# Patient Record
Sex: FEMALE | Race: BLACK OR AFRICAN AMERICAN | NOT HISPANIC OR LATINO | Employment: FULL TIME | URBAN - METROPOLITAN AREA
[De-identification: names, ages, dates, MRNs, and addresses within clinical notes are randomized per-mention and may not be internally consistent; named-entity substitution may affect disease eponyms.]

---

## 2018-01-01 ENCOUNTER — TRANSFERRED RECORDS (OUTPATIENT)
Dept: HEALTH INFORMATION MANAGEMENT | Facility: CLINIC | Age: 28
End: 2018-01-01

## 2018-01-01 LAB — PAP SMEAR - HIM PATIENT REPORTED: NEGATIVE

## 2018-06-12 ENCOUNTER — OFFICE VISIT (OUTPATIENT)
Dept: FAMILY MEDICINE | Facility: CLINIC | Age: 28
End: 2018-06-12
Payer: COMMERCIAL

## 2018-06-12 VITALS
SYSTOLIC BLOOD PRESSURE: 120 MMHG | HEIGHT: 61 IN | WEIGHT: 146.7 LBS | TEMPERATURE: 98.8 F | OXYGEN SATURATION: 99 % | BODY MASS INDEX: 27.7 KG/M2 | DIASTOLIC BLOOD PRESSURE: 80 MMHG | RESPIRATION RATE: 14 BRPM | HEART RATE: 70 BPM

## 2018-06-12 DIAGNOSIS — N92.1 MENORRHAGIA WITH IRREGULAR CYCLE: ICD-10-CM

## 2018-06-12 DIAGNOSIS — N76.0 BV (BACTERIAL VAGINOSIS): ICD-10-CM

## 2018-06-12 DIAGNOSIS — B96.89 BV (BACTERIAL VAGINOSIS): ICD-10-CM

## 2018-06-12 DIAGNOSIS — R10.32 ABDOMINAL PAIN, LEFT LOWER QUADRANT: Primary | ICD-10-CM

## 2018-06-12 DIAGNOSIS — N94.89 ADNEXAL MASS: ICD-10-CM

## 2018-06-12 DIAGNOSIS — Z11.3 SCREENING EXAMINATION FOR VENEREAL DISEASE: ICD-10-CM

## 2018-06-12 LAB
BETA HCG QUAL IFA URINE: NEGATIVE
SPECIMEN SOURCE: ABNORMAL
WET PREP SPEC: ABNORMAL

## 2018-06-12 PROCEDURE — 87491 CHLMYD TRACH DNA AMP PROBE: CPT | Performed by: FAMILY MEDICINE

## 2018-06-12 PROCEDURE — 87177 OVA AND PARASITES SMEARS: CPT | Mod: 59 | Performed by: FAMILY MEDICINE

## 2018-06-12 PROCEDURE — 87209 SMEAR COMPLEX STAIN: CPT | Performed by: FAMILY MEDICINE

## 2018-06-12 PROCEDURE — 99203 OFFICE O/P NEW LOW 30 MIN: CPT | Performed by: FAMILY MEDICINE

## 2018-06-12 PROCEDURE — 87591 N.GONORRHOEAE DNA AMP PROB: CPT | Performed by: FAMILY MEDICINE

## 2018-06-12 PROCEDURE — 84703 CHORIONIC GONADOTROPIN ASSAY: CPT | Performed by: FAMILY MEDICINE

## 2018-06-12 PROCEDURE — 87210 SMEAR WET MOUNT SALINE/INK: CPT | Performed by: FAMILY MEDICINE

## 2018-06-12 PROCEDURE — 36415 COLL VENOUS BLD VENIPUNCTURE: CPT | Performed by: FAMILY MEDICINE

## 2018-06-12 PROCEDURE — 85025 COMPLETE CBC W/AUTO DIFF WBC: CPT | Performed by: FAMILY MEDICINE

## 2018-06-12 RX ORDER — METRONIDAZOLE 500 MG/1
500 TABLET ORAL 2 TIMES DAILY
Qty: 14 TABLET | Refills: 0 | Status: SHIPPED | OUTPATIENT
Start: 2018-06-12 | End: 2018-06-25

## 2018-06-12 NOTE — PATIENT INSTRUCTIONS
Douleur abdominale (abdominal pain) de cause inconnue (chez les femmes)   La cause exacte de votre douleur abdominale (ventre) n'est pas connue de manière certaine. Amry ne signifie en aucun bart que vous devriez vous inquiéter, ou que les tests adéquats n'ont pas été effectués. Tout le monde préfère connaître la cause exacte de son problème, mais, dans le bart de la douleur abdominale, il arrive parfois qu'il n'existe aucune cause clairement déterminée, ce sandhya peut être une bonne chose. La bonne nouvelle est que carly symptômes peuvent être traités, et que vous allez vous sentir mieux.   Votre état ne semble pas sérieux maintenant; toutefois, il peut arriver que les signes d'un problème sérieux prennent plus de temps à se manifester. Pour cette raison, il est important que vous surveilliez l'apparition de nouveaux symptômes ou problèmes, ou de toute aggravation de votre état.  Au cours mary prochains jours, la douleur abdominale peut être intermittente, ou sonja continue. D'autres symptômes répandus peuvent comprendre la nausée (nausea) et le vomissement (vomiting). Il se peut qu'il vous soit difficile de dire si vous avez de la nausée; il se peut simplement que vous ne vous sentiez pas sonja et que vous n'associiez pas amberly à de la nausée. La constipation (constipation), la diarrhée (diarrhea) et la fièvre (fever) peuvent toutes accompagner cette douleur.  La douleur peut perdurer au cours mary prochains jours, même si edmundo est traitée de manière adéquate. Suivant la tournure de choses, il arrive que la cause devienne plus évidente, et edmundo peut dès lors nécessiter plus de traitement, ou un traitement différent. D'autres analyses, médicaments ou tests peuvent être nécessaires.  Soins à domicile  Votre fournisseur de soins de santé peut vous prescrire mary médicaments afin de couvrir la douleur, les symptômes ou une infection (infection). Suivez ses instructions pour prendre yuri médicaments.  Soins  généraux    Reposez-vous jusqu'à votre prochain examen. N'entreprenez aucune activité fatigante.    Essayez de trouver mary positions sandhya calment votre inconfort (discomfort). Un petit oreiller placé en l'abdomen peut aider à soulager la douleur.    Quelque chose de chaud en votre abdomen (par exemple un coussin chauffant (heating pad)) peut s'avérer utile, mais faites attention à ne pas vous brûler.  Régime alimentaire    Ne vous forcez pas à manger, en particulier si vous avez mary crampes (cramps), mary vomissements ou de la diarrhée.    L'eau est importante pour éviter la déshydratation (dehydrated). La soupe peut également être bonne pour vous. Les boissons énergétiques peuvent également s'avérer bénéfiques, en particulier si elles ne sont pas trop acides. Assurez-vous de ne pas boire de boissons sucrées, car celles-ci peuvent aggraver votre état. Buvez mary liquides en petites quantités. Buvez-les très lentement.    La caféine aggrave parfois la douleur et les crampes.    Évitez les produits laitiers si vous vomissez ou avez de la diarrhée.    Ne mangez pas de grandes quantités de nourriture en une seule fois. Attendez quelques minutes entre les bouchées.    Adoptez un régime à faible teneur en fibres (dit également   régime sans résidu   (low-residue diet)). Les aliments permis sont les pains de grains raffinés, le lester elsi, les jus de fruits et légumes sans la pulpe et les viandes tendres. Clay aliments passeront plus facilement par l'intestin.    Évitez les aliments en grains entiers, les fruits et légumes entiers, les viandes, les graines et les noix, les nourritures frites et grasses, les produits laitiers, l'alcool et la nourriture épicée jusqu'à disparition mary symptômes.  Suivi  Réalisez le suivi avec votre fournisseur de soins de santé comme indiqué, ou si votre douleur n'a pas commencé à diminuer sous les prochaines 24 heures.  Quand devez-vous obtenir mary soins médicaux  Cherchez à obtenir mary soins  médicaux dans les plus brefs délais dans tous les bart suivants :    La douleur s'aggrave ou se déplace vers la partie droite du bas-ventre.    Nouveau vomissement ou nouvelle diarrhée, ou aggravation de ceux-ci    Gonflement (swelling) de l'abdomen    Incapacité d'aller à la selle pendant plus de trois jours    Fièvre de 100,4  F (38  C) ou plus, ou autre température indiquée par votre fournisseur de soins de santé.    Sang dans le vomi ou les selles (couleur rouge foncé ou noire)    Jaunisse (jaundice) (yeux et peau de couleur jaune)    Faiblesse (weakness), étourdissement (dizziness)    Douleur dans le thorax, le dos, le cou ou la mâchoire    Saignement vaginal (vaginal bleeding) imprévu ou absence de règle (missed period)  Appelez le 911  Appelez les services d'urgences dans tous les bart suivants :    Difficulté à respirer (trouble breathing)    Confusion (confusion)    Syncope (fainting) ou perte de connaissance (loss of consciousness)    Rythme cardiaque rapide (rapid heat rate)    Crise épileptique (seizure)  Date Last Reviewed: 12/30/2015 2000-2017 [employer]. [address] [place] [address], [place] [address]. All rights reserved. This information is not intended as a substitute for professional medical care. Always follow your healthcare professional's instructions.

## 2018-06-12 NOTE — MR AVS SNAPSHOT
After Visit Summary   6/12/2018    Hilaria Jansen    MRN: [id number]           Patient Information     Date Of Birth          1990        Visit Information        Provider Department      6/12/2018 10:45 AM Taj Sun MD; [facility]        Today's Diagnoses     Menorrhagia with irregular cycle    -  1    Abdominal pain, left lower quadrant          Care Instructions      Douleur abdominale (abdominal pain) de cause inconnue (chez les femmes)   La cause exacte de votre douleur abdominale (ventre) n'est pas connue de manière certaine. Mary ne signifie en aucun bart que vous devriez vous inquiéter, ou que les tests adéquats n'ont pas été effectués. Tout le monde préfère connaître la cause exacte de son problème, mais, dans le bart de la douleur abdominale, il arrive parfois qu'il n'existe aucune cause clairement déterminée, ce sandhya peut être une bonne chose. La bonne nouvelle est que carly symptômes peuvent être traités, et que vous allez vous sentir mieux.   Votre état ne semble pas sérieux maintenant; toutefois, il peut arriver que les signes d'un problème sérieux prennent plus de temps à se manifester. Pour cette raison, il est important que vous surveilliez l'apparition de nouveaux symptômes ou problèmes, ou de toute aggravation de votre état.  Au cours mary prochains jours, la douleur abdominale peut être intermittente, ou sonja continue. D'autres symptômes répandus peuvent comprendre la nausée (nausea) et le vomissement (vomiting). Il se peut qu'il vous soit difficile de dire si vous avez de la nausée; il se peut simplement que vous ne vous sentiez pas sonja et que vous n'associiez pas amberly à de la nausée. La constipation (constipation), la diarrhée (diarrhea) et la fièvre (fever) peuvent toutes accompagner cette douleur.  La douleur peut perdurer au cours mary prochains jours, même si edmundo est traitée de manière adéquate. Suivant la tournure de choses, il arrive que  la cause devienne plus évidente, et edmundo peut dès lors nécessiter plus de traitement, ou un traitement différent. D'autres analyses, médicaments ou tests peuvent être nécessaires.  Soins à domicile  Votre fournisseur de soins de santé peut vous prescrire mary médicaments afin de couvrir la douleur, les symptômes ou une infection (infection). Suivez ses instructions pour prendre yuri médicaments.  Soins généraux    Reposez-vous jusqu'à votre prochain examen. N'entreprenez aucune activité fatigante.    Essayez de trouver mary positions sandhya calment votre inconfort (discomfort). Un petit oreiller placé en l'abdomen peut aider à soulager la douleur.    Quelque chose de chaud en votre abdomen (par exemple un coussin chauffant (heating pad)) peut s'avérer utile, mais faites attention à ne pas vous brûler.  Régime alimentaire    Ne vous forcez pas à manger, en particulier si vous avez mary crampes (cramps), mary vomissements ou de la diarrhée.    L'eau est importante pour éviter la déshydratation (dehydrated). La soupe peut également être bonne pour vous. Les boissons énergétiques peuvent également s'avérer bénéfiques, en particulier si elles ne sont pas trop acides. Assurez-vous de ne pas boire de boissons sucrées, car celles-ci peuvent aggraver votre état. Buvez mary liquides en petites quantités. Buvez-les très lentement.    La caféine aggrave parfois la douleur et les crampes.    Évitez les produits laitiers si vous vomissez ou avez de la diarrhée.    Ne mangez pas de grandes quantités de nourriture en une seule fois. Attendez quelques minutes entre les bouchées.    Adoptez un régime à faible teneur en fibres (dit également   régime sans résidu   (low-residue diet)). Les aliments permis sont les pains de grains raffinés, le lester elsi, les jus de fruits et légumes sans la pulpe et les viandes tendres. Yuri aliments passeront plus facilement par l'intestin.    Évitez les aliments en grains entiers, les fruits et légumes  entiers, les viandes, les graines et les noix, les nourritures frites et grasses, les produits laitiers, l'alcool et la nourriture épicée jusqu'à disparition mary symptômes.  Suivi  Réalisez le suivi avec votre fournisseur de soins de santé comme indiqué, ou si votre douleur n'a pas commencé à diminuer sous les prochaines 24 heures.  Quand devez-vous obtenir mary soins médicaux  Cherchez à obtenir mary soins médicaux dans les plus brefs délais dans tous les bart suivants :    La douleur s'aggrave ou se déplace vers la partie droite du bas-ventre.    Nouveau vomissement ou nouvelle diarrhée, ou aggravation de ceux-ci    Gonflement (swelling) de l'abdomen    Incapacité d'aller à la selle pendant plus de trois jours    Fièvre de 100,4  F (38  C) ou plus, ou autre température indiquée par votre fournisseur de soins de santé.    Sang dans le vomi ou les selles (couleur rouge foncé ou noire)    Jaunisse (jaundice) (yeux et peau de couleur jaune)    Faiblesse (weakness), étourdissement (dizziness)    Douleur dans le thorax, le dos, le cou ou la mâchoire    Saignement vaginal (vaginal bleeding) imprévu ou absence de règle (missed period)  Appelez le 911  Appelez les services d'urgences dans tous les bart suivants :    Difficulté à respirer (trouble breathing)    Confusion (confusion)    Syncope (fainting) ou perte de connaissance (loss of consciousness)    Rythme cardiaque rapide (rapid heat rate)    Crise épileptique (seizure)  Date Last Reviewed: 12/30/2015 2000-2017 [employer]. [address] [place] [address], [place] [address]. All rights reserved. This information is not intended as a substitute for professional medical care. Always follow your healthcare professional's instructions.                Follow-ups after your visit        Future tests that were ordered for you today     Open Future Orders        Priority Expected Expires Ordered    Ova and Parasite Exam Routine Routine  6/12/2019 6/12/2018            Who  "to contact     If you have questions or need follow up information about today's clinic visit or your schedule please contact [facility] directly at [phone].  Normal or non-critical lab and imaging results will be communicated to you by MyChart, letter or phone within 4 business days after the clinic has received the results. If you do not hear from us within 7 days, please contact the clinic through MyChart or phone. If you have a critical or abnormal lab result, we will notify you by phone as soon as possible.  Submit refill requests through [employer] or call your pharmacy and they will forward the refill request to us. Please allow 3 business days for your refill to be completed.          Additional Information About Your Visit        [facility]Charlotte [facility] HospitalYnvisible Information     [employer] lets you send messages to your doctor, view your test results, renew your prescriptions, schedule appointments and more. To sign up, go to [email or web address] . Click on \"Log in\" on the left side of the screen, which will take you to the Welcome page. Then click on \"Sign up Now\" on the right side of the page.     You will be asked to enter the access code listed below, as well as some personal information. Please follow the directions to create your username and password.     Your access code is: [email or web address]  Expires: 9/10/2018 11:36 AM     Your access code will  in 90 days. If you need help or a new code, please call your [place] clinic or [phone].        Care EveryWhere ID     This is your Care EveryWhere ID. This could be used by other organizations to access your [place] medical records  [id number]        Your Vitals Were     Pulse Temperature Respirations Height Last Period Pulse Oximetry    70 98.8  F (37.1  C) (Oral) 14 5' 1\" (1.549 m) (LMP Unknown) 99%    Breastfeeding? BMI (Body Mass Index)                No 27.72 kg/m2           Blood Pressure from Last 3 Encounters:   18 120/80    Weight from Last 3 " Encounters:   06/12/18 146 lb 11.2 oz (66.5 kg)              We Performed the Following     Beta HCG Qual, Urine - [facility] ([id number])     CBC with platelets differential     Eosinophil smear     Wet prep        Primary Care Provider Office Phone # Fax #    [facility] [phone] [address]  [facility] [address]        Equal Access to Services     SHERMAN REYES : Hadii aad ku hadasho Soomaali, waaxda luqadaha, qaybta kaalmada adeegyada, waxay idiin hayaan adeeg khararomeo lakerline . So [facility] [phone].    ATENCIÓN: Si habla español, tiene a coronado disposición servicios gratuitos de asistencia lingüística. Llame al [phone].    We comply with applicable federal civil rights laws and [place] laws. We do not discriminate on the basis of race, color, national origin, age, disability, sex, sexual orientation, or gender identity.            Thank you!     Thank you for choosing [facility]  for your care. Our goal is always to provide you with excellent care. Hearing back from our patients is one way we can continue to improve our services. Please take a few minutes to complete the written survey that you may receive in the mail after your visit with us. Thank you!             Your Updated Medication List - Protect others around you: Learn how to safely use, store and throw away your medicines at www.disposemymeds.org.      Notice  As of 6/12/2018 11:36 AM    You have not been prescribed any medications.

## 2018-06-12 NOTE — PROGRESS NOTES
SUBJECTIVE:   Hilaria Jansen is a 28 year old female who presents to clinic today for the following health issues:    Chief Complaint   Patient presents with     Pelvic Pain     > 1 month-missed period last month-feels like somthing moving inside-painful urination and constipation-left flank pain-   28-year-old who presents to clinic to establish care.  Past medical history significant for a left adnexal mass that was being evaluated by OB/GYN.  Today she is concerned about multiple symptoms.  She noticed progressive intermittent abdominal pain worse in the left lower quadrant.  She also describes that she feels that there is something moving.  (The patient specifically says it is not gas or bowel movements).   She is concerned about a parasitic infection.  She recently traveled to [place] and stayed for a month.  She denies any hiking or wilderness trips.  No other family members experienced the same symptoms.  She denies any episodes of constipation or diarrhea.  Denies any changes to her stool.  Pain is still located in the left lower quadrant.  Occasionally increases to 7 out of 10.  Currently relieved by Advil.    Also, she is concerned about a vaginal discharge with some odor that has been present for the past 1-2 weeks.  Has not tried any medications for it.    Also, she missed a menstrual cycle last month but had another episode of bleeding this month.  Menstrual cycle lasted from Trinidad 3, 2018 to June 7, 2018.  Abdominal pain and feeling that something is moving.     Feels that there is something moving.  Last month. And this month and now it is constant.   Abdominal pain LLQ. 7/10. Intermittent Advil.     Problem list and histories reviewed & adjusted, as indicated.  Additional history: as documented    There is no problem list on file for this patient.    History reviewed. No pertinent surgical history.    Social History   Substance Use Topics     Smoking status: Never Smoker     Smokeless tobacco: Never  "Used     Alcohol use No     Family History   Problem Relation Age of Onset     Breast Cancer Paternal Aunt      Other - See Comments Maternal Aunt            Reviewed and updated as needed this visit by clinical staff         ROS:  Constitutional, HEENT, cardiovascular, pulmonary, gi and gu systems are negative, except as otherwise noted.    OBJECTIVE:     /80  Pulse 70  Temp 98.8  F (37.1  C) (Oral)  Resp 14  Ht 5' 1\" (1.549 m)  Wt 146 lb 11.2 oz (66.5 kg)  LMP  (LMP Unknown)  SpO2 99%  Breastfeeding? No  BMI 27.72 kg/m2  Body mass index is 27.72 kg/(m^2).  GENERAL: healthy, alert and no distress  NECK: no adenopathy, no asymmetry, masses, or scars and thyroid normal to palpation  RESP: lungs clear to auscultation - no rales, rhonchi or wheezes  CV: regular rate and rhythm, normal S1 S2, no S3 or S4, no murmur, click or rub, no peripheral edema and peripheral pulses strong  ABDOMEN: soft, tenderness to palpation the left lower quadrant, no hepatosplenomegaly, no masses and bowel sounds normal   (female): normal female external genitalia, normal urethral meatus, vaginal mucosa, moderate vaginal discharge with a strong odor.    Diagnostic Test Results:  Results for orders placed or performed in visit on 06/12/18 (from the past 24 hour(s))   Wet prep   Result Value Ref Range    Specimen Description Vagina     Wet Prep No Trichomonas seen     Wet Prep No yeast seen     Wet Prep Clue cells seen (A)    CBC with platelets differential   Result Value Ref Range    WBC 6.7 4.0 - 11.0 10e9/L    RBC Count 4.37 3.8 - 5.2 10e12/L    Hemoglobin 12.4 11.7 - 15.7 g/dL    Hematocrit 37.0 35.0 - 47.0 %    MCV 85 78 - 100 fl    MCH 28.4 26.5 - 33.0 pg    MCHC 33.5 31.5 - 36.5 g/dL    RDW 12.7 10.0 - 15.0 %    Platelet Count 229 150 - 450 10e9/L    Diff Method Automated Method     % Neutrophils 48.2 %    % Lymphocytes 43.8 %    % Monocytes 6.3 %    % Eosinophils 1.4 %    % Basophils 0.3 %    Absolute Neutrophil 3.2 1.6 " - 8.3 10e9/L    Absolute Lymphocytes 2.9 0.8 - 5.3 10e9/L    Absolute Monocytes 0.4 0.0 - 1.3 10e9/L    Absolute Eosinophils 0.1 0.0 - 0.7 10e9/L    Absolute Basophils 0.0 0.0 - 0.2 10e9/L   Beta HCG Qual, Urine - [facility] ([id number])   Result Value Ref Range    Beta HCG Qual IFA Urine Negative NEG^Negative        04/06/2018  1. Uterus is normal in size, shape and orientation. Two intramural myomas     noted as above.     2. Comparison made to prior US dated 1/25/18. The complex cystic/solid     mass previously seen within the right ovary has diminished in size.     Today's scan demonstrates the presence of two simple, benign appearing     follicles as well as an adjacent complex cystic mass now measuring 2.1 x     1.4 x 1.2 cm. The complex cystic mass likely represents the previous     finding and appears to be resolving. Consider another short-term follow-up     study in 6-12 weeks to confirm complete resolution.       3. Left ovary is normal in appearance.         ASSESSMENT/PLAN:   1. Abdominal pain, left lower quadrant  Assessment: The patient specifically states that she feels something moving.  Differential diagnosis includes intrauterine pregnancy, parasitic infection or gas.  Will start by ordering a urine pregnancy test to evaluate if the patient is pregnant.  We also ordered CBC with eosinophil count to evaluate for any parasitic infections.  Plan:  - Ova and Parasite Exam Routine; Future  - Eosinophil smear  - CBC with platelets differential  - Beta HCG Qual, Urine - [facility] ([id number])    2. Adnexal mass  Assessment:The patient was noted to have a complex cystic mass noted by ultrasound on April 6, 2018.  It was recommended by the radiographer to obtain a repeat ultrasound after 6-12 weeks but the patient was in vacation in [place].  During the visit today she does have tenderness to palpation in the left lower quadrant which is consistent with the location of the cystic mass noted  previously.  Plan:  -  US Pelvic Complete w Transvaginal; Future    3. BV (bacterial vaginosis)  - Wet prep  - metroNIDAZOLE (FLAGYL) 500 MG tablet; Take 1 tablet (500 mg) by mouth 2 times daily  Dispense: 14 tablet; Refill: 0    4. Menorrhagia with irregular cycle  - Beta HCG Qual, Urine - [facility] ([id number])    5. Screening examination for venereal disease  - Chlamydia trachomatis PCR  - Neisseria gonorrhoeae PCR    FOLLOW-UP: The patient was advised to return to clinic for a follow-up visit after 1-2 weeks.   Return to clinic earlier if symptoms fails to improve. if symptoms persisted or fails to improve. Obtain pelvic US and stool studies.     Taj Sun MD  [facility]  PAGER: [phone] or (W): [phone]

## 2018-06-12 NOTE — NURSING NOTE
"Chief Complaint   Patient presents with     Pelvic Pain     > 1 month-missed period last month-feels like somthing moving inside-painful urination and constipation-left flank pain-       Initial /80  Pulse 70  Temp 98.8  F (37.1  C) (Oral)  Resp 14  Ht 5' 1\" (1.549 m)  Wt 146 lb 11.2 oz (66.5 kg)  LMP  (LMP Unknown)  SpO2 99%  Breastfeeding? No  BMI 27.72 kg/m2 Estimated body mass index is 27.72 kg/(m^2) as calculated from the following:    Height as of this encounter: 5' 1\" (1.549 m).    Weight as of this encounter: 146 lb 11.2 oz (66.5 kg).  BP completed using cuff size: regular    Health Maintenance that is potentially due pending provider review:  Health Maintenance Due   Topic Date Due     TETANUS IMMUNIZATION (SYSTEM ASSIGNED)  04/28/2008     HIV SCREEN (SYSTEM ASSIGNED)  04/28/2008     PAP SCREENING Q3 YR (SYSTEM ASSIGNED)  04/28/2011         Pap-done- 3/2013-Dr. Annalisa Nam[place], [place]-[facility]  "

## 2018-06-13 LAB
BASOPHILS # BLD AUTO: 0 10E9/L (ref 0–0.2)
BASOPHILS NFR BLD AUTO: 0.3 %
C TRACH DNA SPEC QL NAA+PROBE: NEGATIVE
DIFFERENTIAL METHOD BLD: NORMAL
EOSINOPHIL # BLD AUTO: 0.1 10E9/L (ref 0–0.7)
EOSINOPHIL NFR BLD AUTO: 1.4 %
EOSINOPHIL SPEC QL WRIGHT STN: NORMAL
ERYTHROCYTE [DISTWIDTH] IN BLOOD BY AUTOMATED COUNT: 12.7 % (ref 10–15)
HCT VFR BLD AUTO: 37 % (ref 35–47)
HGB BLD-MCNC: 12.4 G/DL (ref 11.7–15.7)
LYMPHOCYTES # BLD AUTO: 2.9 10E9/L (ref 0.8–5.3)
LYMPHOCYTES NFR BLD AUTO: 43.8 %
Lab: NORMAL
MCH RBC QN AUTO: 28.4 PG (ref 26.5–33)
MCHC RBC AUTO-ENTMCNC: 33.5 G/DL (ref 31.5–36.5)
MCV RBC AUTO: 85 FL (ref 78–100)
MONOCYTES # BLD AUTO: 0.4 10E9/L (ref 0–1.3)
MONOCYTES NFR BLD AUTO: 6.3 %
N GONORRHOEA DNA SPEC QL NAA+PROBE: NEGATIVE
NEUTROPHILS # BLD AUTO: 3.2 10E9/L (ref 1.6–8.3)
NEUTROPHILS NFR BLD AUTO: 48.2 %
O+P STL MICRO: NORMAL
O+P STL MICRO: NORMAL
PLATELET # BLD AUTO: 229 10E9/L (ref 150–450)
RBC # BLD AUTO: 4.37 10E12/L (ref 3.8–5.2)
SPECIMEN SOURCE: NORMAL
WBC # BLD AUTO: 6.7 10E9/L (ref 4–11)

## 2018-06-14 ENCOUNTER — OFFICE VISIT (OUTPATIENT)
Dept: OBGYN | Facility: CLINIC | Age: 28
End: 2018-06-14
Attending: FAMILY MEDICINE
Payer: COMMERCIAL

## 2018-06-14 ENCOUNTER — RADIANT APPOINTMENT (OUTPATIENT)
Dept: ULTRASOUND IMAGING | Facility: CLINIC | Age: 28
End: 2018-06-14
Attending: FAMILY MEDICINE
Payer: COMMERCIAL

## 2018-06-14 VITALS
WEIGHT: 146 LBS | BODY MASS INDEX: 27.59 KG/M2 | DIASTOLIC BLOOD PRESSURE: 73 MMHG | HEART RATE: 70 BPM | SYSTOLIC BLOOD PRESSURE: 109 MMHG | TEMPERATURE: 97.6 F

## 2018-06-14 DIAGNOSIS — N94.89 ADNEXAL MASS: ICD-10-CM

## 2018-06-14 DIAGNOSIS — N92.6 IRREGULAR MENSES: Primary | ICD-10-CM

## 2018-06-14 PROCEDURE — 76830 TRANSVAGINAL US NON-OB: CPT | Performed by: OBSTETRICS & GYNECOLOGY

## 2018-06-14 PROCEDURE — 99203 OFFICE O/P NEW LOW 30 MIN: CPT | Performed by: OBSTETRICS & GYNECOLOGY

## 2018-06-14 NOTE — PROGRESS NOTES
"SUBJECTIVE:  Hilaria Jansen is a 28 year old female who presents to discuss abdominal pain, recent ultrasound.  She was evaluated by Dr. Sun 6/12/18.  She describes a sensation of \"something moving\" in her lower abdomen.  She describes pain in her left lower abdomen.  She had no period in May, most recent period started Trinidad 3.  She would like to become pregnant.  Pap 2013 normal ([facility]).  She states she usually has daily bowel movements, but sometimes every two days, and the stools can be hard.      An ultrasound on 4/6/18 showed two intramural fibroids.  The left ovary was normal.  The right ovary had two simple, benign appearing follicles as well as an adjacent complex cystic mass measuring 2.1 x 1.4 x 1.2 cm.  (An ultrasound 1/25/18 had showed a complex cystic/solid mass in the right ovary, larger than that seen on the 4/6/18 ultrasound).      An ultrasound in our clinic today shows the left ovary measures 5.0 x 4.6 x 3.7 cm, and contains 3 simple cysts.  The right ovary measures 4.1 x 2.6 x 2.4 cm, and contains a single simple cyst 2.6 x 3.0 x 2.1 cm.  Trace free fluid is seen in the cul de sac, and both adnexae.      OBJECTIVE: /73  Pulse 70  Temp 97.6  F (36.4  C) (Oral)  Wt 146 lb (66.2 kg)  LMP  (LMP Unknown)  BMI 27.59 kg/m2 Patient was not otherwise examined.      ASSESSMENT: Abdominal pain.  Bilateral ovarian cysts, likely physiologic given prior ultrasound findings.  Recent irregular menses.  Desire for pregnancy.      PLAN: Discussed the ultrasound findings in detail.  Discussed physiologic ovarian cysts, formation and resolution.  Discussed ovulation, conception.  Discussed possibility that her abdominal pain has a GI source rather than a pelvic source.      After discussion, she will plan to have a lab visit for progesterone later in her cycle, about the first week in July, to try to assess ovulationm, call for results.  If progesterone is elevated, ovulation is confirmed.  " If low, she will plan a repeat progesterone about 2 weeks later, and we will re-evaluate.  In view of her desire for pregnancy, she will plan a future appointment with Dr. Capps later this summer.      Please note greater than 50% of this 30 minute appointment were spent in counseling with the patient of the issues described above in the history of present illness and in the plan, including evaluation and managment of irregular menses, abdominal pain.    Due to language barrier, an  was present during the history-taking and subsequent discussion with this patient.

## 2018-06-14 NOTE — MR AVS SNAPSHOT
"              After Visit Summary   2018    Hilaria Jansen    MRN: [id number]           Patient Information     Date Of Birth          1990        Visit Information        Provider Department      2018 3:15 PM Nora Vera MD; [facility] [place] [facility]        Today's Diagnoses     Irregular menses    -  1       Follow-ups after your visit        Who to contact     If you have questions or need follow up information about today's clinic visit or your schedule please contact [facility] directly at [phone].  Normal or non-critical lab and imaging results will be communicated to you by Lat[employer] Education[employer], letter or phone within 4 business days after the clinic has received the results. If you do not hear from us within 7 days, please contact the clinic through [employer] or phone. If you have a critical or abnormal lab result, we will notify you by phone as soon as possible.  Submit refill requests through [a]list games or call your pharmacy and they will forward the refill request to us. Please allow 3 business days for your refill to be completed.          Additional Information About Your Visit        MyChart Information     [a]list games lets you send messages to your doctor, view your test results, renew your prescriptions, schedule appointments and more. To sign up, go to [email or web address] . Click on \"Log in\" on the left side of the screen, which will take you to the Welcome page. Then click on \"Sign up Now\" on the right side of the page.     You will be asked to enter the access code listed below, as well as some personal information. Please follow the directions to create your username and password.     Your access code is: [email or web address]  Expires: 9/10/2018 11:36 AM     Your access code will  in 90 days. If you need help or a new code, please call your [facility] or [phone].        Care EveryWhere ID     This is your Care EveryWhere ID. This could be used by other " organizations to access your [facility] medical records  [id number]        Your Vitals Were     Pulse Temperature Last Period BMI (Body Mass Index)          70 97.6  F (36.4  C) (Oral) (LMP Unknown) 27.59 kg/m2         Blood Pressure from Last 3 Encounters:   06/14/18 109/73   06/12/18 120/80    Weight from Last 3 Encounters:   06/14/18 146 lb (66.2 kg)   06/12/18 146 lb 11.2 oz (66.5 kg)               Primary Care Provider Office Phone # Fax #    [facility] [phone]       [address]  [facility] [address]        Equal Access to Services     SHERMAN REYES : Hadii luc velizo Sopee, waaxda luqadaha, qaybta kaalmada adeegyada, katja britt . So [facility] [phone].    ATENCIÓN: Si habla español, tiene a coronado disposición servicios gratuitos de asistencia lingüística. Llame al [phone].    We comply with applicable federal civil rights laws and [place] laws. We do not discriminate on the basis of race, color, national origin, age, disability, sex, sexual orientation, or gender identity.            Thank you!     Thank you for choosing [facility]  for your care. Our goal is always to provide you with excellent care. Hearing back from our patients is one way we can continue to improve our services. Please take a few minutes to complete the written survey that you may receive in the mail after your visit with us. Thank you!             Your Updated Medication List - Protect others around you: Learn how to safely use, store and throw away your medicines at www.disposemymeds.org.          This list is accurate as of 6/14/18 11:59 PM.  Always use your most recent med list.                   Brand Name Dispense Instructions for use Diagnosis    metroNIDAZOLE 500 MG tablet    FLAGYL    14 tablet    Take 1 tablet (500 mg) by mouth 2 times daily    BV (bacterial vaginosis)

## 2018-06-14 NOTE — PROGRESS NOTES
Please call the patient with the results.  Pelvic ultrasound shows a medium size cyst in the right ovary and multiple small cysts in the left ovary The pain in your pelvic area can be attributed to the cysts. I would recommend a follow-up here in clinic if your symptoms continues.     Take care   Taj Sun MD

## 2018-06-14 NOTE — NURSING NOTE
"Chief Complaint   Patient presents with     RECHECK       Initial /73  Pulse 70  Temp 97.6  F (36.4  C) (Oral)  Wt 146 lb (66.2 kg)  LMP  (LMP Unknown)  BMI 27.59 kg/m2 Estimated body mass index is 27.59 kg/(m^2) as calculated from the following:    Height as of 6/12/18: 5' 1\" (1.549 m).    Weight as of this encounter: 146 lb (66.2 kg).  BP completed using cuff size: regular    No obstetric history on file.    The following HM Due: NONE      The following patient reported/Care Every where data was sent to:  P ABSTRACT QUALITY INITIATIVES [[id number]]  na      n/a              "

## 2018-06-15 ENCOUNTER — TELEPHONE (OUTPATIENT)
Dept: FAMILY MEDICINE | Facility: CLINIC | Age: 28
End: 2018-06-15

## 2018-06-15 NOTE — TELEPHONE ENCOUNTER
Result note 6/12/18:      US Pelvic Complete w Transvaginal   Status:  Final result   Visible to patient:  No (Not Released) Dx:  Adnexal mass Order: [id number]       Notes Recorded by Iesha Gilliland, ADAN on 6/15/2018 at 1:42 PM  LM on home number through [employer]  for pt to call back.  Iesha Gilliland RN    ------    Notes Recorded by Taj Sun MD on 6/14/2018 at 5:08 PM  Please call the patient with the results.  Pelvic ultrasound shows a medium size cyst in the right ovary and multiple small cysts in the left ovary The pain in your pelvic area can be attributed to the cysts. I would recommend a follow-up here in clinic if your symptoms continues.     Take care   Taj Sun MD

## 2018-06-18 NOTE — TELEPHONE ENCOUNTER
Spoke to pt via [facility] interpeter.  Informed and scheduled pt in apt tomorrow per pt request.  Iesha Gilliland RN

## 2018-06-19 ENCOUNTER — HEALTH MAINTENANCE LETTER (OUTPATIENT)
Age: 28
End: 2018-06-19

## 2018-06-25 ENCOUNTER — OFFICE VISIT (OUTPATIENT)
Dept: FAMILY MEDICINE | Facility: CLINIC | Age: 28
End: 2018-06-25
Payer: COMMERCIAL

## 2018-06-25 VITALS
OXYGEN SATURATION: 100 % | TEMPERATURE: 99 F | HEART RATE: 104 BPM | SYSTOLIC BLOOD PRESSURE: 118 MMHG | HEIGHT: 61 IN | WEIGHT: 147.2 LBS | DIASTOLIC BLOOD PRESSURE: 74 MMHG | BODY MASS INDEX: 27.79 KG/M2

## 2018-06-25 DIAGNOSIS — N83.209 CYST OF OVARY, UNSPECIFIED LATERALITY: ICD-10-CM

## 2018-06-25 DIAGNOSIS — R10.2 PELVIC PAIN IN FEMALE: Primary | ICD-10-CM

## 2018-06-25 PROCEDURE — 99213 OFFICE O/P EST LOW 20 MIN: CPT | Performed by: FAMILY MEDICINE

## 2018-06-25 NOTE — MR AVS SNAPSHOT
"              After Visit Summary   6/25/2018    Hilaria Jansen    MRN: [id number]           Patient Information     Date Of Birth          1990        Visit Information        Provider Department      6/25/2018 3:45 PM Taj Sun MD; [employer] [facility] [facility]         Follow-ups after your visit        Your next 10 appointments already scheduled     Jul 26, 2018  5:00 PM CDT   Office Visit with Brooke Capps MD   [facility] ([facility]    [address] [place] [id number] [phone]           Bring a current list of meds and any records pertaining to this visit. For Physicals, please bring immunization records and any forms needing to be filled out. Please arrive 10 minutes early to complete paperwork.              Who to contact     If you have questions or need follow up information about today's clinic visit or your schedule please contact [facility] directly at [phone].  Normal or non-critical lab and imaging results will be communicated to you by makeristhart, letter or phone within 4 business days after the clinic has received the results. If you do not hear from us within 7 days, please contact the clinic through Ulaboxt or phone. If you have a critical or abnormal lab result, we will notify you by phone as soon as possible.  Submit refill requests through [employer] (formerly [employer]) or call your pharmacy and they will forward the refill request to us. Please allow 3 business days for your refill to be completed.          Additional Information About Your Visit        makeristharMakstr Information     [employer] (formerly [employer]) lets you send messages to your doctor, view your test results, renew your prescriptions, schedule appointments and more. To sign up, go to [email or web address] . Click on \"Log in\" on the left side of the screen, which will take you to the Welcome page. Then click on \"Sign up Now\" on the right side of the page. " "    You will be asked to enter the access code listed below, as well as some personal information. Please follow the directions to create your username and password.     Your access code is: [email or web address]  Expires: 9/10/2018 11:36 AM     Your access code will  in 90 days. If you need help or a new code, please call your [facility] or [phone].        Care EveryWhere ID     This is your Care EveryWhere ID. This could be used by other organizations to access your [place] medical records  [id number]        Your Vitals Were     Pulse Temperature Height Last Period Pulse Oximetry Breastfeeding?    104 99  F (37.2  C) (Oral) 5' 1\" (1.549 m) 2018 (Exact Date) 100% No    BMI (Body Mass Index)                   27.81 kg/m2            Blood Pressure from Last 3 Encounters:   18 118/74   18 109/73   18 120/80    Weight from Last 3 Encounters:   18 147 lb 3.2 oz (66.8 kg)   18 146 lb (66.2 kg)   18 146 lb 11.2 oz (66.5 kg)              Today, you had the following     No orders found for display       Primary Care Provider Office Phone # Fax #    Taj Beka Sun [phone] [address]  [place] [address]        Equal Access to Services     [facility]: Hadii aad ku hadasho Sotaqueriaali, waaxda luqadaha, qaybta kaalmada adegerriyada, katja britt . So [facility] [phone].    ATENCIÓN: Si habla español, tiene a coronado disposición servicios gratuitos de asistencia lingüística. Llame al [phone].    We comply with applicable federal civil rights laws and [place] laws. We do not discriminate on the basis of race, color, national origin, age, disability, sex, sexual orientation, or gender identity.            Thank you!     Thank you for choosing [facility]  for your care. Our goal is always to provide you with excellent care. Hearing back from our patients is one way we can continue to improve our " services. Please take a few minutes to complete the written survey that you may receive in the mail after your visit with us. Thank you!             Your Updated Medication List - Protect others around you: Learn how to safely use, store and throw away your medicines at www.disposemymeds.org.      Notice  As of 6/25/2018  4:31 PM    You have not been prescribed any medications.

## 2018-06-25 NOTE — PROGRESS NOTES
"  SUBJECTIVE:   Hilaria Jansen is a 28 year old female who presents to clinic today for the following health issues:    Follow up U/S results.  The patient presents to clinic for a follow-up visit.  She reports that left lower quadrant pain has resolved.  She denies any nausea vomiting or diarrhea.  She continues to be concerned about something moving inside her abdomen.  She denies any abdominal pain, constipation or diarrhea.  Desires to know the results of her last ultrasound.    Problem list and histories reviewed & adjusted, as indicated.  Additional history: as documented    Patient Active Problem List   Diagnosis     Adnexal mass     No past surgical history on file.    Social History   Substance Use Topics     Smoking status: Never Smoker     Smokeless tobacco: Never Used     Alcohol use No     Family History   Problem Relation Age of Onset     Breast Cancer Paternal Aunt      Other - See Comments Maternal Aunt            Reviewed and updated as needed this visit by clinical staff  Tobacco  Allergies       Reviewed and updated as needed this visit by Provider         ROS:  Constitutional, HEENT, cardiovascular, pulmonary, gi and gu systems are negative, except as otherwise noted.    OBJECTIVE:     /74  Pulse 104  Temp 99  F (37.2  C) (Oral)  Ht 5' 1\" (1.549 m)  Wt 147 lb 3.2 oz (66.8 kg)  LMP 06/03/2018 (Exact Date)  SpO2 100%  Breastfeeding? No  BMI 27.81 kg/m2  Body mass index is 27.81 kg/(m^2).  GENERAL: healthy, alert and no distress  NECK: no adenopathy, no asymmetry, masses, or scars and thyroid normal to palpation  RESP: lungs clear to auscultation - no rales, rhonchi or wheezes  CV: regular rate and rhythm, normal S1 S2, no S3 or S4, no murmur, click or rub, no peripheral edema and peripheral pulses strong  ABDOMEN: soft, nontender, no hepatosplenomegaly, no masses and bowel sounds normal  MS: no gross musculoskeletal defects noted, no edema    Diagnostic Test Results:    Impression:  " The uterus is normal.  The endometrium measures 2.6 mm.  The right ovary contains a simple cyst as noted above.  The left ovary contains at least three simple cysts as noted above.      ASSESSMENT/PLAN:       ICD-10-CM    1. Pelvic pain in female R10.2    2. Cyst of ovary, unspecified laterality N83.209      The patient was noted to have a complex cystic mass noted by ultrasound on April 6, 2018.  It was recommended by the radiographer to obtain a repeat ultrasound after 6-12 weeks but the patient was in vacation in [place].  Ultrasound was ordered in the previous visit.  Results were reviewed with the patient today.  Cystic mass described in the previous ultrasound was not noted.  She was noted to have 3 small cysts in the left ovary.  Pelvic pain is likely secondary to ovarian cyst.  The patient does not have any gastrointestinal symptoms at this time.  She was advised to return to clinic for evaluation if pain continues.   Results of labs from previous visit including ova and parasite, enteric panel were all unremarkable.    Taj Sun MD  [facility]

## 2018-06-25 NOTE — NURSING NOTE
"Chief Complaint   Patient presents with     Follow Up For     /74  Pulse 104  Temp 99  F (37.2  C) (Oral)  Ht 5' 1\" (1.549 m)  Wt 147 lb 3.2 oz (66.8 kg)  LMP 06/03/2018 (Exact Date)  SpO2 100%  Breastfeeding? No  BMI 27.81 kg/m2 Estimated body mass index is 27.81 kg/(m^2) as calculated from the following:    Height as of this encounter: 5' 1\" (1.549 m).    Weight as of this encounter: 147 lb 3.2 oz (66.8 kg).  BP completed using cuff size: regular       Health Maintenance due pending provider review:  Pap Smear    Pt had PAP SMEAR done this year with Dr. Annalisa Nam-Cedar - results were normal.    Farrah Blackmon MA  "

## 2018-07-10 DIAGNOSIS — N92.6 IRREGULAR MENSES: Primary | ICD-10-CM

## 2018-07-10 DIAGNOSIS — N92.6 IRREGULAR MENSES: ICD-10-CM

## 2018-07-10 LAB — PROGEST SERPL-MCNC: <0.2 NG/ML

## 2018-07-10 PROCEDURE — 84144 ASSAY OF PROGESTERONE: CPT | Performed by: OBSTETRICS & GYNECOLOGY

## 2018-07-10 PROCEDURE — 36415 COLL VENOUS BLD VENIPUNCTURE: CPT | Performed by: OBSTETRICS & GYNECOLOGY

## 2018-07-10 NOTE — PROGRESS NOTES
Pt had progesterone level drawn and it was low. Per AM, pt to repeat in 2 wks. Order placed.   Leslee Miller, RN-BSN

## 2018-07-24 DIAGNOSIS — N92.6 IRREGULAR MENSES: ICD-10-CM

## 2018-07-24 LAB — PROGEST SERPL-MCNC: 22.5 NG/ML

## 2018-07-24 PROCEDURE — 36415 COLL VENOUS BLD VENIPUNCTURE: CPT | Performed by: OBSTETRICS & GYNECOLOGY

## 2018-07-24 PROCEDURE — 84144 ASSAY OF PROGESTERONE: CPT | Performed by: OBSTETRICS & GYNECOLOGY

## 2018-07-26 ENCOUNTER — OFFICE VISIT (OUTPATIENT)
Dept: OBGYN | Facility: CLINIC | Age: 28
End: 2018-07-26
Payer: COMMERCIAL

## 2018-07-26 VITALS
HEART RATE: 101 BPM | BODY MASS INDEX: 29.19 KG/M2 | OXYGEN SATURATION: 100 % | DIASTOLIC BLOOD PRESSURE: 83 MMHG | HEIGHT: 60 IN | SYSTOLIC BLOOD PRESSURE: 125 MMHG | WEIGHT: 148.7 LBS

## 2018-07-26 DIAGNOSIS — Z31.69 PRE-CONCEPTION COUNSELING: Primary | ICD-10-CM

## 2018-07-26 PROCEDURE — 99214 OFFICE O/P EST MOD 30 MIN: CPT | Performed by: OBSTETRICS & GYNECOLOGY

## 2018-07-26 RX ORDER — PRENATAL VIT/IRON FUM/FOLIC AC 27MG-0.8MG
1 TABLET ORAL DAILY
Qty: 100 TABLET | Refills: 3 | Status: SHIPPED | OUTPATIENT
Start: 2018-07-26 | End: 2019-02-04

## 2018-07-26 NOTE — MR AVS SNAPSHOT
After Visit Summary   7/26/2018    Hilaria Jansen    MRN: [id number]           Patient Information     Date Of Birth          1990        Visit Information        Provider Department      7/26/2018 4:45 PM Brooke Capps MD; [employer] [facility] [facility]        Today's Diagnoses     Pre-conception counseling    -  1      Care Instructions    Do a pregnancy test on 8/1 and if positive, let us know and we would check blood work Wednesday and Friday.  Plan for ultrasound about 8 weeks    If negative, try ovulation kits and let me know if no positive is seen.     Cycle instructions:    The first day of bleeding/period is called Day 1.    Start testing for ovulation using the store bought ovulation predictor kits on Day 11 of cycle.  When you get a positive surge, you will most likely be ovulating within the next 24 hours.       Test the urine about the same time each day.  (should try to test between 2-4 pm, empty bladder about noon)         The test is positive when you see 2 dark solid lines or smiley face.  (one faint line and one dark line is NOT positive)       Once the test is positive, you can stop testing.  Have sex/intercourse the day the test is positive.  The next day, have sex again.    If you don't have a period by 15-16 days after LH kit is positive (surge) then do urine pregnancy test and call clinic if positive.      Basic info:  The ovulation predictor kits are found in the condom/tampon section of the store.  Clear blue easy brand is simple to read.  Most women will get a positive LH surge before day 20 of the cycle.    Do not use lubercants with intercourse when trying to get pregnant.            Follow-ups after your visit        Who to contact     If you have questions or need follow up information about today's clinic visit or your schedule please contact [facility] directly at [phone].  Normal or non-critical lab and imaging  "results will be communicated to you by MyChart, letter or phone within 4 business days after the clinic has received the results. If you do not hear from us within 7 days, please contact the clinic through Quettrat or phone. If you have a critical or abnormal lab result, we will notify you by phone as soon as possible.  Submit refill requests through [employer] or call your pharmacy and they will forward the refill request to us. Please allow 3 business days for your refill to be completed.          Additional Information About Your Visit        [employer] Information     [employer] lets you send messages to your doctor, view your test results, renew your prescriptions, schedule appointments and more. To sign up, go to [email or web address] . Click on \"Log in\" on the left side of the screen, which will take you to the Welcome page. Then click on \"Sign up Now\" on the right side of the page.     You will be asked to enter the access code listed below, as well as some personal information. Please follow the directions to create your username and password.     Your access code is: [email or web address]  Expires: 10/24/2018  4:44 PM     Your access code will  in 90 days. If you need help or a new code, please call your [place] clinic or [phone].        Care EveryWhere ID     This is your Care EveryWhere ID. This could be used by other organizations to access your [place] medical records  [id number]        Your Vitals Were     Pulse Height Last Period Pulse Oximetry BMI (Body Mass Index)       101 5' (1.524 m) 2018 100% 29.04 kg/m2        Blood Pressure from Last 3 Encounters:   18 125/83   18 118/74   18 109/73    Weight from Last 3 Encounters:   18 148 lb 11.2 oz (67.4 kg)   18 147 lb 3.2 oz (66.8 kg)   18 146 lb (66.2 kg)              Today, you had the following     No orders found for display         Today's Medication Changes          These changes are accurate as of 18  " 5:23 PM.  If you have any questions, ask your nurse or doctor.               Start taking these medicines.        Dose/Directions    prenatal multivitamin plus iron 27-0.8 MG Tabs per tablet   Used for:  Pre-conception counseling   Started by:  Brooke Capps MD        Dose:  1 tablet   Take 1 tablet by mouth daily   Quantity:  100 tablet   Refills:  3            Where to get your medicines      These medications were sent to [facility] [address] [place] [address] AT [address] [address]    Hours:  24-hours Phone:  [phone]     prenatal multivitamin plus iron 27-0.8 MG Tabs per tablet                Primary Care Provider Office Phone # Fax #    Taj Beka Sun [phone] [address] [facility] [address] [address]        Equal Access to Services     [facility]: Hadii luc jackson hadasho Soomaali, waaxda luqadaha, qaybta kaalmada adeegyada, waxay angelin hayeli britt . So [facility] [phone].    ATENCIÓN: Si habla español, tiene a coronado disposición servicios gratuitos de asistencia lingüística. Alem al [phone].    We comply with applicable federal civil rights laws and [place] laws. We do not discriminate on the basis of race, color, national origin, age, disability, sex, sexual orientation, or gender identity.            Thank you!     Thank you for choosing [facility]  for your care. Our goal is always to provide you with excellent care. Hearing back from our patients is one way we can continue to improve our services. Please take a few minutes to complete the written survey that you may receive in the mail after your visit with us. Thank you!             Your Updated Medication List - Protect others around you: Learn how to safely use, store and throw away your medicines at www.disposemymeds.org.          This list is accurate as of 7/26/18  5:23 PM.  Always use your most recent med  list.                   Brand Name Dispense Instructions for use Diagnosis    prenatal multivitamin plus iron 27-0.8 MG Tabs per tablet     100 tablet    Take 1 tablet by mouth daily    Pre-conception counseling

## 2018-07-26 NOTE — PATIENT INSTRUCTIONS
Do a pregnancy test on 8/1 and if positive, let us know and we would check blood work Wednesday and Friday.  Plan for ultrasound about 8 weeks    If negative, try ovulation kits and let me know if no positive is seen.     Cycle instructions:    The first day of bleeding/period is called Day 1.    Start testing for ovulation using the store bought ovulation predictor kits on Day 11 of cycle.  When you get a positive surge, you will most likely be ovulating within the next 24 hours.       Test the urine about the same time each day.  (should try to test between 2-4 pm, empty bladder about noon)         The test is positive when you see 2 dark solid lines or smiley face.  (one faint line and one dark line is NOT positive)       Once the test is positive, you can stop testing.  Have sex/intercourse the day the test is positive.  The next day, have sex again.    If you don't have a period by 15-16 days after LH kit is positive (surge) then do urine pregnancy test and call clinic if positive.      Basic info:  The ovulation predictor kits are found in the condom/tampon section of the store.  Clear blue easy brand is simple to read.  Most women will get a positive LH surge before day 20 of the cycle.    Do not use lubercants with intercourse when trying to get pregnant.

## 2018-07-26 NOTE — PROGRESS NOTES
CC:  Trying to get pregnant  HPI:  Hilaria Jansen is a 28 year old female Patient's last menstrual period was 07/04/2018.  Menses are regular q 28-30 days, lasting 3-4 days.  Here with 's who speaks Bangladeshi trying to get pregnant.    Prior pregnancy history with one early chemical pregnancy and one termination where she took a pill.  Same partner for those pregnancies.    Had a Depo-Provera shot March 2017 and then no period until November 2017.  Had a progesterone level checked July 10 that was low/undetectable.  Repeat progesterone level July 24=22.5    Allergies: Flagyl [metronidazole]    The patient's past medical history, social history and family history is reviewed at our visit and updated in EPIC.    EXAM:  Blood pressure 125/83, pulse 101, height 5' (1.524 m), weight 148 lb 11.2 oz (67.4 kg), last menstrual period 07/04/2018, SpO2 100 %, not currently breastfeeding.  BMI= Body mass index is 29.04 kg/(m^2).  Patient's last menstrual period was 07/04/2018.  General - pleasant female in no acute distress.  Neurological - alert and oriented X 3  Psychiatric - normal mood and affect    No other physical examination was performed today as we spent over 50% of today's 30 minute visit in face-to-face discussion and counseling about next steps for pregnancy.    ASSESSMENT/PLAN:  (Z31.69) Pre-conception counseling  (primary encounter diagnosis)  Comment: elevated progesterone a few days ago  Plan: Prenatal Vit-Fe Fumarate-FA (PRENATAL         MULTIVITAMIN PLUS IRON) 27-0.8 MG TABS per         tablet        Recommend she start taking a prenatal vitamin daily and check a pregnancy test on August 1.  If pregnancy test is positive we will have her come in for a quantitative hCG on August 1 and August 3 to make sure it is rising.  Discussed progesterone level with 1 of those lab draws to make sure staying in the normal range.  If it is low, can supplement with Prometrium.  Plan ultrasound approximately 8 weeks  gestation.    If pregnancy test is negative, she was given cycle instructions and we reviewed how to do ovulation predictor kits with the following cycle.  She will let me know if not having a positive OPK.  Otherwise discussed normal to take up to 1 year to get pregnant and since her periods did not start until November 2017, that would be November 2018.  She was reassured.  Plan Pap smear with first new OB visit.      Brooke Capps MD

## 2018-08-14 DIAGNOSIS — N91.2 ABSENCE OF MENSTRUATION: ICD-10-CM

## 2018-08-14 LAB
B-HCG SERPL-ACNC: <1 IU/L (ref 0–5)
PROGEST SERPL-MCNC: 1.9 NG/ML

## 2018-08-14 PROCEDURE — 84702 CHORIONIC GONADOTROPIN TEST: CPT | Performed by: OBSTETRICS & GYNECOLOGY

## 2018-08-14 PROCEDURE — 36415 COLL VENOUS BLD VENIPUNCTURE: CPT | Performed by: OBSTETRICS & GYNECOLOGY

## 2018-08-14 PROCEDURE — 84144 ASSAY OF PROGESTERONE: CPT | Performed by: OBSTETRICS & GYNECOLOGY

## 2018-09-24 DIAGNOSIS — N92.6 IRREGULAR MENSES: ICD-10-CM

## 2018-09-24 LAB — PROGEST SERPL-MCNC: 2.5 NG/ML

## 2018-09-24 PROCEDURE — 84144 ASSAY OF PROGESTERONE: CPT | Performed by: OBSTETRICS & GYNECOLOGY

## 2018-09-24 PROCEDURE — 36415 COLL VENOUS BLD VENIPUNCTURE: CPT | Performed by: OBSTETRICS & GYNECOLOGY

## 2018-10-04 ENCOUNTER — OFFICE VISIT (OUTPATIENT)
Dept: OBGYN | Facility: CLINIC | Age: 28
End: 2018-10-04
Attending: OBSTETRICS & GYNECOLOGY
Payer: COMMERCIAL

## 2018-10-04 ENCOUNTER — RADIANT APPOINTMENT (OUTPATIENT)
Dept: ULTRASOUND IMAGING | Facility: CLINIC | Age: 28
End: 2018-10-04
Attending: OBSTETRICS & GYNECOLOGY
Payer: COMMERCIAL

## 2018-10-04 VITALS
WEIGHT: 149 LBS | DIASTOLIC BLOOD PRESSURE: 75 MMHG | HEART RATE: 82 BPM | BODY MASS INDEX: 29.1 KG/M2 | TEMPERATURE: 98.4 F | SYSTOLIC BLOOD PRESSURE: 120 MMHG

## 2018-10-04 DIAGNOSIS — N92.6 IRREGULAR MENSES: ICD-10-CM

## 2018-10-04 DIAGNOSIS — N92.6 IRREGULAR MENSES: Primary | ICD-10-CM

## 2018-10-04 PROCEDURE — 76830 TRANSVAGINAL US NON-OB: CPT | Performed by: OBSTETRICS & GYNECOLOGY

## 2018-10-04 PROCEDURE — 99214 OFFICE O/P EST MOD 30 MIN: CPT | Performed by: OBSTETRICS & GYNECOLOGY

## 2018-10-04 NOTE — NURSING NOTE
Chief Complaint   Patient presents with     Ultrasound       Initial /75  Pulse 82  Temp 98.4  F (36.9  C) (Oral)  Wt 149 lb (67.6 kg)  BMI 29.1 kg/m2 Estimated body mass index is 29.1 kg/(m^2) as calculated from the following:    Height as of 18: 5' (1.524 m).    Weight as of this encounter: 149 lb (67.6 kg).  BP completed using cuff size: regular        The following HM Due: NONE      The following patient reported/Care Every where data was sent to:  P ABSTRACT QUALITY INITIATIVES [[id number]]  na      n/a

## 2018-10-04 NOTE — MR AVS SNAPSHOT
After Visit Summary   10/4/2018    Hilaria Jansen    MRN: [id number]           Patient Information     Date Of Birth          1990        Visit Information        Provider Department      10/4/2018 3:45 PM Brooke Capps MD; LANGUAGE [facility]        Today's Diagnoses     Irregular menses    -  1       Follow-ups after your visit        Who to contact     If you have questions or need follow up information about today's clinic visit or your schedule please contact [facility] directly at [phone].  Normal or non-critical lab and imaging results will be communicated to you by MyChart, letter or phone within 4 business days after the clinic has received the results. If you do not hear from us within 7 days, please contact the clinic through PetCoachhart or phone. If you have a critical or abnormal lab result, we will notify you by phone as soon as possible.  Submit refill requests through Planet DDS or call your pharmacy and they will forward the refill request to us. Please allow 3 business days for your refill to be completed.          Additional Information About Your Visit        MyChart Information     Planet DDS gives you secure access to your electronic health record. If you see a primary care provider, you can also send messages to your care team and make appointments. If you have questions, please call your primary care clinic.  If you do not have a primary care provider, please call [phone] and they will assist you.        Care EveryWhere ID     This is your Care EveryWhere ID. This could be used by other organizations to access your [place] medical records  [id number]        Your Vitals Were     Pulse Temperature Last Period BMI (Body Mass Index)          82 98.4  F (36.9  C) (Oral) 09/25/2018 29.1 kg/m2         Blood Pressure from Last 3 Encounters:   10/04/18 120/75   07/26/18 125/83   06/25/18 118/74    Weight from Last 3 Encounters:    10/04/18 149 lb (67.6 kg)   07/26/18 148 lb 11.2 oz (67.4 kg)   06/25/18 147 lb 3.2 oz (66.8 kg)              Today, you had the following     No orders found for display         Today's Medication Changes          These changes are accurate as of 10/4/18 11:59 PM.  If you have any questions, ask your nurse or doctor.               Start taking these medicines.        Dose/Directions    progesterone 200 MG capsule   Commonly known as:  PROMETRIUM   Used for:  Irregular menses   Started by:  Brooke Capps MD        Dose:  200 mg   Take 1 capsule (200 mg) by mouth daily Day after ovulation until 10 weeks gestation   Quantity:  90 capsule   Refills:  1            Where to get your medicines      These medications were sent to [facility] [address] [place] [address] AT [address] [address]    Hours:  24-hours Phone:  [phone]     progesterone 200 MG capsule                Primary Care Provider Office Phone # Fax #    Kwesiws Beka Sun [phone] [address] [address]        Equal Access to Services     SHERMAN REYES AH: Hadii luc jackson hadasho Sotaqueriaali, waaxda luqadaha, qaybta kaalmada adeegyada, katja nazario. So [facility] [phone].    ATENCIÓN: Si habla español, tiene a coronado disposición servicios gratuitos de asistencia lingüística. Imaname al [phone].    We comply with applicable federal civil rights laws and [place] laws. We do not discriminate on the basis of race, color, national origin, age, disability, sex, sexual orientation, or gender identity.            Thank you!     Thank you for choosing [facility]  for your [place]. Our goal is always to provide you with excellent care. Hearing back from our patients is one way we can continue to improve our services. Please take a few minutes to complete the written survey that you may receive in the mail after  your visit with us. Thank you!             Your Updated Medication List - Protect others around you: Learn how to safely use, store and throw away your medicines at www.disposemymeds.org.          This list is accurate as of 10/4/18 11:59 PM.  Always use your most recent med list.                   Brand Name Dispense Instructions for use Diagnosis    prenatal multivitamin plus iron 27-0.8 MG Tabs per tablet     100 tablet    Take 1 tablet by mouth daily    Pre-conception counseling       progesterone 200 MG capsule    PROMETRIUM    90 capsule    Take 1 capsule (200 mg) by mouth daily Day after ovulation until 10 weeks gestation    Irregular menses

## 2018-10-05 NOTE — PROGRESS NOTES
Due to language barrier, a phone  was used for this clinic visit, #[id number]    CC:  F/u ultrasound  HPI:  Hilaria Jansen is a 28 year old female Patient's last menstrual period was 09/25/2018.  Patient was last seen in July and clinic note is reviewed at today's visit.  Had progesterone level checked that was elevated at that visit so showed ovulation.  Did not did not get menses until September 3.  Did ovulation predictor kit and showed positive result September 10, had progesterone level checked September 24 which was 2.5 and then LMP 9/25.    Discussed low progesterone level likely because menses was about to start.  Unsure why ovulated so early that cycle.  Is currently cycle day 10 today.  Has had negative OPK to date.  Wants to know next steps for getting pregnant.    Had ultrasound performed today that showed a follicle developing on her right ovary measuring 1.9 x 1.5 x 1.3 cm, endometrium 8 mm.    Allergies: Flagyl [metronidazole]    The patient's past medical history, social history and family history is reviewed at our visit and updated in EPIC.    EXAM:  Blood pressure 120/75, pulse 82, temperature 98.4  F (36.9  C), temperature source Oral, weight 149 lb (67.6 kg), last menstrual period 09/25/2018, not currently breastfeeding.  BMI= Body mass index is 29.1 kg/(m^2).  Patient's last menstrual period was 09/25/2018.  General - pleasant female in no acute distress.  Neurological - alert and oriented X 3  Psychiatric - normal mood and affect    No other physical examination was performed today as we spent over 50% of today's 30 minute visit in face-to-face discussion and counseling about ultrasound results and plan.    ASSESSMENT/PLAN:  (N92.6) Irregular menses  (primary encounter diagnosis)  Comment: 1 early ovulation cycle  Plan: progesterone (PROMETRIUM) 200 MG capsule        Clinic note from July 2018 is reviewed at today's visit.  Partner has caused 2 prior pregnancies so unlikely to be sperm  issue.  We have not done HSG either.  As of November will be 1 year of no birth control and no pregnancies.    Discussed trying supplemental progesterone during the luteal phase for this next cycle.  Patient would like to try this.  Discussed she should the late in the next 2-3 days based on ultrasound today.  Prescription done for Prometrium to take following ovulation and reviewed instructions on doing pregnancy test and stopping Prometrium if negative pregnancy test 2 weeks after ovulation.    If pregnancy test is positive, she will send my chart message and would get early hCG levels and schedule ultrasound for 7-8 weeks for viability.    Otherwise plan to see her back in November and then would need blood work done, day 3 labs, HSG, semen analysis ordered.      Brooke Capps MD

## 2018-10-18 DIAGNOSIS — Z32.01 POSITIVE PREGNANCY TEST: ICD-10-CM

## 2018-10-18 LAB — B-HCG SERPL-ACNC: 77 IU/L (ref 0–5)

## 2018-10-18 PROCEDURE — 84702 CHORIONIC GONADOTROPIN TEST: CPT | Performed by: OBSTETRICS & GYNECOLOGY

## 2018-10-18 PROCEDURE — 36415 COLL VENOUS BLD VENIPUNCTURE: CPT | Performed by: OBSTETRICS & GYNECOLOGY

## 2018-10-20 DIAGNOSIS — Z32.01 POSITIVE PREGNANCY TEST: ICD-10-CM

## 2018-10-20 LAB — B-HCG SERPL-ACNC: 320 IU/L (ref 0–5)

## 2018-10-20 PROCEDURE — 36415 COLL VENOUS BLD VENIPUNCTURE: CPT | Performed by: OBSTETRICS & GYNECOLOGY

## 2018-10-20 PROCEDURE — 84702 CHORIONIC GONADOTROPIN TEST: CPT | Performed by: OBSTETRICS & GYNECOLOGY

## 2018-11-13 ENCOUNTER — OFFICE VISIT (OUTPATIENT)
Dept: OBGYN | Facility: CLINIC | Age: 28
End: 2018-11-13
Attending: OBSTETRICS & GYNECOLOGY
Payer: COMMERCIAL

## 2018-11-13 ENCOUNTER — RADIANT APPOINTMENT (OUTPATIENT)
Dept: ULTRASOUND IMAGING | Facility: CLINIC | Age: 28
End: 2018-11-13
Attending: OBSTETRICS & GYNECOLOGY
Payer: COMMERCIAL

## 2018-11-13 VITALS
HEART RATE: 91 BPM | DIASTOLIC BLOOD PRESSURE: 67 MMHG | BODY MASS INDEX: 30.72 KG/M2 | SYSTOLIC BLOOD PRESSURE: 119 MMHG | TEMPERATURE: 98 F | WEIGHT: 157.3 LBS

## 2018-11-13 DIAGNOSIS — Z32.01 POSITIVE PREGNANCY TEST: ICD-10-CM

## 2018-11-13 DIAGNOSIS — Z34.90 EARLY STAGE OF PREGNANCY: Primary | ICD-10-CM

## 2018-11-13 PROCEDURE — 99213 OFFICE O/P EST LOW 20 MIN: CPT | Performed by: OBSTETRICS & GYNECOLOGY

## 2018-11-13 PROCEDURE — 76817 TRANSVAGINAL US OBSTETRIC: CPT | Performed by: OBSTETRICS & GYNECOLOGY

## 2018-11-13 NOTE — PROGRESS NOTES
S; Hilariagermán Jansen is a 28 year old  who is here for us results.  She is about 7 wks by known LMP.  She reports feeling mildly nauseated and is taking vit b6 and unisom with some relief.  Also excess saliva.  She denies pian, cramping or bleeding.  She is otherwise without complaints.    O: /67  Pulse 91  Temp 98  F (36.7  C) (Oral)  Wt 157 lb 4.8 oz (71.4 kg)  BMI 30.72 kg/m2    Gen: NAd    No further exam done    Dating (mm/dd/yyyy):   LMP:2018                     EDC:  2019              GA by LMP:  7w0d      Current Scan On:  2018          EDC: 2019                         GA by Current Scan:  7w2d  The calculation of the gestational age by current scan was based on CRL.     Anatomy Scan:  Quispe gestation.  Biometry:  CRL  1.1cm  7w2d                                          Yolk Sac  visualized                                                 Fetal heart activity:  Rate and rhythm is within normal limits.  Fetal heart rate: 161 bpm      Maternal Structures:  Cervix: The cervix appears long and closed.  Right Ovary: 2.8 x 1.8 x 2.2 cm  Left Ovary: 3.0 x 1.8 x1.3  cm     Impression:      Early quispe IUP with yolk sac and cardiac activity, measures 7w 2d by today's ultrasound, EDC 19.     Beckie Moon      A/P: Single live IUP   We reviewed the ultrasound results and confirmed her EDC of 19.  We discussed ways to help nausea and encouraged her to call if worsening.  Instructed her to schedule RN only visit and new OB visit.  Questions answered.    BECKIE MOON MD      This visit lasted approximately 15 minutes and >50% of the time was spent on counseling about early pregnancy.

## 2018-11-13 NOTE — NURSING NOTE
Chief Complaint   Patient presents with     Consult     review ob viability check and size and dates       Initial /67  Pulse 91  Temp 98  F (36.7  C) (Oral)  Wt 157 lb 4.8 oz (71.4 kg)  BMI 30.72 kg/m2 Estimated body mass index is 30.72 kg/(m^2) as calculated from the following:    Height as of 18: 5' (1.524 m).    Weight as of this encounter: 157 lb 4.8 oz (71.4 kg).  BP completed using cuff size: regular    Questioned patient about current smoking habits.  Pt. has never smoked.          The following HM Due: pap smear      The following patient reported/Care Every where data was sent to:  P ABSTRACT QUALITY INITIATIVES [[id number]]        patient has appointment for today  Kamla Corley

## 2018-11-13 NOTE — MR AVS SNAPSHOT
After Visit Summary   11/13/2018    Hilaria Jansen    MRN: [id number]           Patient Information     Date Of Birth          1990        Visit Information        Provider Department      11/13/2018 3:00 PM Beckie Mason MD; LANGUAGE [facility]        Today's Diagnoses     Early stage of pregnancy    -  1       Follow-ups after your visit        Who to contact     If you have questions or need follow up information about today's clinic visit or your schedule please contact [facility] directly at [phone].  Normal or non-critical lab and imaging results will be communicated to you by JustFabhart, letter or phone within 4 business days after the clinic has received the results. If you do not hear from us within 7 days, please contact the clinic through MobileReactort or phone. If you have a critical or abnormal lab result, we will notify you by phone as soon as possible.  Submit refill requests through Ipercast or call your pharmacy and they will forward the refill request to us. Please allow 3 business days for your refill to be completed.          Additional Information About Your Visit        MyChart Information     Ipercast gives you secure access to your electronic health record. If you see a primary care provider, you can also send messages to your care team and make appointments. If you have questions, please call your primary care clinic.  If you do not have a primary care provider, please call [phone] and they will assist you.        Care EveryWhere ID     This is your Care EveryWhere ID. This could be used by other organizations to access your [place] medical records  [id number]        Your Vitals Were     Pulse Temperature BMI (Body Mass Index)             91 98  F (36.7  C) (Oral) 30.72 kg/m2          Blood Pressure from Last 3 Encounters:   11/13/18 119/67   10/04/18 120/75   07/26/18 125/83    Weight from Last 3 Encounters:   11/13/18 157 lb 4.8  oz (71.4 kg)   10/04/18 149 lb (67.6 kg)   07/26/18 148 lb 11.2 oz (67.4 kg)              Today, you had the following     No orders found for display       Primary Care Provider Office Phone # Fax #    Taj Beka Sun [phone]       [address] [facility] [address] [address]        Equal Access to Services     SHERMAN REYES : Hadii aad ku hadasho Soomaali, waaxda luqadaha, qaybta kaalmada adeegyada, waxay idiin hayaan adeeg anaclaireromeo lakerline . So [facility] [phone].    ATENCIÓN: Si willy giffrod, tiene a coronado disposición servicios gratuitos de asistencia lingüística. Imaname al [phone].    We comply with applicable federal civil rights laws and [place] laws. We do not discriminate on the basis of race, color, national origin, age, disability, sex, sexual orientation, or gender identity.            Thank you!     Thank you for choosing [facility]  for your care. Our goal is always to provide you with excellent care. Hearing back from our patients is one way we can continue to improve our services. Please take a few minutes to complete the written survey that you may receive in the mail after your visit with us. Thank you!             Your Updated Medication List - Protect others around you: Learn how to safely use, store and throw away your medicines at www.disposemymeds.org.          This list is accurate as of 11/13/18  3:12 PM.  Always use your most recent med list.                   Brand Name Dispense Instructions for use Diagnosis    prenatal multivitamin plus iron 27-0.8 MG Tabs per tablet     100 tablet    Take 1 tablet by mouth daily    Pre-conception counseling       progesterone 200 MG capsule    PROMETRIUM    90 capsule    Take 1 capsule (200 mg) by mouth daily Day after ovulation until 10 weeks gestation    Irregular menses

## 2018-11-28 ENCOUNTER — TELEPHONE (OUTPATIENT)
Dept: OBGYN | Facility: CLINIC | Age: 28
End: 2018-11-28

## 2018-11-28 ENCOUNTER — PRENATAL OFFICE VISIT (OUTPATIENT)
Dept: NURSING | Facility: CLINIC | Age: 28
End: 2018-11-28
Payer: COMMERCIAL

## 2018-11-28 VITALS
BODY MASS INDEX: 30.12 KG/M2 | DIASTOLIC BLOOD PRESSURE: 77 MMHG | TEMPERATURE: 98.6 F | WEIGHT: 153.4 LBS | HEART RATE: 92 BPM | HEIGHT: 60 IN | SYSTOLIC BLOOD PRESSURE: 125 MMHG

## 2018-11-28 DIAGNOSIS — K59.09 OTHER CONSTIPATION: ICD-10-CM

## 2018-11-28 DIAGNOSIS — Z34.90 SUPERVISION OF NORMAL PREGNANCY: Primary | ICD-10-CM

## 2018-11-28 DIAGNOSIS — Z23 NEED FOR TDAP VACCINATION: ICD-10-CM

## 2018-11-28 DIAGNOSIS — R11.0 NAUSEA: Primary | ICD-10-CM

## 2018-11-28 DIAGNOSIS — Z12.4 SCREENING FOR MALIGNANT NEOPLASM OF CERVIX: ICD-10-CM

## 2018-11-28 LAB
ABO + RH BLD: NORMAL
ABO + RH BLD: NORMAL
ALBUMIN UR-MCNC: NEGATIVE MG/DL
APPEARANCE UR: CLEAR
BILIRUB UR QL STRIP: NEGATIVE
BLD GP AB SCN SERPL QL: NORMAL
BLOOD BANK CMNT PATIENT-IMP: NORMAL
COLOR UR AUTO: YELLOW
ERYTHROCYTE [DISTWIDTH] IN BLOOD BY AUTOMATED COUNT: 13.4 % (ref 10–15)
GLUCOSE UR STRIP-MCNC: NEGATIVE MG/DL
HCT VFR BLD AUTO: 33 % (ref 35–47)
HGB BLD-MCNC: 11.4 G/DL (ref 11.7–15.7)
HGB UR QL STRIP: NEGATIVE
KETONES UR STRIP-MCNC: NEGATIVE MG/DL
LEUKOCYTE ESTERASE UR QL STRIP: ABNORMAL
MCH RBC QN AUTO: 28.9 PG (ref 26.5–33)
MCHC RBC AUTO-ENTMCNC: 34.5 G/DL (ref 31.5–36.5)
MCV RBC AUTO: 84 FL (ref 78–100)
NITRATE UR QL: NEGATIVE
PH UR STRIP: 6 PH (ref 5–7)
PLATELET # BLD AUTO: 212 10E9/L (ref 150–450)
RBC # BLD AUTO: 3.95 10E12/L (ref 3.8–5.2)
SOURCE: ABNORMAL
SP GR UR STRIP: 1.02 (ref 1–1.03)
SPECIMEN EXP DATE BLD: NORMAL
UROBILINOGEN UR STRIP-ACNC: 0.2 EU/DL (ref 0.2–1)
WBC # BLD AUTO: 8 10E9/L (ref 4–11)

## 2018-11-28 PROCEDURE — 99000 SPECIMEN HANDLING OFFICE-LAB: CPT | Performed by: OBSTETRICS & GYNECOLOGY

## 2018-11-28 PROCEDURE — 99207 ZZC NO CHARGE NURSE ONLY: CPT

## 2018-11-28 PROCEDURE — 86780 TREPONEMA PALLIDUM: CPT | Performed by: OBSTETRICS & GYNECOLOGY

## 2018-11-28 PROCEDURE — 83021 HEMOGLOBIN CHROMOTOGRAPHY: CPT | Mod: 90 | Performed by: OBSTETRICS & GYNECOLOGY

## 2018-11-28 PROCEDURE — 87389 HIV-1 AG W/HIV-1&-2 AB AG IA: CPT | Performed by: OBSTETRICS & GYNECOLOGY

## 2018-11-28 PROCEDURE — 86762 RUBELLA ANTIBODY: CPT | Performed by: OBSTETRICS & GYNECOLOGY

## 2018-11-28 PROCEDURE — 86900 BLOOD TYPING SEROLOGIC ABO: CPT | Performed by: OBSTETRICS & GYNECOLOGY

## 2018-11-28 PROCEDURE — 86850 RBC ANTIBODY SCREEN: CPT | Performed by: OBSTETRICS & GYNECOLOGY

## 2018-11-28 PROCEDURE — 86901 BLOOD TYPING SEROLOGIC RH(D): CPT | Performed by: OBSTETRICS & GYNECOLOGY

## 2018-11-28 PROCEDURE — 85027 COMPLETE CBC AUTOMATED: CPT | Performed by: OBSTETRICS & GYNECOLOGY

## 2018-11-28 PROCEDURE — 85660 RBC SICKLE CELL TEST: CPT | Mod: 90 | Performed by: OBSTETRICS & GYNECOLOGY

## 2018-11-28 PROCEDURE — 81003 URINALYSIS AUTO W/O SCOPE: CPT | Performed by: OBSTETRICS & GYNECOLOGY

## 2018-11-28 PROCEDURE — 36415 COLL VENOUS BLD VENIPUNCTURE: CPT | Performed by: OBSTETRICS & GYNECOLOGY

## 2018-11-28 PROCEDURE — 87340 HEPATITIS B SURFACE AG IA: CPT | Performed by: OBSTETRICS & GYNECOLOGY

## 2018-11-28 PROCEDURE — 87086 URINE CULTURE/COLONY COUNT: CPT | Performed by: OBSTETRICS & GYNECOLOGY

## 2018-11-28 NOTE — MR AVS SNAPSHOT
After Visit Summary   11/28/2018    Hilaria Jansen    MRN: [id number]           Patient Information     Date Of Birth          1990        Visit Information        Provider Department      11/28/2018 11:00 AM LANGUAGE DANIELE; [facility] NURSE EDUC[facility]        Today's Diagnoses     Supervision of normal pregnancy    -  1    Need for Tdap vaccination        Screening for malignant neoplasm of cervix           Follow-ups after your visit        Your next 10 appointments already scheduled     Dec 18, 2018 11:30 AM CST   New P[facility]atal with Brooke Capps MD   [facility]    [address] [facility] [address] [phone]              Who to contact     If you have questions or need follow up information about today's clinic visit or your schedule please contact [facility] directly at [phone].  Normal or non-critical lab and imaging results will be communicated to you by MyChart, letter or phone within 4 business days after the clinic has received the results. If you do not hear from us within 7 days, please contact the clinic through MyChart or phone. If you have a critical or abnormal lab result, we will notify you by phone as soon as possible.  Submit refill requests through Givit or call your pharmacy and they will forward the refill request to us. Please allow 3 business days for your refill to be completed.          Additional Information About Your Visit        MyChart Information     Givit gives you secure access to your electronic health record. If you see a primary care provider, you can also send messages to your care team and make appointments. If you have questions, please call your primary care clinic.  If you do not have a primary care provider, please call [phone] and they will assist you.        Care EveryWhere ID     This is your Care EveryWhere ID. This could be used by  other organizations to access your [place] medical records  [id number]        Your Vitals Were     Pulse Temperature Height Last Period BMI (Body Mass Index)       92 98.6  F (37  C) 5' (1.524 m) 09/25/2018 29.96 kg/m2        Blood Pressure from Last 3 Encounters:   11/28/18 125/77   11/13/18 119/67   10/04/18 120/75    Weight from Last 3 Encounters:   11/28/18 153 lb 6.4 oz (69.6 kg)   11/13/18 157 lb 4.8 oz (71.4 kg)   10/04/18 149 lb (67.6 kg)              We Performed the Following     ABO/Rh type and screen     CBC with platelets     Hepatitis B surface antigen     HGB Eval Reflex to ELP or RBC Solubility     HIV Antigen Antibody Combo     Rubella Antibody IgG Quantitative     Treponema Abs w Reflex to RPR and Titer     UA without Microscopic     Urine Culture Aerobic Bacterial        Primary Care Provider Office Phone # Fax #    Thomasdows Beka Sun [phone] [address] [address]        Equal Access to Services     [facility]: Hadii aad ku hadasho Soomaali, waaxda luqadaha, qaybta kaalmada adeegyada, katja britt . So [facility] [phone].    ATENCIÓN: Si habla español, tiene a coronado disposición servicios gratuitos de asistencia lingüística. Llame al [phone].    We comply with applicable federal civil rights laws and [place] laws. We do not discriminate on the basis of race, color, national origin, age, disability, sex, sexual orientation, or gender identity.            Thank you!     Thank you for choosing [facility]  for your care. Our goal is always to provide you with excellent care. Hearing back from our patients is one way we can continue to improve our services. Please take a few minutes to complete the written survey that you may receive in the mail after your visit with us. Thank you!             Your Updated Medication List - Protect others around you: Learn how to safely use, store and throw away  your medicines at www.disposemymeds.org.          This list is accurate as of 11/28/18 11:44 AM.  Always use your most recent med list.                   Brand Name Dispense Instructions for use Diagnosis    prenatal multivitamin w/iron 27-0.8 MG tablet     100 tablet    Take 1 tablet by mouth daily    Pre-conception counseling       progesterone 200 MG capsule    PROMETRIUM    90 capsule    Take 1 capsule (200 mg) by mouth daily Day after ovulation until 10 weeks gestation    Irregular menses

## 2018-11-28 NOTE — TELEPHONE ENCOUNTER
Patient is requesting medication for nausea and constipation. She has tried B6, Unisom for the nausea bur is not effective    Pharmacy updated

## 2018-11-28 NOTE — TELEPHONE ENCOUNTER
Patient requesting nausea medication, tried B6, Unisom but not effective. Also needs Rx for constipation.    Pharmacy updated

## 2018-11-28 NOTE — PROGRESS NOTES
Important Information for Provider:     Patient presents for new ob teaching and labs, third pregnancy. Patient has been taking Unisom, B6 for nausea which is not effective. Sent TE to Dr Capps to send Rx for nausea and constipation. Pharmacy updated. Patient will plan the Quad screening second trimester. Ultrasound was performed 11/13/18, viable pregnancy. She will continue to take her progesterone until 10 weeks. Has NOB appointment with Dr Capps 12/18/19 at [facility].   Encouraged walking, precautions given. Patient does not drink caffeine , encouraged more fluid intake, complains of dizziness, headaches.         Prenatal OB Questionnaire  Patient supplied answers from flow sheet for:  Prenatal OB Questionnaire.  Past Medical History  Diabetes?: No  Hypertension : No  Heart disease, mitral valve prolapse or rheumatic fever?: No  An autoimmune disease such as lupus or rheumatoid arthritis?: No  Kidney disease or urinary tract infection?: No  Epilepsy, seizures or spells?: No  Migraine headaches?: No  A stroke or loss of function or sensation?: No  Any other neurological problems?: No  Have you ever been treated for depression?: No  Are you having problems with crying spells or loss of self-esteem?: No  Have you ever required psychiatric care?: No  Have you ever had hepatitis, liver disease or jaundice?: No  Have you been treated for blood clots in your veins, deep vein thrombosis, inflammation in the veins, thrombosis, phlebitis, pulmonary embolism or varicosities?: No  Have you had excessive bleeding after surgery or dental work?: No  Do you bleed more than other women after a cut or scratch?: No  Do you have a history of anemia?: No  Have you ever had thyroid problems or taken thyroid medication?: No   Do you have any endocrine problems?: No  Have you ever been in a major accident or suffered serious trauma?: No  Within the last year, has anyone hit, slapped, kicked or otherwise hurt you?:  No  In the last year, has anyone forced you to have sex when you didn't want to?: No    Past Medical History 2   Have you ever received a blood transfusion?: No  Would you refuse a blood transfusion if a doctor judged it to be medically necessary?: No   If you answered Yes, would you rather die than receive a blood transfusion?: No  If you answered Yes, is this for Anabaptist reasons?: No  Does anyone in your home smoke?: No  Do you use tobacco products?: No  Do you drink beer, wine or hard liquor?: No  Do you use any of the following: marijuana, speed, cocaine, heroin, hallucinogens or other drugs?: No   Is your blood type Rh negative?: No  Have you ever had abnormal antibodies in your blood?: No  Have you ever had asthma?: No  Have you ever had tuberculosis?: No  Do you have any allergies to drugs or over-the-counter medications?: (!) Yes (Flagyl)  Allergies: Dust Mites, Aspartame, Ethanol, Venlafaxine, Hydrochloride, Sertraline: No  Have you had any breast problems?: No  Have you ever ?: No  Have you had any gynecological surgical procedures such as cervical conization, a LEEP procedure, laser treatment, cryosurgery of the cervix or a dilation and curettage, etc?: No  Have you ever had any other surgical procedures?: No  Have you been hospitalized for a nonsurgical reason excluding normal delivery?: No  Have you ever had any anesthetic complications?: No  Have you ever had an abnormal pap smear?: No    Past Medical History (Continued)  Do you have a history of abnormalities of the uterus?: No  Did your mother take DANYEL or any other hormones when she was pregnant with you?: No  Did it take you more than a year to become pregnant?: No  Have you ever been evaluated or treated for infertility?: No  Is there a history of medical problems in your family, which you feel may be important to this pregnancy?: No  Do you have any other problems we have not asked about which you feel may be important to this  pregnancy?: No    Symptoms since last menstrual period  Do you have any of the following symptoms: abdominal pain, blood in stools or urine, chest pain, shortness of breath, coughing or vomiting up blood, your heart racing or skipping beats, nausea and vomiting, pain on urination or vaginal discharge or bleed: (!) Yes  Will the patient be 35 years old or older at the time of delivery?: No    Has the patient, baby's father or anyone in either family had:  Thalassemia (Italian, Greek, Mediterranean or  background only) and an MCV result less than 80?: No  Neural tube defect such as meningomyelocele, spina bifida or anencephaly?: No  Congenital heart defect?: No  Down's Syndrome?: No  Aftab-Sachs disease (Religious, Cajun, [place]ian[place])?: No  Sickle cell disease or trait ()?: No  Hemophilia or other inherited problems of blood?: No  Muscular dystrophy?: No  Cystic fibrosis?: No  Irons's chorea?: No  Mental retardation/autism?: No  If yes, was the person tested for fragile X?: No  Any other inherited genetic or chromosomal disorder?: No  Maternal metabolic disorder (e.g Insulin-dependent diabetes, PKU)?: No  A child with birth defects not listed above?: No  Recurrent pregnancy loss or stillbirth?: No   Has the patient had any medications/street drugs/alcohol since her last menstrual period?: No  Does the patient or baby's father have any other genetic risks?: No    Infection History   Do you object to being tested for Hepatitis B?: No  Do you object to being tested for HIV?: No   Do you feel that you are at high risk for coming in contact with the AIDS virus?: No  Have you ever been treated for tuberculosis?: No  Have you ever had a positive skin test for tuberculosis?: No  Do you live with someone who has tuberculosis?: No  Have you ever been exposed to tuberculosis?: No  Do you have genital herpes?: No  Does your partner have genital herpes?: No  Have you had a viral illness since your last period?:  No  Have you ever had gonorrhea, chlamydia, syphilis, venereal warts, trichomoniasis, pelvic inflammatory disease or any other sexually transmitted disease?: No  Do you know if you are a genital group B streptococcus carrier?: No  Have you had chicken pox/varicella?: No   Have you been vaccinated against chicken Pox?: No  Have you had any other infectious diseases?: No      Allergies as of 11/28/2018:    Allergies as of 11/28/2018 - Alexandre as Reviewed 11/13/2018   Allergen Reaction Noted     Flagyl [metronidazole] Itching 06/12/2018       Current medications are:  Current Outpatient Prescriptions   Medication Sig Dispense Refill     Prenatal Vit-Fe Fumarate-FA (PRENATAL MULTIVITAMIN PLUS IRON) 27-0.8 MG TABS per tablet Take 1 tablet by mouth daily 100 tablet 3     progesterone (PROMETRIUM) 200 MG capsule Take 1 capsule (200 mg) by mouth daily Day after ovulation until 10 weeks gestation 90 capsule 1         Early ultrasound screening tool:    Does patient have irregular periods?  No  Did patient use hormonal birth control in the three months prior to positive urine pregnancy test? No  Is the patient breastfeeding?  No  Is the patient 10 weeks or greater at time of education visit?  No

## 2018-11-29 LAB
BACTERIA SPEC CULT: NORMAL
HBV SURFACE AG SERPL QL IA: NONREACTIVE
HGB A1 MFR BLD: 59.7 % (ref 95–97.9)
HGB A2 MFR BLD: 3.3 % (ref 2–3.5)
HGB C MFR BLD: 0 % (ref 0–0)
HGB E MFR BLD: 0 % (ref 0–0)
HGB F MFR BLD: 0.4 % (ref 0–2.1)
HGB FRACT BLD ELPH-IMP: ABNORMAL
HGB OTHER MFR BLD: 0 % (ref 0–0)
HGB S BLD QL SOLY: POSITIVE
HGB S MFR BLD: 36.6 % (ref 0–0)
HIV 1+2 AB+HIV1 P24 AG SERPL QL IA: NONREACTIVE
PATH INTERP BLD-IMP: ABNORMAL
RUBV IGG SERPL IA-ACNC: 36 IU/ML
SPECIMEN SOURCE: NORMAL
T PALLIDUM AB SER QL: NONREACTIVE

## 2018-11-29 RX ORDER — AMOXICILLIN 250 MG
1-2 CAPSULE ORAL 2 TIMES DAILY PRN
Qty: 60 TABLET | Refills: 1 | Status: SHIPPED | OUTPATIENT
Start: 2018-11-29 | End: 2019-05-24

## 2018-11-29 RX ORDER — METOCLOPRAMIDE 10 MG/1
10 TABLET ORAL
Qty: 120 TABLET | Refills: 1 | Status: SHIPPED | OUTPATIENT
Start: 2018-11-29 | End: 2019-02-04

## 2018-11-29 NOTE — TELEPHONE ENCOUNTER
Scripts done for Reglan and Senokot.  Please have her let us know if those do not work.  Thanks  Brooke Capps MD

## 2018-12-05 PROBLEM — D57.3 SICKLE CELL TRAIT (H): Status: ACTIVE | Noted: 2018-12-05

## 2018-12-18 ENCOUNTER — PRENATAL OFFICE VISIT (OUTPATIENT)
Dept: OBGYN | Facility: CLINIC | Age: 28
End: 2018-12-18
Payer: COMMERCIAL

## 2018-12-18 VITALS — BODY MASS INDEX: 29.69 KG/M2 | DIASTOLIC BLOOD PRESSURE: 67 MMHG | WEIGHT: 152 LBS | SYSTOLIC BLOOD PRESSURE: 112 MMHG

## 2018-12-18 DIAGNOSIS — Z23 NEED FOR PROPHYLACTIC VACCINATION AND INOCULATION AGAINST INFLUENZA: ICD-10-CM

## 2018-12-18 DIAGNOSIS — Z34.80 SUPERVISION OF OTHER NORMAL PREGNANCY, ANTEPARTUM: Primary | ICD-10-CM

## 2018-12-18 PROCEDURE — 90686 IIV4 VACC NO PRSV 0.5 ML IM: CPT | Performed by: OBSTETRICS & GYNECOLOGY

## 2018-12-18 PROCEDURE — 99207 ZZC FIRST OB VISIT: CPT | Performed by: OBSTETRICS & GYNECOLOGY

## 2018-12-18 PROCEDURE — 90471 IMMUNIZATION ADMIN: CPT | Performed by: OBSTETRICS & GYNECOLOGY

## 2018-12-18 NOTE — PROGRESS NOTES

## 2018-12-18 NOTE — PATIENT INSTRUCTIONS
Call [phone] to schedule anatomy ultrasound for 18-22 weeks.  At [facility]

## 2018-12-18 NOTE — PROGRESS NOTES
Here but no , does okay with english. Taking unisom for sleep but feeling better than before. Stopped Prometrium at 10 weeks. Dates by LMP c/w 7 wk u/s and happy to hear doptones today. Declines first trimester screen and may want quad? Ultrasound ordered and plans on finding out gender. May need a note so her mother can come help her after delivery. Hilaria is still trying to get citizenship here.  Labs reviewed and with sickle cell trait, sending to hematology for her questions. Providers, weight gain/exercise, delivery discussed. Will discuss residents when we have  since could not understand today. Definitely declines all males including interpreters. RTC 4 weeks. BE    HPI:  Hilaria Jansen is a 28 year old female Patient's last menstrual period was 09/25/2018. at 12w0d, Estimated Date of Delivery: Jul 2, 2019.  She denies vaginal bleeding and abdominal pain. Very happy about the pregnancy!   No other c/o.    Past Medical History:   Diagnosis Date     NO ACTIVE PROBLEMS        Past Surgical History:   Procedure Laterality Date     SURGICAL PATHOLOGY EXAM Left 09/10/2015    left paratracheal neck mass removed, benign       Allergies: Flagyl [metronidazole]     EXAM:  Blood pressure 112/67, weight 68.9 kg (152 lb), last menstrual period 09/25/2018, not currently breastfeeding.   BMI= Body mass index is 29.69 kg/m .  General - pleasant female in no acute distress.  Neck - supple without lymphadenopathy or thyromegaly.  Lungs - clear to auscultation bilaterally.  Heart - regular rate and rhythm without murmur.  Breast - no nodularity, asymmetry or nipple discharge bilaterally.  Abdomen - soft, nontender, nondistended, gravid, consistant with dates  Rectovaginal - deferred.  Musculoskeletal - no gross deformities.  Neurological - normal strength, sensation, and mental status.    Doptones were 152    ASSESSMENT/PLAN:  (Z34.80) Supervision of other normal pregnancy, antepartum  (primary encounter  diagnosis)  Comment: off prometrium  Plan: US OB > 14 Weeks        Discussed low risk of miscarriage and questions answered. Will schedule u/s for anatomy. Discussed  for visits would be easier and she only wants female--can ask at desk.     (Z23) Need for prophylactic vaccination and inoculation against influenza  Plan: FLU VACCINE, SPLIT VIRUS, IM (QUADRIVALENT)         [40681]- >3 YRS, Vaccine Administration,         Initial [08429]    Weight gain and exercise during pregnancy was discussed at today's visit.  The patient will return to clinic in 4 weeks for continued prenatal care.

## 2019-01-08 ENCOUNTER — OFFICE VISIT (OUTPATIENT)
Dept: FAMILY MEDICINE | Facility: CLINIC | Age: 29
End: 2019-01-08
Payer: MEDICAID

## 2019-01-08 VITALS
HEART RATE: 110 BPM | SYSTOLIC BLOOD PRESSURE: 118 MMHG | TEMPERATURE: 98.3 F | OXYGEN SATURATION: 99 % | DIASTOLIC BLOOD PRESSURE: 68 MMHG | BODY MASS INDEX: 28.65 KG/M2 | WEIGHT: 155.7 LBS | HEIGHT: 62 IN

## 2019-01-08 DIAGNOSIS — L73.2 HIDRADENITIS SUPPURATIVA: Primary | ICD-10-CM

## 2019-01-08 DIAGNOSIS — M54.41 ACUTE RIGHT-SIDED LOW BACK PAIN WITH RIGHT-SIDED SCIATICA: ICD-10-CM

## 2019-01-08 PROCEDURE — 99214 OFFICE O/P EST MOD 30 MIN: CPT | Performed by: PHYSICIAN ASSISTANT

## 2019-01-08 RX ORDER — CLINDAMYCIN PHOSPHATE 10 UG/ML
LOTION TOPICAL 2 TIMES DAILY
Qty: 60 ML | Refills: 1 | Status: SHIPPED | OUTPATIENT
Start: 2019-01-08 | End: 2019-01-23

## 2019-01-08 ASSESSMENT — MIFFLIN-ST. JEOR: SCORE: 1386.5

## 2019-01-08 NOTE — PATIENT INSTRUCTIONS
Use cream twice daily  Schedule follow up with dermatology within a few weeks  Follow up with physical therapy for back pain  Use heat as needed  Tylenol as needed for pain  Ok to take a full pill of unisom every night as needed for sleep  Return to clinic for any new or worsening symptoms or go to ER Urgent care in off hours        Patient Education     Se soigner soi-même d'une douleur dans la colonne vertébrale lombaire (low back)  La plupart mary personnes éprouvent de temps à autre une douleur dans la colonne vertébrale lombaire. Dans de nombreux bart, cette douleur n'est pas grave et un soin par soi-même peut aider. Parfois, une douleur dans la colonne vertébrale lombaire peut être le saniya d'un problème plus important. Appelez votre médecin si votre douleur revient souvient ou s'aggrave au krishna du temps. Pour un soin à long terme de votre dos, faites de l'entraînement physique régulièrement, perdez quelques kilos en trop et apprenez à vous tenir correctement.    Prenez un bref repos  Reposez votre dos pendant un jour ou deux pour commencer à guérir. Dormez en un matelas ferme ou sonja en le plancher. Utilisez un petit oreiller ou une serviette fermement placés sous votre colonne vertébrale lombaire afin de la soutenir. Gardez les genoux légèrement fléchis, et placez un autre oreiller sous ceux-ci. Au bout de quelques heures, levez-vous et marchez autant que vous le pouvez.  Réduire la douleur (pain) et le gonflement (swelling)  Le froid réduit le gonflement. Le froid et la chaleur peuvent tous deux calmer la douleur. Protégez votre peau en plaçant une serviette entre votre corps et la glace ou la source de chaleur.    Pendant les permiers jours, appliquez un bloc réfrigérant (ice pack) pendant 10-15 minutes toutes les heures tant que vous restez éveillé.    Au bout de quelques jours, essayez de soulager votre douleur par la chaleur.    Les médicaments en vente reji peuvent aider à contrôler la douleur et le  gonflement. Essayez l'aspirine (aspirin) ou un substitut de l'aspirine (aspirin substitute), comme l'ibuprofène (ibuprofen).    Exercice  L'exercice peut aider la guérison de votre dos. L'exercice aide également à renforcer votre dos et à la rendre plus souple, et ainsi à éviter de vous blesser à nouveau. Demandez à votre médecin de vous informer en les exercices spécifiques à effectuer pour renforcer votre dos.  Adoptez un maintien adéquat pour éviter de vous blesser à nouveau    Quand vous marchez, fléchissez les hanches et les genoux. Ne vous penchez pas à la ceinture et ne vous tordez pas en le côté.    Quand vous soulevez un poids, maintenez l'objet en question près de votre corps. N'essayez pas de soulever un poids supérieur à votre capacité.    En position assise, gardez votre colonne vertébrale lombaire soutenue. Utilisez une serviette enroulée selon carly besoins.  Appelez votre médecin si :    Vous n'êtes pas capable de vous tenir debout ou de marcher.    Vous avez une température (temperature) de plus de 101.0 F (38,3 C).    Carly mictions (urination) sont fréquentes (frequent), douloureuses (painful) ou sanglantes (bloody)    Vous avez une douleur abdominale sévère (severe abdominal pain).    Vous éprouvez une douleur aiguë (sharp), en coup de poignard (stabbing).    Votre douleur est brenden (constant).    Votre jambe vous fait mal ou est engourdie (numbness).    Vous avez mal dans une nouvelle partie de votre dos.    Vous remarquez que la douleur ne diminue pas après plus d'une semaine.   Date Last Reviewed: 9/29/2015 2000-2017 [employer]. [address] [place] [address], [place] [address]. All rights reserved. This information is not intended as a substitute for professional medical care. Always follow your healthcare professional's instructions.

## 2019-01-08 NOTE — PROGRESS NOTES
SUBJECTIVE:   Hilaria Jansen is a 28 year old female who presents to clinic today for the following health issues:    Lump  Onset: more than a week     Description:   Location: in left armpit   Character: round, painful, red, all over  Itching (Pruritis): no     Progression of Symptoms:  same and intermittent    Accompanying Signs & Symptoms:  Fever: Yes, a little   Body aches or joint pain: no, but has been constipated for about 3 days    Sore throat symptoms: no   Recent cold symptoms: just a little cough but has gotten better now.     History:   Previous similar rash: YES- a long time ago but changed a new deodorant and has gotten better.     Precipitating factors:   Exposure to similar rash: no   New exposures: None   Recent travel: no     Alleviating factors:  none    Therapies Tried and outcome: tylenol for pain but not helping.     Lumps  -Patient has had lumps on her armpits for over one week  -Denies using a new deodorant    -She is currently 15 weeks pregnant  -Patient denies breast pain     Back Pain  -She has constipation, insomnia, low back pain  -Her back pain has been present for approximately one week, it is not worsening  -The pain will shoot down into her toes  -Patient denies a past medical history of back pain  -She has tried Unisom for insomnia but it has not helped    Problem list and histories reviewed & adjusted, as indicated.  Additional history: as documented    ROS:  CONSTITUTIONAL: NEGATIVE for fever, chills, change in weight  INTEGUMENTARY/SKIN: NEGATIVE for worrisome rashes, moles, POSITIVE bumps  EYES: NEGATIVE for vision changes or irritation  ENT/MOUTH: NEGATIVE for ear, mouth and throat problems  RESP: NEGATIVE for significant cough or SOB  BREAST: NEGATIVE for masses, tenderness or discharge  CV: NEGATIVE for chest pain, palpitations or peripheral edema  GI: NEGATIVE for nausea, abdominal pain, heartburn, POSITIVE change in bowel habits  : NEGATIVE for frequency, dysuria, or  hematuria  MUSCULOSKELETAL: NEGATIVE for significant arthralgias, POSITIVE myalgia  NEURO: NEGATIVE for weakness, dizziness or paresthesias  ENDOCRINE: NEGATIVE for temperature intolerance, skin/hair changes  HEME: NEGATIVE for bleeding problems  PSYCHIATRIC: NEGATIVE for changes in mood or affect    This document serves as a record of the services and decisions personally performed and made by Mary Timmons PA-C. It was created on her behalf by Parag Katz, trained medical scribe. The creation of this document is based on the provider's statements to the medical scribe.  Parag Katz 2:47 PM January 8, 2019    Patient Active Problem List   Diagnosis     Adnexal mass     Abnormal CT scan, neck     Gastroesophageal reflux disease without esophagitis     Sinusitis     Need for Tdap vaccination     Sickle cell trait (H)     Supervision of other normal pregnancy, antepartum     Past Surgical History:   Procedure Laterality Date     SURGICAL PATHOLOGY EXAM Left 09/10/2015    left paratracheal neck mass removed, benign       Social History     Tobacco Use     Smoking status: Never Smoker     Smokeless tobacco: Never Used   Substance Use Topics     Alcohol use: No     Family History   Problem Relation Age of Onset     Breast Cancer Paternal Aunt      Other - See Comments Maternal Aunt            Labs reviewed in EPIC  Patient Active Problem List   Diagnosis     Adnexal mass     Abnormal CT scan, neck     Gastroesophageal reflux disease without esophagitis     Sinusitis     Need for Tdap vaccination     Sickle cell trait (H)     Supervision of other normal pregnancy, antepartum     Past Surgical History:   Procedure Laterality Date     SURGICAL PATHOLOGY EXAM Left 09/10/2015    left paratracheal neck mass removed, benign       Social History     Tobacco Use     Smoking status: Never Smoker     Smokeless tobacco: Never Used   Substance Use Topics     Alcohol use: No     Family History   Problem Relation Age of Onset  "    Breast Cancer Paternal Aunt      Other - See Comments Maternal Aunt          Current Outpatient Medications   Medication Sig Dispense Refill     clindamycin (CLEOCIN T) 1 % external lotion Apply topically 2 times daily 60 mL 1     metoclopramide (REGLAN) 10 MG tablet Take 1 tablet (10 mg) by mouth 4 times daily (before meals and nightly) 120 tablet 1     Prenatal Vit-Fe Fumarate-FA (PRENATAL MULTIVITAMIN PLUS IRON) 27-0.8 MG TABS per tablet Take 1 tablet by mouth daily 100 tablet 3     progesterone (PROMETRIUM) 200 MG capsule Take 1 capsule (200 mg) by mouth daily Day after ovulation until 10 weeks gestation 90 capsule 1     senna-docusate (SENOKOT-S/PERICOLACE) 8.6-50 MG tablet Take 1-2 tablets by mouth 2 times daily as needed for constipation 60 tablet 1       OBJECTIVE:                                                    /68   Pulse 110   Temp 98.3  F (36.8  C) (Oral)   Ht 1.57 m (5' 1.81\")   Wt 70.6 kg (155 lb 11.2 oz)   LMP 09/25/2018   SpO2 99%   BMI 28.65 kg/m   Body mass index is 28.65 kg/m .   GENERAL:: healthy, alert and no distress  EYES: Eyes grossly normal to inspection, extraocular movements - intact, and PERRL  NECK: no tenderness, no adenopathy, no asymmetry, no masses, no stiffness; thyroid- normal to palpation  MS: no gross deformities noted, no edema, pain is on low back area bilaterally, no spinal tenderness, moderate pain over the right lower back muscles, right hip flexion decreased to approximately 90 degrees, pain with internal/external rotation- unable to perform due to pain, positive straight leg raise on right   SKIN: left axillary region with approximately 1 by .5 cm cystic lesion, tender to palpation, mild erythema, no fluctuance and no induration, no rashes   NEURO: strength and tone- normal, sensory exam- grossly normal, mentation- intact, speech- normal, reflexes- symmetric  BACK: no CVA tenderness, no paralumbar tenderness  PSYCH: Alert and oriented times 3; speech- " coherent , normal rate and volume; able to articulate logical thoughts, able to abstract reason, no tangential thoughts, no hallucinations or delusions, affect- normal    No results found for this or any previous visit (from the past 24 hour(s)).       ASSESSMENT/PLAN:                                                        ICD-10-CM    1. Hidradenitis suppurativa L73.2 DERMATOLOGY REFERRAL     clindamycin (CLEOCIN T) 1 % external lotion   2. Acute right-sided low back pain with right-sided sciatica M54.41 [facility]       Patient Instructions     Use cream twice daily  Schedule follow up with dermatology within a few weeks  Follow up with physical therapy for back pain  Use heat as needed  Tylenol as needed for pain  Ok to take a full pill of unisom every night as needed for sleep  Return to clinic for any new or worsening symptoms or go to ER Urgent care in off hours        Patient Education     Se soigner soi-même d'une douleur dans la colonne vertébrale lombaire (low back)  La plupart mary personnes éprouvent de temps à autre une douleur dans la colonne vertébrale lombaire. Dans de nombreux bart, cette douleur n'est pas grave et un soin par soi-même peut aider. Parfois, une douleur dans la colonne vertébrale lombaire peut être le saniya d'un problème plus important. Appelez votre médecin si votre douleur revient souvient ou s'aggrave au krishna du temps. Pour un soin à long terme de votre dos, faites de l'entraînement physique régulièrement, perdez quelques kilos en trop et apprenez à vous tenir correctement.    Prenez un bref repos  Reposez votre dos pendant un jour ou deux pour commencer à guérir. Dormez en un matelas ferme ou sonja en le plancher. Utilisez un petit oreiller ou une serviette fermement placés sous votre colonne vertébrale lombaire afin de la soutenir. Gardez les genoux légèrement fléchis, et placez un autre oreiller sous ceux-ci. Au bout de quelques heures, rica et  marchez autant que vous le pouvez.  Réduire la douleur (pain) et le gonflement (swelling)  Le froid réduit le gonflement. Le froid et la chaleur peuvent tous deux calmer la douleur. Protégez votre peau en plaçant une serviette entre votre corps et la glace ou la source de chaleur.    Pendant les permiers jours, appliquez un bloc réfrigérant (ice pack) pendant 10-15 minutes toutes les heures tant que vous restez éveillé.    Au bout de quelques jours, essayez de soulager votre douleur par la chaleur.    Les médicaments en vente reji peuvent aider à contrôler la douleur et le gonflement. Essayez l'aspirine (aspirin) ou un substitut de l'aspirine (aspirin substitute), comme l'ibuprofène (ibuprofen).    Exercice  L'exercice peut aider la guérison de votre dos. L'exercice aide également à renforcer votre dos et à la rendre plus souple, et ainsi à éviter de vous blesser à nouveau. Demandez à votre médecin de vous informer en les exercices spécifiques à effectuer pour renforcer votre dos.  Adoptez un maintien adéquat pour éviter de vous blesser à nouveau    Quand vous marchez, fléchissez les hanches et les genoux. Ne vous penchez pas à la ceinture et ne vous tordez pas en le côté.    Quand vous soulevez un poids, maintenez l'objet en question près de votre corps. N'essayez pas de soulever un poids supérieur à votre capacité.    En position assise, gardez votre colonne vertébrale lombaire soutenue. Utilisez une serviette enroulée selon carly besoins.  Appelez votre médecin si :    Vous n'êtes pas capable de vous tenir debout ou de marcher.    Vous avez une température (temperature) de plus de 101.0 F (38,3 C).    Carly mictions (urination) sont fréquentes (frequent), douloureuses (painful) ou sanglantes (bloody)    Vous avez une douleur abdominale sévère (severe abdominal pain).    Vous éprouvez une douleur aiguë (sharp), en coup de poignard (stabbing).    Votre douleur est brenden (constant).    Votre jambe vous fait mal  "ou est engourdie (numbness).    Vous avez mal dans une nouvelle partie de votre dos.    Vous remarquez que la douleur ne diminue pas après plus d'une semaine.   Date Last Reviewed: 9/29/2015 2000-2017 [employer]. [address] [place] [id number]. All rights reserved. This information is not intended as a substitute for professional medical care. Always follow your healthcare professional's instructions.               Estimated body mass index is 28.65 kg/m  as calculated from the following:    Height as of this encounter: 1.57 m (5' 1.81\").    Weight as of this encounter: 70.6 kg (155 lb 11.2 oz).     The information in this document, created by the medical scribe for me, accurately reflects the services I personally performed and the decisions made by me. I have reviewed and approved this document for accuracy prior to leaving the patient care area.  January 8, 2019 2:48 PM    Mary Timmons  [facility]    "

## 2019-01-09 ENCOUNTER — ONCOLOGY VISIT (OUTPATIENT)
Dept: ONCOLOGY | Facility: CLINIC | Age: 29
End: 2019-01-09
Attending: INTERNAL MEDICINE
Payer: MEDICAID

## 2019-01-09 VITALS
OXYGEN SATURATION: 98 % | BODY MASS INDEX: 29.22 KG/M2 | SYSTOLIC BLOOD PRESSURE: 115 MMHG | TEMPERATURE: 99 F | RESPIRATION RATE: 16 BRPM | DIASTOLIC BLOOD PRESSURE: 70 MMHG | WEIGHT: 154.76 LBS | HEIGHT: 61 IN | HEART RATE: 97 BPM

## 2019-01-09 DIAGNOSIS — D57.3 SICKLE CELL TRAIT (H): ICD-10-CM

## 2019-01-09 PROCEDURE — 99204 OFFICE O/P NEW MOD 45 MIN: CPT | Mod: ZP | Performed by: INTERNAL MEDICINE

## 2019-01-09 PROCEDURE — G0463 HOSPITAL OUTPT CLINIC VISIT: HCPCS | Mod: ZF

## 2019-01-09 ASSESSMENT — PAIN SCALES - GENERAL: PAINLEVEL: EXTREME PAIN (9)

## 2019-01-09 ASSESSMENT — MIFFLIN-ST. JEOR: SCORE: 1362.88

## 2019-01-09 NOTE — LETTER
2019       RE: Hilaria Jansen  [address]  [facility] [place] [address]     Dear Colleague,    Thank you for referring your patient, Hilaria Jansen, to the [facility]. Please see a copy of my visit note below.    Hematology Outpatient Consult Note    Reason for consult: Sickle cell trait, pregnant    History of present illness: History is taken with the help of a professional  (French).  Ms. Jansen is a 28-year-old woman with  sickle cell trait documented by hemoglobin electrophoresis on 18. She is now pregnant, and sent for counseling on any issues regarding sickle cell trait.  She is about 15 weeks pregnant, with due date .  She reports that the father of her baby has been tested, and he is hemoglobin AA.  She reports on one occasion she may be had some problems with dehydration and hot weather.    Past medical history:  - Status post excision L neck mass 9/10/15- normal thymus tissue.   -Hidradenitis suppurativa  -GERD- history of adnexal mass  -  miscarriage 1    Medications:  Clindamycin 1% lotion twice daily  Metoclopramide 10 mg every 4 hours as needed  Prenatal vitamin 1 daily  Senokot 1-2 tablets twice daily as needed    Family history: No one has sickle cell disease that she is aware of a.  Mother-healthy, father-healthy, siblings-healthy    Social: She is originally from [facility] [place] [facility], speaks French.  He has been in [place] for 5 years.  She is working as a she does not smoke and was never smoker, does not drink alcohol.    Review of Systems:  As in history above.  She was seen by her primary care physician yesterday for left axillary pain and itching- diagnosed with hidradenitis suppurativa, given topical clindamycin.  She also has some right back and leg pain, diagnosed as sciatica and has been referred to a chiropractor.  The rest of the >10 point ROS is negative.      PHYSICAL EXAMINATION:  /70   Pulse 97   Temp 99  F (37.2  C) (Oral)   Resp 16   " Ht 1.539 m (5' 0.59\")   Wt 70.2 kg (154 lb 12.2 oz)   LMP 09/25/2018   SpO2 98%   BMI 29.64 kg/m       General appearance:  Patient is 29 yo woman  in no acute distress.     HEENT: Well-healed surgical scar base of anterior neck.  No pallor, icterus, or mucositis.  No thyromegaly.   Lymph nodes:  No cervical, supraclavicular, axillary, or inguinal lymphadenopathy.   Lungs:  Clear to auscultation bilaterally.   Heart:  Regular rate and rhythm; no S3 S4 or murmer.     Abdomen: Abdomen mildly protuberant from pregnancy.  Positive bowel sounds, soft and nontender, nondistended.  No hepatomegaly. No splenomegaly appreciated.    Extremities:  No joint swelling or tenderness.  No ankle edema.     Skin: Mild erythema and lump under left axilla.  No rash, no petechiae or ecchymoses.    Assessment and recommendation: Sickle cell trait.  I discussed with her that people with sickle cell trait are asymptomatic and do not of pain crises.  They are at risk for a few health issues.  One of the most important to be aware of is that people with sickle cell trait are at increased risk of exertional rhabdomyolysis and heat stroke if exercising under hot humid conditions and not taking in enough liquids.  I emphasized that if she is exerting herself or out in hot weather that she needs to drink a lot of water or other liquids..  They have decreased urinary concentrating ability, and there is a mild increased risk for chronic kidney disease, but it is not clear that there is an increased risk of progression to end-stage renal disease compared to people without sickle cell trait.  There is a modest increased risk of venous thromboembolism.    Regarding pregnancy and sickle cell trait, there have not been any good studies, but there does not appear to be any clear association of any sort of maternal or fetal problems due to sickle cell trait.  There is nothing special that needs to be done for her during pregnancy.    Importantly, " she reports that the father of her baby is already been tested for sickle cell disease and trait and he has AA.  There is a 1 out of 2 chance that her baby will have sickle cell trait, no chance of sickle cell disease based on father genotype of AA (I don't have domcumentation of the father's genotype).   This is ofno clinical consequence for her baby's health, but the same issues as noted above.  I indicated that all patients born in [place] are tested for sickle cell disease and trait, and so she should make sure she gets a copy of those results on her baby.    Regarding her skin condition and sciatica, she should follow-up with her primary care physician.    She does not need routine follow-up in hematology clinic.    Lyubov Estrada MD  Hematology

## 2019-01-09 NOTE — NURSING NOTE
"Oncology Rooming Note    January 9, 2019 9:04 AM   Hilaria Jansen is a 28 year old female who presents for:    Chief Complaint   Patient presents with     Oncology Clinic Visit     Sickle Cell hosp f\u     Initial Vitals: /70   Pulse 97   Temp 99  F (37.2  C) (Oral)   Resp 16   Ht 1.539 m (5' 0.59\")   Wt 70.2 kg (154 lb 12.2 oz)   LMP 09/25/2018   SpO2 98%   BMI 29.64 kg/m   Estimated body mass index is 29.64 kg/m  as calculated from the following:    Height as of this encounter: 1.539 m (5' 0.59\").    Weight as of this encounter: 70.2 kg (154 lb 12.2 oz). Body surface area is 1.73 meters squared.  Extreme Pain (9) Comment: Data Unavailable   Patient's last menstrual period was 09/25/2018.  Allergies reviewed: Yes  Medications reviewed: Yes    Medications: Medication refills not needed today.  Pharmacy name entered into [employer]: [id number] - [place], [place] - [address] [place] [address] & [facility]    Clinical concerns: no  perfecto  was notified.    8 minutes for nursing intake (face to face time)     Sandra Lopez MA              "

## 2019-01-09 NOTE — PROGRESS NOTES
"Hematology Outpatient Consult Note    Reason for consult: Sickle cell trait, pregnant    History of present illness: History is taken with the help of a professional  (French).  Ms. Jansen is a 28-year-old woman with  sickle cell trait documented by hemoglobin electrophoresis on 18. She is now pregnant, and sent for counseling on any issues regarding sickle cell trait.  She is about 15 weeks pregnant, with due date .  She reports that the father of her baby has been tested, and he is hemoglobin AA.  She reports on one occasion she may be had some problems with dehydration and hot weather.    Past medical history:  - Status post excision L neck mass 9/10/15- normal thymus tissue.   -Hidradenitis suppurativa  -GERD- history of adnexal mass  -  miscarriage 1    Medications:  Clindamycin 1% lotion twice daily  Metoclopramide 10 mg every 4 hours as needed  Prenatal vitamin 1 daily  Senokot 1-2 tablets twice daily as needed    Family history: No one has sickle cell disease that she is aware of a.  Mother-healthy, father-healthy, siblings-healthy    Social: She is originally from [facility], speaks French.  He has been in [place] for 5 years.  She is working as a she does not smoke and was never smoker, does not drink alcohol.    Review of Systems:  As in history above.  She was seen by her primary care physician yesterday for left axillary pain and itching- diagnosed with hidradenitis suppurativa, given topical clindamycin.  She also has some right back and leg pain, diagnosed as sciatica and has been referred to a chiropractor.  The rest of the >10 point ROS is negative.      PHYSICAL EXAMINATION:  /70   Pulse 97   Temp 99  F (37.2  C) (Oral)   Resp 16   Ht 1.539 m (5' 0.59\")   Wt 70.2 kg (154 lb 12.2 oz)   LMP 2018   SpO2 98%   BMI 29.64 kg/m      General appearance:  Patient is 27 yo woman  in no acute distress.     HEENT: Well-healed surgical scar base of anterior neck.  No " pallor, icterus, or mucositis.  No thyromegaly.   Lymph nodes:  No cervical, supraclavicular, axillary, or inguinal lymphadenopathy.   Lungs:  Clear to auscultation bilaterally.   Heart:  Regular rate and rhythm; no S3 S4 or murmer.     Abdomen: Abdomen mildly protuberant from pregnancy.  Positive bowel sounds, soft and nontender, nondistended.  No hepatomegaly. No splenomegaly appreciated.    Extremities:  No joint swelling or tenderness.  No ankle edema.     Skin: Mild erythema and lump under left axilla.  No rash, no petechiae or ecchymoses.    Assessment and recommendation: Sickle cell trait.  I discussed with her that people with sickle cell trait are asymptomatic and do not of pain crises.  They are at risk for a few health issues.  One of the most important to be aware of is that people with sickle cell trait are at increased risk of exertional rhabdomyolysis and heat stroke if exercising under hot humid conditions and not taking in enough liquids.  I emphasized that if she is exerting herself or out in hot weather that she needs to drink a lot of water or other liquids..  They have decreased urinary concentrating ability, and there is a mild increased risk for chronic kidney disease, but it is not clear that there is an increased risk of progression to end-stage renal disease compared to people without sickle cell trait.  There is a modest increased risk of venous thromboembolism.    Regarding pregnancy and sickle cell trait, there have not been any good studies, but there does not appear to be any clear association of any sort of maternal or fetal problems due to sickle cell trait.  There is nothing special that needs to be done for her during pregnancy.    Importantly, she reports that the father of her baby is already been tested for sickle cell disease and trait and he has AA.  There is a 1 out of 2 chance that her baby will have sickle cell trait, no chance of sickle cell disease based on father  genotype of AA (I don't have domcumentation of the father's genotype).   This is ofno clinical consequence for her baby's health, but the same issues as noted above.  I indicated that all patients born in [place] are tested for sickle cell disease and trait, and so she should make sure she gets a copy of those results on her baby.    Regarding her skin condition and sciatica, she should follow-up with her primary care physician.    She does not need routine follow-up in hematology clinic.    Lyubov Estrada MD  Hematology

## 2019-01-14 ENCOUNTER — THERAPY VISIT (OUTPATIENT)
Dept: PHYSICAL THERAPY | Facility: CLINIC | Age: 29
End: 2019-01-14
Payer: MEDICAID

## 2019-01-14 DIAGNOSIS — M54.41 ACUTE RIGHT-SIDED LOW BACK PAIN WITH RIGHT-SIDED SCIATICA: ICD-10-CM

## 2019-01-14 PROCEDURE — 97110 THERAPEUTIC EXERCISES: CPT | Mod: GP | Performed by: PHYSICAL THERAPIST

## 2019-01-14 PROCEDURE — 97140 MANUAL THERAPY 1/> REGIONS: CPT | Mod: GP | Performed by: PHYSICAL THERAPIST

## 2019-01-14 PROCEDURE — 97162 PT EVAL MOD COMPLEX 30 MIN: CPT | Mod: GP | Performed by: PHYSICAL THERAPIST

## 2019-01-14 NOTE — LETTER
DEPARTMENT OF HEALTH AND HUMAN SERVICES  CENTERS FOR MEDICARE & MEDICAID SERVICES    PLAN/UPDATED PLAN OF PROGRESS FOR OUTPATIENT REHABILITATION    PATIENTS NAME:  Hilaria Jansen   : 1990  PROVIDER NUMBER:    [id number]  [id number]  PROVIDER NAME: [place] [facility] PHYSICAL THERAPY  MEDICAL RECORD NUMBER: [id number]   START OF CARE DATE:  SOC Date: 19   TYPE:  PT    PRIMARY/TREATMENT DIAGNOSIS: (Pertinent Medical Diagnosis)  Acute right-sided low back pain with right-sided sciatica    VISITS FROM START OF CARE:  Rxs Used: 1     [place] [facility] Initial Evaluation  Subjective:  The history is provided by the patient. The history is limited by a language barrier. A  was used.   Hilaria Jansen is a 28 year old female with a lumbar condition.  Condition occurred with:  Other reason.  Condition occurred: other (pregnant).  This is a new condition  1 month hx of R > L LBP with sciatica R > L. Patient states that pain is constant and that no position is better than another. MD visit tomorrow 1/15/19 in which I have asked she be given a order for a Mother To Be brace.    Patient reports pain:  Central lumbar spine, lumbar spine right, SI joint right, lumbar spine left and SI joint left.  Radiates to:  Gluteals left, gluteals right, lower leg right and lower leg left.  Pain is described as aching and shooting and is constant and reported as 8/10.  Associated symptoms:  Pregnancy and loss of motion/stiffness. Pain is the same all the time.  Symptoms are exacerbated by bending, certain positions, lifting, walking and standing Relieved by: sitting seems her choice.  Since onset symptoms are unchanged.        General health as reported by patient is good.  Pertinent medical history includes:  None.  Medical allergies: yes.  Other surgeries include:  No.  Current medications:  None as reported by the patient.  Current occupation is Student.      Red flags:   Pain at night/rest.    Objective:  Standing Alignment:    Pelvic:  PSIS high R  Lumbar/SI Evaluation  ROM:    AROM Lumbar:   Flexion:        Would not do due to pain  Ext:                    Would not do due to pain   Side Bend:        Left:     Right:   Rotation:           Left:     Right:   Side Glide:        Left:     Right:   Lumbar Myotomes:  normal  Lumbar DTR's:  normal  Cord Signs:  normal  Lumbar Dermtomes:  normal      Neural Tension/Mobility:    Left side:  Slump positive.   Right side:   Slump positive.  Lumbar Palpation:    Tenderness present at Left:    Quadratus Lumborum; Erector Spinae and PSIS  Tenderness present at Right: Quadratus Lumborum; Erector Spinae and PSIS    SI joint/Sacrum:      Left positive at:    Thigh thrust  Right positive at:    Thigh thrust  Sacral conclusion right:  Anterior inominate                  Assessment/Plan:    Patient is a 28 year old female with lumbar complaints.    Patient has the following significant findings with corresponding treatment plan.                Diagnosis 1:  R LBP > L with sciatica and pregnacy  Pain -  self management, education, directional preference exercise and home program  Decreased ROM/flexibility - manual therapy and therapeutic exercise  Decreased joint mobility - manual therapy and therapeutic exercise  Decreased strength - therapeutic exercise and therapeutic activities  Impaired muscle performance - neuro re-education  Decreased function - therapeutic activities  Impaired posture - neuro re-education    Therapy Evaluation Codes:   1) History comprised of:   Personal factors that impact the plan of care:      Coping style, Language, Overall behavior pattern and pregnant.    Comorbidity factors that impact the plan of care are:      None.     Medications impacting care: None.  2) Examination of Body Systems comprised of:   Body structures and functions that impact the plan of care:      Lumbar spine.   Activity limitations that impact the  "plan of care are:      Bathing, Bending, Cooking, Dressing, Lifting, Sitting, Squatting/kneeling, Stairs, Standing and Walking.  3) Clinical presentation characteristics are:   Evolving/Changing.  4) Decision-Making    Moderate complexity using standardized patient assessment instrument and/or measureable assessment of functional outcome.  Cumulative Therapy Evaluation is: Moderate complexity.  Previous and current functional limitations:  (See Goal Flow Sheet for this information)    Short term and Long term goals: (See Goal Flow Sheet for this information)     Communication ability:  Patient appears to be able to clearly communicate and understand verbal and written communication and follow directions correctly.  Treatment Explanation - The following has been discussed with the patient:   RX ordered/plan of care  Anticipated outcomes  Possible risks and side effects  This patient would benefit from PT intervention to resume normal activities.   Rehab potential is fair.    Frequency:  1 X week, once daily  Duration:  for 6 weeks  Discharge Plan:  Achieve all LTG.  Independent in home treatment program.  Reach maximal therapeutic benefit.    Caregiver Signature/Credentials _____________________________ Date ________       Treating Provider: Td Stinson PT   I have reviewed and certified the need for these services and plan of treatment while under my care.        PHYSICIAN'S SIGNATURE:   _________________________________________  Date___________   Mary Timmons MD    Certification period:  Beginning of Cert date period: 01/14/19 to  End of Cert period date: 04/13/19     Functional Level Progress Report: Please see attached \"Goal Flow sheet for Functional level.\"    ____X____ Continue Services or       ________ DC Services                Service dates: From  SOC Date: 01/14/19 date to present                         "

## 2019-01-14 NOTE — PROGRESS NOTES
[place] [facility] Initial Evaluation  Subjective:  The history is provided by the patient. The history is limited by a language barrier. A  was used.   Hilaria Jansen is a 28 year old female with a lumbar condition.  Condition occurred with:  Other reason.  Condition occurred: other (pregnant).  This is a new condition  1 month hx of R > L LBP with sciatica R > L. Patient states that pain is constant and that no position is better than another. MD visit tomorrow 1/15/19 in which I have asked she be given a order for a Mother To Be brace.    Patient reports pain:  Central lumbar spine, lumbar spine right, SI joint right, lumbar spine left and SI joint left.  Radiates to:  Gluteals left, gluteals right, lower leg right and lower leg left.  Pain is described as aching and shooting and is constant and reported as 8/10.  Associated symptoms:  Pregnancy and loss of motion/stiffness. Pain is the same all the time.  Symptoms are exacerbated by bending, certain positions, lifting, walking and standing Relieved by: sitting seems her choice.  Since onset symptoms are unchanged.        General health as reported by patient is good.  Pertinent medical history includes:  None.  Medical allergies: yes.  Other surgeries include:  No.  Current medications:  None as reported by the patient.  Current occupation is Student.            Red flags:  Pain at night/rest.                        Objective:  Standing Alignment:          Pelvic:  PSIS high R                         Lumbar/SI Evaluation  ROM:    AROM Lumbar:   Flexion:        Would not do due to pain  Ext:                    Would not do due to pain   Side Bend:        Left:     Right:   Rotation:           Left:     Right:   Side Glide:        Left:     Right:           Lumbar Myotomes:  normal            Lumbar DTR's:  normal      Cord Signs:  normal    Lumbar Dermtomes:  normal                Neural Tension/Mobility:    Left side:  Slump  positive.   Right side:   Slump positive.  Lumbar Palpation:    Tenderness present at Left:    Quadratus Lumborum; Erector Spinae and PSIS  Tenderness present at Right: Quadratus Lumborum; Erector Spinae and PSIS        SI joint/Sacrum:        Left positive at:    Thigh thrust  Right positive at:    Thigh thrust    Sacral conclusion right:  Anterior inominate                                             General     ROS    Assessment/Plan:    Patient is a 28 year old female with lumbar complaints.    Patient has the following significant findings with corresponding treatment plan.                Diagnosis 1:  R LBP > L with sciatica and pregnacy  Pain -  self management, education, directional preference exercise and home program  Decreased ROM/flexibility - manual therapy and therapeutic exercise  Decreased joint mobility - manual therapy and therapeutic exercise  Decreased strength - therapeutic exercise and therapeutic activities  Impaired muscle performance - neuro re-education  Decreased function - therapeutic activities  Impaired posture - neuro re-education    Therapy Evaluation Codes:   1) History comprised of:   Personal factors that impact the plan of care:      Coping style, Language, Overall behavior pattern and pregnant.    Comorbidity factors that impact the plan of care are:      None.     Medications impacting care: None.  2) Examination of Body Systems comprised of:   Body structures and functions that impact the plan of care:      Lumbar spine.   Activity limitations that impact the plan of care are:      Bathing, Bending, Cooking, Dressing, Lifting, Sitting, Squatting/kneeling, Stairs, Standing and Walking.  3) Clinical presentation characteristics are:   Evolving/Changing.  4) Decision-Making    Moderate complexity using standardized patient assessment instrument and/or measureable assessment of functional outcome.  Cumulative Therapy Evaluation is: Moderate complexity.    Previous and current  functional limitations:  (See Goal Flow Sheet for this information)    Short term and Long term goals: (See Goal Flow Sheet for this information)     Communication ability:  Patient appears to be able to clearly communicate and understand verbal and written communication and follow directions correctly.  Treatment Explanation - The following has been discussed with the patient:   RX ordered/plan of care  Anticipated outcomes  Possible risks and side effects  This patient would benefit from PT intervention to resume normal activities.   Rehab potential is fair.    Frequency:  1 X week, once daily  Duration:  for 6 weeks  Discharge Plan:  Achieve all LTG.  Independent in home treatment program.  Reach maximal therapeutic benefit.    Please refer to the daily flowsheet for treatment today, total treatment time and time spent performing 1:1 timed codes.

## 2019-01-15 ENCOUNTER — PRENATAL OFFICE VISIT (OUTPATIENT)
Dept: OBGYN | Facility: CLINIC | Age: 29
End: 2019-01-15
Payer: MEDICAID

## 2019-01-15 VITALS
WEIGHT: 153 LBS | DIASTOLIC BLOOD PRESSURE: 62 MMHG | SYSTOLIC BLOOD PRESSURE: 120 MMHG | HEART RATE: 78 BPM | BODY MASS INDEX: 29.3 KG/M2

## 2019-01-15 DIAGNOSIS — Z34.80 SUPERVISION OF OTHER NORMAL PREGNANCY, ANTEPARTUM: Primary | ICD-10-CM

## 2019-01-15 PROCEDURE — 99207 ZZC PRENATAL VISIT: CPT | Performed by: OBSTETRICS & GYNECOLOGY

## 2019-01-15 PROCEDURE — 99000 SPECIMEN HANDLING OFFICE-LAB: CPT | Performed by: OBSTETRICS & GYNECOLOGY

## 2019-01-15 PROCEDURE — 36415 COLL VENOUS BLD VENIPUNCTURE: CPT | Performed by: OBSTETRICS & GYNECOLOGY

## 2019-01-15 PROCEDURE — 81511 FTL CGEN ABNOR FOUR ANAL: CPT | Mod: 90 | Performed by: OBSTETRICS & GYNECOLOGY

## 2019-01-15 NOTE — PROGRESS NOTES
"Due to language barrier, an  was present during the history-taking and subsequent discussion (and for part of the physical exam) with this patient.  Has pain in the left axilla where she has active hidradenitis suppurativa noted.  Is already taking Tylenol and using clindamycin, has appointment with general surgery 1/23.  Discussed would be okay if they want to drain abscess.  Also having a lot of pelvic discomfort and pressure noted.  Saw physical therapy yesterday and was recommended she get a maternity support belt.  This was ordered today and prescription done, discussed how to obtain.  Will draw quad screen today and she has ultrasound already scheduled.  Starting to feel movement and states it is \"painful\". RTC as scheduled. BE  "

## 2019-01-16 ENCOUNTER — TELEPHONE (OUTPATIENT)
Dept: DERMATOLOGY | Facility: CLINIC | Age: 29
End: 2019-01-16

## 2019-01-16 NOTE — TELEPHONE ENCOUNTER
Called Hilaria to remind her of her appointment on 1/23/19. Informed them of parking and to arrive 15 minutes early.   WILLIAN Tello

## 2019-01-17 LAB
# FETUSES US: NORMAL
# FETUSES: NORMAL
AFP ADJ MOM AMN: 1.1
AFP SERPL-MCNC: 39 NG/ML
AGE - REPORTED: 29.2 YR
CURRENT SMOKER: NO
CURRENT SMOKER: NO
DIABETES STATUS PATIENT: NO
FAMILY MEMBER DISEASES HX: NO
FAMILY MEMBER DISEASES HX: NO
GA METHOD: NORMAL
GA METHOD: NORMAL
GA: NORMAL WK
HCG MOM SERPL: 0.88
HCG SERPL-ACNC: NORMAL IU/L
HX OF HEREDITARY DISORDERS: NO
IDDM PATIENT QL: NO
INHIBIN A MOM SERPL: 1.27
INHIBIN A SERPL-MCNC: 197 PG/ML
INTEGRATED SCN PATIENT-IMP: NORMAL
IVF PREGNANCY: NO
LMP START DATE: NORMAL
MONOCHORIONIC TWINS: NO
PATHOLOGY STUDY: NORMAL
PREV FETUS DEFECT: NO
SERVICE CMNT-IMP: NO
SPECIMEN DRAWN SERPL: NORMAL
U ESTRIOL MOM SERPL: 0.86
U ESTRIOL SERPL-MCNC: 0.75 NG/ML
VALPROIC/CARBAMAZEPINE STATUS: NO
WEIGHT UNITS: NORMAL

## 2019-01-23 ENCOUNTER — OFFICE VISIT (OUTPATIENT)
Dept: DERMATOLOGY | Facility: CLINIC | Age: 29
End: 2019-01-23
Payer: MEDICAID

## 2019-01-23 DIAGNOSIS — L02.422 FURUNCLE OF LEFT AXILLA: Primary | ICD-10-CM

## 2019-01-23 DIAGNOSIS — O99.712 PRURITUS GRAVIDARUM, SECOND TRIMESTER: ICD-10-CM

## 2019-01-23 DIAGNOSIS — L29.9 PRURITUS GRAVIDARUM, SECOND TRIMESTER: ICD-10-CM

## 2019-01-23 RX ORDER — CLINDAMYCIN PHOSPHATE 10 UG/ML
LOTION TOPICAL 2 TIMES DAILY
Qty: 60 ML | Refills: 3 | Status: SHIPPED | OUTPATIENT
Start: 2019-01-23 | End: 2019-08-14

## 2019-01-23 RX ORDER — CEPHALEXIN 500 MG/1
500 CAPSULE ORAL 4 TIMES DAILY
Qty: 28 CAPSULE | Refills: 0 | Status: SHIPPED | OUTPATIENT
Start: 2019-01-23 | End: 2019-05-24

## 2019-01-23 ASSESSMENT — PAIN SCALES - GENERAL: PAINLEVEL: MODERATE PAIN (4)

## 2019-01-23 NOTE — PATIENT INSTRUCTIONS
We will start an antibiotic which is safe in pregnancy. Take antibiotic 4 times per day for 7 days.   Keep using clindamycin lotion twice a day.  Apply the benzoyl peroxide soap once a day.   You can also use a warm wash cloth to the left armpit.     You can use Mederma or Vaseline for the stretch marks over your back and belly. These are common in pregnancy and will fade over time.     For the itching of your stomach you can use CeraVe, Cetaphil, or Vanicream moisturizing creams which you can get over the counter.       Follow up in 4-6 weeks.

## 2019-01-23 NOTE — LETTER
"1/23/2019       RE: Hilaria Jansen  [address]  [place] [facility] [address]     Dear Colleague,    Thank you for referring your patient, Hilaria Jansen, to the [facility] DERMATOLOGY at [facility]. Please see a copy of my visit note below.    [facility] Dermatology Note      Dermatology Problem List:  1. Left axillary furuncle - clindamycin 1%; course of oral cephalexin 500 mg QID x7 days (1/23/2019)  2. Abdominal pruritus - start OTC topical moisturizer    Encounter Date: Jan 23, 2019    CC:   Chief Complaint   Patient presents with     Derm Problem     Hilaria is here to be seen for \" bumps underneath armpits and excessive sweating\"     History of Present Illness:  History obtained through use of Faroese Interpretor.     Ms. Hilaria Jansen is a 28 year old female who presents for evaluation of bumps in axilla. Onset 3 weeks ago, left axilla. Multiple painful bumps. Will increase and decrease in size. Has had drainage which is clear to yellow. No new deodorants, uses Secret brand. Reports subjective fevers at time of onset 3 weeks ago. No previous history of similar symptom. Has no history of skin infections or abscesses. Was seen by her PCP on 1/8 who prescribed topical clindamycin 1% lotion to apply BID, she has been doing this since the visit. Has also been using PO acetaminophen for pain control. Denies any concerning lesions of right axilla, groin region, back of neck, or beneath breasts.     No family history of hydradenitis suppurativa or other skin conditions.     Of note, patient is currently pregnant with EGA 17w1d today.     Past Medical History:   Patient Active Problem List   Diagnosis     Adnexal mass     Abnormal CT scan, neck     Gastroesophageal reflux disease without esophagitis     Sinusitis     Need for Tdap vaccination     Sickle cell trait (H)     Supervision of other normal pregnancy, antepartum     Right-sided low back pain with right-sided " sciatica     Past Medical History:   Diagnosis Date     Hidradenitis suppurativa      NO ACTIVE PROBLEMS      Past Surgical History:   Procedure Laterality Date     SURGICAL PATHOLOGY EXAM Left 09/10/2015    left paratracheal neck mass removed, benign       Social History:  Patient reports that  has never smoked. she has never used smokeless tobacco. She reports that she does not drink alcohol or use drugs.   Immigrated from [facility] in [place].     Family History:  Family History   Problem Relation Age of Onset     Breast Cancer Paternal Aunt      Other - See Comments Maternal Aunt      Melanoma No family hx of      Skin Cancer No family hx of        Medications:  Current Outpatient Medications   Medication Sig Dispense Refill     clindamycin (CLEOCIN T) 1 % external lotion Apply topically 2 times daily 60 mL 1     order for DME Equipment being ordered: maternity support belt 1 Units 1     Prenatal Vit-Fe Fumarate-FA (PRENATAL MULTIVITAMIN PLUS IRON) 27-0.8 MG TABS per tablet Take 1 tablet by mouth daily 100 tablet 3     senna-docusate (SENOKOT-S/PERICOLACE) 8.6-50 MG tablet Take 1-2 tablets by mouth 2 times daily as needed for constipation 60 tablet 1     metoclopramide (REGLAN) 10 MG tablet Take 1 tablet (10 mg) by mouth 4 times daily (before meals and nightly) (Patient not taking: Reported on 1/23/2019) 120 tablet 1     progesterone (PROMETRIUM) 200 MG capsule Take 1 capsule (200 mg) by mouth daily Day after ovulation until 10 weeks gestation (Patient not taking: Reported on 1/23/2019) 90 capsule 1        Allergies   Allergen Reactions     Flagyl [Metronidazole] Itching       Review of Systems:  -As per HPI, Const: Denies fevers, chills or changes in weight.   -Constitutional: Otherwise feeling well today, in usual state of health.  -HEENT: Patient denies nonhealing oral sores.  -Skin: As above in HPI. No additional skin concerns.  -MSK: low back pain    Physical exam:  Vitals: BP (P) 109/68 (BP Location: Right  arm, Patient Position: Chair)   Pulse (P) 98   LMP 09/25/2018   GEN: This is a well developed, well-nourished female in no acute distress, in a pleasant mood.    SKIN: Examined face, neck, chest, abdomen, axillae, back, arms, hands including nails/digits  -palpable nodule 1x1.5cm in left axilla, non fluctuant, tender to palpation  -there are two hyperpigmented patches with peripheral collarette of scale in the left axilla  -hyperpigmented linear patch on the midline abdomen  -no skin lesions on the abdomen  -No other lesions of concern on areas examined.     Impression/Plan:  1. Left axillary furuncles: given limited area of involvement, favor furuncles over hidradenitis but cannot rule out early hidradenitis. Early management is similar for both conditions.    Start cephalexin 500 mg PO QID x7 days. This is safe in pregnancy.     Start benzoyl peroxide 5% daily    Continue clindamycin 1% lotion BID    Can apply warm compress to left axilla     Follow up in 4-6 weeks    2. Pruritus on abdomen: mild, recommend OTC bland moisturizing creams including Vanicream, Cerave, or Cetaphil.    3. Linea nigra: no treatment necessary. Reassurance provided.    CC Mary Timmons PA-C  [address] [address] on close of this encounter.  Follow-up in 4-6 weeks, earlier for new or changing lesions.      staffed the patient.    Staff Involved:  Resident(Hortencia Covarrubias)/Staff (as above)    Staff Physician Comments:   I saw and evaluated the patient with the resident and I edited the assessment and plan as documented in the note. I was present for the examination.    Ge Jacobsen MD   of Dermatology  Department of Dermatology  [facility]

## 2019-01-23 NOTE — NURSING NOTE
"Chief Complaint   Patient presents with     Derm Problem     Hilaria is here to be seen for \" bumps underneath armpits and excessive sweating\"     Cash Hernandez, IVAN    "

## 2019-01-23 NOTE — PROGRESS NOTES
"[facility] Dermatology Note      Dermatology Problem List:  1. Left axillary furuncle - clindamycin 1%; course of oral cephalexin 500 mg QID x7 days (1/23/2019)  2. Abdominal pruritus - start OTC topical moisturizer    Encounter Date: Jan 23, 2019    CC:   Chief Complaint   Patient presents with     Derm Problem     Hilaria is here to be seen for \" bumps underneath armpits and excessive sweating\"     History of Present Illness:  History obtained through use of Serbian Interpretor.     Ms. Hilaria Jansen is a 28 year old female who presents for evaluation of bumps in axilla. Onset 3 weeks ago, left axilla. Multiple painful bumps. Will increase and decrease in size. Has had drainage which is clear to yellow. No new deodorants, uses Secret brand. Reports subjective fevers at time of onset 3 weeks ago. No previous history of similar symptom. Has no history of skin infections or abscesses. Was seen by her PCP on 1/8 who prescribed topical clindamycin 1% lotion to apply BID, she has been doing this since the visit. Has also been using PO acetaminophen for pain control. Denies any concerning lesions of right axilla, groin region, back of neck, or beneath breasts.     No family history of hydradenitis suppurativa or other skin conditions.     Of note, patient is currently pregnant with EGA 17w1d today.     Past Medical History:   Patient Active Problem List   Diagnosis     Adnexal mass     Abnormal CT scan, neck     Gastroesophageal reflux disease without esophagitis     Sinusitis     Need for Tdap vaccination     Sickle cell trait (H)     Supervision of other normal pregnancy, antepartum     Right-sided low back pain with right-sided sciatica     Past Medical History:   Diagnosis Date     Hidradenitis suppurativa      NO ACTIVE PROBLEMS      Past Surgical History:   Procedure Laterality Date     SURGICAL PATHOLOGY EXAM Left 09/10/2015    left paratracheal neck mass removed, benign       Social History:  Patient " reports that  has never smoked. she has never used smokeless tobacco. She reports that she does not drink alcohol or use drugs.   [place] [facility] [place].     Family History:  Family History   Problem Relation Age of Onset     Breast Cancer Paternal Aunt      Other - See Comments Maternal Aunt      Melanoma No family hx of      Skin Cancer No family hx of        Medications:  Current Outpatient Medications   Medication Sig Dispense Refill     clindamycin (CLEOCIN T) 1 % external lotion Apply topically 2 times daily 60 mL 1     order for DME Equipment being ordered: maternity support belt 1 Units 1     Prenatal Vit-Fe Fumarate-FA (PRENATAL MULTIVITAMIN PLUS IRON) 27-0.8 MG TABS per tablet Take 1 tablet by mouth daily 100 tablet 3     senna-docusate (SENOKOT-S/PERICOLACE) 8.6-50 MG tablet Take 1-2 tablets by mouth 2 times daily as needed for constipation 60 tablet 1     metoclopramide (REGLAN) 10 MG tablet Take 1 tablet (10 mg) by mouth 4 times daily (before meals and nightly) (Patient not taking: Reported on 1/23/2019) 120 tablet 1     progesterone (PROMETRIUM) 200 MG capsule Take 1 capsule (200 mg) by mouth daily Day after ovulation until 10 weeks gestation (Patient not taking: Reported on 1/23/2019) 90 capsule 1        Allergies   Allergen Reactions     Flagyl [Metronidazole] Itching       Review of Systems:  -As per HPI, Const: Denies fevers, chills or changes in weight.   -Constitutional: Otherwise feeling well today, in usual state of health.  -HEENT: Patient denies nonhealing oral sores.  -Skin: As above in HPI. No additional skin concerns.  -MSK: low back pain    Physical exam:  Vitals: BP (P) 109/68 (BP Location: Right arm, Patient Position: Chair)   Pulse (P) 98   LMP 09/25/2018   GEN: This is a well developed, well-nourished female in no acute distress, in a pleasant mood.    SKIN: Examined face, neck, chest, abdomen, axillae, back, arms, hands including nails/digits  -palpable nodule 1x1.5cm  in left axilla, non fluctuant, tender to palpation  -there are two hyperpigmented patches with peripheral collarette of scale in the left axilla  -hyperpigmented linear patch on the midline abdomen  -no skin lesions on the abdomen  -No other lesions of concern on areas examined.     Impression/Plan:  1. Left axillary furuncles: given limited area of involvement, favor furuncles over hidradenitis but cannot rule out early hidradenitis. Early management is similar for both conditions.    Start cephalexin 500 mg PO QID x7 days. This is safe in pregnancy.     Start benzoyl peroxide 5% daily    Continue clindamycin 1% lotion BID    Can apply warm compress to left axilla     Follow up in 4-6 weeks    2. Pruritus on abdomen: mild, recommend OTC bland moisturizing creams including Vanicream, Cerave, or Cetaphil.    3. Linea nigra: no treatment necessary. Reassurance provided.    CC Mary Timmons PA-C  [address]  [place] [address] on close of this encounter.  Follow-up in 4-6 weeks, earlier for new or changing lesions.      staffed the patient.    Staff Involved:  Resident(Hortencia Covarrubias)/Staff (as above)    Staff Physician Comments:   I saw and evaluated the patient with the resident and I edited the assessment and plan as documented in the note. I was present for the examination.    Ge Jacobsen MD   of Dermatology  Department of Dermatology  [facility]

## 2019-02-04 ENCOUNTER — PRENATAL OFFICE VISIT (OUTPATIENT)
Dept: OBGYN | Facility: CLINIC | Age: 29
End: 2019-02-04
Attending: OBSTETRICS & GYNECOLOGY
Payer: MEDICAID

## 2019-02-04 ENCOUNTER — ANCILLARY PROCEDURE (OUTPATIENT)
Dept: ULTRASOUND IMAGING | Facility: CLINIC | Age: 29
End: 2019-02-04
Attending: OBSTETRICS & GYNECOLOGY
Payer: MEDICAID

## 2019-02-04 VITALS
HEART RATE: 93 BPM | SYSTOLIC BLOOD PRESSURE: 126 MMHG | TEMPERATURE: 98.8 F | WEIGHT: 159 LBS | BODY MASS INDEX: 30.45 KG/M2 | DIASTOLIC BLOOD PRESSURE: 69 MMHG

## 2019-02-04 DIAGNOSIS — Z34.80 SUPERVISION OF OTHER NORMAL PREGNANCY, ANTEPARTUM: ICD-10-CM

## 2019-02-04 DIAGNOSIS — Z31.69 PRE-CONCEPTION COUNSELING: ICD-10-CM

## 2019-02-04 DIAGNOSIS — Z34.80 SUPERVISION OF OTHER NORMAL PREGNANCY, ANTEPARTUM: Primary | ICD-10-CM

## 2019-02-04 PROCEDURE — T1013 SIGN LANG/ORAL INTERPRETER: HCPCS | Mod: U3 | Performed by: OBSTETRICS & GYNECOLOGY

## 2019-02-04 PROCEDURE — 76805 OB US >/= 14 WKS SNGL FETUS: CPT | Performed by: OBSTETRICS & GYNECOLOGY

## 2019-02-04 PROCEDURE — 99207 ZZC PRENATAL VISIT: CPT | Performed by: OBSTETRICS & GYNECOLOGY

## 2019-02-04 RX ORDER — PRENATAL VIT/IRON FUM/FOLIC AC 27MG-0.8MG
1 TABLET ORAL DAILY
Qty: 100 TABLET | Refills: 3 | Status: SHIPPED | OUTPATIENT
Start: 2019-02-04 | End: 2019-04-09

## 2019-02-04 NOTE — PROGRESS NOTES
Had normal ultrasound today and having a boy! Still feels like movements are painful. Discussed normal. Using the maternity support belt. Axilla spot is much better but had rash after the ABX taken. RTC 4 weeks and GCT then. BE

## 2019-02-13 ENCOUNTER — TELEPHONE (OUTPATIENT)
Dept: OBGYN | Facility: CLINIC | Age: 29
End: 2019-02-13

## 2019-02-13 NOTE — TELEPHONE ENCOUNTER
Patient is 20w1d, c/o not feeling baby moving for 3 days. Explained to patient this is normal. Won't feel constant movement until 28 weeks. Pt not understanding and insisting on an appointment. Double booked pt on BE schedule 2/14.   Leslee Miller, RN-BSN

## 2019-02-14 ENCOUNTER — PRENATAL OFFICE VISIT (OUTPATIENT)
Dept: OBGYN | Facility: CLINIC | Age: 29
End: 2019-02-14
Payer: MEDICAID

## 2019-02-14 VITALS
OXYGEN SATURATION: 98 % | WEIGHT: 161 LBS | SYSTOLIC BLOOD PRESSURE: 129 MMHG | DIASTOLIC BLOOD PRESSURE: 75 MMHG | HEART RATE: 119 BPM | BODY MASS INDEX: 30.83 KG/M2

## 2019-02-14 DIAGNOSIS — Z34.80 SUPERVISION OF OTHER NORMAL PREGNANCY, ANTEPARTUM: Primary | ICD-10-CM

## 2019-02-14 PROCEDURE — T1013 SIGN LANG/ORAL INTERPRETER: HCPCS | Mod: U3 | Performed by: OBSTETRICS & GYNECOLOGY

## 2019-02-14 PROCEDURE — 99207 ZZC PRENATAL VISIT: CPT | Performed by: OBSTETRICS & GYNECOLOGY

## 2019-02-14 PROCEDURE — 59425 ANTEPARTUM CARE ONLY: CPT | Performed by: OBSTETRICS & GYNECOLOGY

## 2019-02-14 NOTE — PROGRESS NOTES
"Did not feel movement for 3 days and was having cramps on Sunday \"every minute\". Worry about baby. Sore throat noted and body aches, feels like fever at times. Discussed flu sx and OTC meds she can use. Called for appt and baby moved last night. Reassured baby is fine and she was happy to hear doptones today. RTC as scheduled for GCT. BE  "

## 2019-03-12 ENCOUNTER — PRENATAL OFFICE VISIT (OUTPATIENT)
Dept: OBGYN | Facility: CLINIC | Age: 29
End: 2019-03-12
Payer: COMMERCIAL

## 2019-03-12 VITALS
SYSTOLIC BLOOD PRESSURE: 110 MMHG | WEIGHT: 167 LBS | DIASTOLIC BLOOD PRESSURE: 70 MMHG | BODY MASS INDEX: 31.98 KG/M2 | HEART RATE: 98 BPM

## 2019-03-12 DIAGNOSIS — Z34.80 SUPERVISION OF OTHER NORMAL PREGNANCY, ANTEPARTUM: Primary | ICD-10-CM

## 2019-03-12 DIAGNOSIS — K21.9 GASTROESOPHAGEAL REFLUX DISEASE WITHOUT ESOPHAGITIS: ICD-10-CM

## 2019-03-12 LAB
GLUCOSE 1H P 50 G GLC PO SERPL-MCNC: 134 MG/DL (ref 60–129)
HGB BLD-MCNC: 10.9 G/DL (ref 11.7–15.7)

## 2019-03-12 PROCEDURE — 00000218 ZZHCL STATISTIC OBHBG - HEMOGLOBIN: Performed by: OBSTETRICS & GYNECOLOGY

## 2019-03-12 PROCEDURE — 36415 COLL VENOUS BLD VENIPUNCTURE: CPT | Performed by: OBSTETRICS & GYNECOLOGY

## 2019-03-12 PROCEDURE — 99207 ZZC PRENATAL VISIT: CPT | Performed by: OBSTETRICS & GYNECOLOGY

## 2019-03-12 PROCEDURE — 82950 GLUCOSE TEST: CPT | Performed by: OBSTETRICS & GYNECOLOGY

## 2019-03-12 RX ORDER — OMEPRAZOLE 40 MG/1
40 CAPSULE, DELAYED RELEASE ORAL DAILY
Qty: 90 CAPSULE | Refills: 1 | Status: SHIPPED | OUTPATIENT
Start: 2019-03-12 | End: 2019-08-14

## 2019-03-12 NOTE — PROGRESS NOTES
GCT today. Feeling fatigue, pain eveywhere sean low back, and occasional contractions. PTL discussed and questions answered. S>D so discussed growth u/s at 32-34 wks and order was done. RTC 4 weeks and tdap then. BE    Childbirth classes? YES, planning on it  Plan on breastfeeding? Yes  Birthcontrol? unsure  Sex on ultrasound? boy  Circumsion? Yes--discussed outpatient  Peds doc? Not sure, ?[facility]

## 2019-03-13 ENCOUNTER — TELEPHONE (OUTPATIENT)
Dept: OBGYN | Facility: CLINIC | Age: 29
End: 2019-03-13

## 2019-03-13 DIAGNOSIS — R73.09 ABNORMAL GLUCOSE: Primary | ICD-10-CM

## 2019-03-13 DIAGNOSIS — Z34.80 SUPERVISION OF OTHER NORMAL PREGNANCY, ANTEPARTUM: ICD-10-CM

## 2019-03-18 DIAGNOSIS — R73.09 ABNORMAL GLUCOSE: ICD-10-CM

## 2019-03-18 DIAGNOSIS — Z34.80 SUPERVISION OF OTHER NORMAL PREGNANCY, ANTEPARTUM: ICD-10-CM

## 2019-03-18 LAB
GLUCOSE 1H P 100 G GLC PO SERPL-MCNC: 152 MG/DL (ref 60–179)
GLUCOSE 2H P 100 G GLC PO SERPL-MCNC: 125 MG/DL (ref 60–154)
GLUCOSE 3H P 100 G GLC PO SERPL-MCNC: 121 MG/DL (ref 60–139)
GLUCOSE P FAST SERPL-MCNC: 83 MG/DL (ref 60–94)

## 2019-03-18 PROCEDURE — 82951 GLUCOSE TOLERANCE TEST (GTT): CPT | Performed by: OBSTETRICS & GYNECOLOGY

## 2019-03-18 PROCEDURE — 82952 GTT-ADDED SAMPLES: CPT | Performed by: OBSTETRICS & GYNECOLOGY

## 2019-03-18 PROCEDURE — 36415 COLL VENOUS BLD VENIPUNCTURE: CPT | Performed by: OBSTETRICS & GYNECOLOGY

## 2019-03-22 PROBLEM — M54.41 RIGHT-SIDED LOW BACK PAIN WITH RIGHT-SIDED SCIATICA: Status: RESOLVED | Noted: 2019-01-14 | Resolved: 2019-03-22

## 2019-04-05 ENCOUNTER — OFFICE VISIT (OUTPATIENT)
Dept: OPHTHALMOLOGY | Facility: CLINIC | Age: 29
End: 2019-04-05
Payer: COMMERCIAL

## 2019-04-05 DIAGNOSIS — H04.123 DRY EYES, BILATERAL: ICD-10-CM

## 2019-04-05 DIAGNOSIS — H10.13 ALLERGIC CONJUNCTIVITIS OF BOTH EYES: ICD-10-CM

## 2019-04-05 ASSESSMENT — TONOMETRY
IOP_METHOD: ICARE
OD_IOP_MMHG: 9
OS_IOP_MMHG: 10

## 2019-04-05 ASSESSMENT — REFRACTION_WEARINGRX
OD_SPHERE: -0.25
SPECS_TYPE: SVL
OS_CYLINDER: +0.50
OS_AXIS: 088
OD_CYLINDER: SPHERE
OS_SPHERE: -0.50

## 2019-04-05 ASSESSMENT — CONF VISUAL FIELD
OD_NORMAL: 1
METHOD: COUNTING FINGERS
OS_NORMAL: 1

## 2019-04-05 ASSESSMENT — REFRACTION_MANIFEST
OD_SPHERE: PLANO
OS_CYLINDER: +0.50
OS_SPHERE: -0.50
OS_AXIS: 104
OD_CYLINDER: SPHERE

## 2019-04-05 ASSESSMENT — SLIT LAMP EXAM - LIDS
COMMENTS: NORMAL
COMMENTS: NORMAL

## 2019-04-05 ASSESSMENT — VISUAL ACUITY
OD_SC: 20/25
OD_SC+: -
METHOD: SNELLEN - LINEAR
OS_SC: 20/30

## 2019-04-05 ASSESSMENT — EXTERNAL EXAM - LEFT EYE: OS_EXAM: NORMAL

## 2019-04-05 ASSESSMENT — CUP TO DISC RATIO
OS_RATIO: 0.05
OD_RATIO: 0.05

## 2019-04-05 ASSESSMENT — EXTERNAL EXAM - RIGHT EYE: OD_EXAM: NORMAL

## 2019-04-05 NOTE — NURSING NOTE
Chief Complaints and History of Present Illnesses   Patient presents with     Annual Eye Exam     Chief Complaint(s) and History of Present Illness(es)     Annual Eye Exam     Laterality: both eyes    Severity: moderate    Frequency: constantly    Course: stable    Associated symptoms: eye pain    Treatments tried: no treatments    Pain scale: 8/10              Comments     Eyes itching last 2 weeks and painful. Not on eye drops. No vision changes. Used to wear glasses 3 years ago but needing new pair.    Currently pregnant    Nancy Liconapaulette COT 3:34 PM April 5, 2019

## 2019-04-05 NOTE — PROGRESS NOTES
Assessment & Plan       Hilaria Jansen is a 28 year old female with the following diagnoses:   1. Dry eyes, bilateral - Both Eyes    2. Allergic conjunctivitis of both eyes - Both Eyes       Systane complete one drops 3 X per day  If not improved Zaditor one drop 2 X per day    Patient disposition:   Return in about 2 weeks (around 4/19/2019) for Follow Up.          Complete documentation of historical and exam elements from today's encounter can be found in the full encounter summary report (not reduplicated in this progress note). I personally obtained the chief complaint(s) and history of present illness.  I confirmed and edited as necessary the review of systems, past medical/surgical history, family history, social history, and examination findings as documented by others; and I examined the patient myself. I personally reviewed the relevant tests, images, and reports as documented above. I formulated and edited as necessary the assessment and plan and discussed the findings and management plan with the patient and family.  Dr. Mu Echevarria

## 2019-04-09 ENCOUNTER — PRENATAL OFFICE VISIT (OUTPATIENT)
Dept: OBGYN | Facility: CLINIC | Age: 29
End: 2019-04-09
Payer: COMMERCIAL

## 2019-04-09 VITALS — SYSTOLIC BLOOD PRESSURE: 122 MMHG | WEIGHT: 174 LBS | BODY MASS INDEX: 33.32 KG/M2 | DIASTOLIC BLOOD PRESSURE: 74 MMHG

## 2019-04-09 DIAGNOSIS — Z31.69 PRE-CONCEPTION COUNSELING: ICD-10-CM

## 2019-04-09 DIAGNOSIS — Z23 NEED FOR TDAP VACCINATION: ICD-10-CM

## 2019-04-09 DIAGNOSIS — Z34.80 SUPERVISION OF OTHER NORMAL PREGNANCY, ANTEPARTUM: ICD-10-CM

## 2019-04-09 DIAGNOSIS — O30.031 MONOCHORIONIC DIAMNIOTIC TWIN GESTATION IN FIRST TRIMESTER: Primary | ICD-10-CM

## 2019-04-09 PROCEDURE — 90471 IMMUNIZATION ADMIN: CPT | Performed by: OBSTETRICS & GYNECOLOGY

## 2019-04-09 PROCEDURE — 99207 ZZC PRENATAL VISIT: CPT | Performed by: OBSTETRICS & GYNECOLOGY

## 2019-04-09 PROCEDURE — 90715 TDAP VACCINE 7 YRS/> IM: CPT | Performed by: OBSTETRICS & GYNECOLOGY

## 2019-04-09 RX ORDER — PRENATAL VIT/IRON FUM/FOLIC AC 27MG-0.8MG
1 TABLET ORAL DAILY
Qty: 100 TABLET | Refills: 3 | Status: SHIPPED | OUTPATIENT
Start: 2019-04-09 | End: 2020-05-06

## 2019-04-09 NOTE — LETTER
[facility]  [address] [place] [address]  [phone]      May 18, 2022      Hilaria Jansen  [address] [address] [place] [id number]              To Whom It May Concern:    Hilaria Jansen is being seen in our clinic for prenatal care.  Her Estimated Date of Delivery: Dec 5, 2022.  She has a high risk pregnancy and chronic back pain. She may continue working on light duty with the following restrictions: restrictions include standing, reaching, bending, lifting more than 5 lbs,  pushing more than 5 lbs, and pulling more than 5 lbs. She should be allowed to sit as needed. She will need to miss work for appointments.      Sincerely,          Lucia Gannon MD

## 2019-04-09 NOTE — PROGRESS NOTES
(no  available) Passed all GTT. tdap today. Got the breast pump. Feeling weak and discussed lower hgb and iron rich foods.  Has u/s scheduled 5/9 for growth.  She would like a letter done for spouse's mother to come to [place] and take care of her and baby.  Discussed we can work on that. RTC as scheduled. BE

## 2019-04-09 NOTE — LETTER
[facility]  [address] [place] [address]  [phone]      May 18, 2022      Hilaria Jansen  [address] [place] [address] [place] [id number]              To Whom It May Concern:    Hilaria Jansen is being seen in our clinic for prenatal care.  Her Estimated Date of Delivery: Dec 5, 2022.  She may continue working on light duty with the following restrictions: Restrictions include standing, reaching, bending, lifting more than 5lbs,  pushing more than 5lbs, and pulling more than 5lbs. She should be allowed to sit as needed. She will need to miss work for appointments.       Sincerely,        Lucia Gannon MD

## 2019-04-09 NOTE — LETTER
[facility]  [address] [address]  [phone]      April 9, 2019      Hilaria Jansen  [address]   [place] [address]              To Whom It May Concern:    Hilaria Jansen is being seen in our clinic for prenatal care.  Her Estimated Date of Delivery: Jul 2, 2019.  Patient's last menstrual period was 09/25/2018..  She needs to have a family member come and help support her during delivery and postpartum for cares. We are requesting Angela Armando to come to the [place] and help with childcare.        Sincerely,          Brooke Capps MD

## 2019-04-23 ENCOUNTER — PRENATAL OFFICE VISIT (OUTPATIENT)
Dept: OBGYN | Facility: CLINIC | Age: 29
End: 2019-04-23
Payer: COMMERCIAL

## 2019-04-23 VITALS — SYSTOLIC BLOOD PRESSURE: 118 MMHG | BODY MASS INDEX: 33.32 KG/M2 | WEIGHT: 174 LBS | DIASTOLIC BLOOD PRESSURE: 70 MMHG

## 2019-04-23 DIAGNOSIS — Z34.80 SUPERVISION OF OTHER NORMAL PREGNANCY, ANTEPARTUM: Primary | ICD-10-CM

## 2019-04-23 PROCEDURE — 99207 ZZC PRENATAL VISIT: CPT | Performed by: OBSTETRICS & GYNECOLOGY

## 2019-04-23 NOTE — PROGRESS NOTES
Left axilla has been good. Got letter for spouse. Eating more spinach and beef for low Hgb. Discussed FH S>D and has u/s for next visit. RTC as scheduled. BE

## 2019-05-09 ENCOUNTER — ANCILLARY PROCEDURE (OUTPATIENT)
Dept: ULTRASOUND IMAGING | Facility: CLINIC | Age: 29
End: 2019-05-09
Attending: OBSTETRICS & GYNECOLOGY
Payer: COMMERCIAL

## 2019-05-09 ENCOUNTER — PRENATAL OFFICE VISIT (OUTPATIENT)
Dept: OBGYN | Facility: CLINIC | Age: 29
End: 2019-05-09
Attending: OBSTETRICS & GYNECOLOGY
Payer: COMMERCIAL

## 2019-05-09 VITALS
TEMPERATURE: 98.4 F | HEART RATE: 100 BPM | WEIGHT: 178 LBS | SYSTOLIC BLOOD PRESSURE: 110 MMHG | BODY MASS INDEX: 34.09 KG/M2 | DIASTOLIC BLOOD PRESSURE: 72 MMHG

## 2019-05-09 DIAGNOSIS — Z34.80 SUPERVISION OF OTHER NORMAL PREGNANCY, ANTEPARTUM: ICD-10-CM

## 2019-05-09 DIAGNOSIS — Z34.80 SUPERVISION OF OTHER NORMAL PREGNANCY, ANTEPARTUM: Primary | ICD-10-CM

## 2019-05-09 PROCEDURE — 99207 ZZC PRENATAL VISIT: CPT | Performed by: OBSTETRICS & GYNECOLOGY

## 2019-05-09 PROCEDURE — 76816 OB US FOLLOW-UP PER FETUS: CPT | Performed by: OBSTETRICS & GYNECOLOGY

## 2019-05-09 NOTE — PROGRESS NOTES
Ultrasound today with growth at 78%, discussed should be about 8 lbs at due date. She is worried about how big with delivery. Reassured. States lots of movement noted. Otherwise feeling well. RTC 2 weeks. BE

## 2019-05-24 ENCOUNTER — PRENATAL OFFICE VISIT (OUTPATIENT)
Dept: OBGYN | Facility: CLINIC | Age: 29
End: 2019-05-24
Payer: COMMERCIAL

## 2019-05-24 VITALS
OXYGEN SATURATION: 100 % | WEIGHT: 178.9 LBS | HEART RATE: 112 BPM | SYSTOLIC BLOOD PRESSURE: 117 MMHG | TEMPERATURE: 97.4 F | DIASTOLIC BLOOD PRESSURE: 74 MMHG | BODY MASS INDEX: 34.26 KG/M2

## 2019-05-24 DIAGNOSIS — A63.0 GENITAL WARTS: ICD-10-CM

## 2019-05-24 DIAGNOSIS — Z34.80 SUPERVISION OF OTHER NORMAL PREGNANCY, ANTEPARTUM: Primary | ICD-10-CM

## 2019-05-24 PROCEDURE — 99207 ZZC PRENATAL VISIT: CPT | Performed by: OBSTETRICS & GYNECOLOGY

## 2019-05-24 NOTE — PROGRESS NOTES
Lump in vagina for about 2 months and thought it would go away but it is not and more painful so would like that looked at today. Here with  and spouse. Reviewed probably baby about 4 kilos for delivery and that's what spouse was. On exam, has 2 warts noted, right labia minora and bigger one from introitus posterior wall. Discussed removal at next visit with GBS. HPV info given and discussed. RTC 2 weeks and GBS then. BE

## 2019-06-06 ENCOUNTER — PRENATAL OFFICE VISIT (OUTPATIENT)
Dept: OBGYN | Facility: CLINIC | Age: 29
End: 2019-06-06
Payer: COMMERCIAL

## 2019-06-06 VITALS
WEIGHT: 182 LBS | OXYGEN SATURATION: 99 % | BODY MASS INDEX: 34.85 KG/M2 | SYSTOLIC BLOOD PRESSURE: 117 MMHG | DIASTOLIC BLOOD PRESSURE: 73 MMHG | HEART RATE: 107 BPM

## 2019-06-06 DIAGNOSIS — A63.0: ICD-10-CM

## 2019-06-06 DIAGNOSIS — Z34.80 SUPERVISION OF OTHER NORMAL PREGNANCY, ANTEPARTUM: Primary | ICD-10-CM

## 2019-06-06 DIAGNOSIS — O98.313: ICD-10-CM

## 2019-06-06 LAB — HGB BLD-MCNC: 11.8 G/DL (ref 11.7–15.7)

## 2019-06-06 PROCEDURE — 11305 SHAVE SKIN LESION 0.5 CM/<: CPT | Performed by: OBSTETRICS & GYNECOLOGY

## 2019-06-06 PROCEDURE — 88305 TISSUE EXAM BY PATHOLOGIST: CPT | Performed by: OBSTETRICS & GYNECOLOGY

## 2019-06-06 PROCEDURE — 88342 IMHCHEM/IMCYTCHM 1ST ANTB: CPT | Performed by: OBSTETRICS & GYNECOLOGY

## 2019-06-06 PROCEDURE — 00000218 ZZHCL STATISTIC OBHBG - HEMOGLOBIN: Performed by: OBSTETRICS & GYNECOLOGY

## 2019-06-06 PROCEDURE — 36416 COLLJ CAPILLARY BLOOD SPEC: CPT | Performed by: OBSTETRICS & GYNECOLOGY

## 2019-06-06 PROCEDURE — 87653 STREP B DNA AMP PROBE: CPT | Performed by: OBSTETRICS & GYNECOLOGY

## 2019-06-06 PROCEDURE — 99207 ZZC PRENATAL VISIT: CPT | Performed by: OBSTETRICS & GYNECOLOGY

## 2019-06-06 PROCEDURE — 11300 SHAVE SKIN LESION 0.5 CM/<: CPT | Performed by: OBSTETRICS & GYNECOLOGY

## 2019-06-06 ASSESSMENT — PATIENT HEALTH QUESTIONNAIRE - PHQ9: SUM OF ALL RESPONSES TO PHQ QUESTIONS 1-9: 2

## 2019-06-06 NOTE — PROGRESS NOTES
GBS today. Here with  and consented for condyloma removal. She states also has another in buttock area. Baby is very big and she is uncomfortable. RTC weekly until delivery.  BE  After consent, area of condyloma x3 swabbed with betadine, and using sterile technique, 1% lidocaine injected into base of condyloma.  (1 on patient right labia minora, 1 at vaginal introitus large, 1 on patient right buttock area near anus) condyloma removed with scissors.  Hemostasis achieved with silver nitrate for 2 smaller spots and stitch of 0 Vicryl used for vaginal introitus bleeding.  Specimen submitted to pathology and patient tolerated with discomfort from the lidocaine.  EBL 10cc.  Sits baths discussed as needed.

## 2019-06-07 LAB
GP B STREP DNA SPEC QL NAA+PROBE: NEGATIVE
SPECIMEN SOURCE: NORMAL

## 2019-06-10 ENCOUNTER — HOSPITAL ENCOUNTER (OUTPATIENT)
Facility: CLINIC | Age: 29
Discharge: HOME | End: 2019-06-10
Attending: OBSTETRICS & GYNECOLOGY | Admitting: OBSTETRICS & GYNECOLOGY
Payer: COMMERCIAL

## 2019-06-10 ENCOUNTER — NURSE TRIAGE (OUTPATIENT)
Dept: NURSING | Facility: CLINIC | Age: 29
End: 2019-06-10

## 2019-06-10 ENCOUNTER — TELEPHONE (OUTPATIENT)
Dept: OBGYN | Facility: CLINIC | Age: 29
End: 2019-06-10

## 2019-06-10 VITALS
HEIGHT: 65 IN | BODY MASS INDEX: 30.99 KG/M2 | DIASTOLIC BLOOD PRESSURE: 76 MMHG | RESPIRATION RATE: 16 BRPM | HEART RATE: 109 BPM | TEMPERATURE: 98.2 F | SYSTOLIC BLOOD PRESSURE: 123 MMHG | WEIGHT: 186 LBS

## 2019-06-10 PROCEDURE — 59025 FETAL NON-STRESS TEST: CPT | Mod: 26 | Performed by: OBSTETRICS & GYNECOLOGY

## 2019-06-10 PROCEDURE — 59025 FETAL NON-STRESS TEST: CPT

## 2019-06-10 PROCEDURE — G0463 HOSPITAL OUTPT CLINIC VISIT: HCPCS | Mod: 25

## 2019-06-10 RX ORDER — ONDANSETRON 2 MG/ML
4 INJECTION INTRAMUSCULAR; INTRAVENOUS EVERY 6 HOURS PRN
Status: DISCONTINUED | OUTPATIENT
Start: 2019-06-10 | End: 2019-06-10 | Stop reason: HOSPADM

## 2019-06-10 ASSESSMENT — MIFFLIN-ST. JEOR: SCORE: 1569.57

## 2019-06-11 ENCOUNTER — HOSPITAL ENCOUNTER (OUTPATIENT)
Facility: CLINIC | Age: 29
End: 2019-06-11
Admitting: OBSTETRICS & GYNECOLOGY
Payer: COMMERCIAL

## 2019-06-11 NOTE — PLAN OF CARE
Discharge instruction given, patient verbalizes understanding.  Pt discharged to home in stable condition, ambulatory.

## 2019-06-11 NOTE — TELEPHONE ENCOUNTER
Reason for Disposition    Baby moving less today (e.g., kick count < 5 in 1 hour or < 10 in 2 hours)    Additional Information    Negative: Passed out (i.e., lost consciousness, collapsed and was not responding)    Negative: Shock suspected (e.g., cold/pale/clammy skin, too weak to stand, low BP, rapid pulse)    Negative: Difficult to awaken or acting confused (e.g., disoriented, slurred speech)    Negative: SEVERE vaginal bleeding (e.g., continuous red blood from vagina, large blood clots)    Negative: [1] SEVERE abdominal pain (e.g., excruciating) AND [2] constant AND [3] present > 1 hour    Negative: Sounds like a life-threatening emergency to the triager    Negative: [1] Vaginal bleeding AND [2] pregnant < 20 weeks    Negative: MILD-MODERATE vaginal bleeding (i.e., small to medium clots; like mild menstrual period)    Negative: Abdominal pain or having contractions    Negative: Leakage of fluid from vagina (or caller thinks she has ruptured her bag of gottlieb)    Protocols used: PREGNANCY - VAGINAL BLEEDING GREATER THAN 20 WEEKS [facility]    Patient calling to report onset of vaginal bleeding 1 hour ago.  Writer was unable to communicate well with the patient and consulted a french  via telephone.   ID: [id number].  Patient denies any clots noted in bleeding, denies abdominal pain, but reports only feeling the baby move once today - this morning.  Patient was advised to go to L&D per guideline.  On-call OB was paged as an FYI per One Note instructions.    Yara Rodriguez RN  [facility]

## 2019-06-11 NOTE — PLAN OF CARE
Pt admitted to 234 for MAC assessment.  Pt reports decreased fetal movement and bleeding x2 with wiping in the bathroom.  Pt states she had sexual intercourse this morning at 11am.

## 2019-06-11 NOTE — DISCHARGE INSTRUCTIONS
Discharge Instruction for Undelivered Patients      You were seen for: Bleeding Assessment  We Consulted: Dr. RISHI Landry  You had (Test or Medicine): NST     Diet:   Drink 8 to 12 glasses of liquids (milk, juice, water) every day.  You may eat meals and snacks.     Activity:  Call your doctor or nurse midwife if your baby is moving less than usual.     Call your provider if you notice:  Swelling in your face or increased swelling in your hands or legs.  Headaches that are not relieved by Tylenol (acetaminophen).  Changes in your vision (blurring: seeing spots or stars.)  Nausea (sick to your stomach) and vomiting (throwing up).   Weight gain of 5 pounds or more per week.  Heartburn that doesn't go away.  Signs of bladder infection: pain when you urinate (use the toilet), need to go more often and more urgently.  The bag of gottlieb (rupture of membranes) breaks, or you notice leaking in your underwear.  Bright red blood in your underwear.  Abdominal (lower belly) or stomach pain.  For first baby: Contractions (tightening) less than 5 minutes apart for one hour or more.  Second (plus) baby: Contractions (tightening) less than 10 minutes apart and getting stronger.  *If less than 34 weeks: Contractions (tightenings) more than 6 times in one hour.  Increase or change in vaginal discharge (note the color and amount)      Follow-up:  As scheduled in the clinic

## 2019-06-11 NOTE — TELEPHONE ENCOUNTER
Hilaria Jansen is a 29 year old  at 36w6d who called with vaginal bleeding and decreased fetal movement. FNA paged me to let me know that they told patient to come to the hospital per protocol. Unfortunately [facility] is closed/on divert. I called patient with a [place] .    Confirmed that patient is having bleeding. Just drops when she goes to the bathroom. Also some old blood. No abdominal pain or contractions.   Only felt baby move once today in the morning around 8am.     Recommend patient go to a different hospital now to make sure her baby is okay due to the decreased fetal movement.   She plans to go [facility] for evaluation, I called them to let them know.     Lucia Gannon MD

## 2019-06-12 ENCOUNTER — PRENATAL OFFICE VISIT (OUTPATIENT)
Dept: OBGYN | Facility: CLINIC | Age: 29
End: 2019-06-12
Payer: COMMERCIAL

## 2019-06-12 VITALS
BODY MASS INDEX: 30.29 KG/M2 | DIASTOLIC BLOOD PRESSURE: 72 MMHG | SYSTOLIC BLOOD PRESSURE: 123 MMHG | WEIGHT: 182 LBS | HEART RATE: 92 BPM | TEMPERATURE: 98.9 F

## 2019-06-12 DIAGNOSIS — Z34.80 SUPERVISION OF OTHER NORMAL PREGNANCY, ANTEPARTUM: Primary | ICD-10-CM

## 2019-06-12 LAB — COPATH REPORT: NORMAL

## 2019-06-12 PROCEDURE — 99207 ZZC PRENATAL VISIT: CPT | Performed by: OBSTETRICS & GYNECOLOGY

## 2019-06-12 PROCEDURE — 59426 ANTEPARTUM CARE ONLY: CPT | Performed by: OBSTETRICS & GYNECOLOGY

## 2019-06-12 RX ORDER — ACETAMINOPHEN 325 MG/1
650 TABLET ORAL EVERY 6 HOURS PRN
Qty: 100 TABLET | Refills: 0 | Status: SHIPPED | OUTPATIENT
Start: 2019-06-12 | End: 2019-08-14

## 2019-06-12 RX ORDER — IBUPROFEN 200 MG
800 TABLET ORAL
Status: CANCELLED | OUTPATIENT
Start: 2019-06-12

## 2019-06-12 RX ORDER — NALOXONE HYDROCHLORIDE 0.4 MG/ML
.1-.4 INJECTION, SOLUTION INTRAMUSCULAR; INTRAVENOUS; SUBCUTANEOUS
Status: CANCELLED | OUTPATIENT
Start: 2019-06-12

## 2019-06-12 RX ORDER — AMOXICILLIN 250 MG
1 CAPSULE ORAL DAILY
Qty: 100 TABLET | Refills: 0 | Status: SHIPPED | OUTPATIENT
Start: 2019-06-12 | End: 2019-08-14

## 2019-06-12 RX ORDER — CARBOPROST TROMETHAMINE 250 UG/ML
250 INJECTION, SOLUTION INTRAMUSCULAR
Status: CANCELLED | OUTPATIENT
Start: 2019-06-12

## 2019-06-12 RX ORDER — METHYLERGONOVINE MALEATE 0.2 MG/ML
200 INJECTION INTRAVENOUS
Status: CANCELLED | OUTPATIENT
Start: 2019-06-12

## 2019-06-12 RX ORDER — LIDOCAINE 40 MG/G
CREAM TOPICAL
Status: CANCELLED | OUTPATIENT
Start: 2019-06-12

## 2019-06-12 RX ORDER — FENTANYL CITRATE 50 UG/ML
50-100 INJECTION, SOLUTION INTRAMUSCULAR; INTRAVENOUS
Status: CANCELLED | OUTPATIENT
Start: 2019-06-12

## 2019-06-12 RX ORDER — OXYTOCIN/0.9 % SODIUM CHLORIDE 30/500 ML
100-340 PLASTIC BAG, INJECTION (ML) INTRAVENOUS CONTINUOUS PRN
Status: CANCELLED | OUTPATIENT
Start: 2019-06-12

## 2019-06-12 RX ORDER — OXYTOCIN 10 [USP'U]/ML
10 INJECTION, SOLUTION INTRAMUSCULAR; INTRAVENOUS
Status: CANCELLED | OUTPATIENT
Start: 2019-06-12

## 2019-06-12 RX ORDER — SODIUM CHLORIDE, SODIUM LACTATE, POTASSIUM CHLORIDE, CALCIUM CHLORIDE 600; 310; 30; 20 MG/100ML; MG/100ML; MG/100ML; MG/100ML
INJECTION, SOLUTION INTRAVENOUS CONTINUOUS
Status: CANCELLED | OUTPATIENT
Start: 2019-06-12

## 2019-06-12 RX ORDER — OXYCODONE AND ACETAMINOPHEN 5; 325 MG/1; MG/1
1 TABLET ORAL
Status: CANCELLED | OUTPATIENT
Start: 2019-06-12

## 2019-06-12 RX ORDER — ONDANSETRON 2 MG/ML
4 INJECTION INTRAMUSCULAR; INTRAVENOUS EVERY 6 HOURS PRN
Status: CANCELLED | OUTPATIENT
Start: 2019-06-12

## 2019-06-12 RX ORDER — ACETAMINOPHEN 325 MG/1
650 TABLET ORAL EVERY 4 HOURS PRN
Status: CANCELLED | OUTPATIENT
Start: 2019-06-12

## 2019-06-12 RX ORDER — IBUPROFEN 600 MG/1
600 TABLET, FILM COATED ORAL EVERY 6 HOURS PRN
Qty: 60 TABLET | Refills: 0 | Status: SHIPPED | OUTPATIENT
Start: 2019-06-12 | End: 2019-08-14

## 2019-06-12 NOTE — PROGRESS NOTES
37w1d  Active fetal movement. Irregular contractions. No leaking, bleeding or abnormal discharge.   Heartburn is getting worse. Taking omeprazole.   Diverted to [facility] 6/10 for evaluation of decreased fetal movement and vaginal bleeding.   Questions about elective IOL.     GBS: Negative  Hemoglobin   Date Value Ref Range Status   06/06/2019 11.8 11.7 - 15.7 g/dL Final     Breast pump rx: Done  Labor orders: Done  Birth plan: flexible  Length of stay: discussed  Disability paperwork: NA  Resident involvement: discussed and agrees.  OTC PP meds: done     Reviewed labor instructions, kick counts, s/sx pre-eclampsia.  Due to language barrier, an  was present during the history-taking and subsequent discussion (and for part of the physical exam) with this patient.   RTC weekly.  Lucia Gannon MD

## 2019-06-13 ENCOUNTER — TELEPHONE (OUTPATIENT)
Dept: OBGYN | Facility: CLINIC | Age: 29
End: 2019-06-13

## 2019-06-13 ENCOUNTER — HOSPITAL ENCOUNTER (OUTPATIENT)
Facility: CLINIC | Age: 29
Discharge: HOME | End: 2019-06-13
Attending: OBSTETRICS & GYNECOLOGY | Admitting: OBSTETRICS & GYNECOLOGY
Payer: COMMERCIAL

## 2019-06-13 VITALS
TEMPERATURE: 99.1 F | HEART RATE: 106 BPM | BODY MASS INDEX: 30.32 KG/M2 | HEIGHT: 65 IN | DIASTOLIC BLOOD PRESSURE: 66 MMHG | WEIGHT: 182 LBS | SYSTOLIC BLOOD PRESSURE: 124 MMHG

## 2019-06-13 DIAGNOSIS — O23.43 URINARY TRACT INFECTION IN MOTHER DURING THIRD TRIMESTER OF PREGNANCY: Primary | ICD-10-CM

## 2019-06-13 PROBLEM — N89.8 VAGINAL DISCHARGE: Status: ACTIVE | Noted: 2019-06-13

## 2019-06-13 PROBLEM — Z36.89 ENCOUNTER FOR TRIAGE IN PREGNANT PATIENT: Status: ACTIVE | Noted: 2019-06-13

## 2019-06-13 LAB
ALBUMIN UR-MCNC: 10 MG/DL
APPEARANCE UR: ABNORMAL
BACTERIA #/AREA URNS HPF: ABNORMAL /HPF
BILIRUB UR QL STRIP: NEGATIVE
COLOR UR AUTO: YELLOW
GLUCOSE UR STRIP-MCNC: NEGATIVE MG/DL
HGB UR QL STRIP: NEGATIVE
KETONES UR STRIP-MCNC: NEGATIVE MG/DL
LEUKOCYTE ESTERASE UR QL STRIP: ABNORMAL
NITRATE UR QL: POSITIVE
PH UR STRIP: 6.5 PH (ref 5–7)
RBC #/AREA URNS AUTO: 1 /HPF (ref 0–2)
RUPTURE OF FETAL MEMBRANES BY ROM PLUS: NEGATIVE
SOURCE: ABNORMAL
SP GR UR STRIP: 1.01 (ref 1–1.03)
SQUAMOUS #/AREA URNS AUTO: 15 /HPF (ref 0–1)
UROBILINOGEN UR STRIP-MCNC: NORMAL MG/DL (ref 0–2)
WBC #/AREA URNS AUTO: 10 /HPF (ref 0–5)

## 2019-06-13 PROCEDURE — 84112 EVAL AMNIOTIC FLUID PROTEIN: CPT | Performed by: OBSTETRICS & GYNECOLOGY

## 2019-06-13 PROCEDURE — G0463 HOSPITAL OUTPT CLINIC VISIT: HCPCS | Mod: 25

## 2019-06-13 PROCEDURE — 81001 URINALYSIS AUTO W/SCOPE: CPT | Performed by: OBSTETRICS & GYNECOLOGY

## 2019-06-13 PROCEDURE — 99213 OFFICE O/P EST LOW 20 MIN: CPT | Mod: 25 | Performed by: OBSTETRICS & GYNECOLOGY

## 2019-06-13 PROCEDURE — 59025 FETAL NON-STRESS TEST: CPT | Mod: 26 | Performed by: OBSTETRICS & GYNECOLOGY

## 2019-06-13 PROCEDURE — 59025 FETAL NON-STRESS TEST: CPT

## 2019-06-13 PROCEDURE — 87086 URINE CULTURE/COLONY COUNT: CPT | Performed by: OBSTETRICS & GYNECOLOGY

## 2019-06-13 RX ORDER — CEPHALEXIN 500 MG/1
500 CAPSULE ORAL 4 TIMES DAILY
Qty: 20 CAPSULE | Refills: 0 | Status: ON HOLD | OUTPATIENT
Start: 2019-06-13 | End: 2019-06-17

## 2019-06-13 RX ORDER — ONDANSETRON 2 MG/ML
4 INJECTION INTRAMUSCULAR; INTRAVENOUS EVERY 6 HOURS PRN
Status: DISCONTINUED | OUTPATIENT
Start: 2019-06-13 | End: 2019-06-13 | Stop reason: HOSPADM

## 2019-06-13 ASSESSMENT — MIFFLIN-ST. JEOR: SCORE: 1551.43

## 2019-06-13 NOTE — PLAN OF CARE
ROM PLUS negative. Baby reactive at this time with extended monitoring. discharge at 1304 ambulating with .

## 2019-06-13 NOTE — TELEPHONE ENCOUNTER
Karolina Escobar is a 29 year old  at 37w2d who was seen in triage today to rule out ROM. She also reported dysuria. UA shows sign of infection    I called patient with  and informed her of results and rx.   rx for Cephalexin 500 mg orally 4 times per day for 5 days.    Results for KAROLINA ESCOBAR (MRN [id number]) as of 2019 14:12   2019 11:10   Color Urine Yellow   Appearance Urine Slightly Cloudy   Glucose Urine Negative   Bilirubin Urine Negative   Ketones Urine Negative   Specific Gravity Urine 1.010   pH Urine 6.5   Protein Albumin Urine 10 (A)   Urobilinogen mg/dL Normal   Nitrite Urine Positive (A)   Blood Urine Negative   Leukocyte Esterase Urine Large (A)   Source Unspecified Urine   WBC Urine 10 (H)   RBC Urine 1   Bacteria Urine Moderate (A)   Squamous Epithelial /HPF Urine 15 (H)

## 2019-06-13 NOTE — DISCHARGE INSTRUCTIONS
Discharge Instruction for Undelivered Patients      You were seen for: Membrane Assessment  We Consulted: Dr Gannon  You had (Test or Medicine):ROM PLUS, NST      Call your provider if you notice:  Swelling in your face or increased swelling in your hands or legs.  Headaches that are not relieved by Tylenol (acetaminophen).  Changes in your vision (blurring: seeing spots or stars.)  Nausea (sick to your stomach) and vomiting (throwing up).   Weight gain of 5 pounds or more per week.  Heartburn that doesn't go away.  Signs of bladder infection: pain when you urinate (use the toilet), need to go more often and more urgently.  The bag of gottlieb (rupture of membranes) breaks, or you notice leaking in your underwear.  Bright red blood in your underwear.  Abdominal (lower belly) or stomach pain.  For first baby: Contractions (tightening) less than 5 minutes apart for one hour or more.  Second (plus) baby: Contractions (tightening) less than 10 minutes apart and getting stronger.  *If less than 34 weeks: Contractions (tightenings) more than 6 times in one hour.  Increase or change in vaginal discharge (note the color and amount)  Follow-up:  As scheduled in the clinic

## 2019-06-13 NOTE — PLAN OF CARE
Pt to BP for evaluation of SROM, approx 0830, white/clear. No bleeding, baby is active, denies ctx or any pain. MD updated as to pt's arrival and rom+ sent. No fluid noted on perineum with rom+ test. Irrit per toco, FHR mod shala, no accels, no decels. Pulse slightly elevated, BP WNL. MD to see pt and make plan after results of ROM+

## 2019-06-14 NOTE — PROGRESS NOTES
"[facility]  OB Triage Note    CC: Leakage of fluid    Interview conducted with iPad     HPI: Ms. Hilaria Jansen is a 29 year old  at 37w2d by 7w2d US, who presents with leakage of fluid.  She noted white discharge running down her leg after her shower today. It occurred twice. Has not had any fluid leakage since.  She denies contractions, leaking fluid, vaginal bleeding.  + Good fetal movement.    Obstetric Complications  1. Sickle cell trait      Prenatal Labs  Lab Results   Component Value Date    ABO B 2018    RH Pos 2018    AS Neg 2018    HEPBANG Nonreactive 2018    CHPCRT Negative 2018    GCPCRT Negative 2018    HGB 11.8 2019       O:  Patient Vitals for the past 24 hrs:   BP Temp Temp src Pulse Height Weight   19 1125 -- -- -- -- 1.651 m (5' 5\") 82.6 kg (182 lb)   19 1050 124/66 99.1  F (37.3  C) Oral 106 -- --     Gen: Well-appearing, NAD  Abd: Soft, gravid  Ext: Trace LE edema b/l    Spec: Normal external genitalia. No lesions in cervix or vagina. No pooling. Cervix appears closed    FHT: , moderate shala, present accels, no decels  [place]: 0-1 ctx in 10 mins    Labs:  UA wnl  ROM Plus negative    A/P:  Ms. Hilaria Jansen is a 29 year old  at 37w2d by 7w2d US here with r/o ROM.  Amnisure negative and no pooling found on speculum exam. Vaginal discharge appears physiologic. Patient comfortable discharging home. Reviewed labor precautions.    #FWB: - Category I FHT    Patient seen by and discussed with Dr. Jagdeep Chang MD  Ob/Gyn, PGY-1  2019, 8:27 PM    Physician Attestation   I, uLcia Gannon, personally examined and evaluated this patient.  I discussed the patient with the medical student and/or resident and care team, and agree with the assessment and plan of care as documented in the note of 2019  [date].      I personally reviewed vital signs, medications, labs " and exam and fetal tracing..    Key findings: 29 year old  at 37w2d here for evaluation of leaking fluid. ROM plus neg, no evidence of rupture on sterile speculum exam. Not in labor. Category 1 reactive NST. discharge to home with labor precautions. Follow up in clinic next week as scheduled.   Lucia Gannon MD  Date of Service (when I saw the patient): 19

## 2019-06-15 ENCOUNTER — ANESTHESIA EVENT (OUTPATIENT)
Dept: OBGYN | Facility: CLINIC | Age: 29
End: 2019-06-15

## 2019-06-15 ENCOUNTER — HOSPITAL ENCOUNTER (INPATIENT)
Facility: CLINIC | Age: 29
LOS: 4 days | Discharge: HOME HEALTH | End: 2019-06-19
Attending: OBSTETRICS & GYNECOLOGY | Admitting: OBSTETRICS & GYNECOLOGY
Payer: COMMERCIAL

## 2019-06-15 ENCOUNTER — ANESTHESIA (OUTPATIENT)
Dept: OBGYN | Facility: CLINIC | Age: 29
End: 2019-06-15

## 2019-06-15 DIAGNOSIS — Z98.891 S/P C-SECTION: Primary | ICD-10-CM

## 2019-06-15 PROBLEM — O09.90 PREGNANCY, SUPERVISION, HIGH-RISK: Status: ACTIVE | Noted: 2019-06-15

## 2019-06-15 PROBLEM — O36.8190 DECREASED FETAL MOVEMENT: Status: ACTIVE | Noted: 2019-06-15

## 2019-06-15 LAB
ABO + RH BLD: NORMAL
ABO + RH BLD: NORMAL
BACTERIA SPEC CULT: NORMAL
BASOPHILS # BLD AUTO: 0 10E9/L (ref 0–0.2)
BASOPHILS NFR BLD AUTO: 0 %
BLD GP AB SCN SERPL QL: NORMAL
BLOOD BANK CMNT PATIENT-IMP: NORMAL
DIFFERENTIAL METHOD BLD: ABNORMAL
EOSINOPHIL # BLD AUTO: 0 10E9/L (ref 0–0.7)
EOSINOPHIL NFR BLD AUTO: 0.3 %
ERYTHROCYTE [DISTWIDTH] IN BLOOD BY AUTOMATED COUNT: 16.1 % (ref 10–15)
HCT VFR BLD AUTO: 34.9 % (ref 35–47)
HGB BLD-MCNC: 11.1 G/DL (ref 11.7–15.7)
IMM GRANULOCYTES # BLD: 0 10E9/L (ref 0–0.4)
IMM GRANULOCYTES NFR BLD: 0.5 %
LYMPHOCYTES # BLD AUTO: 1.7 10E9/L (ref 0.8–5.3)
LYMPHOCYTES NFR BLD AUTO: 25.9 %
MCH RBC QN AUTO: 25.8 PG (ref 26.5–33)
MCHC RBC AUTO-ENTMCNC: 31.8 G/DL (ref 31.5–36.5)
MCV RBC AUTO: 81 FL (ref 78–100)
MONOCYTES # BLD AUTO: 0.4 10E9/L (ref 0–1.3)
MONOCYTES NFR BLD AUTO: 6.2 %
NEUTROPHILS # BLD AUTO: 4.5 10E9/L (ref 1.6–8.3)
NEUTROPHILS NFR BLD AUTO: 67.1 %
NRBC # BLD AUTO: 0 10*3/UL
NRBC BLD AUTO-RTO: 0 /100
PLATELET # BLD AUTO: 197 10E9/L (ref 150–450)
RBC # BLD AUTO: 4.3 10E12/L (ref 3.8–5.2)
SPECIMEN EXP DATE BLD: NORMAL
SPECIMEN SOURCE: NORMAL
WBC # BLD AUTO: 6.6 10E9/L (ref 4–11)

## 2019-06-15 PROCEDURE — 25000132 ZZH RX MED GY IP 250 OP 250 PS 637: Performed by: STUDENT IN AN ORGANIZED HEALTH CARE EDUCATION/TRAINING PROGRAM

## 2019-06-15 PROCEDURE — 86900 BLOOD TYPING SEROLOGIC ABO: CPT | Performed by: STUDENT IN AN ORGANIZED HEALTH CARE EDUCATION/TRAINING PROGRAM

## 2019-06-15 PROCEDURE — 99214 OFFICE O/P EST MOD 30 MIN: CPT | Mod: 25 | Performed by: OBSTETRICS & GYNECOLOGY

## 2019-06-15 PROCEDURE — 85025 COMPLETE CBC W/AUTO DIFF WBC: CPT | Performed by: STUDENT IN AN ORGANIZED HEALTH CARE EDUCATION/TRAINING PROGRAM

## 2019-06-15 PROCEDURE — 12000001 ZZH R&B MED SURG/OB UMMC

## 2019-06-15 PROCEDURE — 59025 FETAL NON-STRESS TEST: CPT

## 2019-06-15 PROCEDURE — 59025 FETAL NON-STRESS TEST: CPT | Mod: 76

## 2019-06-15 PROCEDURE — 59025 FETAL NON-STRESS TEST: CPT | Mod: 26 | Performed by: OBSTETRICS & GYNECOLOGY

## 2019-06-15 PROCEDURE — 36415 COLL VENOUS BLD VENIPUNCTURE: CPT | Performed by: STUDENT IN AN ORGANIZED HEALTH CARE EDUCATION/TRAINING PROGRAM

## 2019-06-15 PROCEDURE — 25800030 ZZH RX IP 258 OP 636: Performed by: STUDENT IN AN ORGANIZED HEALTH CARE EDUCATION/TRAINING PROGRAM

## 2019-06-15 PROCEDURE — G0463 HOSPITAL OUTPT CLINIC VISIT: HCPCS | Mod: 25

## 2019-06-15 PROCEDURE — 25800030 ZZH RX IP 258 OP 636

## 2019-06-15 PROCEDURE — 86850 RBC ANTIBODY SCREEN: CPT | Performed by: STUDENT IN AN ORGANIZED HEALTH CARE EDUCATION/TRAINING PROGRAM

## 2019-06-15 PROCEDURE — 86901 BLOOD TYPING SEROLOGIC RH(D): CPT | Performed by: STUDENT IN AN ORGANIZED HEALTH CARE EDUCATION/TRAINING PROGRAM

## 2019-06-15 PROCEDURE — 86780 TREPONEMA PALLIDUM: CPT | Performed by: STUDENT IN AN ORGANIZED HEALTH CARE EDUCATION/TRAINING PROGRAM

## 2019-06-15 PROCEDURE — 96360 HYDRATION IV INFUSION INIT: CPT

## 2019-06-15 PROCEDURE — 40000671 ZZH STATISTIC ANESTHESIA CASE

## 2019-06-15 RX ORDER — CARBOPROST TROMETHAMINE 250 UG/ML
250 INJECTION, SOLUTION INTRAMUSCULAR
Status: DISCONTINUED | OUTPATIENT
Start: 2019-06-15 | End: 2019-06-17

## 2019-06-15 RX ORDER — SODIUM CHLORIDE, SODIUM LACTATE, POTASSIUM CHLORIDE, CALCIUM CHLORIDE 600; 310; 30; 20 MG/100ML; MG/100ML; MG/100ML; MG/100ML
INJECTION, SOLUTION INTRAVENOUS CONTINUOUS
Status: DISCONTINUED | OUTPATIENT
Start: 2019-06-15 | End: 2019-06-15

## 2019-06-15 RX ORDER — OXYTOCIN/0.9 % SODIUM CHLORIDE 30/500 ML
100-340 PLASTIC BAG, INJECTION (ML) INTRAVENOUS CONTINUOUS PRN
Status: DISCONTINUED | OUTPATIENT
Start: 2019-06-15 | End: 2019-06-17

## 2019-06-15 RX ORDER — OXYTOCIN 10 [USP'U]/ML
10 INJECTION, SOLUTION INTRAMUSCULAR; INTRAVENOUS
Status: DISCONTINUED | OUTPATIENT
Start: 2019-06-15 | End: 2019-06-17

## 2019-06-15 RX ORDER — METHYLERGONOVINE MALEATE 0.2 MG/ML
200 INJECTION INTRAVENOUS
Status: DISCONTINUED | OUTPATIENT
Start: 2019-06-15 | End: 2019-06-17

## 2019-06-15 RX ORDER — ACETAMINOPHEN 325 MG/1
650 TABLET ORAL EVERY 4 HOURS PRN
Status: DISCONTINUED | OUTPATIENT
Start: 2019-06-15 | End: 2019-06-17

## 2019-06-15 RX ORDER — NALOXONE HYDROCHLORIDE 0.4 MG/ML
.1-.4 INJECTION, SOLUTION INTRAMUSCULAR; INTRAVENOUS; SUBCUTANEOUS
Status: DISCONTINUED | OUTPATIENT
Start: 2019-06-15 | End: 2019-06-16

## 2019-06-15 RX ORDER — IBUPROFEN 800 MG/1
800 TABLET, FILM COATED ORAL
Status: DISCONTINUED | OUTPATIENT
Start: 2019-06-15 | End: 2019-06-17

## 2019-06-15 RX ORDER — LIDOCAINE HYDROCHLORIDE 10 MG/ML
INJECTION, SOLUTION EPIDURAL; INFILTRATION; INTRACAUDAL; PERINEURAL
Status: DISCONTINUED
Start: 2019-06-15 | End: 2019-06-16 | Stop reason: HOSPADM

## 2019-06-15 RX ORDER — ONDANSETRON 2 MG/ML
4 INJECTION INTRAMUSCULAR; INTRAVENOUS EVERY 6 HOURS PRN
Status: DISCONTINUED | OUTPATIENT
Start: 2019-06-15 | End: 2019-06-15

## 2019-06-15 RX ORDER — FENTANYL CITRATE 50 UG/ML
50-100 INJECTION, SOLUTION INTRAMUSCULAR; INTRAVENOUS
Status: DISCONTINUED | OUTPATIENT
Start: 2019-06-15 | End: 2019-06-15

## 2019-06-15 RX ORDER — MISOPROSTOL 100 UG/1
25 TABLET ORAL EVERY 4 HOURS PRN
Status: DISCONTINUED | OUTPATIENT
Start: 2019-06-15 | End: 2019-06-15

## 2019-06-15 RX ORDER — SODIUM CHLORIDE, SODIUM LACTATE, POTASSIUM CHLORIDE, CALCIUM CHLORIDE 600; 310; 30; 20 MG/100ML; MG/100ML; MG/100ML; MG/100ML
INJECTION, SOLUTION INTRAVENOUS CONTINUOUS
Status: DISCONTINUED | OUTPATIENT
Start: 2019-06-15 | End: 2019-06-17

## 2019-06-15 RX ORDER — OXYCODONE AND ACETAMINOPHEN 5; 325 MG/1; MG/1
1 TABLET ORAL
Status: DISCONTINUED | OUTPATIENT
Start: 2019-06-15 | End: 2019-06-17

## 2019-06-15 RX ORDER — ONDANSETRON 2 MG/ML
4 INJECTION INTRAMUSCULAR; INTRAVENOUS EVERY 6 HOURS PRN
Status: DISCONTINUED | OUTPATIENT
Start: 2019-06-15 | End: 2019-06-17

## 2019-06-15 RX ORDER — CITRIC ACID/SODIUM CITRATE 334-500MG
30 SOLUTION, ORAL ORAL ONCE
Status: DISCONTINUED | OUTPATIENT
Start: 2019-06-15 | End: 2019-06-17

## 2019-06-15 RX ORDER — SODIUM CHLORIDE, SODIUM LACTATE, POTASSIUM CHLORIDE, CALCIUM CHLORIDE 600; 310; 30; 20 MG/100ML; MG/100ML; MG/100ML; MG/100ML
INJECTION, SOLUTION INTRAVENOUS
Status: COMPLETED
Start: 2019-06-15 | End: 2019-06-15

## 2019-06-15 RX ADMIN — Medication 25 MCG: at 16:28

## 2019-06-15 RX ADMIN — SODIUM CHLORIDE, POTASSIUM CHLORIDE, SODIUM LACTATE AND CALCIUM CHLORIDE: 600; 310; 30; 20 INJECTION, SOLUTION INTRAVENOUS at 15:31

## 2019-06-15 RX ADMIN — SODIUM CHLORIDE, POTASSIUM CHLORIDE, SODIUM LACTATE AND CALCIUM CHLORIDE 1000 ML: 600; 310; 30; 20 INJECTION, SOLUTION INTRAVENOUS at 11:51

## 2019-06-15 RX ADMIN — SODIUM CHLORIDE, POTASSIUM CHLORIDE, SODIUM LACTATE AND CALCIUM CHLORIDE: 600; 310; 30; 20 INJECTION, SOLUTION INTRAVENOUS at 16:21

## 2019-06-15 RX ADMIN — ACETAMINOPHEN 650 MG: 325 TABLET, FILM COATED ORAL at 23:56

## 2019-06-15 RX ADMIN — SODIUM CHLORIDE, POTASSIUM CHLORIDE, SODIUM LACTATE AND CALCIUM CHLORIDE: 600; 310; 30; 20 INJECTION, SOLUTION INTRAVENOUS at 22:24

## 2019-06-15 RX ADMIN — SODIUM CHLORIDE, POTASSIUM CHLORIDE, SODIUM LACTATE AND CALCIUM CHLORIDE 500 ML: 600; 310; 30; 20 INJECTION, SOLUTION INTRAVENOUS at 22:07

## 2019-06-15 RX ADMIN — Medication 25 MCG: at 20:30

## 2019-06-15 RX ADMIN — SODIUM CHLORIDE, POTASSIUM CHLORIDE, SODIUM LACTATE AND CALCIUM CHLORIDE: 600; 310; 30; 20 INJECTION, SOLUTION INTRAVENOUS at 23:56

## 2019-06-15 NOTE — PROVIDER NOTIFICATION
06/15/19 1513   Provider Notification   Provider Name/Title Tarah   Method of Notification At Bedside   Request Evaluate - Remote   Notification Reason Status Update   Provider notified of patient request to start IOL, plan per provider to initiate new IV placement prior to administration of meds. CRNA requested to initiate PIV. Will proceed with ongoing assessment.

## 2019-06-15 NOTE — PROVIDER NOTIFICATION
06/15/19 1040   Provider Notification   Provider Name/Title Dr. Rascon   Method of Notification In Department   Request Evaluate in Person   Notification Reason Patient Arrived        06/15/19 1040   Provider Notification   Provider Name/Title Dr. Rascon   Method of Notification In Department   Request Evaluate in Person   Notification Reason Patient Arrived   Here for tour and having decreased fetal movement

## 2019-06-15 NOTE — H&P
[facility] [place]    History and Physical  Obstetrics and Gynecology     Date of Admission:  6/15/2019    Assessment & Plan   Hilaria Jansen is a 29 year old female who presents with decreased fetal movement  ASSESSMENT:   IUP @ 37w4d   NST reactive.  Category 1-2    PLAN:   Patient was given IVF bolus of 1L and allow to eat lunch with no improvement in FHT  Given decreased FM and prolonged periods of minimal variability at term, will admit for induction of labor due to non-reassuring fetal status.  Plan for cervical ripening  Discussed continuous monitoring and potential for fetal distress with induction/labor.  If signs of fetal distress, would recommend induction.    Lucia Rascon    History of Present Illness   Hilaria Jansen is a 29 year old female  37w4d  Estimated Date of Delivery: 19 is calculated from Patient's last menstrual period was 2018. is admitted to the Birthplace.    Patient was on birthplace tour today and mentioned decreased fetal movement.  Hasn't had much appetite lately, so hasn't been eating well.  Unable to tell last time movement was normal, but it was probably earlier this week. Only think she's had to eat/drink all day was some tea.    PRENATAL COURSE  Prenatal course was essentially uncomplicated      Recent Labs   Lab Test 18  1127   ABO B   RH Pos   AS Neg     Rhogam not indicated   Recent Labs   Lab Test 19  1210 18  1127   HEPBANG  --  Nonreactive   HIAGAB  --  Nonreactive   GBS Negative  --    RUQIGG  --  36       Past Medical History    I have reviewed this patient's medical history and updated it with pertinent information if needed.   Past Medical History:   Diagnosis Date     Hidradenitis suppurativa      NO ACTIVE PROBLEMS        Past Surgical History   I have reviewed this patient's surgical history and updated it with pertinent information if needed.  Past Surgical History:   Procedure Laterality  Date     SURGICAL PATHOLOGY EXAM Left 09/10/2015    left paratracheal neck mass removed, benign       Prior to Admission Medications   Prior to Admission Medications   Prescriptions Last Dose Informant Patient Reported? Taking?   Prenatal Vit-Fe Fumarate-FA (PRENATAL MULTIVITAMIN W/IRON) 27-0.8 MG tablet 6/14/2019 at Unknown time Self No Yes   Sig: Take 1 tablet by mouth daily   acetaminophen (TYLENOL) 325 MG tablet More than a month at Unknown time  No No   Sig: Take 2 tablets (650 mg) by mouth every 6 hours as needed for mild pain Start after Delivery.   benzoyl peroxide 5 % external liquid More than a month at Unknown time Self No No   Sig: Use daily as directed   cephALEXin (KEFLEX) 500 MG capsule 6/14/2019 at Unknown time  No Yes   Sig: Take 1 capsule (500 mg) by mouth 4 times daily for 5 days   clindamycin (CLEOCIN T) 1 % external lotion Unknown at Unknown time Self No No   Sig: Apply topically 2 times daily   ibuprofen (ADVIL/MOTRIN) 600 MG tablet More than a month at Unknown time  No No   Sig: Take 1 tablet (600 mg) by mouth every 6 hours as needed for moderate pain Start after delivery   omeprazole (PRILOSEC) 40 MG DR capsule More than a month at Unknown time Self No No   Sig: Take 1 capsule (40 mg) by mouth daily   order for DME More than a month at Unknown time Self No No   Sig: Equipment being ordered: maternity support belt   order for DME Unknown at Unknown time Self No No   Sig: Equipment being ordered: bilateral electric breast pump   senna-docusate (SENOKOT-S/PERICOLACE) 8.6-50 MG tablet More than a month at Unknown time  No No   Sig: Take 1 tablet by mouth daily Start after delivery.      Facility-Administered Medications: None     Allergies   Allergies   Allergen Reactions     Flagyl [Metronidazole] Itching     Keflex [Cephalexin] Rash       Social History   I have reviewed this patient's social history and updated it with pertinent information if needed. Hilaria Jansen  reports that she has  never smoked. She has never used smokeless tobacco. She reports that she does not drink alcohol or use drugs.    Family History   I have reviewed this patient's family history and updated it with pertinent information if needed.   Family History   Problem Relation Age of Onset     Breast Cancer Paternal Aunt      Other - See Comments Maternal Aunt      Melanoma No family hx of      Skin Cancer No family hx of      Glaucoma No family hx of      Macular Degeneration No family hx of        Immunization History   Immunization History   Administered Date(s) Administered     Influenza Vaccine IM 3yrs+ 4 Valent IIV4 12/18/2018     TDAP Vaccine (Adacel) 04/09/2019       Physical Exam   Temp: 99.2  F (37.3  C) Temp src: Oral BP: 126/69     Resp: 18        Vital Signs with Ranges  Temp:  [99.2  F (37.3  C)] 99.2  F (37.3  C)  Resp:  [18] 18  BP: (126)/(69) 126/69    Abdomen: gravid, single vertex fetus, non-tender, EFW 8 lbs   Cervical Exam: closed/ long / high     Fetal Heart Tones: 145 baseline, minimal variability, occ small accel.  1-2 small decels  TOCO:   occ contractions    Constitutional: healthy, alert and active   Respiratory: no increased work of breathing  Cardiovascular: normal S1 and S2  Skin/Extremites: no bruising or bleeding and normal skin color, texture, turgor  Neurologic: Awake, alert, oriented to name, place and time.  Cranial nerves II-XII are grossly intact.  Gait is normal.

## 2019-06-15 NOTE — PROVIDER NOTIFICATION
06/15/19 1330   Provider Notification   Provider Name/Title Tarah   Method of Notification At Bedside   Request Evaluate in Person   Notification Reason Status Update   Provider at bedside to evaluate in person and discuss plan of care. Plan per provider to recommend IOL due to non reassuring fetal status. Pt to discuss with spouse and notify care team of their decision. Will proceed with ongoing assessment.    Pt tolerated xgeva injection in right arm without complications  Calcium level 9 5  Pt discharged in stable condition and is aware of next appointment  AVS provided

## 2019-06-15 NOTE — PROVIDER NOTIFICATION
06/15/19 1355   Provider Notification   Provider Name/Title Donovan Rascon   Method of Notification At Bedside   Request Evaluate in Person   Notification Reason Status Update   Provider at bedside per patient request. IOL discussed further, patient questions asked and answered. Patient agrees to IOL at this time,  also ordered. Will proceed with ongoing assessment.

## 2019-06-16 ENCOUNTER — ANESTHESIA EVENT (OUTPATIENT)
Dept: OBGYN | Facility: CLINIC | Age: 29
End: 2019-06-16
Payer: COMMERCIAL

## 2019-06-16 ENCOUNTER — ANESTHESIA (OUTPATIENT)
Dept: OBGYN | Facility: CLINIC | Age: 29
End: 2019-06-16
Payer: COMMERCIAL

## 2019-06-16 LAB — T PALLIDUM AB SER QL: NONREACTIVE

## 2019-06-16 PROCEDURE — 12000001 ZZH R&B MED SURG/OB UMMC

## 2019-06-16 PROCEDURE — 3E0R3BZ INTRODUCTION OF ANESTHETIC AGENT INTO SPINAL CANAL, PERCUTANEOUS APPROACH: ICD-10-PCS | Performed by: ANESTHESIOLOGY

## 2019-06-16 PROCEDURE — 25000128 H RX IP 250 OP 636: Performed by: ANESTHESIOLOGY

## 2019-06-16 PROCEDURE — 3E033VJ INTRODUCTION OF OTHER HORMONE INTO PERIPHERAL VEIN, PERCUTANEOUS APPROACH: ICD-10-PCS | Performed by: OBSTETRICS & GYNECOLOGY

## 2019-06-16 PROCEDURE — 3E0P7VZ INTRODUCTION OF HORMONE INTO FEMALE REPRODUCTIVE, VIA NATURAL OR ARTIFICIAL OPENING: ICD-10-PCS | Performed by: OBSTETRICS & GYNECOLOGY

## 2019-06-16 PROCEDURE — 25000125 ZZHC RX 250: Performed by: ANESTHESIOLOGY

## 2019-06-16 PROCEDURE — 00HU33Z INSERTION OF INFUSION DEVICE INTO SPINAL CANAL, PERCUTANEOUS APPROACH: ICD-10-PCS | Performed by: ANESTHESIOLOGY

## 2019-06-16 PROCEDURE — 25000125 ZZHC RX 250: Performed by: STUDENT IN AN ORGANIZED HEALTH CARE EDUCATION/TRAINING PROGRAM

## 2019-06-16 PROCEDURE — 25800030 ZZH RX IP 258 OP 636: Performed by: STUDENT IN AN ORGANIZED HEALTH CARE EDUCATION/TRAINING PROGRAM

## 2019-06-16 PROCEDURE — 10907ZC DRAINAGE OF AMNIOTIC FLUID, THERAPEUTIC FROM PRODUCTS OF CONCEPTION, VIA NATURAL OR ARTIFICIAL OPENING: ICD-10-PCS | Performed by: OBSTETRICS & GYNECOLOGY

## 2019-06-16 PROCEDURE — 59200 INSERT CERVICAL DILATOR: CPT | Performed by: OBSTETRICS & GYNECOLOGY

## 2019-06-16 PROCEDURE — 25000132 ZZH RX MED GY IP 250 OP 250 PS 637: Performed by: STUDENT IN AN ORGANIZED HEALTH CARE EDUCATION/TRAINING PROGRAM

## 2019-06-16 RX ORDER — EPHEDRINE SULFATE 50 MG/ML
5 INJECTION, SOLUTION INTRAMUSCULAR; INTRAVENOUS; SUBCUTANEOUS
Status: DISCONTINUED | OUTPATIENT
Start: 2019-06-16 | End: 2019-06-17

## 2019-06-16 RX ORDER — LIDOCAINE 40 MG/G
CREAM TOPICAL
Status: DISCONTINUED | OUTPATIENT
Start: 2019-06-16 | End: 2019-06-17

## 2019-06-16 RX ORDER — LIDOCAINE HYDROCHLORIDE AND EPINEPHRINE 15; 5 MG/ML; UG/ML
INJECTION, SOLUTION EPIDURAL PRN
Status: DISCONTINUED | OUTPATIENT
Start: 2019-06-16 | End: 2019-06-23 | Stop reason: HOSPADM

## 2019-06-16 RX ORDER — HYDROXYZINE HYDROCHLORIDE 50 MG/1
100 TABLET, FILM COATED ORAL ONCE
Status: COMPLETED | OUTPATIENT
Start: 2019-06-16 | End: 2019-06-16

## 2019-06-16 RX ORDER — NALOXONE HYDROCHLORIDE 0.4 MG/ML
.1-.4 INJECTION, SOLUTION INTRAMUSCULAR; INTRAVENOUS; SUBCUTANEOUS
Status: DISCONTINUED | OUTPATIENT
Start: 2019-06-16 | End: 2019-06-17

## 2019-06-16 RX ORDER — OXYTOCIN/0.9 % SODIUM CHLORIDE 30/500 ML
1-24 PLASTIC BAG, INJECTION (ML) INTRAVENOUS CONTINUOUS
Status: DISCONTINUED | OUTPATIENT
Start: 2019-06-16 | End: 2019-06-17

## 2019-06-16 RX ORDER — NALBUPHINE HYDROCHLORIDE 10 MG/ML
2.5-5 INJECTION, SOLUTION INTRAMUSCULAR; INTRAVENOUS; SUBCUTANEOUS EVERY 6 HOURS PRN
Status: DISCONTINUED | OUTPATIENT
Start: 2019-06-16 | End: 2019-06-17

## 2019-06-16 RX ADMIN — SODIUM CHLORIDE, POTASSIUM CHLORIDE, SODIUM LACTATE AND CALCIUM CHLORIDE 1000 ML: 600; 310; 30; 20 INJECTION, SOLUTION INTRAVENOUS at 03:51

## 2019-06-16 RX ADMIN — ACETAMINOPHEN 650 MG: 325 TABLET, FILM COATED ORAL at 23:26

## 2019-06-16 RX ADMIN — SODIUM CHLORIDE, POTASSIUM CHLORIDE, SODIUM LACTATE AND CALCIUM CHLORIDE: 600; 310; 30; 20 INJECTION, SOLUTION INTRAVENOUS at 05:59

## 2019-06-16 RX ADMIN — Medication 2 MILLI-UNITS/MIN: at 15:00

## 2019-06-16 RX ADMIN — SODIUM CHLORIDE, POTASSIUM CHLORIDE, SODIUM LACTATE AND CALCIUM CHLORIDE: 600; 310; 30; 20 INJECTION, SOLUTION INTRAVENOUS at 14:17

## 2019-06-16 RX ADMIN — SODIUM CHLORIDE, POTASSIUM CHLORIDE, SODIUM LACTATE AND CALCIUM CHLORIDE: 600; 310; 30; 20 INJECTION, SOLUTION INTRAVENOUS at 19:01

## 2019-06-16 RX ADMIN — LIDOCAINE HYDROCHLORIDE,EPINEPHRINE BITARTRATE 3 ML: 15; .005 INJECTION, SOLUTION EPIDURAL; INFILTRATION; INTRACAUDAL; PERINEURAL at 04:15

## 2019-06-16 RX ADMIN — Medication: at 04:06

## 2019-06-16 RX ADMIN — HYDROXYZINE HYDROCHLORIDE 100 MG: 50 TABLET ORAL at 02:55

## 2019-06-16 RX ADMIN — Medication: at 23:43

## 2019-06-16 NOTE — PROVIDER NOTIFICATION
06/16/19 0830   Provider Notification   Provider Name/Title Roberto Coppola   Method of Notification In Department   Request Evaluate - Remote   Notification Reason Status Update   Providers inquired about pt. Status. Pt. Sleeping comfortably at this time, plan per provider to evaluate in person when pt awake. Will proceed with ongoing assessment.

## 2019-06-16 NOTE — PROVIDER NOTIFICATION
06/16/19 0730   Provider Notification   Provider Name/Title Tarah   Method of Notification In Department   Request Evaluate in Person   Notification Reason Status Update   Provider at bedside to conduct status update, pt. Sleeping at this time, assessment deferred per MD. Will proceed with ongoing assessment.

## 2019-06-16 NOTE — ANESTHESIA PREPROCEDURE EVALUATION
"Anesthesia Pre-Procedure Evaluation    Patient: Hilaria Jansen   MRN:     [id number] Gender:   female   Age:    29 year old :      1990        Preoperative Diagnosis: * No surgery found *        Past Medical History:   Diagnosis Date     Hidradenitis suppurativa      NO ACTIVE PROBLEMS       Past Surgical History:   Procedure Laterality Date     SURGICAL PATHOLOGY EXAM Left 09/10/2015    left paratracheal neck mass removed, benign          Anesthesia Evaluation     .             ROS/MED HX    ENT/Pulmonary:  - neg pulmonary ROS     Neurologic:  - neg neurologic ROS     Cardiovascular:  - neg cardiovascular ROS       METS/Exercise Tolerance:     Hematologic: Comments: Sickle cell trait - neg hematologic  ROS       Musculoskeletal:  - neg musculoskeletal ROS       GI/Hepatic:  - neg GI/hepatic ROS   (+) GERD       Renal/Genitourinary:  - ROS Renal section negative       Endo:  - neg endo ROS       Psychiatric:  - neg psychiatric ROS       Infectious Disease:  - neg infectious disease ROS       Malignancy:      - no malignancy   Other:    - neg other ROS                 [facility] AN PHYSICAL EXAM    Lab Results   Component Value Date    WBC 6.6 06/15/2019    HGB 11.1 (L) 06/15/2019    HCT 34.9 (L) 06/15/2019     06/15/2019       Preop Vitals  BP Readings from Last 3 Encounters:   19 123/82   19 124/66   19 123/72    Pulse Readings from Last 3 Encounters:   19 106   19 92   06/10/19 109      Resp Readings from Last 3 Encounters:   19 24   06/10/19 16   19 16    SpO2 Readings from Last 3 Encounters:   19 99%   19 100%   19 98%      Temp Readings from Last 1 Encounters:   19 37  C (98.6  F) (Oral)    Ht Readings from Last 1 Encounters:   19 1.651 m (5' 5\")      Wt Readings from Last 1 Encounters:   19 82.6 kg (182 lb)    Estimated body mass index is 30.29 kg/m  as calculated from the following:    Height as of 19: 1.651 m " "(5' 5\").    Weight as of 6/13/19: 82.6 kg (182 lb).     LDA:  Peripheral IV 06/15/19 Left;Dorsal Hand (Active)   Site Assessment WDL 6/15/2019 11:00 PM   Line Status Infusing 6/16/2019  3:00 AM   Phlebitis Scale 0-->no symptoms 6/16/2019 12:00 AM   Infiltration Scale 0 6/16/2019 12:00 AM   Infiltration Site Treatment Method  None 6/16/2019 12:00 AM   Extravasation? No 6/16/2019 12:00 AM   Number of days: 1            Assessment:         Documentation: H&P complete; Preop Testing complete; Consents complete   Proceeding: Proceed without further delay  Tobacco Use:  NO Active use of Tobacco/UNKNOWN Tobacco use status     Plan:   Anes. Type:  General   Pre-Induction: Midazolam IV; Acetaminophen PO   Induction:  IV (Standard)   Airway: Oral ETT   Access/Monitoring: PIV   Maintenance: Balanced   Emergence: Procedure Site   Logistics: Same Day Surgery     Postop Pain/Sedation Strategy:  Standard-Options: Opioids PRN     PONV Management:  Adult Risk Factors: Female, Non-Smoker, Postop Opioids             neg OB ROS                   Daniel Snow MD  "

## 2019-06-16 NOTE — PROVIDER NOTIFICATION
06/16/19 1445   Provider Notification   Provider Name/Title Roberto Coppola   Method of Notification At Bedside   Request Evaluate in Person   Notification Reason SVE   Providers present to evaluate in person. Patient found to be 3/70/-3, cook removed. Pitocin started per orders at 2ml/hr. Will proceed with ongoing assessment.

## 2019-06-16 NOTE — PROGRESS NOTES
[facility]  Labor Progress Note    S:  Patient feeling much more comfortable with epidural in place     O:   Patient Vitals for the past 4 hrs:   BP Temp Temp src Resp SpO2   19 0433 116/66 -- -- -- 99 %   19 0427 117/70 -- -- (P) 20 --   19 0425 115/71 -- -- 20 --   19 0423 117/74 -- -- 20 99 %   19 0421 116/77 -- -- 20 --   19 0419 113/69 -- -- 20 --   19 0417 118/70 -- -- 20 --   19 0415 115/68 -- -- 26 --   19 0413 123/71 -- -- 26 100 %   19 0411 126/69 -- -- 26 --   19 0409 123/75 -- -- 26 --   19 0408 122/69 -- -- 26 100 %   19 0300 123/82 98.6  F (37  C) Oral 24 --     SVE: deferred, cook in place    FHT: Baseline 155, mod variability, accelerations, no decelerations  [place]:  3-4 contractions in 10 minutes    A/P:  Ms. Hilaria Jansen is a 29 year old  at 37w5d by LMP c/w 7w2d US, here for IOL for for non-reassuring fetal status.    #Labor:   - IOL for non-reassuring fetal status, FHT currently category II for periods of minimal variability- but overall reassured with moderate variability   - Cervix C/L/H> vaginal miso x2> Cook catheter in placed 60cc/60cc  - Pain: Epidural in place    #Fetal well being  - Category I FHT, reactive. Continuous EFM and toco   - Rh positive, Rubella immune, cephalic, GBS negative, GCT failed, GTT passed, Placenta anterior, Growth US 2281g 78%ile (19)  - Cephalic, GBS neg    Leah Man MD   OB/GYN Resident PGY-3  2019 4:40 AM

## 2019-06-16 NOTE — PROGRESS NOTES
[facility]  Labor Progress Note    S:  Comfortable with epidural    O:   Patient Vitals for the past 4 hrs:   BP Resp SpO2   19 0730 109/59 18 98 %   19 0615 100/54 20 --     SVE: deferred, cook in place    FHT: Baseline 150, min to mod variability, + accelerations, no decelerations  [place]:  3-4 contractions in 10 minutes    A/P:  Ms. Hilaria Jansen is a 29 year old  at 37w5d by LMP c/w 7w2d US, here for IOL for for non-reassuring fetal status.    #Labor:   - IOL for non-reassuring fetal status, FHT currently category II for periods of minimal variability- but overall reassuring with moderate variability   - Cervix C/L/H> vaginal miso x2> Cook catheter in placed 60cc/60cc. Plan to deflate vaginal balloon at 1445 and check cervix around uterine balloon.   - Pain: Epidural in place    #Fetal well being  - Category I FHT, reactive. Continuous EFM and toco   - Rh positive, Rubella immune, cephalic, GBS negative, GCT failed, GTT passed, Placenta anterior, Growth US 2281g 78%ile (19)  - Cephalic, GBS neg    Meghan Mejia MD  OB/Gyn PGY-3  2019    Physician Attestation   I, Wendi Coppola, personally examined and evaluated this patient.  I discussed the patient with the medical student and/or resident and care team, and agree with the assessment and plan of care as documented in the note of 19  [date].      I personally reviewed vital signs, medications, labs and fetal heart rate tracing.    Key findings: Doing well. Will assess alysha at 12 hours.  Wendi Coppola MD  Date of Service (when I saw the patient): 19

## 2019-06-16 NOTE — PROVIDER NOTIFICATION
06/16/19 1005   Provider Notification   Provider Name/Title Roberto Coppola   Method of Notification At Bedside   Request Evaluate in Person   Notification Reason Status Update   Provider at bedside to discuss plan of care. Plan per provider to proceed with cook ripening until 1445. To re-check SVE then, and decide to remove catheter or deflate vaginal balloon and leave uterine balloon inflated. Will proceed with ongoing assessment.

## 2019-06-16 NOTE — PROGRESS NOTES
[facility]  Labor Progress Note    S:  Comfortable with epidural    O:   No data found.  SVE: 3/70/-3, Cook removed     FHT: Baseline 150, min to mod variability, + accelerations, no decelerations  [place]:  1-2 contractions in 10 minutes    A/P:  Ms. Hilaria Jansen is a 29 year old  at 37w5d by LMP c/w 7w2d US, here for IOL for for non-reassuring fetal status.    #Labor:   - IOL for non-reassuring fetal status, FHT currently category II for periods of minimal variability- but overall reassuring   - Cervix C/L/H> vaginal miso x2>s/p Larry. Start pitocin now.   - Pain: Epidural in place    #Fetal well being  - Category I FHT, reactive. Continuous EFM and toco   - Rh positive, Rubella immune, cephalic, GBS negative, GCT failed, GTT passed, Placenta anterior, Growth US 2281g 78%ile (19)  - Cephalic, GBS neg    Meghan Mejia MD  OB/Gyn PGY-3  2019

## 2019-06-16 NOTE — PROVIDER NOTIFICATION
06/16/19 0133   Provider Notification   Provider Name/Title Tarah Man   Method of Notification At Bedside     Md's at bedside discussing next steps for induction after placement of kaur was not tolerated by the patient. MD recommends retrial of placement of kaur bulb with nitrous gas. Patient and spouse discussed, and agree to plan.  Cook catheter placed at 0240 with help of nitrous.

## 2019-06-16 NOTE — PROGRESS NOTES
Patient had attempted kaur placement for cervical ripening by Dr. Man.  Catheter was in place, but patient jumped up in the bed before the balloon was inflated, so it came out of place.  Patient declined trying again.    Discussed our induction options with the patient.  I would not recommend continued misoprostol due to frequent contractions and previous periods of late decelerations.  I don't feel the baby would tolerate this well.    I discussed trying a kaur with the benefit of NO for pain control.  I think the kaur is the most effective next step and would be least likely to cause fetal distress.    I also discussed low dose pitocin.  Reviewed that this medication is started at a low dose and increased slowly over time, with the goal of making painful contractions that get stronger and closer together.  She asks if this will be more uncomfortable than the cramping she had after misoprostol earlier.      Patient questions why this process is so uncomfortable and stated she desires the method that causes the least pain.  Discussed that, unfortunately, labor is painful.  Can use various interventions to help with pain, such as nitrous now and an epidural when she is in active labor, but some discomfort is unavoidable.  I do not feel that fentanyl is a good option for her given her FHT.    After our discussion, patient and her  wanted to talk amongst themselves.  They will let us know when they know how they'd like to proceed.    Lucia Rascon MD

## 2019-06-16 NOTE — PROGRESS NOTES
[facility]  Labor Progress Note    S:  Patient resting in bed, only occasional contraction but says they are painful     O:   Patient Vitals for the past 4 hrs:   BP Temp Temp src Resp   06/15/19 2330 111/72 98.2  F (36.8  C) Oral 22   06/15/19 2225 -- 98.2  F (36.8  C) Oral --     SVE: Ft/L/H, attempted to place kaur- able to get into cervix but patient then moved and did not tolerate any further exam     FHT: Baseline 155, mod variability- some periods of minimal variability, no accelerations, no decelerations  [place]:  3-4 contractions in 10 minutes    A/P:  Ms. Hilaria Jansen is a 29 year old  at 37w5d by LMP c/w 7w2d US, here for IOL for for non-reassuring fetal status.    #Labor:   - IOL for non-reassuring fetal status, FHT currently category II for periods of minimal variability- but overall reassured with moderate variability   - Cervix C/L/H> vaginal miso x2> Due to Category II FHT with periods of minimal variability in addition frequent contractions patient is not candidate for further cytotec. Discussed with patient in depth that kaur for mechanical dilation is best plan. Will retry placement using cook catheter and nitrous oxide. If unsuccessful will plan low dose IV pitocin  - Pain: prn per patient's wishes, reviewed options with patient    #Fetal well being  - Category II  FHT, FHT currently category II for periods of minimal variability- but overall reassured with moderate variability. Continuous EFM and toco   - Rh positive, Rubella immune, cephalic, GBS negative, GCT failed, GTT passed, Placenta anterior, Growth US 2281g 78%ile (19)  - Cephalic, GBS neg    Leah Man MD   OB/GYN Resident PGY-2  2019 2:23 AM

## 2019-06-16 NOTE — PROGRESS NOTES
[facility] DEL[facility] PROGRESS NOTE:   [place] 15, 2019 10:55 PM         SUBJECTIVE:   Patient ambulating in the halls.  Doing well           OBJECTIVE:     Vitals:    06/15/19 2030 06/15/19 2114 06/15/19 2200 06/15/19 2225   BP: 114/69 123/82 118/76    Resp: 16  16    Temp: 98.8  F (37.1  C) 99  F (37.2  C) 98.2  F (36.8  C) 98.2  F (36.8  C)   TempSrc:  Oral Oral Oral         NST:  FHT:  Baseline currently 150-160 with minimal variability.  No accels.  Has had occ late decels  Cat:   2  [place]:  q 2-6 min  SVE:  deferred         LABS:     Recent Results (from the past 12 hour(s))   CBC with platelets differential    Collection Time: 06/15/19  3:41 PM   Result Value Ref Range    WBC 6.6 4.0 - 11.0 10e9/L    RBC Count 4.30 3.8 - 5.2 10e12/L    Hemoglobin 11.1 (L) 11.7 - 15.7 g/dL    Hematocrit 34.9 (L) 35.0 - 47.0 %    MCV 81 78 - 100 fl    MCH 25.8 (L) 26.5 - 33.0 pg    MCHC 31.8 31.5 - 36.5 g/dL    RDW 16.1 (H) 10.0 - 15.0 %    Platelet Count 197 150 - 450 10e9/L    Diff Method Automated Method     % Neutrophils 67.1 %    % Lymphocytes 25.9 %    % Monocytes 6.2 %    % Eosinophils 0.3 %    % Basophils 0.0 %    % Immature Granulocytes 0.5 %    Nucleated RBCs 0 0 /100    Absolute Neutrophil 4.5 1.6 - 8.3 10e9/L    Absolute Lymphocytes 1.7 0.8 - 5.3 10e9/L    Absolute Monocytes 0.4 0.0 - 1.3 10e9/L    Absolute Eosinophils 0.0 0.0 - 0.7 10e9/L    Absolute Basophils 0.0 0.0 - 0.2 10e9/L    Abs Immature Granulocytes 0.0 0 - 0.4 10e9/L    Absolute Nucleated RBC 0.0    ABO/Rh type and screen    Collection Time: 06/15/19  3:41 PM   Result Value Ref Range    ABO B     RH(D) Pos     Antibody Screen Neg     Test Valid Only At          [facility]    Specimen Expires 2019               ASSESSMENT / PLAN:   29 year old  at 37w4d admitted for IOL for non-reassuring fetal status.    Labor: s/p vaginal miso x 2.  Given recent FHT, discontinued misoprostol and will attempt  kaur placement when due for next dose.  Fetal well being: Category 2 tracing.  GBS: neg  Pain: nitrous vs epidural at patient request.  =    Lucia Rascon MD

## 2019-06-16 NOTE — ANESTHESIA PROCEDURE NOTES
Epidural Procedure Note    Staff:     Anesthesiologist:  Daniel Snow MD  Location: OB     Procedure start time:  6/16/2019 3:50 AM     Procedure end time:  6/16/2019 4:17 AM   Pre-procedure checklist:   patient identified, IV checked, site marked, risks and benefits discussed, informed consent, monitors and equipment checked, pre-op evaluation, at physician/surgeon's request and post-op pain management      Correct Patient: Yes      Correct Position: Yes      Correct Site: Yes      Correct Procedure: Yes      Correct Laterality:  Yes    Site Marked:  Yes  Procedure:     Procedure:  Epidural catheter    ASA:  2    Position:  Sitting    Sterile Prep: chloraprep      Insertion site:  L3-4    Local skin infiltration:  2% lidocaine    amount (mL):  4    Approach:  Midline    Needle gauge (G):  17    Block Needle Type:  Touhchet    Injection Technique:  LORT saline    DENNIS at (cm):  8    Attempts:  1    Redirects:  0    Catheter gauge (G):  19    Catheter threaded easily: Yes      Threaded to cm at skin:  12    Threaded in epidural space (cm):  4    Paresthesias:  No    Aspiration negative for Heme or CSF: Yes      Test dose (mL):  3    Test dose negative for signs of intravascular, subdural or intrathecal injection: Yes

## 2019-06-16 NOTE — PROGRESS NOTES
Attending Progress Note    Doing well.  Isn't feeling any cramping or contractions.    /min to mod variability / + accels, occ small spontaneous decel  [place]:  occ contractions, mild    Discussed continued cat 2 FHT, but overall reassuring.  Can continue induction.  Due for next dose of misoprostol at 2030.    Lucia Rascon MD

## 2019-06-16 NOTE — PROGRESS NOTES
[facility] PROGRESS NOTE:   2019 2:47 AM         SUBJECTIVE:   Agreeable to cook placement with nitrous.           OBJECTIVE:     Vitals:    06/15/19 2114 06/15/19 2200 06/15/19 2225 06/15/19 2330   BP: 123/82 118/76  111/72   Resp:    Temp: 99  F (37.2  C) 98.2  F (36.8  C) 98.2  F (36.8  C) 98.2  F (36.8  C)   TempSrc: Oral Oral Oral Oral         NST:  FHT:  140/min-mod variability, no accels or decels in last 30min  Cat:   1-2  [place]:  q 1.5-3 min  SVE:  1.5/40/-3    Personally placed Cook cervical ripening balloon.  Patient tolerated relatively well with help of NO for placement and after.         LABS:     Recent Results (from the past 12 hour(s))   CBC with platelets differential    Collection Time: 06/15/19  3:41 PM   Result Value Ref Range    WBC 6.6 4.0 - 11.0 10e9/L    RBC Count 4.30 3.8 - 5.2 10e12/L    Hemoglobin 11.1 (L) 11.7 - 15.7 g/dL    Hematocrit 34.9 (L) 35.0 - 47.0 %    MCV 81 78 - 100 fl    MCH 25.8 (L) 26.5 - 33.0 pg    MCHC 31.8 31.5 - 36.5 g/dL    RDW 16.1 (H) 10.0 - 15.0 %    Platelet Count 197 150 - 450 10e9/L    Diff Method Automated Method     % Neutrophils 67.1 %    % Lymphocytes 25.9 %    % Monocytes 6.2 %    % Eosinophils 0.3 %    % Basophils 0.0 %    % Immature Granulocytes 0.5 %    Nucleated RBCs 0 0 /100    Absolute Neutrophil 4.5 1.6 - 8.3 10e9/L    Absolute Lymphocytes 1.7 0.8 - 5.3 10e9/L    Absolute Monocytes 0.4 0.0 - 1.3 10e9/L    Absolute Eosinophils 0.0 0.0 - 0.7 10e9/L    Absolute Basophils 0.0 0.0 - 0.2 10e9/L    Abs Immature Granulocytes 0.0 0 - 0.4 10e9/L    Absolute Nucleated RBC 0.0    ABO/Rh type and screen    Collection Time: 06/15/19  3:41 PM   Result Value Ref Range    ABO B     RH(D) Pos     Antibody Screen Neg     Test Valid Only At          [facility]    Specimen Expires 2019               ASSESSMENT / PLAN:   29 year old  at 37w5d admitted for IOL for non-reassuring  fetal status.     Labor: s/p vaginal miso x 2.  Kaur placed at 0245.  [place]ter with kaur in place.  Fetal well being: Category 1-2 tracing.  GBS: neg  Pain: nitrous vs epidural at patient request.      Lucia Rascon MD

## 2019-06-16 NOTE — PROGRESS NOTES
[facility]  Labor Progress Note    S:  Patient resting in bed, not feeling any contractions .    O:   Patient Vitals for the past 4 hrs:   BP Temp Temp src Resp   06/15/19 2030 114/69 98.8  F (37.1  C) -- 16   06/15/19 1800 121/64 98.1  F (36.7  C) Oral 16   06/15/19 1714 121/68 -- -- 16     SVE: deferred     FHT: Baseline 155, mod variability- some periods of minimal variability, no accelerations, no decelerations  [place]: 0-1 contractions in 10 minutes    A/P:  Ms. Hilaria Jansen is a 29 year old  at 37w4d by LMP c/w 7w2d US, here for IOL for for non-reassuring fetal status.    #Labor:   - IOL for non-reassuring fetal status, FHT currently category II for periods of minimal variability- but overall reassured with moderate variability   - Cervix C/L/H> vaginal miso x1 (1628), will give second dose now. Plan to recheck cervix when patient uncomfortable to assess for possible kaur placement.   - Pain: prn per patient's wishes, reviewed options with patient  - Anticipate     #Fetal well being  - Category II  FHT, FHT currently category II for periods of minimal variability- but overall reassured with moderate variability. Continuous EFM and toco   - Rh positive, Rubella immune, cephalic, GBS negative, GCT failed, GTT passed, Placenta anterior, Growth US 2281g 78%ile (19)  - Cephalic, GBS n    Leah Man MD  OBGYN PGY-3  8:35 PM 6/15/2019

## 2019-06-17 ENCOUNTER — ANESTHESIA EVENT (OUTPATIENT)
Dept: OBGYN | Facility: CLINIC | Age: 29
End: 2019-06-17
Payer: COMMERCIAL

## 2019-06-17 ENCOUNTER — ANESTHESIA (OUTPATIENT)
Dept: OBGYN | Facility: CLINIC | Age: 29
End: 2019-06-17
Payer: COMMERCIAL

## 2019-06-17 PROBLEM — Z98.891 S/P C-SECTION: Status: ACTIVE | Noted: 2019-06-17

## 2019-06-17 LAB
HGB BLD-MCNC: 9.9 G/DL (ref 11.7–15.7)
PLATELET # BLD AUTO: 178 10E9/L (ref 150–450)
T PALLIDUM AB SER QL: NONREACTIVE

## 2019-06-17 PROCEDURE — 25000128 H RX IP 250 OP 636: Performed by: NURSE ANESTHETIST, CERTIFIED REGISTERED

## 2019-06-17 PROCEDURE — 86780 TREPONEMA PALLIDUM: CPT | Performed by: OBSTETRICS & GYNECOLOGY

## 2019-06-17 PROCEDURE — 25800030 ZZH RX IP 258 OP 636: Performed by: STUDENT IN AN ORGANIZED HEALTH CARE EDUCATION/TRAINING PROGRAM

## 2019-06-17 PROCEDURE — 85018 HEMOGLOBIN: CPT | Performed by: OBSTETRICS & GYNECOLOGY

## 2019-06-17 PROCEDURE — 40000977 ZZH STATISTIC ATTENDANCE AT DELIVERY

## 2019-06-17 PROCEDURE — 36000059 ZZH SURGERY LEVEL 3 EA 15 ADDTL MIN UMMC: Performed by: OBSTETRICS & GYNECOLOGY

## 2019-06-17 PROCEDURE — 27210794 ZZH OR GENERAL SUPPLY STERILE: Performed by: OBSTETRICS & GYNECOLOGY

## 2019-06-17 PROCEDURE — 88307 TISSUE EXAM BY PATHOLOGIST: CPT | Mod: 26 | Performed by: STUDENT IN AN ORGANIZED HEALTH CARE EDUCATION/TRAINING PROGRAM

## 2019-06-17 PROCEDURE — 25000132 ZZH RX MED GY IP 250 OP 250 PS 637: Performed by: STUDENT IN AN ORGANIZED HEALTH CARE EDUCATION/TRAINING PROGRAM

## 2019-06-17 PROCEDURE — 25000125 ZZHC RX 250: Performed by: STUDENT IN AN ORGANIZED HEALTH CARE EDUCATION/TRAINING PROGRAM

## 2019-06-17 PROCEDURE — 25000128 H RX IP 250 OP 636: Performed by: STUDENT IN AN ORGANIZED HEALTH CARE EDUCATION/TRAINING PROGRAM

## 2019-06-17 PROCEDURE — 37000008 ZZH ANESTHESIA TECHNICAL FEE, 1ST 30 MIN: Performed by: OBSTETRICS & GYNECOLOGY

## 2019-06-17 PROCEDURE — C9290 INJ, BUPIVACAINE LIPOSOME: HCPCS | Performed by: ANESTHESIOLOGY

## 2019-06-17 PROCEDURE — C1765 ADHESION BARRIER: HCPCS | Performed by: OBSTETRICS & GYNECOLOGY

## 2019-06-17 PROCEDURE — 25000128 H RX IP 250 OP 636: Performed by: ANESTHESIOLOGY

## 2019-06-17 PROCEDURE — 71000014 ZZH RECOVERY PHASE 1 LEVEL 2 FIRST HR: Performed by: OBSTETRICS & GYNECOLOGY

## 2019-06-17 PROCEDURE — 88307 TISSUE EXAM BY PATHOLOGIST: CPT | Performed by: STUDENT IN AN ORGANIZED HEALTH CARE EDUCATION/TRAINING PROGRAM

## 2019-06-17 PROCEDURE — 85049 AUTOMATED PLATELET COUNT: CPT | Performed by: OBSTETRICS & GYNECOLOGY

## 2019-06-17 PROCEDURE — 12000001 ZZH R&B MED SURG/OB UMMC

## 2019-06-17 PROCEDURE — 37000009 ZZH ANESTHESIA TECHNICAL FEE, EACH ADDTL 15 MIN: Performed by: OBSTETRICS & GYNECOLOGY

## 2019-06-17 PROCEDURE — 40000170 ZZH STATISTIC PRE-PROCEDURE ASSESSMENT II: Performed by: OBSTETRICS & GYNECOLOGY

## 2019-06-17 PROCEDURE — 59515 CESAREAN DELIVERY: CPT | Mod: GC | Performed by: OBSTETRICS & GYNECOLOGY

## 2019-06-17 PROCEDURE — 36000057 ZZH SURGERY LEVEL 3 1ST 30 MIN - UMMC: Performed by: OBSTETRICS & GYNECOLOGY

## 2019-06-17 PROCEDURE — 25800030 ZZH RX IP 258 OP 636: Performed by: NURSE ANESTHETIST, CERTIFIED REGISTERED

## 2019-06-17 PROCEDURE — 25000125 ZZHC RX 250: Performed by: NURSE ANESTHETIST, CERTIFIED REGISTERED

## 2019-06-17 PROCEDURE — 86901 BLOOD TYPING SEROLOGIC RH(D): CPT | Performed by: OBSTETRICS & GYNECOLOGY

## 2019-06-17 PROCEDURE — 27110028 ZZH OR GENERAL SUPPLY NON-STERILE: Performed by: OBSTETRICS & GYNECOLOGY

## 2019-06-17 PROCEDURE — 25000125 ZZHC RX 250: Performed by: ANESTHESIOLOGY

## 2019-06-17 RX ORDER — SODIUM CHLORIDE, SODIUM LACTATE, POTASSIUM CHLORIDE, CALCIUM CHLORIDE 600; 310; 30; 20 MG/100ML; MG/100ML; MG/100ML; MG/100ML
INJECTION, SOLUTION INTRAVENOUS CONTINUOUS
Status: DISCONTINUED | OUTPATIENT
Start: 2019-06-17 | End: 2019-06-17 | Stop reason: HOSPADM

## 2019-06-17 RX ORDER — OXYTOCIN 10 [USP'U]/ML
10 INJECTION, SOLUTION INTRAMUSCULAR; INTRAVENOUS
Status: DISCONTINUED | OUTPATIENT
Start: 2019-06-17 | End: 2019-06-19 | Stop reason: HOSPADM

## 2019-06-17 RX ORDER — ACETAMINOPHEN 325 MG/1
650 TABLET ORAL EVERY 4 HOURS PRN
Status: DISCONTINUED | OUTPATIENT
Start: 2019-06-17 | End: 2019-06-17

## 2019-06-17 RX ORDER — FENTANYL CITRATE 50 UG/ML
50-100 INJECTION, SOLUTION INTRAMUSCULAR; INTRAVENOUS
Status: DISCONTINUED | OUTPATIENT
Start: 2019-06-17 | End: 2019-06-19 | Stop reason: HOSPADM

## 2019-06-17 RX ORDER — CARBOPROST TROMETHAMINE 250 UG/ML
250 INJECTION, SOLUTION INTRAMUSCULAR
Status: DISCONTINUED | OUTPATIENT
Start: 2019-06-17 | End: 2019-06-19 | Stop reason: HOSPADM

## 2019-06-17 RX ORDER — OXYTOCIN/0.9 % SODIUM CHLORIDE 30/500 ML
340 PLASTIC BAG, INJECTION (ML) INTRAVENOUS CONTINUOUS PRN
Status: DISCONTINUED | OUTPATIENT
Start: 2019-06-17 | End: 2019-06-19 | Stop reason: HOSPADM

## 2019-06-17 RX ORDER — DEXTROSE, SODIUM CHLORIDE, SODIUM LACTATE, POTASSIUM CHLORIDE, AND CALCIUM CHLORIDE 5; .6; .31; .03; .02 G/100ML; G/100ML; G/100ML; G/100ML; G/100ML
INJECTION, SOLUTION INTRAVENOUS CONTINUOUS
Status: DISCONTINUED | OUTPATIENT
Start: 2019-06-17 | End: 2019-06-19 | Stop reason: HOSPADM

## 2019-06-17 RX ORDER — CLINDAMYCIN PHOSPHATE 900 MG/50ML
900 INJECTION, SOLUTION INTRAVENOUS
Status: COMPLETED | OUTPATIENT
Start: 2019-06-17 | End: 2019-06-17

## 2019-06-17 RX ORDER — HYDROCORTISONE 2.5 %
CREAM (GRAM) TOPICAL 3 TIMES DAILY PRN
Status: DISCONTINUED | OUTPATIENT
Start: 2019-06-17 | End: 2019-06-19 | Stop reason: HOSPADM

## 2019-06-17 RX ORDER — ACETAMINOPHEN 325 MG/1
650 TABLET ORAL EVERY 4 HOURS PRN
Status: DISCONTINUED | OUTPATIENT
Start: 2019-06-20 | End: 2019-06-19 | Stop reason: HOSPADM

## 2019-06-17 RX ORDER — SIMETHICONE 80 MG
80 TABLET,CHEWABLE ORAL 4 TIMES DAILY PRN
Status: DISCONTINUED | OUTPATIENT
Start: 2019-06-17 | End: 2019-06-19 | Stop reason: HOSPADM

## 2019-06-17 RX ORDER — NALOXONE HYDROCHLORIDE 0.4 MG/ML
.1-.4 INJECTION, SOLUTION INTRAMUSCULAR; INTRAVENOUS; SUBCUTANEOUS
Status: ACTIVE | OUTPATIENT
Start: 2019-06-17 | End: 2019-06-18

## 2019-06-17 RX ORDER — METHYLERGONOVINE MALEATE 0.2 MG/ML
200 INJECTION INTRAVENOUS
Status: DISCONTINUED | OUTPATIENT
Start: 2019-06-17 | End: 2019-06-19 | Stop reason: HOSPADM

## 2019-06-17 RX ORDER — LANOLIN 100 %
OINTMENT (GRAM) TOPICAL
Status: DISCONTINUED | OUTPATIENT
Start: 2019-06-17 | End: 2019-06-19 | Stop reason: HOSPADM

## 2019-06-17 RX ORDER — CITRIC ACID/SODIUM CITRATE 334-500MG
30 SOLUTION, ORAL ORAL
Status: COMPLETED | OUTPATIENT
Start: 2019-06-17 | End: 2019-06-17

## 2019-06-17 RX ORDER — OXYTOCIN/0.9 % SODIUM CHLORIDE 30/500 ML
100-340 PLASTIC BAG, INJECTION (ML) INTRAVENOUS CONTINUOUS PRN
Status: DISCONTINUED | OUTPATIENT
Start: 2019-06-17 | End: 2019-06-19 | Stop reason: HOSPADM

## 2019-06-17 RX ORDER — HYDROMORPHONE HYDROCHLORIDE 1 MG/ML
.3-.5 INJECTION, SOLUTION INTRAMUSCULAR; INTRAVENOUS; SUBCUTANEOUS EVERY 5 MIN PRN
Status: DISCONTINUED | OUTPATIENT
Start: 2019-06-17 | End: 2019-06-17 | Stop reason: HOSPADM

## 2019-06-17 RX ORDER — LIDOCAINE 40 MG/G
CREAM TOPICAL
Status: DISCONTINUED | OUTPATIENT
Start: 2019-06-17 | End: 2019-06-19 | Stop reason: HOSPADM

## 2019-06-17 RX ORDER — NALOXONE HYDROCHLORIDE 0.4 MG/ML
.1-.4 INJECTION, SOLUTION INTRAMUSCULAR; INTRAVENOUS; SUBCUTANEOUS
Status: DISCONTINUED | OUTPATIENT
Start: 2019-06-17 | End: 2019-06-17

## 2019-06-17 RX ORDER — MISOPROSTOL 200 UG/1
400 TABLET ORAL
Status: DISCONTINUED | OUTPATIENT
Start: 2019-06-17 | End: 2019-06-19 | Stop reason: HOSPADM

## 2019-06-17 RX ORDER — OXYTOCIN/0.9 % SODIUM CHLORIDE 30/500 ML
100 PLASTIC BAG, INJECTION (ML) INTRAVENOUS CONTINUOUS
Status: DISCONTINUED | OUTPATIENT
Start: 2019-06-17 | End: 2019-06-19 | Stop reason: HOSPADM

## 2019-06-17 RX ORDER — SODIUM CHLORIDE, SODIUM LACTATE, POTASSIUM CHLORIDE, CALCIUM CHLORIDE 600; 310; 30; 20 MG/100ML; MG/100ML; MG/100ML; MG/100ML
INJECTION, SOLUTION INTRAVENOUS CONTINUOUS PRN
Status: DISCONTINUED | OUTPATIENT
Start: 2019-06-17 | End: 2019-06-17

## 2019-06-17 RX ORDER — ONDANSETRON 2 MG/ML
4 INJECTION INTRAMUSCULAR; INTRAVENOUS EVERY 6 HOURS PRN
Status: DISCONTINUED | OUTPATIENT
Start: 2019-06-17 | End: 2019-06-17

## 2019-06-17 RX ORDER — IBUPROFEN 800 MG/1
800 TABLET, FILM COATED ORAL
Status: DISCONTINUED | OUTPATIENT
Start: 2019-06-17 | End: 2019-06-17

## 2019-06-17 RX ORDER — ACETAMINOPHEN 325 MG/1
975 TABLET ORAL EVERY 8 HOURS
Status: DISCONTINUED | OUTPATIENT
Start: 2019-06-17 | End: 2019-06-19 | Stop reason: HOSPADM

## 2019-06-17 RX ORDER — SODIUM CHLORIDE, SODIUM LACTATE, POTASSIUM CHLORIDE, CALCIUM CHLORIDE 600; 310; 30; 20 MG/100ML; MG/100ML; MG/100ML; MG/100ML
INJECTION, SOLUTION INTRAVENOUS CONTINUOUS
Status: DISCONTINUED | OUTPATIENT
Start: 2019-06-17 | End: 2019-06-19 | Stop reason: HOSPADM

## 2019-06-17 RX ORDER — ONDANSETRON 4 MG/1
4 TABLET, ORALLY DISINTEGRATING ORAL EVERY 30 MIN PRN
Status: DISCONTINUED | OUTPATIENT
Start: 2019-06-17 | End: 2019-06-17 | Stop reason: HOSPADM

## 2019-06-17 RX ORDER — BUPIVACAINE HYDROCHLORIDE AND EPINEPHRINE 2.5; 5 MG/ML; UG/ML
INJECTION, SOLUTION INFILTRATION; PERINEURAL PRN
Status: DISCONTINUED | OUTPATIENT
Start: 2019-06-17 | End: 2019-06-17

## 2019-06-17 RX ORDER — ONDANSETRON 2 MG/ML
4 INJECTION INTRAMUSCULAR; INTRAVENOUS EVERY 30 MIN PRN
Status: DISCONTINUED | OUTPATIENT
Start: 2019-06-17 | End: 2019-06-17 | Stop reason: HOSPADM

## 2019-06-17 RX ORDER — OXYTOCIN/0.9 % SODIUM CHLORIDE 30/500 ML
PLASTIC BAG, INJECTION (ML) INTRAVENOUS
Status: DISCONTINUED
Start: 2019-06-17 | End: 2019-06-17 | Stop reason: HOSPADM

## 2019-06-17 RX ORDER — OXYTOCIN/0.9 % SODIUM CHLORIDE 30/500 ML
PLASTIC BAG, INJECTION (ML) INTRAVENOUS CONTINUOUS PRN
Status: DISCONTINUED | OUTPATIENT
Start: 2019-06-17 | End: 2019-06-17

## 2019-06-17 RX ORDER — OXYCODONE AND ACETAMINOPHEN 5; 325 MG/1; MG/1
1 TABLET ORAL
Status: DISCONTINUED | OUTPATIENT
Start: 2019-06-17 | End: 2019-06-17

## 2019-06-17 RX ORDER — OXYCODONE HYDROCHLORIDE 5 MG/1
5-10 TABLET ORAL EVERY 4 HOURS PRN
Status: DISCONTINUED | OUTPATIENT
Start: 2019-06-17 | End: 2019-06-19 | Stop reason: HOSPADM

## 2019-06-17 RX ORDER — AMOXICILLIN 250 MG
1 CAPSULE ORAL 2 TIMES DAILY PRN
Status: DISCONTINUED | OUTPATIENT
Start: 2019-06-17 | End: 2019-06-19 | Stop reason: HOSPADM

## 2019-06-17 RX ORDER — ONDANSETRON 2 MG/ML
4 INJECTION INTRAMUSCULAR; INTRAVENOUS EVERY 6 HOURS PRN
Status: DISCONTINUED | OUTPATIENT
Start: 2019-06-17 | End: 2019-06-19 | Stop reason: HOSPADM

## 2019-06-17 RX ORDER — FENTANYL CITRATE 50 UG/ML
INJECTION, SOLUTION INTRAMUSCULAR; INTRAVENOUS PRN
Status: DISCONTINUED | OUTPATIENT
Start: 2019-06-17 | End: 2019-06-17

## 2019-06-17 RX ORDER — FENTANYL CITRATE 50 UG/ML
25-50 INJECTION, SOLUTION INTRAMUSCULAR; INTRAVENOUS
Status: DISCONTINUED | OUTPATIENT
Start: 2019-06-17 | End: 2019-06-17 | Stop reason: HOSPADM

## 2019-06-17 RX ORDER — LIDOCAINE HCL/EPINEPHRINE/PF 2%-1:200K
VIAL (ML) INJECTION PRN
Status: DISCONTINUED | OUTPATIENT
Start: 2019-06-17 | End: 2019-06-17

## 2019-06-17 RX ORDER — ONDANSETRON 2 MG/ML
INJECTION INTRAMUSCULAR; INTRAVENOUS PRN
Status: DISCONTINUED | OUTPATIENT
Start: 2019-06-17 | End: 2019-06-17

## 2019-06-17 RX ORDER — LIDOCAINE 40 MG/G
CREAM TOPICAL
Status: DISCONTINUED | OUTPATIENT
Start: 2019-06-17 | End: 2019-06-17

## 2019-06-17 RX ORDER — KETOROLAC TROMETHAMINE 30 MG/ML
30 INJECTION, SOLUTION INTRAMUSCULAR; INTRAVENOUS EVERY 6 HOURS
Status: COMPLETED | OUTPATIENT
Start: 2019-06-17 | End: 2019-06-17

## 2019-06-17 RX ORDER — BISACODYL 10 MG
10 SUPPOSITORY, RECTAL RECTAL DAILY PRN
Status: DISCONTINUED | OUTPATIENT
Start: 2019-06-19 | End: 2019-06-19 | Stop reason: HOSPADM

## 2019-06-17 RX ORDER — FERROUS GLUCONATE 324(38)MG
324 TABLET ORAL
Qty: 90 TABLET | Refills: 0 | Status: SHIPPED | OUTPATIENT
Start: 2019-06-17 | End: 2019-08-14

## 2019-06-17 RX ORDER — PHENYLEPHRINE HCL IN 0.9% NACL 1 MG/10 ML
SYRINGE (ML) INTRAVENOUS CONTINUOUS PRN
Status: DISCONTINUED | OUTPATIENT
Start: 2019-06-17 | End: 2019-06-17

## 2019-06-17 RX ORDER — AMOXICILLIN 250 MG
2 CAPSULE ORAL 2 TIMES DAILY PRN
Status: DISCONTINUED | OUTPATIENT
Start: 2019-06-17 | End: 2019-06-19 | Stop reason: HOSPADM

## 2019-06-17 RX ORDER — IBUPROFEN 800 MG/1
800 TABLET, FILM COATED ORAL EVERY 6 HOURS PRN
Status: DISCONTINUED | OUTPATIENT
Start: 2019-06-17 | End: 2019-06-19 | Stop reason: HOSPADM

## 2019-06-17 RX ADMIN — Medication 50 MCG/MIN: at 01:56

## 2019-06-17 RX ADMIN — SENNOSIDES AND DOCUSATE SODIUM 2 TABLET: 8.6; 5 TABLET ORAL at 08:29

## 2019-06-17 RX ADMIN — ACETAMINOPHEN 975 MG: 325 TABLET, FILM COATED ORAL at 20:48

## 2019-06-17 RX ADMIN — IBUPROFEN 800 MG: 800 TABLET ORAL at 22:25

## 2019-06-17 RX ADMIN — ACETAMINOPHEN 975 MG: 325 TABLET, FILM COATED ORAL at 13:47

## 2019-06-17 RX ADMIN — FENTANYL CITRATE 50 MCG: 50 INJECTION, SOLUTION INTRAMUSCULAR; INTRAVENOUS at 01:53

## 2019-06-17 RX ADMIN — ONDANSETRON 4 MG: 2 INJECTION INTRAMUSCULAR; INTRAVENOUS at 02:36

## 2019-06-17 RX ADMIN — BUPIVACAINE HYDROCHLORIDE AND EPINEPHRINE BITARTRATE 20 ML: 2.5; .005 INJECTION, SOLUTION INFILTRATION; PERINEURAL at 02:56

## 2019-06-17 RX ADMIN — OXYCODONE HYDROCHLORIDE 10 MG: 5 TABLET ORAL at 13:47

## 2019-06-17 RX ADMIN — SODIUM CHLORIDE, SODIUM LACTATE, POTASSIUM CHLORIDE, CALCIUM CHLORIDE AND DEXTROSE MONOHYDRATE: 5; 600; 310; 30; 20 INJECTION, SOLUTION INTRAVENOUS at 10:20

## 2019-06-17 RX ADMIN — LIDOCAINE HYDROCHLORIDE,EPINEPHRINE BITARTRATE 5 ML: 20; .005 INJECTION, SOLUTION EPIDURAL; INFILTRATION; INTRACAUDAL; PERINEURAL at 01:34

## 2019-06-17 RX ADMIN — OXYTOCIN-SODIUM CHLORIDE 0.9% IV SOLN 30 UNIT/500ML 300 ML/HR: 30-0.9/5 SOLUTION at 02:07

## 2019-06-17 RX ADMIN — SIMETHICONE CHEW TAB 80 MG 80 MG: 80 TABLET ORAL at 08:29

## 2019-06-17 RX ADMIN — CLINDAMYCIN PHOSPHATE 900 MG: 18 INJECTION, SOLUTION INTRAVENOUS at 01:45

## 2019-06-17 RX ADMIN — ACETAMINOPHEN 975 MG: 325 TABLET, FILM COATED ORAL at 06:24

## 2019-06-17 RX ADMIN — OXYCODONE HYDROCHLORIDE 10 MG: 5 TABLET ORAL at 20:06

## 2019-06-17 RX ADMIN — LIDOCAINE HYDROCHLORIDE,EPINEPHRINE BITARTRATE 4 ML: 20; .005 INJECTION, SOLUTION EPIDURAL; INFILTRATION; INTRACAUDAL; PERINEURAL at 01:47

## 2019-06-17 RX ADMIN — KETOROLAC TROMETHAMINE 30 MG: 30 INJECTION, SOLUTION INTRAMUSCULAR at 21:52

## 2019-06-17 RX ADMIN — SODIUM CHLORIDE, POTASSIUM CHLORIDE, SODIUM LACTATE AND CALCIUM CHLORIDE: 600; 310; 30; 20 INJECTION, SOLUTION INTRAVENOUS at 01:09

## 2019-06-17 RX ADMIN — SIMETHICONE CHEW TAB 80 MG 80 MG: 80 TABLET ORAL at 16:53

## 2019-06-17 RX ADMIN — KETOROLAC TROMETHAMINE 30 MG: 30 INJECTION, SOLUTION INTRAMUSCULAR at 09:22

## 2019-06-17 RX ADMIN — SODIUM CITRATE AND CITRIC ACID MONOHYDRATE 30 ML: 500; 334 SOLUTION ORAL at 01:32

## 2019-06-17 RX ADMIN — BUPIVACAINE 20 ML: 13.3 INJECTION, SUSPENSION, LIPOSOMAL INFILTRATION at 02:56

## 2019-06-17 RX ADMIN — AZITHROMYCIN MONOHYDRATE 500 MG: 500 INJECTION, POWDER, LYOPHILIZED, FOR SOLUTION INTRAVENOUS at 01:45

## 2019-06-17 RX ADMIN — KETOROLAC TROMETHAMINE 30 MG: 30 INJECTION, SOLUTION INTRAMUSCULAR at 15:45

## 2019-06-17 RX ADMIN — OXYCODONE HYDROCHLORIDE 5 MG: 5 TABLET ORAL at 10:43

## 2019-06-17 RX ADMIN — LIDOCAINE HYDROCHLORIDE,EPINEPHRINE BITARTRATE 5 ML: 20; .005 INJECTION, SOLUTION EPIDURAL; INFILTRATION; INTRACAUDAL; PERINEURAL at 01:40

## 2019-06-17 RX ADMIN — KETOROLAC TROMETHAMINE 30 MG: 30 INJECTION, SOLUTION INTRAMUSCULAR at 03:25

## 2019-06-17 RX ADMIN — SODIUM CHLORIDE, POTASSIUM CHLORIDE, SODIUM LACTATE AND CALCIUM CHLORIDE: 600; 310; 30; 20 INJECTION, SOLUTION INTRAVENOUS at 01:27

## 2019-06-17 RX ADMIN — GENTAMICIN SULFATE 140 MG: 40 INJECTION, SOLUTION INTRAMUSCULAR; INTRAVENOUS at 01:45

## 2019-06-17 RX ADMIN — OXYCODONE HYDROCHLORIDE 10 MG: 5 TABLET ORAL at 16:53

## 2019-06-17 NOTE — PROGRESS NOTES
[facility]  Labor Progress Note    S: Patient comfortable, trying to sleep.    O:   Patient Vitals for the past 4 hrs:   BP Temp Temp src Resp SpO2   19 118/75 99.1  F (37.3  C) Oral 16 95 %   19 -- 98.9  F (37.2  C) Oral -- --   19 118/69 -- -- -- 98 %     SVE: Last check /-2 at     FHT: Baseline 150, minimal variability, no accelerations, recurrent early and late decelerations  [place]: 3 contractions in 10 minutes    A/P:  Ms. Hilaria Jansen is a 29 year old  at 37w5d by LMP c/w 7w2d US, here for IOL for for non-reassuring fetal status.     # Labor:   - IOL for non-reassuring fetal status, FHT currently category II for periods of minimal variability  - Cervix C/L/H> vaginal miso x2>s/p Cook. S/p AROM with clear and bloody fluid. IV pitocin currently stopped due to minimal variability with recurrent decelerations. Will plan to restart when FHT improved.   - Pain: Epidural in place      # FWB:  - Category II FHT due to intermittent minimal variability and recurrent decelerations. Some decels appear to be early decelerations and some appear to be late decelerations. IV pitocin stopped, maternal O2 applied. Will allow return of more reassuring FHT prior to restarting pitocin.   - Rh positive, Rubella immune, cephalic, GBS negative, GCT failed, GTT passed, Placenta anterior, Growth US 2281g 78%ile (19)  - Cephalic, GBS neg    Naomie Wood MD  OB/GYN Resident, PGY-2  2019 10:08 PM

## 2019-06-17 NOTE — ANESTHESIA PREPROCEDURE EVALUATION
"Anesthesia Pre-Procedure Evaluation    Patient: Hilaria Jansen   MRN:     [id number] Gender:   female   Age:    29 year old :      1990        Preoperative Diagnosis: * No surgery found *        Past Medical History:   Diagnosis Date     Hidradenitis suppurativa      NO ACTIVE PROBLEMS       Past Surgical History:   Procedure Laterality Date     SURGICAL PATHOLOGY EXAM Left 09/10/2015    left paratracheal neck mass removed, benign          Anesthesia Evaluation     .             ROS/MED HX    ENT/Pulmonary:  - neg pulmonary ROS     Neurologic:  - neg neurologic ROS     Cardiovascular:  - neg cardiovascular ROS       METS/Exercise Tolerance:     Hematologic: Comments: Sickle cell trait - neg hematologic  ROS       Musculoskeletal:  - neg musculoskeletal ROS       GI/Hepatic:  - neg GI/hepatic ROS   (+) GERD       Renal/Genitourinary:  - ROS Renal section negative       Endo:  - neg endo ROS       Psychiatric:  - neg psychiatric ROS       Infectious Disease:  - neg infectious disease ROS       Malignancy:      - no malignancy   Other:    - neg other ROS                     PHYSICAL EXAM:   Mental Status/Neuro: A/A/O   Airway: Facies: Feasible  Mallampati: II  Mouth/Opening: Full   Respiratory: Auscultation: CTAB      CV: Rhythm: Regular   Comments:                    Lab Results   Component Value Date    WBC 6.6 06/15/2019    HGB 11.1 (L) 06/15/2019    HCT 34.9 (L) 06/15/2019     06/15/2019       Preop Vitals  BP Readings from Last 3 Encounters:   19 114/69   19 124/66   19 123/72    Pulse Readings from Last 3 Encounters:   19 106   19 92   06/10/19 109      Resp Readings from Last 3 Encounters:   19 18   06/10/19 16   19 16    SpO2 Readings from Last 3 Encounters:   19 100%   19 99%   19 100%      Temp Readings from Last 1 Encounters:   19 36.8  C (98.2  F) (Oral)    Ht Readings from Last 1 Encounters:   19 1.651 m (5' 5\")    " "  Wt Readings from Last 1 Encounters:   06/13/19 82.6 kg (182 lb)    Estimated body mass index is 30.29 kg/m  as calculated from the following:    Height as of 6/13/19: 1.651 m (5' 5\").    Weight as of 6/13/19: 82.6 kg (182 lb).     LDA:  Peripheral IV 06/15/19 Left;Dorsal Hand (Active)   Site Assessment WDL 6/17/2019  1:49 AM   Line Status Infusing 6/17/2019  1:49 AM   Phlebitis Scale 0-->no symptoms 6/17/2019  1:49 AM   Infiltration Scale 0 6/17/2019  1:49 AM   Infiltration Site Treatment Method  None 6/16/2019 12:00 AM   Extravasation? No 6/16/2019 12:00 AM   Number of days: 2       Intrathecal/Epidural Catheter 06/16/19 (Active)   Site Assessment Clean, dry, intact 6/16/2019  3:20 PM   Line Status Infusing 6/16/2019 10:42 AM   Number of days: 1       Urethral Catheter Latex 16 fr (Active)   Tube Description Positional 6/17/2019  1:00 AM   Number of days: 0            Assessment:   ASA SCORE: 2    NPO Status: Increased aspiration risk   Documentation: Deferred   Proceeding: Proceed without further delay     Plan:   Anes. Type:  Regional; MAC     RA Details:  Labor/OB Procedure; Catheter     RA-Location/Type: Epidural (L)   Pre-Induction: None     Drips/Meds-Preparation: Oxytocin   Induction:  Not applicable   Airway: Native Airway   Access/Monitoring: PIV   Maintenance: LA-Catheter   Emergence: N/a   Logistics: Observation/Admission     Postop Pain/Sedation Strategy:  Standard-Options: Block SS; Exparel     PONV Management:  Adult Risk Factors: Female, Non-Smoker  Prevention: Ondansetron     CONSENT: Direct conversation   Plan and risks discussed with: Patient; Spouse   Blood Products: Consented (ALL Blood Products)       Comments for Plan/Consent:  Patient with well functioning epidural now needs csection for nonreassuring fetal heart tones. Reportedly was very uncomfortable during IV and epidural placement. I discussed in detail that should would not be insensate during the procedure but should not have pain. " She appears to have understood this difference. Plan to proceed with epidural as plan A assuming good coverage after lidocaine boluses. Patient aware that plan B is general anesthesia in the event that she does not tolerate plan A for any reason.              neg OB CANDIE Patrick MD

## 2019-06-17 NOTE — PROVIDER NOTIFICATION
06/17/19 0020   Provider Notification   Provider Name/Title Dr. Wood   Method of Notification In Department   Notification Reason Decels   Comments   Comments RN to not start oxytocin, will reasses plan at 0045     EFM strip reviewed by Dr. Wood. Will reassess plan at 0045. RN continue to monitor.

## 2019-06-17 NOTE — OR NURSING
Dr. Wood at bedside to discuss  consents d/t category 2 tracing. Pt given time to ask questions and agreeable to plan of care. Dr. Patrick present at bedside to discuss TAP block consent. Consent forms signed,  Lakhwinder present throughout discussion. Pt transferred to OR via cart accompanied by RNs and CRNA and anesthesia.

## 2019-06-17 NOTE — PROGRESS NOTES
[facility]  Labor Progress Note    S: Patient comfortable. Questions answered with in-person Kinyarwanda interpretor.    O:   Patient Vitals for the past 4 hrs:   BP Temp Temp src Resp SpO2   19 2327 131/75 -- -- 18 100 %   19 2230 119/68 98.7  F (37.1  C) Oral 16 99 %   19 2128 118/75 99.1  F (37.3  C) Oral 16 95 %   19 -- 98.9  F (37.2  C) Oral -- --     SVE: /    FHT: Baseline 150, minimal to moderate variability, no accelerations, no decelerations  [place]: Not tracing well, uterine irritability    A/P:  Ms. Hilaria Jansen is a 29 year old  at 37w5d by LMP c/w 7w2d US, here for IOL for for non-reassuring fetal status. Pitocin has now been off for 15 minutes with improvement in decelerations. Will allow another half an hour for fetal recovery, with hopefully improvement in variability. Discussed with patient and  that if fetus unable to tolerate progression of labor with pitocin, will likely need  section. Questions answered, patient and  expressed understanding. Will reassess FHT at 0015 and restart pitocin if able.     Naomie Wood MD  OB/GYN Resident, PGY-2  2019 11:56 PM

## 2019-06-17 NOTE — OR NURSING
OR to PACU Transfer Note  Data: Pt to OB PACU at 0301 via cart. PIV infusing LR and pitocin without complications, kaur with yellow urine to gravity, VSS, pt does not complain of pain and/or nausea.   Interventions: IV to pump, monitors and alarms on, SCD on.  Plan: Patient instructed to notify RN for pain or nausea, routine post op cares, initiate breastfeeding/pumping as soon as patient/infant able.

## 2019-06-17 NOTE — ANESTHESIA POSTPROCEDURE EVALUATION
Anesthesia POST Procedure Evaluation    Patient: Hilaria Jansen   MRN:     [id number] Gender:   female   Age:    29 year old :      1990        Preoperative Diagnosis: Pregnancy   Procedure(s):   SECTION   Postop Comments: No value filed.       Anesthesia Type:  Regional, MAC  No value filed.    Reportable Event: NO     PAIN: Uncomplicated   Sign Out status: Comfortable, Well controlled pain     PONV: No PONV   Sign Out status:  No Nausea or Vomiting     Neuro/Psych: Uneventful perioperative course   Sign Out Status: Preoperative baseline; Age appropriate mentation     Airway/Resp.: Uneventful perioperative course   Sign Out Status: Non labored breathing, age appropriate RR; Resp. Status within EXPECTED Parameters     CV: Uneventful perioperative course   Sign Out status: Appropriate BP and perfusion indices; Appropriate HR/Rhythm     Disposition:   Sign Out in:  Labor and Delivery  Disposition:  Labor and Delivery  Recovery Course: Uneventful  Follow-Up: Not required           Last Anesthesia Record Vitals:  CRNA VITALS  2019 0230 - 2019 0330      2019             Pulse:  98    SpO2:  95 %    Resp Rate (observed):  1  (Abnormal)           Last PACU Vitals:  Vitals Value Taken Time   /69 2019  4:35 AM   Temp 37  C (98.6  F) 2019  3:05 AM   Pulse 89 2019  4:35 AM   Resp 15 2019  4:22 AM   SpO2 98 % 2019  4:37 AM   Temp src     NIBP     Pulse     SpO2     Resp     Temp     Ht Rate     Temp 2     Vitals shown include unvalidated device data.      Electronically Signed By: Eladio Patrick MD, 2019, 4:49 AM

## 2019-06-17 NOTE — ANESTHESIA CARE TRANSFER NOTE
Patient: Hilaria Jansen    Procedure(s):   SECTION    Diagnosis: Pregnancy  Diagnosis Additional Information: No value filed.    Anesthesia Type:   No value filed.     Note:  Airway :Room Air  Patient transferred to:Labor and Delivery  Handoff Report: Identifed the Patient, Identified the Reponsible Provider, Reviewed the pertinent medical history, Discussed the surgical course, Reviewed Intra-OP anesthesia mangement and issues during anesthesia, Set expectations for post-procedure period and Allowed opportunity for questions and acknowledgement of understanding      Vitals: (Last set prior to Anesthesia Care Transfer)    CRNA VITALS  2019 0230 - 2019 0307      2019             Pulse:  98    SpO2:  95 %    Resp Rate (observed):  1  (Abnormal)                 Electronically Signed By: LEONA Philippe CRNA  2019  3:07 AM

## 2019-06-17 NOTE — PROGRESS NOTES
[facility]  Labor Progress Note    S: Patient comfortable with epidural.     O:   Patient Vitals for the past 4 hrs:   BP Temp Temp src SpO2   19 1715 -- 99.2  F (37.3  C) Oral 98 %   19 1630 100/55 -- -- 97 %   19 1520 -- 99.1  F (37.3  C) Oral 97 %     SVE: 4/70/-2    FHT: Baseline 150, currently modearte variability but periods of minimal variability, present accelerations, occasional late decelerations that have resolved with fluid bolus   [place]: 2 contractions in 10 minutes    A/P:  Ms. Hilaria Jansen is a 29 year old  at 37w5d by LMP c/w 7w2d US, here for IOL for for non-reassuring fetal status.    # Labor:   - IOL for non-reassuring fetal status, FHT currently category II for periods of minimal variability- but overall reassuring   - Cervix C/L/H> vaginal miso x2>s/p Cook. Now on IV pitocin at 6 mU/min, continue to titrate per protocol. S/p AROM attempt, no fluid seen. Will re-attempt if no fluid leakage over next hour.   - Pain: Epidural in place        # FWB:  - Category II FHT due to intermittent minimal variability and occasional late decelerations, however currently reassuring with recent accelerations and moderate variability. Will continue to monitor closely and continue intrauterine resuscitation measures prn   - Rh positive, Rubella immune, cephalic, GBS negative, GCT failed, GTT passed, Placenta anterior, Growth US 2281g 78%ile (19)  - Cephalic, GBS neg      Naomie Wood MD  OB/GYN Resident, PGY-2  2019 7:12 PM

## 2019-06-17 NOTE — PROGRESS NOTES
[facility]  Labor Progress Note    S: Per RN, no leakage of fluid for an hour.    O:   Patient Vitals for the past 4 hrs:   BP Temp Temp src SpO2   19 1715 -- 99.2  F (37.3  C) Oral 98 %   19 1630 100/55 -- -- 97 %     SVE: 5/70/-2, AROM for clear/bloody fluid     FHT: Baseline 150, moderate variability currently although periods of minimal variability, no accelerations, no decelerations  [place]: 2 contractions in 10 minutes    A/P:  Ms. Hilaria Jansen is a 29 year old  at 37w5d by LMP c/w 7w2d US, here for IOL for for non-reassuring fetal status.     # Labor:   - IOL for non-reassuring fetal status, FHT currently category II for periods of minimal variability- but overall reassuring   - Cervix C/L/H> vaginal miso x2>s/p Larry. Now on IV pitocin at 8 mU/min, continue to titrate per protocol. S/p AROM with clear and bloody fluid.    - Pain: Epidural in place      # FWB:  - Category II FHT due to intermittent minimal variability, however currently reassuring with moderate variability. Will continue to monitor closely and continue intrauterine resuscitation measures prn   - Rh positive, Rubella immune, cephalic, GBS negative, GCT failed, GTT passed, Placenta anterior, Growth US 2281g 78%ile (19)  - Cephalic, GBS neg      Naomie Wood MD  OB/GYN Resident, PGY-2  2019 8:18 PM

## 2019-06-17 NOTE — PROGRESS NOTES
VSS. Afebrile. Category II tracing with recurring decels with periods of minimal varibaility. Pt reposition, IV fluid bolus given and O2 administered and FHT not responding well to interventions. Pitocin titrated down in half per provider (see MAR). Support person at bedside. Continue with plan of care.

## 2019-06-17 NOTE — PROGRESS NOTES
After 1 hour off of pitocin, FHT persistently Cat II with minimal variability. Due to prolonged Cat II FHT despite intrauterine resuscitation measures and remote from delivery, recommended proceeding with primary  section. Patient and  agreeable. Risks of  discussed including bleeding, infection, risk of injury to surrounding structures. In-person interpretor present for consent discussion. Patient questions answered and written consent obtained. Will proceeding with PLTCS.     Naomie Wood MD  Obstetrics and Gynecology, PGY2  2019 1:16 AM

## 2019-06-17 NOTE — DISCHARGE SUMMARY
[facility] Discharge Summary    Hilaria Jansen MRN# [id number]   Age: 29 year old YOB: 1990     Date of Admission:  6/15/2019  Date of Discharge::  19  Admitting Physician:  Lucia Rascon MD  Discharge Physician:  Nora Vera MD             Admission Diagnoses:   Intrauterine pregnancy at 37w6d  Decreased fetal movement          Discharge Diagnosis:   Same, delivered   Cat II FHT, remote from delivery   Acute on chronic blood loss anemia  Intrapartum hemorrhage          Procedures:   Procedure(s): Primary low transverse  section with two layer closure via Pfannenstiel  skin incision  Epidural anesthesia  TAP blocks                Medications Prior to Admission:     Medications Prior to Admission   Medication Sig Dispense Refill Last Dose     Prenatal Vit-Fe Fumarate-FA (PRENATAL MULTIVITAMIN W/IRON) 27-0.8 MG tablet Take 1 tablet by mouth daily 100 tablet 3 2019 at Unknown time     acetaminophen (TYLENOL) 325 MG tablet Take 2 tablets (650 mg) by mouth every 6 hours as needed for mild pain Start after Delivery. 100 tablet 0 More than a month at Unknown time     benzoyl peroxide 5 % external liquid Use daily as directed 226 g 3 More than a month at Unknown time     clindamycin (CLEOCIN T) 1 % external lotion Apply topically 2 times daily 60 mL 3 Unknown at Unknown time     ibuprofen (ADVIL/MOTRIN) 600 MG tablet Take 1 tablet (600 mg) by mouth every 6 hours as needed for moderate pain Start after delivery 60 tablet 0 More than a month at Unknown time     omeprazole (PRILOSEC) 40 MG DR capsule Take 1 capsule (40 mg) by mouth daily 90 capsule 1 More than a month at Unknown time     order for DME Equipment being ordered: bilateral electric breast pump 1 Device 0 Unknown at Unknown time     order for DME Equipment being ordered: maternity support belt 1 Units 1 More than a month at Unknown time     senna-docusate (SENOKOT-S/PERICOLACE) 8.6-50 MG tablet Take 1 tablet by  mouth daily Start after delivery. 100 tablet 0 More than a month at Unknown time     [DISCONTINUED] cephALEXin (KEFLEX) 500 MG capsule Take 1 capsule (500 mg) by mouth 4 times daily for 5 days 20 capsule 0 6/14/2019 at Unknown time             Discharge Medications:        Review of your medicines      START taking      Dose / Directions   ferrous gluconate 324 (38 Fe) MG tablet  Commonly known as:  FERGON      Dose:  324 mg  Take 1 tablet (324 mg) by mouth daily (with breakfast)  Quantity:  90 tablet  Refills:  0     norethindrone 0.35 MG tablet  Commonly known as:  MICRONOR      Dose:  0.35 mg  Take 1 tablet (0.35 mg) by mouth daily  Quantity:  90 tablet  Refills:  3        CONTINUE these medicines which have NOT CHANGED      Dose / Directions   acetaminophen 325 MG tablet  Commonly known as:  TYLENOL  Used for:  Supervision of other normal pregnancy, antepartum      Dose:  650 mg  Take 2 tablets (650 mg) by mouth every 6 hours as needed for mild pain Start after Delivery.  Quantity:  100 tablet  Refills:  0     benzoyl peroxide 5 % external liquid  Used for:  Furuncle of left axilla      Use daily as directed  Quantity:  226 g  Refills:  3     clindamycin 1 % external lotion  Commonly known as:  CLEOCIN T      Apply topically 2 times daily  Quantity:  60 mL  Refills:  3     ibuprofen 600 MG tablet  Commonly known as:  ADVIL/MOTRIN  Used for:  Supervision of other normal pregnancy, antepartum      Dose:  600 mg  Take 1 tablet (600 mg) by mouth every 6 hours as needed for moderate pain Start after delivery  Quantity:  60 tablet  Refills:  0     omeprazole 40 MG DR capsule  Commonly known as:  priLOSEC  Used for:  Gastroesophageal reflux disease without esophagitis      Dose:  40 mg  Take 1 capsule (40 mg) by mouth daily  Quantity:  90 capsule  Refills:  1     order for DME  Used for:  Supervision of other normal pregnancy, antepartum      Equipment being ordered: maternity support belt  Quantity:  1 Units  Refills:   1     order for DME  Used for:  Breast feeding status of mother      Equipment being ordered: bilateral electric breast pump  Quantity:  1 Device  Refills:  0     prenatal multivitamin w/iron 27-0.8 MG tablet  Used for:  Pre-conception counseling      Dose:  1 tablet  Take 1 tablet by mouth daily  Quantity:  100 tablet  Refills:  3     senna-docusate 8.6-50 MG tablet  Commonly known as:  SENOKOT-S/PERICOLACE  Used for:  Supervision of other normal pregnancy, antepartum      Dose:  1 tablet  Take 1 tablet by mouth daily Start after delivery.  Quantity:  100 tablet  Refills:  0        STOP taking    cephALEXin 500 MG capsule  Commonly known as:  KEFLEX              Where to get your medicines      These medications were sent to [facility] [place], [place] - [address]  [address] [address]    Phone:  [phone]     ferrous gluconate 324 (38 Fe) MG tablet    norethindrone 0.35 MG tablet                 Consultations:   Anesthesia          Brief Admission History:   Ms. Hilaria Jansen is a 29 year old now  who initially presented at 37w4d with decreased fetal movement. Patient was on birthplace tour today and mentioned decreased fetal movement.  Hasn't had much appetite lately, so hasn't been eating well.  Unable to tell last time movement was normal, but it was probably earlier this week. Only think she's had to eat/drink all day was some tea.    Please see patient's H&P dated 6/15/19 for full history.        Intrapartum course   Hilaria Jansen is a 29 year old  at 37w6d admitted for IOL for non-reassuring fetal status, with decreased fetal movement and Cat II FHT. Patient was inducted with vaginal misoprostol, IV pitocin and AROM. At 6 cm dilation, FHT showed minimal variability with recurrent decelerations. The IV pitocin was stopped and intrauterine resuscitation measures attempted. After 1 hour, the FHT was persistently Cat II and a primary   section was recommended. The risks, benefits, and alternatives of  section were discussed with the patient, and she agreed to proceed.      Intraoperative course   The procedure was uncomplicated.  EBL 1148 mL.  See operative report for details.     Findings:   1. Clear amniotic fluid  2. Liveborn male infant in SANDRO presentation. Apgars 8 at 1 minute & 9 at 5 minutes. Weight pending.  3. Arterial cord pH 7.25, base deficit 1.9.  4. Normal uterus, fallopian tubes, and ovaries.        Postpartum Course   The patient's hospital course was unremarkable.  She recovered as anticipated and experienced no post-operative complications.  On discharge, her pain was well controlled. Vaginal bleeding is similar to peak menstrual flow.  Voiding without difficulty.  Ambulating well and tolerating a normal diet.  No fever or significant wound drainage.  Breastfeeding well.  Infant is stable.  Regaining bowel function  She was discharged on post-partum day #2.    Post-partum hemoglobin:   Hemoglobin   Date Value Ref Range Status   2019 8.8 (L) 11.7 - 15.7 g/dL Final             Discharge Instructions and Follow-Up:   Discharge diet: Regular   Discharge activity: No lifting greater than 20 lbs, pushing, pulling, or other strenuous activity for 6 weeks. Pelvic rest for 6 weeks including no sexual intercourse, tampons, or douching. No driving until you can slam on the breaks without pain or while on narcotic pain medications.    Discharge follow-up: Follow up with primary OB for routine postpartum visit in 6 weeks   Wound care: Keep incision clean and dry           Discharge Disposition:   Discharged to home      Amy Schumer, MD  Obstetrics and Gynecology, PGY-3  19 6:26 AM    INora MD, personally saw and evaluated this patient.  I discussed the patient with the resident and care team, and agree with the assessment and plan of care as documented in the resident's note of 19.  I personally  reviewed vital signs, medications, lab, and exam.     Key Findings:  Meeting goals for discharge.  Abdomen non-tender, fundus firm.  Incision healing.     PLAN:  Discharge home.  RTC 6 weeks.  See medication list.      Nora Vera MD   Date of Service (when I saw the patient) 06/19/19   '

## 2019-06-17 NOTE — PROVIDER NOTIFICATION
06/16/19 2157   Provider Notification   Provider Name/Title Dr. Wood    Method of Notification At Bedside   Request Evaluate in Person   Notification Reason Decels   Pt with recurring late decels, provider notified, pt reposition. SVE completed and fetal scalp stimulation performed and bladder emptied. Continue to monitor closely.

## 2019-06-17 NOTE — ANESTHESIA PROCEDURE NOTES
Peripheral Nerve Block Procedure Note    Staff:     Anesthesiologist:  Eladio Patrick MD  Location: OB  Procedure Start/Stop TImes:      6/17/2019 2:52 AM     6/17/2019 2:56 AM    patient identified, IV checked, site marked, risks and benefits discussed, informed consent, monitors and equipment checked, pre-op evaluation, at physician/surgeon's request and post-op pain management      Correct Patient: Yes      Correct Position: Yes      Correct Site: Yes      Correct Procedure: Yes      Correct Laterality:  Yes    Site Marked:  Yes  Procedure details:     Procedure:  TAP    ASA:  2    Laterality:  Bilateral    Position:  Supine    Sterile Prep: chloraprep, mask and sterile gloves      Local skin infiltration:  None    Needle:  Short bevel    Needle gauge:  21    Needle length (mm):  110    Ultrasound: Yes      Ultrasound used to identify targeted nerve, plexus, or vascular structure and placed a needle adjacent to it      Permanent Image entered into patiient's record      Abnormal pain on injection: No      Blood Aspirated: No      Paresthesias:  No    Bleeding at site: No      Bolus via:  Needle    Infusion Method:  Single Shot    Complications:  None

## 2019-06-17 NOTE — OP NOTE
[facility]  Full Operative Progress Note     Surgery Date:  2019    Surgeon:  Wendi Coppola MD    Assistants:  Naomie Wood MD, PGY-2    Pre-op Diagnosis:  1. Intrauterine pregnancy at 37w6d     2. Cat II FHT, remote from delivery     Post-op Diagnosis:  1. Same      2. Liveborn male infant     Procedure:  Primary low-transverse  section with two layer uterine closure via Pfannenstiel incision    Anesthesia: Epidural    EBL:  1148 mL    IVF:  500 mL crystalloid    UOP:  300 mL clear urine at the end of the case    Drains: Lindquist Catheter     Specimens:  Placenta, cord blood, cord gases     Complications: None apparent    Indications:   Hilaria Jansen is a 29 year old  at 37w6d admitted for IOL for non-reassuring fetal status, with decreased fetal movement and Cat II FHT. Patient was inducted with vaginal misoprostol, IV pitocin and AROM. At 6 cm dilation, FHT showed minimal variability with recurrent decelerations. The IV pitocin was stopped and intrauterine resuscitation measures attempted. After 1 hour, the FHT was persistently Cat II and a primary  section was recommended. The risks, benefits, and alternatives of  section were discussed with the patient, and she agreed to proceed.     Findings:   1. Clear amniotic fluid  2. Liveborn male infant in SANDRO presentation. Apgars 8 at 1 minute & 9 at 5 minutes. Weight pending.  3. Arterial cord pH 7.25, base deficit 1.9.  4. Normal uterus, fallopian tubes, and ovaries.     Procedure Details:   The patient was brought to the OR, where adequate epidural anesthesia was administered.  She was placed in the dorsal supine position with a slight leftward tilt. She was prepped and draped in the usual sterile fashion. A surgical time out was performed. A pfannenstiel skin incision was made with the scalpel, and carried down to the underlying fascia with sharp and blunt dissection. The fascia was incised  in the midline, and the incision was extended laterally with the [place] scissors. The superior aspect of the fascia was grasped with the Kocher clamps and dissected off of the underlying rectus muscles with blunt and sharp dissection. Attention was then turned to the inferior aspect of the fascia, which was similarly dissected off of the underlying rectus muscles. The rectus muscles were  in the midline, and the peritoneum was entered bluntly, and the opening was extended with digital pressure. The bladder blade was placed. A transverse hysterotomy was made with the scalpel in the lower uterine segment, and the incision was extended with digital pressure. The infant was noted to be in the SANDRO position, and was delivered atraumatically. The shoulders delivered easily.  No nuchal cord was noted. The cord was doubly clamped and cut, and the infant was handed off to the awaiting nursery staff. A segment of cord was cut and held for gases. The placenta was delivered with gentle traction on the umbilical cord and uterine massage. The uterus was cleared of all clots and debris. Uterine tone was noted to be firm with 20 units of pitocin given through the running IV and uterine massage.  The hysterotomy was closed with a running locked suture of 0 Vicryl.  The hysterotomy was then imbricated using an 0 Vicryl suture. An area of bleeding on the left aspect of the hysterotomy was controlled with two figure-of-8 sutures of 0-Vicryl. The hysterotomy was noted to be hemostatic. The posterior cul-de-sac was cleared of all clots and debris. The pericolic gutters were cleared of all clots and debris. The hysterotomy was reexamined and noted to be hemostatic. Half a sheet of seprafilm was placed over the hysterotomy. The fascia and rectus muscles were examined and areas of oozing were controlled with electrocautery. Half a sheet of Seprafilm was placed between the fascia and the rectus muscles. The fascia was closed with a  running 0 Vicryl suture. The subcutaneous tissue was irrigated and areas of oozing were controlled with electrocautery. The subcutaneous tissue was less than 2 cm in thickness, and was therefore not closed. The skin was closed with 4-0 Vicryl and covered with a sterile dressing.    All sponge, needle, and instrument counts were correct. The patient tolerated the procedure well, and was transferred to recovery in stable condition. Dr. Coppola was present and scrubbed for the entirety of the procedure.     Naomie Wood  OB/GYN Resident, PGY-2  6/17/2019 1:20 AM      Physician Attestation   I was present for the entire procedure between opening and closing.    Wendi Coppola  Date of Service (when I saw the patient): 06/17/19

## 2019-06-18 LAB — HGB BLD-MCNC: 8.8 G/DL (ref 11.7–15.7)

## 2019-06-18 PROCEDURE — 85018 HEMOGLOBIN: CPT | Performed by: STUDENT IN AN ORGANIZED HEALTH CARE EDUCATION/TRAINING PROGRAM

## 2019-06-18 PROCEDURE — 36415 COLL VENOUS BLD VENIPUNCTURE: CPT | Performed by: STUDENT IN AN ORGANIZED HEALTH CARE EDUCATION/TRAINING PROGRAM

## 2019-06-18 PROCEDURE — 12000001 ZZH R&B MED SURG/OB UMMC

## 2019-06-18 PROCEDURE — 25000132 ZZH RX MED GY IP 250 OP 250 PS 637: Performed by: STUDENT IN AN ORGANIZED HEALTH CARE EDUCATION/TRAINING PROGRAM

## 2019-06-18 RX ADMIN — IBUPROFEN 800 MG: 800 TABLET ORAL at 05:31

## 2019-06-18 RX ADMIN — OXYCODONE HYDROCHLORIDE 5 MG: 5 TABLET ORAL at 11:17

## 2019-06-18 RX ADMIN — SIMETHICONE CHEW TAB 80 MG 80 MG: 80 TABLET ORAL at 14:04

## 2019-06-18 RX ADMIN — SENNOSIDES AND DOCUSATE SODIUM 2 TABLET: 8.6; 5 TABLET ORAL at 19:43

## 2019-06-18 RX ADMIN — IBUPROFEN 800 MG: 800 TABLET ORAL at 18:30

## 2019-06-18 RX ADMIN — SENNOSIDES AND DOCUSATE SODIUM 2 TABLET: 8.6; 5 TABLET ORAL at 07:56

## 2019-06-18 RX ADMIN — ACETAMINOPHEN 975 MG: 325 TABLET, FILM COATED ORAL at 22:24

## 2019-06-18 RX ADMIN — SIMETHICONE CHEW TAB 80 MG 80 MG: 80 TABLET ORAL at 19:43

## 2019-06-18 RX ADMIN — SIMETHICONE CHEW TAB 80 MG 80 MG: 80 TABLET ORAL at 07:55

## 2019-06-18 RX ADMIN — ACETAMINOPHEN 975 MG: 325 TABLET, FILM COATED ORAL at 05:31

## 2019-06-18 RX ADMIN — OXYCODONE HYDROCHLORIDE 5 MG: 5 TABLET ORAL at 19:43

## 2019-06-18 RX ADMIN — OXYCODONE HYDROCHLORIDE 5 MG: 5 TABLET ORAL at 15:34

## 2019-06-18 RX ADMIN — IBUPROFEN 800 MG: 800 TABLET ORAL at 11:18

## 2019-06-18 RX ADMIN — ACETAMINOPHEN 975 MG: 325 TABLET, FILM COATED ORAL at 14:04

## 2019-06-18 RX ADMIN — OXYCODONE HYDROCHLORIDE 10 MG: 5 TABLET ORAL at 07:55

## 2019-06-18 RX ADMIN — OXYCODONE HYDROCHLORIDE 5 MG: 5 TABLET ORAL at 02:19

## 2019-06-18 NOTE — LACTATION NOTE
This note was copied from a baby's chart.  Couplet seen by Resource RN:    Infant born at 37.6 weeks via  on 19 at 0205. Infant has been sleepy and difficult to latch.    Infant skin to skin with mother. Oral exam completed prior to attempting to latch. Infant has palpable, shortened lingual frenulum, but is able to extend and elevate his tongue appropriately. When suck assessed he is able to keep his tongue down and forward and mother denies pain and has not evidence of nipple trauma.     Attempted to latch infant in the laid back position. Infant opening mouth and is able to get a deep latch in this position.Infant able to sustain latch with deep, rhythmic sucking, but then infant  pulls off the breast sucking on his tongue. Mother shown how to do suck training to encourage him to keep his tongue down and forward.      Hilaria is hand expressing after each feeding (needs more practice, reinforcement) and she has pumped to stimulate her breasts.    Plan: Continue to breastfeed every 2-3 hours with RN support. Continue hand expression after feedings and pump 4-6 times per day at least until infant latching well.     Lactation Consult placed and Lactation Consultant will evaluate tomorrow 19

## 2019-06-18 NOTE — PROGRESS NOTES
[facility]   Post-partum Note    Name:  Hilaria Jansen  MRN: [id number]    S: Patient is doing ok.  Pain is adequately  controlled.  Tolerating regular diet without nausea or vomiting.  Ambulating without dizziness.  Lochia is minimal.  Breastfeeding.  Voiding. Has not yet passed gas.    O:   Patient Vitals for the past 24 hrs:   BP Temp Temp src Pulse Heart Rate Resp SpO2   06/17/19 1930 125/78 98.3  F (36.8  C) Oral 107 -- 18 100 %   06/17/19 1430 -- -- -- -- -- 18 100 %   06/17/19 1345 122/75 99.9  F (37.7  C) Oral 112 -- 18 100 %   06/17/19 1130 -- -- -- -- -- 18 100 %   06/17/19 1021 119/75 -- -- 97 -- 18 100 %   06/17/19 0920 130/83 -- -- 92 -- 18 100 %   06/17/19 0630 109/70 99.4  F (37.4  C) Oral 87 -- 16 98 %   06/17/19 0530 117/69 98.4  F (36.9  C) Oral 87 -- 16 98 %   06/17/19 0500 117/75 98.2  F (36.8  C) Oral 87 -- 16 98 %   06/17/19 0435 111/69 -- -- 89 -- -- 98 %   06/17/19 0430 -- -- -- -- -- -- 98 %   06/17/19 0425 -- -- -- -- -- -- 99 %   06/17/19 0420 104/64 -- -- 83 87 21 99 %   06/17/19 0405 145/64 -- -- 89 87 19 100 %   06/17/19 0350 125/86 -- -- 81 80 17 100 %   06/17/19 0335 93/61 -- -- 94 91 19 100 %   06/17/19 0320 107/65 -- -- 94 96 20 100 %   06/17/19 0305 101/72 98.6  F (37  C) Oral 95 98 22 97 %   06/17/19 0250 118/69 -- -- -- -- -- --   06/17/19 0025 114/69 -- -- -- -- -- 100 %   06/17/19 0000 117/68 98.2  F (36.8  C) Oral -- -- 18 98 %   06/16/19 2327 131/75 -- -- -- -- 18 100 %     Gen:  Resting comfortably, NAD  CV:  Regular rate  Pulm:  Unlabored breathing  Abd:  Soft, appropriately ttp, non-distended. Fundus firm below umbilicus, appropriately tender  Incision: c/d/i with bandage in place  Ext:  non-tender, 2+ LE edema b/l    I/O last 3 completed shifts:  In: 1100 [P.O.:600; I.V.:500]  Out: 4748 [Urine:3500; Blood:1248]    Hgb:   Hemoglobin   Date Value Ref Range Status   06/17/2019 9.9 (L) 11.7 - 15.7 g/dL Final     Hemoglobin   Date Value Ref  Range Status   2019 8.8 (L) 11.7 - 15.7 g/dL Final       Assessment/Plan:  29 year old  on POD #1 s/p PLTCS for category II fetal heart tracing remote from delivery.  Pregnancy complicated by induction of labor for non-reassuring fetal heart tracing and decreased fetal movement.  Doing well post-operatively.     -Routine postpartum management  Pain: Well-controlled with ibuprofen and tylenol, PRN oxycodone  Hgb: 11.1>QBL 1148> 9.9, acute   blood loss anemia, asymptomatic, home on PO iron  GI:  Scheduled bowel regimen ordered.   :  S/p kaur. Voiding   Rh: Positive, no rhogam ordered  Rubella: immune  Feed: breast  BC: Undecided but discussed all options and is considering POPs, has just used condoms in the past    Dispo: Anticipate discharge home POD#2-3    Amy Schumer, MD  Ob/Gyn, PGY-3  19 6:39 AM    Physician Attestation   I, Dilcia Travis, personally examined and evaluated this patient.  I discussed the patient with the medical student and/or resident and care team, and agree with the assessment and plan of care as documented in the note of today.     I personally reviewed vital signs, medications and imaging.    Key findings: patient is meeting POD #1 goals.  Due to language barrier patient was seen with Croatian .   All of the patients questions were answered to her apparent satisfaction    Dilcia Travis MD  Date of Service (when I saw the patient): 19

## 2019-06-19 VITALS
OXYGEN SATURATION: 100 % | TEMPERATURE: 98.9 F | RESPIRATION RATE: 18 BRPM | SYSTOLIC BLOOD PRESSURE: 126 MMHG | HEART RATE: 105 BPM | DIASTOLIC BLOOD PRESSURE: 78 MMHG

## 2019-06-19 PROCEDURE — 25000132 ZZH RX MED GY IP 250 OP 250 PS 637: Performed by: STUDENT IN AN ORGANIZED HEALTH CARE EDUCATION/TRAINING PROGRAM

## 2019-06-19 RX ORDER — IBUPROFEN 600 MG/1
600 TABLET, FILM COATED ORAL EVERY 6 HOURS PRN
Qty: 30 TABLET | Refills: 1 | Status: SHIPPED | OUTPATIENT
Start: 2019-06-19 | End: 2019-08-14

## 2019-06-19 RX ORDER — OXYCODONE HYDROCHLORIDE 5 MG/1
5 TABLET ORAL EVERY 6 HOURS PRN
Qty: 6 TABLET | Refills: 0 | Status: SHIPPED | OUTPATIENT
Start: 2019-06-19 | End: 2019-08-15

## 2019-06-19 RX ORDER — ACETAMINOPHEN AND CODEINE PHOSPHATE 120; 12 MG/5ML; MG/5ML
0.35 SOLUTION ORAL DAILY
Qty: 90 TABLET | Refills: 3 | Status: SHIPPED | OUTPATIENT
Start: 2019-06-19 | End: 2020-05-06

## 2019-06-19 RX ORDER — ACETAMINOPHEN 325 MG/1
325-650 TABLET ORAL EVERY 6 HOURS PRN
Qty: 30 TABLET | Refills: 1 | Status: SHIPPED | OUTPATIENT
Start: 2019-06-19 | End: 2019-08-14

## 2019-06-19 RX ORDER — AMOXICILLIN 250 MG
1 CAPSULE ORAL DAILY
Qty: 30 TABLET | Refills: 0 | Status: SHIPPED | OUTPATIENT
Start: 2019-06-19 | End: 2019-08-14

## 2019-06-19 RX ADMIN — SIMETHICONE CHEW TAB 80 MG 80 MG: 80 TABLET ORAL at 01:22

## 2019-06-19 RX ADMIN — ACETAMINOPHEN 975 MG: 325 TABLET, FILM COATED ORAL at 07:04

## 2019-06-19 RX ADMIN — ACETAMINOPHEN 650 MG: 325 TABLET, FILM COATED ORAL at 13:24

## 2019-06-19 RX ADMIN — SENNOSIDES AND DOCUSATE SODIUM 2 TABLET: 8.6; 5 TABLET ORAL at 07:54

## 2019-06-19 RX ADMIN — IBUPROFEN 800 MG: 800 TABLET ORAL at 07:54

## 2019-06-19 RX ADMIN — IBUPROFEN 800 MG: 800 TABLET ORAL at 01:22

## 2019-06-19 RX ADMIN — OXYCODONE HYDROCHLORIDE 10 MG: 5 TABLET ORAL at 01:22

## 2019-06-19 NOTE — PROGRESS NOTES
[facility]   Post-partum Note    Name:  Hilaria Jansen  MRN: [id number]    S: Patient is doing ok.  Pain is adequately  controlled.  Tolerating regular diet without nausea or vomiting.  Ambulating without dizziness.  Lochia is minimal.  Breastfeeding.  Voiding. Has not yet passed gas.    O:   Patient Vitals for the past 24 hrs:   BP Temp Temp src Pulse Heart Rate Resp SpO2   19 2347 124/77 99  F (37.2  C) Oral 110 -- 18 --   19 1940 126/82 98.1  F (36.7  C) Oral -- 112 18 --   19 1409 117/69 98.1  F (36.7  C) Oral 107 -- 16 --   19 0723 117/71 98.4  F (36.9  C) Oral 107 -- 18 100 %     Gen:  Resting comfortably, NAD  CV:  Regular rate  Pulm:  Unlabored breathing  Abd:  Soft, appropriately ttp, non-distended. Fundus firm below umbilicus, appropriately tender  Incision: c/d/i with bandage in place  Ext:  non-tender, 2+ LE edema b/l    No intake/output data recorded.    Hgb:   Hemoglobin   Date Value Ref Range Status   2019 8.8 (L) 11.7 - 15.7 g/dL Final     Assessment/Plan:  29 year old  on POD #2 s/p PLTCS for category II fetal heart tracing remote from delivery.  Pregnancy complicated by induction of labor for non-reassuring fetal heart tracing and decreased fetal movement.  Doing well post-operatively.     -Routine postpartum management  Pain: Well-controlled with ibuprofen and tylenol, PRN oxycodone  Hgb: 11.1>QBL 1148> 9.9>8.8, acute blood loss anemia, asymptomatic, home on PO iron  GI:  Scheduled bowel regimen ordered.   :  S/p kaur. Voiding   Rh: Positive, no rhogam ordered  Rubella: immune  Feed: breast  BC: Desires POPs    Dispo: Anticipate discharge home POD#2    Amy Schumer, MD  Ob/Gyn, PGY-3  19 6:25 AM    I, Nora Vera MD, personally saw and evaluated this patient.  I discussed the patient with the resident and care team, and agree with the assessment and plan of care as documented in the resident's note of 19.  I  personally reviewed vital signs, medications, lab, and exam.     Key Findings:  Meeting goals for discharge.  Abdomen non-tender, fundus firm.  Incision healing.     PLAN:  Discharge home.  RTC 6 weeks.  See medication list.      Nora Vera MD   Date of Service (when I saw the patient) 06/19/19   '

## 2019-06-19 NOTE — LACTATION NOTE
This note was copied from a baby's chart.  Consult for: First time mom breastfeeding, early term delivery & using nipple shield.     History: C/S @ 37w6d, AGA infant @ 7# 7.9 oz. birthweight, 5% loss at 24 & -7.9% @ 48 hours with high intermediate risk serum bilirubin x2 checks.  Maternal history of postpartum hemorrhage with 1248 mL QBL, GERD, adnexal mass.     Breast exam of mom: Soft, symmetrical, pendulous with intact, everted nipples bilaterally. Hilaria reports early tenderness & bilateral breast growth during pregnancy.      Oral exam of baby:  Normal arch to palate, excellent tongue extension and lift seen today. Occasional thrusting tongue forward when he rests between sucking but organized sucking on finger most of the time.     Feeding assessment:  Demo for mom how to latch deep without shield but dimpling in on cheek and difficulty maintain seal. Changed to 16 mm nipple shield, taught mom how to invert slightly and apply well onto breast then latch him fully onto shield with cheeks and nose rest on breast while feeding. Infant with organized & frequent nutritive suck, intermittent swallows that mom could also hear.  Mom using breast compressions to help keep him interested and actively sucking. Milk full in shield both times he came off. Hilaria return demo on how to place nipple shield and latch him well onto it on her own.   Education provided: Discussed possible feeding challenges with early term baby & ways to help bring in full milk supply, positioning & using pillows/blankets for support, anatomy of breast and infant mouth for feedings, ways to get and maintain deep latch, breast compressions during feedings to enhance milk transfer, point out nutritive suck and how to hear swallows, benefits of skin to skin and feeding on cue, benefits of and how to do breast massage and hand expression, how to tell when satiated and if getting enough, supply and demand, ways to comfort newborns with 5 S's, what to expect  in the coming days and preventing engorgement. Reviewed breastfeeding log and resource list adding in [email or web address] and additional community resources.     Plan: Frequent skin to skin, breastfeed on cue with goal of 8 to 12 feedings in 24 hours, watch for early cues. Hand express after each feeding until milk is in and hands on pumping at least 4-6 times in 24 hours to help bring in full milk supply. Follow up with outpatient lactation visit within a week of discharge for support with early term infant, checking in on milk transfer and guidance on weaning from pumping after supply established. Parents plan follow up with [facility], will call to set up lactation appt. With Юлия Beltran NP for one day next week.

## 2019-06-27 ENCOUNTER — OFFICE VISIT (OUTPATIENT)
Dept: MIDWIFE SERVICES | Facility: CLINIC | Age: 29
End: 2019-06-27
Payer: COMMERCIAL

## 2019-06-27 VITALS
SYSTOLIC BLOOD PRESSURE: 135 MMHG | HEART RATE: 89 BPM | BODY MASS INDEX: 27.96 KG/M2 | TEMPERATURE: 99.6 F | DIASTOLIC BLOOD PRESSURE: 85 MMHG | WEIGHT: 168 LBS

## 2019-06-27 DIAGNOSIS — R30.0 DYSURIA: Primary | ICD-10-CM

## 2019-06-27 DIAGNOSIS — N30.00 ACUTE CYSTITIS WITHOUT HEMATURIA: ICD-10-CM

## 2019-06-27 PROBLEM — Z23 NEED FOR TDAP VACCINATION: Status: RESOLVED | Noted: 2018-11-28 | Resolved: 2019-06-27

## 2019-06-27 LAB
ALBUMIN UR-MCNC: NEGATIVE MG/DL
APPEARANCE UR: CLEAR
BACTERIA #/AREA URNS HPF: ABNORMAL /HPF
BILIRUB UR QL STRIP: NEGATIVE
COLOR UR AUTO: YELLOW
GLUCOSE UR STRIP-MCNC: NEGATIVE MG/DL
HGB UR QL STRIP: ABNORMAL
KETONES UR STRIP-MCNC: NEGATIVE MG/DL
LEUKOCYTE ESTERASE UR QL STRIP: ABNORMAL
NITRATE UR QL: NEGATIVE
NON-SQ EPI CELLS #/AREA URNS LPF: ABNORMAL /LPF
PH UR STRIP: 6 PH (ref 5–7)
RBC #/AREA URNS AUTO: ABNORMAL /HPF
SOURCE: ABNORMAL
SP GR UR STRIP: 1.01 (ref 1–1.03)
UROBILINOGEN UR STRIP-ACNC: 0.2 EU/DL (ref 0.2–1)
WBC #/AREA URNS AUTO: ABNORMAL /HPF

## 2019-06-27 PROCEDURE — 81001 URINALYSIS AUTO W/SCOPE: CPT | Performed by: ADVANCED PRACTICE MIDWIFE

## 2019-06-27 PROCEDURE — 87086 URINE CULTURE/COLONY COUNT: CPT | Performed by: ADVANCED PRACTICE MIDWIFE

## 2019-06-27 PROCEDURE — 99024 POSTOP FOLLOW-UP VISIT: CPT | Performed by: ADVANCED PRACTICE MIDWIFE

## 2019-06-27 RX ORDER — NITROFURANTOIN 25; 75 MG/1; MG/1
100 CAPSULE ORAL 2 TIMES DAILY
Qty: 14 CAPSULE | Refills: 0 | Status: SHIPPED | OUTPATIENT
Start: 2019-06-27 | End: 2019-08-14

## 2019-06-27 NOTE — PROGRESS NOTES
SUBJECTIVE:    Hilaria Jansen is a 29 year old female who presents today with 3 day(s) of dysuria.   She is s/p  delivery 1 week ago. She is currently breastfeeding. She denies fever or flank pain. States that she had a UTI recently but stopped treatement r/t being allergic to keflex.   UTI HX: rare.  ADDITIONAL SX: none - unable to distiguish lower abdominal pain from surgical site pain. Vaginal bleeding from childbirth so unable to distinguish hematuria.   REVIEW OF SYMPTOMS:   Constitutional, HEENT, cardiovascular, pulmonary, gi and gu systems are negative, except as otherwise noted.    OBJECTIVE:   EXAM  /85   Pulse 89   Temp 99.6  F (37.6  C) (Oral)   Wt 76.2 kg (168 lb)   LMP 2018   BMI 27.96 kg/m    Patient appears well, afebrile.   Physical exam shows no flank tenderness or s/s of pyelonephritis.   Urine dip shows blood, microscopic exam: 5-10 WBC's per HPF.  And moderate bacteria  ASSESSMENT: uncomplicated UTI in the setting of early postpartum period.     PLAN: Urine was sent for culture.   Push fluids frequently.   Medications per orders in [employer]. SE reviewed.  RTC with continued symptoms or concerns.  Allergic to keflex. Rx given for Macrobid. A small risk for hemolytic anemia in the , but given patient allergies for other antibiotics, it is the best choice.  Call if fever or flank pain.       Reba Rodriguez CNM

## 2019-06-28 ENCOUNTER — OFFICE VISIT (OUTPATIENT)
Dept: FAMILY MEDICINE | Facility: CLINIC | Age: 29
End: 2019-06-28
Payer: COMMERCIAL

## 2019-06-28 LAB
BACTERIA SPEC CULT: NORMAL
SPECIMEN SOURCE: NORMAL

## 2019-06-28 PROCEDURE — 99215 OFFICE O/P EST HI 40 MIN: CPT | Performed by: NURSE PRACTITIONER

## 2019-06-28 NOTE — LETTER
[address]  [address]  [facility] [address]  Phone: [phone]  Fax: [phone]    2019        Hilaria Jansen  [address]  [facility] [id number]          To whom it may concern:    RE: Hilaria Dangelome    The patient listed above, Hilaria, has given birth on 19.  She had a emergency  section and is medically recovering from this abdominal surgery, in addition to caring for her .  Her  was born before 38 weeks gestation and is having feeding difficulty.  Hilaria and her  baby would medically benefit from having her partner's mother here helping with surgery recovery and infant feeding.    The patient's baby's name is Leonard Burden.  The partner's mother's name is Angela Armando.    Please contact me for questions or concerns.      Sincerely,        LEONA Neal CNP

## 2019-06-28 NOTE — PROGRESS NOTES
Initial Lactation Consultation    Baby:  Leonard Burden         MRN:  [id number]  Mom:  Hilaria Jansen                 MRN:  [id number]       Consultation Date: 2019    HPI  Breastfeeding long-term goals: more than 1 year   Breastfeeding story ((how did nursing go right after birth, how is it going now, main concerns, etc):  Nipple pains on both side, low milk supply.    Nursing about 2-3 times per day.  Nursing on both side(s).  Nursing sessions last about 5-7 minutes per side.    Nipple pain: Yes, both sides. Usually does not like to nurse, will take bottle from pumping instead.     PUMPING: Other: Medela  # times per day:  About 2 times a day    SUPPLEMENTATION: expressed milk bottles 1-2 oz 6 times a day    Baby's OUTPUT:   STOOLS:  2-3 per day with yellow seeding appearance  URINE OUTPUT:  Diapers are described as wet     MOTHER      Breastfeeding History  NoN/A    Medical History  Patient Active Problem List   Diagnosis     Adnexal mass     Abnormal CT scan, neck     Gastroesophageal reflux disease without esophagitis     Sinusitis     Sickle cell trait (H)     Supervision of other normal pregnancy, antepartum     Dry eyes, bilateral     Allergic conjunctivitis of both eyes     Indication for care in labor or delivery     Encounter for triage in pregnant patient     Vaginal discharge     Decreased fetal movement     Pregnancy, supervision, high-risk     S/P          Pregnancy History (any complications in this pregnancy)  none    Delivery History  Primary  for  Fetal Distress    Labor Meds/Anesthesia  Epidural    Current Medications  Current Outpatient Medications   Medication     acetaminophen (TYLENOL) 325 MG tablet     acetaminophen (TYLENOL) 325 MG tablet     benzoyl peroxide 5 % external liquid     clindamycin (CLEOCIN T) 1 % external lotion     ferrous gluconate (FERGON) 324 (38 Fe) MG tablet     ibuprofen (ADVIL/MOTRIN) 600 MG tablet     ibuprofen (ADVIL/MOTRIN) 600 MG tablet  "    nitroFURantoin macrocrystal-monohydrate (MACROBID) 100 MG capsule     norethindrone (MICRONOR) 0.35 MG tablet     omeprazole (PRILOSEC) 40 MG DR capsule     order for DME     order for DME     Prenatal Vit-Fe Fumarate-FA (PRENATAL MULTIVITAMIN W/IRON) 27-0.8 MG tablet     senna-docusate (SENOKOT-S/PERICOLACE) 8.6-50 MG tablet     senna-docusate (SENOKOT-S/PERICOLACE) 8.6-50 MG tablet     No current facility-administered medications for this visit.          Herbals:  None    ASSESSMENT OF MOTHER    Physical:   Breast appearance  Breast Size: extra large  Nipple Appearance - Left: intact  Nipple Appearance - Right: intact  Nipple Erectility - Left: flat  Nipple Erectility - Right: flat  Areolas Compressibility: soft  Nipple Size: average  Milk Supply: mature      BABY       Name: Leonard Burden   YOB: 2019   Age: 11 day old   Gestational Age: born 37 w 5 d    Doctor: No Ref-Primary, Physician       BABY'S WEIGHT HISTORY  Last interval weight:  2 oz.in 4 days.  Birth Weight: 7 lb 8 oz  Discharge Weight: 6 lb. 14.4 oz   Wt Readings from Last 5 Encounters:   06/28/19 3.246 kg (7 lb 2.5 oz) (16 %)*   06/24/19 3.189 kg (7 lb 0.5 oz) (20 %)*   06/19/19 3.13 kg (6 lb 14.4 oz) (27 %)*     * Growth percentiles are based on WHO (Boys, 0-2 years) data.     Percentage wt. change from birth:       Since discharge from hospital, baby has a gain of 4 oz.in 11 days.  Note: Normal weight gain is 1/2 to 1 oz/day in the first 6 months of life.    ASSESSMENT OF BABY    Physical:   Temp 97.8  F (36.6  C) (Axillary)   Ht 0.508 m (1' 8\")   Wt 3.246 kg (7 lb 2.5 oz)   HC 35.6 cm (14\")   BMI 12.58 kg/m      GENERAL: Alert, vigorous, is in no acute distress.  SKIN: right upper thigh has irregularly shaped hyperpigmented macule with adan of long, dark hair covering macule  HEAD: The head is normocephalic. The fontanels and sutures are normal  EYES: The eyes are normal. The conjunctivae and cornea normal.   NOSE: Clear, no " discharge or congestion  MOUTH: The mouth is clear.  NECK: The neck is supple and thyroid is normal, no masses  LYMPH NODES: No adenopathy  LUNGS: The lung fields are clear to auscultation,no rales, rhonchi, wheezing or retractions  HEART: The precordium is quiet. Rhythm is regular. S1 and S2 are normal. No murmurs.   ABDOMEN: The umbilicus is normal. The bowel sounds are normal. Abdomen soft, non tender,  non distended, no masses or hepatosplenomegaly.  NEUROLOGIC: Normal tone throughout.       FEEDING ASSESSMENT    Initial position and latch strategy observed: assisted from start due to infant having just eaten  Effort to Latch: gentle stimulation needed for infant to latch  Duration of Breast Feeding: Right Breast: 0; Left Breast: 10  Nipple pain:  Mild at start of nursing  Weight gain at breast:  1 oz    A latch was observed today.    Latch:  1 - Repeated Attempts  Audible Swallowin - Spontaneous & frequent  Type of Nipple:  1 - Flat  Comfort+: 2 - Soft, Nontender  Hold:  0 - Full Assist  Suckin - Short fast bursts,  2 or more sucks per second  TOTAL LATCHES SCORE:  7     INTERVENTIONS/EVALUATION:  Cross Cradle, Asymmetric Latch, Flange lips, Breast Compression and Other: paced feeding, pumping      SUMMARY  1.  Infant weight gain - slow, but going up  2.  Maternal supply - adequate, threatened by infant low transfer  3.  Maternal health - recovery from ; dad has not leave and is working, letter written for visa for MIL to assist; RTW at 6 weeks  4.  Latch - full assistance - mom didn't have a chance to practice because infant was not hungry.  Able to get infant into good position and hold breast.  5.  Milk transfer - 1 oz today on one side is reassuring.  However, this was an unusually good latch and session.  Mom will still need to pump and infant will still need some expressed milk in addition to milk from breast    RECOMMENDATIONS  Patient Instructions   For the next week  Nurse Leonard  "then pump, then give bottle - 10-12 times a day  If you need to drop something at times, drop the nursing but continue to pump  Return next Wednesday    The pillow you used today is call \"MyBrestFriend\"    Positioning and latch  Goal is to have a deep latch with areola in the baby's mouth instead of just nipple and to have baby pulling tongue along breast to get milk instead of \"chomping\" or sucking shallowly    Here is one way to achieve that:  1. Sit back with feet up and shoulders relaxed - you'll be bringing baby to you instead of your breast to baby  2. Bring baby snug up to you (skin to skin is best!) with baby's tummy to your tummy and with baby's ear, shoulder and hip aligned  3.  The baby's nose (not mouth) should be aligned with your nipple  4.  Hold breast behind areola in a \"U shape\" to help mold the breast tissue and make it easy for baby to latch  5.  Hand on neck/bottom of head and baby's chin on the breast  6.  Wait for a big open mouth and \"pop\" baby onto breast      PLAN  1. Breastfeed every 1-3 hours-as often as baby wants in the daytime and up to 4 hour stretch between feedings at night. Use breast compression during feedings to help maximize milk flow           Positioning and latch   Goal is to have a deep latch with areola in the baby's mouth instead of just nipple and to have baby pulling tongue along  breast to get milk instead of \"chomping\" or sucking  Shallowly     Here is one way to achieve that:    - Sit back with feet up and shoulders relaxed - you'll be bringing baby to you instead of your breast to baby    - Bring baby snug up to you (skin to skin is best!) with baby's tummy to your tummy and with baby's ear, shoulder and hip      aligned    - The baby's nose (not mouth) should be aligned with your nipple    - Hold breast behind areola in a \"U shape\" to help mold the breast tissue and make it easy for baby to latch    - Hand on neck/bottom of head and baby's chin on the breast    - " "Wait for a big open mouth and \"pop\" baby onto breast    2. Pump after breastfeeding    -for 10-15 minutes or two let-downs   -at least 6 times in 24 hrs     3.  Give Leonard expressed milk after nursing.  Start with 1/2- 1 oz (15-30 ml).  Ideally he gets this with finger feeding, but paced bottle feeding is ok as well   Paced feeding videos   https://www.youtube.com/watch?v=XW9km94EfmS    AND    https://www.breastfeeding-problems.com/paced-bottle-feeding.html    PUMPING TIPS (for supply/supplementation)  -Doing \"mini pumps\" for a few minutes every 1 hr or so in between feedings without washing pump parts or putting milk in fridge-cover pump set with towel during this time     -Wash pump parts every 24 hours and store parts in the fridge between pumpings (often need to put milk in separate bottle for storage)  -Pump right before you go to bed and again right after the first nursing in the am.   -Breast massage and hand expression for 1-2 minutes before, during and after pumping completed to maximize milk production.   -\"Hands on \" pumping - breast massage and hand expression before, during and after pumping to help breast stimulation-see website    Http://[email or web address]/Breastfeeding/[email or web address] - \"Hands on Pumping\"   Hand Expression-  Http://[email or web address]/Breastfeeding/HandExpression.html - hand expression  -Hands free pumping allows you to do hands on pumping and care for your infant while pumping.  It also helps hold on the flanges for better body positioning.  You can make a \"hands free\" pumping bra by using an old bra and cutting out holes for the pump flanges to fit in. You can also use a tube top and make a slit or cut out holes for the pump flanges.  I also like the \"Pump Ease brand hands-free bra that you can find on Amazon or similar \"hook and eye\" type bandeau bra.       BONUS galactogogues (will not have as robust response so should not be used alone if need to increase supply)  1. " Mother's Milk tea- 3 times/day   2. Omego 3 supplements if not in prenatal vitamins-for mother - -300mg daily  3. Oatmeal for mother-helps to increase milk supply- oatmeal cookies too!         Follow up: 5 days    75 minutes time spent face-to-face, 40 with mother and 35 with baby, with over 50% spent in counseling/coordination of care regarding breastfeeding goals, latch, nipple care, weight gain expectations, and pumping.     CHARI Elaine

## 2019-07-03 ENCOUNTER — OFFICE VISIT (OUTPATIENT)
Dept: FAMILY MEDICINE | Facility: CLINIC | Age: 29
End: 2019-07-03
Payer: COMMERCIAL

## 2019-07-03 DIAGNOSIS — O92.29 SORE NIPPLES DUE TO LACTATION: Primary | ICD-10-CM

## 2019-07-03 PROCEDURE — 99214 OFFICE O/P EST MOD 30 MIN: CPT | Performed by: NURSE PRACTITIONER

## 2019-07-11 ENCOUNTER — OFFICE VISIT (OUTPATIENT)
Dept: FAMILY MEDICINE | Facility: CLINIC | Age: 29
End: 2019-07-11
Payer: COMMERCIAL

## 2019-07-11 DIAGNOSIS — O92.29 SORE NIPPLES DUE TO LACTATION: Primary | ICD-10-CM

## 2019-07-11 PROCEDURE — 99214 OFFICE O/P EST MOD 30 MIN: CPT | Performed by: NURSE PRACTITIONER

## 2019-07-11 NOTE — PROGRESS NOTES
"Follow-up Lactation Consultation    Baby:  Leonard Burden         MRN:  [id number]  Mom:  Hilaria Jansen    Accompanied by     Consultation Date: 7/3/2019    HPI  Update since last visit:  Much better but still not producing enough milk.     Nursing about 6+ times per day/every 2-3 hours.  Nursing on left side because the right side doesn't have much milk and he doesn't like the right side  Nursing sessions last 5-7 minutes per side.    Nipple pain: left side     PUMPING: Other: Medela   # times per day:  About 1 time a day   Dual or single pumping:  Dual     Amount pumped per pumping session:  About 3-5 oz. Per day    SUPPLEMENTATION  Bottle after nursing every time - 1-2 oz    Baby's OUTPUT:   A few stooled diapers with yellow seeding appearance    MOTHER    Herbals:  None    ASSESSMENT OF MOTHER    Physical:   Breast appearance  Breast Size: large  Nipple Appearance - Left: intact  Nipple Appearance - Right: intact  Nipple Erectility - Left: flat  Nipple Erectility - Right: flat  Areolas Compressibility: soft  Nipple Size: average  Milk Supply: mature      BABY       Name: Leonard Burden   YOB: 2019   Age: 2 week old    Doctor: Dr. Shine     BABY'S WEIGHT HISTORY  Last interval weight:  0 oz.in 1 days.    Wt Readings from Last 5 Encounters:   07/03/19 3.402 kg (7 lb 8 oz) (15 %)*   07/02/19 3.402 kg (7 lb 8 oz) (17 %)*   06/28/19 3.246 kg (7 lb 2.5 oz) (16 %)*   06/24/19 3.189 kg (7 lb 0.5 oz) (20 %)*   06/19/19 3.13 kg (6 lb 14.4 oz) (27 %)*     * Growth percentiles are based on WHO (Boys, 0-2 years) data.         ASSESSMENT OF BABY    Physical:   Temp 97.8  F (36.6  C) (Axillary)   Ht 0.508 m (1' 8\")   Wt 3.402 kg (7 lb 8 oz)   HC 35.6 cm (14\")   BMI 13.18 kg/m      GENERAL: Alert, vigorous, is in no acute distress.  SKIN: skin is clear, no rash or abnormal pigmentation  HEAD: The head is normocephalic. The fontanels and sutures are normal  EYES: The eyes are normal. The conjunctivae and " cornea normal.   NOSE: Clear, no discharge or congestion  MOUTH: The mouth is clear.  NECK: The neck is supple and thyroid is normal, no masses  LYMPH NODES: No adenopathy  LUNGS: The lung fields are clear to auscultation,no rales, rhonchi, wheezing or retractions  HEART: The precordium is quiet. Rhythm is regular. S1 and S2 are normal. No murmurs. The femoral pulses are normal.  ABDOMEN: The umbilicus is normal. The bowel sounds are normal. Abdomen soft, non tender,  non distended, no masses or hepatosplenomegaly.  NEUROLOGIC: Normal tone throughout.     Oral Anatomy  Mouth: normal  Palate: normal  Jaw: normal  Tongue: normal  Lingual Frenulum: normal  Lip Frenulum: normal  Digital Suck Exam: root      FEEDING ASSESSMENT    Initial position and latch strategy observed: cross cradle, wide latch  Effort to Latch: gentle stimulation needed for infant to latch  Nipple pain:  none  Weight gain at breast:  50 ml L, 15 ml R for 45 ml total         SUMMARY  Improved latch, but still not adequate transfer for growth and milk supply continues to be threatened.  Additionally infant now has thrush    RECOMMENDATIONS  Patient Instructions   Apply medicine to Leonard's mouth and lip four times a day    Leonard needs 4-10 oz more than he is getting at just the breast    He ate 2 oz between both the left and the right breast  If he ate like this 8 times a day then he would be very close to what he needs  The 3-5 oz you pump would be enough extra    You need to nurse Leonard on both sides each time you nurse him  You can get him on the breast with a wide mouth.  It isn't asymmetric so no as efficient.  But you can help this by doing breast compressions the whole time he eats.    I still recommend pumping after Leonard nurses about 4 times a day - this will help your milk supply stay good    If you nurse him on both breasts, with breast compressions and at least 8 times a day you can give him only 3 oz pumped milk  If you nurse him  less, then increase the amount you give him with bottle    Always pump after Leonard nurses - you will not get as much but this means it is easier for Leonard to nurse    Mix 1 cup vinegar and 3 cups water - use this as a rinse on breasts after each time nursing      Follow up: 1 week    60 minutes time spent face-to-face, 30 with mother and 30 with baby, with over 50% spent in counseling/coordination of care regarding breastfeeding goals, latch, nipple care, weight gain expectations, and pumping.     CHARI Elaine

## 2019-07-25 NOTE — PROGRESS NOTES
"Follow-up Lactation Consultation    Baby:  Leonard Burden         MRN:  [id number]  Mom:  Hilaria Jansen    Consultation Date: 7/11/2019    HPI  Update since last visit:  Going well.     Nursing about 10 times per day/every 2-3 hours.  Nursing on both side(s).  Nursing sessions last 10-15 minutes per side.    Nipple pain: none    PUMPING: Other: Medelaa   # times per day:  1-2  Dual or single pumping:  dual    Amount pumped per pumping session:  4-5 oz.    SUPPLEMENTATION  Prefers breast to the bottle - not taking much bottle    Baby's OUTPUT:   A few stooled diapers with yellow seeding appearance    MOTHER      Herbals:  None    ASSESSMENT OF MOTHER    Physical:   Breast appearance  Breast Size: large  Nipple Appearance - Left: intact  Nipple Appearance - Right: intact  Nipple Erectility - Left: erect with stimulation  Nipple Erectility - Right: erect with stimulation  Areolas Compressibility: soft  Nipple Size: average  Milk Supply: mature      BABY       Name: Leonard Burden   YOB: 2019   Age: 3 week old       Doctor: Laurent Fletcher MD     BABY'S WEIGHT HISTORY  Last interval weight:  17 oz.in 8 days.    Wt Readings from Last 5 Encounters:   07/11/19 3.884 kg (8 lb 9 oz) (27 %)*   07/03/19 3.402 kg (7 lb 8 oz) (15 %)*   07/02/19 3.402 kg (7 lb 8 oz) (17 %)*   06/28/19 3.246 kg (7 lb 2.5 oz) (16 %)*   06/24/19 3.189 kg (7 lb 0.5 oz) (20 %)*     * Growth percentiles are based on WHO (Boys, 0-2 years) data.         ASSESSMENT OF BABY    Physical:   Temp 97.6  F (36.4  C) (Axillary)   Ht 0.521 m (1' 8.5\")   Wt 3.884 kg (8 lb 9 oz)   HC 36 cm (14.17\")   BMI 14.33 kg/m      GENERAL: Alert, vigorous, is in no acute distress.  SKIN: skin is clear, no rash or abnormal pigmentation  HEAD: The head is normocephalic. The fontanels and sutures are normal  EYES: The eyes are normal. The conjunctivae and cornea normal.   NOSE: Clear, no discharge or congestion  MOUTH: thick white patches on cheeks, tongue and lips  NECK: " The neck is supple and thyroid is normal, no masses  LYMPH NODES: No adenopathy  LUNGS: The lung fields are clear to auscultation,no rales, rhonchi, wheezing or retractions  HEART: The precordium is quiet. Rhythm is regular. S1 and S2 are normal. No murmurs. The femoral pulses are normal.  ABDOMEN: The umbilicus is normal. The bowel sounds are normal. Abdomen soft, non tender,  non distended, no masses or hepatosplenomegaly.  NEUROLOGIC: Normal tone throughout.          INTERVENTIONS/EVALUATION:  Cross Cradle and Breast Compression      SUMMARY  Excellent weight gain in last interval  Continues to have thrush  Mom's sore nipples resolved    RECOMMENDATIONS  Patient Instructions     Amazing job with nursing!  I am so impressed with you and Leonard    Treat his thrush with the medicine.  The pharmacy on the second floor sells the sponges on a stick for 25 cents apiece.  They could be used more than once in the beginning, but as thrush resolves, you'll want to use new ones so you don't re-contaminate.  Insurance will not cover these.    Return for two month well child check    One part of Leonard's  metabolic screen was flagged.  Possible carrier for thalassemia - change in hemoglobin cells.  It will not affect Leonard and he needs no follow-up testing.  Hemoglobinopathies Abnormal    WNL^Within Normal Limits  Final 2019  5:23 AM 51   FA & Barts Low    Comment:   Interpretation: Barts Low can indicate silent carrier or alpha thalassemia   trait status. No further testing is required for hemoglobinopathies.            Follow up: 2 month well child or earlier if thrush not resolved    60 minutes time spent face-to-face, 30 with mother and 30 with baby, with over 50% spent in counseling/coordination of care regarding breastfeeding goals, latch, nipple care, weight gain expectations, and pumping.     CHARI Elaine

## 2019-07-30 ENCOUNTER — PRENATAL OFFICE VISIT (OUTPATIENT)
Dept: OBGYN | Facility: CLINIC | Age: 29
End: 2019-07-30
Payer: COMMERCIAL

## 2019-07-30 VITALS
BODY MASS INDEX: 24.96 KG/M2 | DIASTOLIC BLOOD PRESSURE: 78 MMHG | WEIGHT: 150 LBS | SYSTOLIC BLOOD PRESSURE: 123 MMHG | HEART RATE: 87 BPM

## 2019-07-30 DIAGNOSIS — R10.84 ABDOMINAL PAIN, GENERALIZED: ICD-10-CM

## 2019-07-30 DIAGNOSIS — R30.0 DYSURIA: ICD-10-CM

## 2019-07-30 DIAGNOSIS — K59.09 OTHER CONSTIPATION: ICD-10-CM

## 2019-07-30 LAB
ALBUMIN UR-MCNC: NEGATIVE MG/DL
APPEARANCE UR: CLEAR
BILIRUB UR QL STRIP: NEGATIVE
COLOR UR AUTO: YELLOW
ERYTHROCYTE [DISTWIDTH] IN BLOOD BY AUTOMATED COUNT: 17.7 % (ref 10–15)
GLUCOSE UR STRIP-MCNC: NEGATIVE MG/DL
HCT VFR BLD AUTO: 36.5 % (ref 35–47)
HGB BLD-MCNC: 11.8 G/DL (ref 11.7–15.7)
HGB UR QL STRIP: NEGATIVE
KETONES UR STRIP-MCNC: NEGATIVE MG/DL
LEUKOCYTE ESTERASE UR QL STRIP: NEGATIVE
MCH RBC QN AUTO: 25.2 PG (ref 26.5–33)
MCHC RBC AUTO-ENTMCNC: 32.3 G/DL (ref 31.5–36.5)
MCV RBC AUTO: 78 FL (ref 78–100)
NITRATE UR QL: NEGATIVE
PH UR STRIP: 5.5 PH (ref 5–7)
PLATELET # BLD AUTO: 269 10E9/L (ref 150–450)
RBC # BLD AUTO: 4.69 10E12/L (ref 3.8–5.2)
SOURCE: NORMAL
SP GR UR STRIP: 1.01 (ref 1–1.03)
TSH SERPL DL<=0.005 MIU/L-ACNC: 1.43 MU/L (ref 0.4–4)
UROBILINOGEN UR STRIP-ACNC: 0.2 EU/DL (ref 0.2–1)
WBC # BLD AUTO: 7.1 10E9/L (ref 4–11)

## 2019-07-30 PROCEDURE — 81003 URINALYSIS AUTO W/O SCOPE: CPT | Performed by: OBSTETRICS & GYNECOLOGY

## 2019-07-30 PROCEDURE — 84443 ASSAY THYROID STIM HORMONE: CPT | Performed by: OBSTETRICS & GYNECOLOGY

## 2019-07-30 PROCEDURE — 85027 COMPLETE CBC AUTOMATED: CPT | Performed by: OBSTETRICS & GYNECOLOGY

## 2019-07-30 PROCEDURE — 99207 ZZC POST PARTUM EXAM: CPT | Performed by: OBSTETRICS & GYNECOLOGY

## 2019-07-30 PROCEDURE — 36415 COLL VENOUS BLD VENIPUNCTURE: CPT | Performed by: OBSTETRICS & GYNECOLOGY

## 2019-07-30 RX ORDER — DOCUSATE SODIUM 100 MG/1
100 CAPSULE, LIQUID FILLED ORAL 2 TIMES DAILY
Qty: 100 CAPSULE | Refills: 1 | Status: SHIPPED | OUTPATIENT
Start: 2019-07-30 | End: 2020-05-06

## 2019-07-30 NOTE — PROGRESS NOTES
SUBJECTIVE: Hilaria is here for a 6-week postpartum checkup.    Delivery date was June 17 2019 . She had a c/s for Category II FHT remote from delivery of a viable boy, weight 7 pounds 7 oz..  Since delivery, she has been breast feeding.  She has no signs of infection, bleeding or other complications.  She is not pregnant.  We discussed contraceptions and she has chosen mini pill / progesterone only pill.  She  has not had intercourse since delivery and complains of No discomfort. Patient screened for postpartum depression and complaints are NEGATIVE. Screening has also been completed for intimate partner violence.    EXAM:  Today's Depression Rating was   PHQ-9 SCORE 6/6/2019   PHQ-9 Total Score 2       GENERAL healthy, alert and no distress  HENT: Normocephalic.   BREASTS: Not examined   GI:  Incision is well healed.   Abdomen is soft nontender     ASSESSMENT:   Normal postpartum exam after c/s for abnormal FHT.    PLAN:  Return as needed or at time of next expected pap, pelvic, or breast exam.

## 2019-07-30 NOTE — NURSING NOTE
"Chief Complaint   Patient presents with     Postpartum Care       Initial /78   Pulse 87   Wt 68 kg (150 lb)   LMP 2018   BMI 24.96 kg/m   Estimated body mass index is 24.96 kg/m  as calculated from the following:    Height as of 19: 1.651 m (5' 5\").    Weight as of this encounter: 68 kg (150 lb).  BP completed using cuff size: regular    Questioned patient about current smoking habits.  Pt. has never smoked.          The following HM Due: NONE      The following patient reported/Care Every where data was sent to:  P ABSTRACT QUALITY INITIATIVES [19651]        N/a      Paige Delong MA                "

## 2019-08-13 ENCOUNTER — OFFICE VISIT (OUTPATIENT)
Dept: FAMILY MEDICINE | Facility: CLINIC | Age: 29
End: 2019-08-13
Payer: COMMERCIAL

## 2019-08-13 VITALS
OXYGEN SATURATION: 96 % | TEMPERATURE: 96 F | WEIGHT: 147.5 LBS | HEIGHT: 65 IN | DIASTOLIC BLOOD PRESSURE: 72 MMHG | BODY MASS INDEX: 24.57 KG/M2 | HEART RATE: 52 BPM | SYSTOLIC BLOOD PRESSURE: 116 MMHG

## 2019-08-13 DIAGNOSIS — R07.0 THROAT PAIN: Primary | ICD-10-CM

## 2019-08-13 DIAGNOSIS — Z86.018: ICD-10-CM

## 2019-08-13 LAB
ALBUMIN SERPL-MCNC: 3.9 G/DL (ref 3.4–5)
ALP SERPL-CCNC: 97 U/L (ref 40–150)
ALT SERPL W P-5'-P-CCNC: 17 U/L (ref 0–50)
ANION GAP SERPL CALCULATED.3IONS-SCNC: 8 MMOL/L (ref 3–14)
AST SERPL W P-5'-P-CCNC: 17 U/L (ref 0–45)
BASOPHILS # BLD AUTO: 0 10E9/L (ref 0–0.2)
BASOPHILS NFR BLD AUTO: 0.3 %
BILIRUB SERPL-MCNC: 0.5 MG/DL (ref 0.2–1.3)
BUN SERPL-MCNC: 11 MG/DL (ref 7–30)
CALCIUM SERPL-MCNC: 9.1 MG/DL (ref 8.5–10.1)
CHLORIDE SERPL-SCNC: 111 MMOL/L (ref 94–109)
CO2 SERPL-SCNC: 23 MMOL/L (ref 20–32)
CREAT SERPL-MCNC: 0.76 MG/DL (ref 0.52–1.04)
DEPRECATED S PYO AG THROAT QL EIA: NORMAL
DIFFERENTIAL METHOD BLD: ABNORMAL
EOSINOPHIL # BLD AUTO: 0.1 10E9/L (ref 0–0.7)
EOSINOPHIL NFR BLD AUTO: 1.7 %
ERYTHROCYTE [DISTWIDTH] IN BLOOD BY AUTOMATED COUNT: 17.3 % (ref 10–15)
GFR SERPL CREATININE-BSD FRML MDRD: >90 ML/MIN/{1.73_M2}
GLUCOSE SERPL-MCNC: 74 MG/DL (ref 70–99)
HCT VFR BLD AUTO: 35.5 % (ref 35–47)
HGB BLD-MCNC: 11.6 G/DL (ref 11.7–15.7)
LYMPHOCYTES # BLD AUTO: 2.9 10E9/L (ref 0.8–5.3)
LYMPHOCYTES NFR BLD AUTO: 48.3 %
MCH RBC QN AUTO: 25.3 PG (ref 26.5–33)
MCHC RBC AUTO-ENTMCNC: 32.7 G/DL (ref 31.5–36.5)
MCV RBC AUTO: 78 FL (ref 78–100)
MONOCYTES # BLD AUTO: 0.3 10E9/L (ref 0–1.3)
MONOCYTES NFR BLD AUTO: 5.6 %
NEUTROPHILS # BLD AUTO: 2.7 10E9/L (ref 1.6–8.3)
NEUTROPHILS NFR BLD AUTO: 44.1 %
PLATELET # BLD AUTO: 220 10E9/L (ref 150–450)
POTASSIUM SERPL-SCNC: 3.9 MMOL/L (ref 3.4–5.3)
PROT SERPL-MCNC: 8.2 G/DL (ref 6.8–8.8)
RBC # BLD AUTO: 4.58 10E12/L (ref 3.8–5.2)
SODIUM SERPL-SCNC: 142 MMOL/L (ref 133–144)
SPECIMEN SOURCE: NORMAL
TSH SERPL DL<=0.005 MIU/L-ACNC: 1.39 MU/L (ref 0.4–4)
WBC # BLD AUTO: 6.1 10E9/L (ref 4–11)

## 2019-08-13 PROCEDURE — 87081 CULTURE SCREEN ONLY: CPT | Performed by: PHYSICIAN ASSISTANT

## 2019-08-13 PROCEDURE — 84443 ASSAY THYROID STIM HORMONE: CPT | Performed by: PHYSICIAN ASSISTANT

## 2019-08-13 PROCEDURE — 99214 OFFICE O/P EST MOD 30 MIN: CPT | Performed by: PHYSICIAN ASSISTANT

## 2019-08-13 PROCEDURE — 85025 COMPLETE CBC W/AUTO DIFF WBC: CPT | Performed by: PHYSICIAN ASSISTANT

## 2019-08-13 PROCEDURE — 80053 COMPREHEN METABOLIC PANEL: CPT | Performed by: PHYSICIAN ASSISTANT

## 2019-08-13 PROCEDURE — 87880 STREP A ASSAY W/OPTIC: CPT | Performed by: PHYSICIAN ASSISTANT

## 2019-08-13 PROCEDURE — 36415 COLL VENOUS BLD VENIPUNCTURE: CPT | Performed by: PHYSICIAN ASSISTANT

## 2019-08-13 ASSESSMENT — MIFFLIN-ST. JEOR: SCORE: 1394.94

## 2019-08-13 NOTE — PROGRESS NOTES
Hilaria Jansen is a 29 year old female who presents to clinic today for the following health issues:    Acute Illness   Acute illness concerns: throat pain  Onset: 2-3 weeks ago    Fever: YES    Chills/Sweats: YES    Headache (location?): YES    Sinus Pressure:YES    Conjunctivitis:  no    Ear Pain: YES- pressure     Rhinorrhea: YES    Congestion: YES    Sore Throat: YES     Cough: no    Wheeze: no    Decreased Appetite: YES    Nausea: no    Vomiting: no    Diarrhea:  no    Dysuria/Freq.: YES    Fatigue/Achiness: YES    Sick/Strep Exposure: no     Therapies Tried and outcome: mouth spray    -HPI given with the help of an      Has sore throat with fever over past 2-3 weeks; no improvement. Did not read temperature. Reports sore throat pain as 6-7/10.   Throat has burning sensation. 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 She cannot tell if this is the same problem.   Confirms fatigue, constipation (denies worsening since pregnancy).   Confirmed previous heartburn when pregnant. Had gastroesophageal disease w/out gastritis.   Denies night sweating, hot/cold intolerance. Denies past medical history of thyroid problems.        Problem list and histories reviewed & adjusted, as indicated.  Additional history: as documented    ROS:  CONSTITUTIONAL: NEGATIVE for fever, chills, change in weight  INTEGUMENTARY/SKIN: NEGATIVE for worrisome rashes, moles or lesions  EYES: NEGATIVE for vision changes or irritation  ENT/MOUTH: NEGATIVE for ear, mouth problems, POSITIVE sore throat  RESP: NEGATIVE for significant cough or SOB  BREAST: NEGATIVE for masses, tenderness or discharge  CV: NEGATIVE for chest pain, palpitations or peripheral edema  GI: NEGATIVE for nausea, abdominal pain, heartburn, or change in bowel habits  : NEGATIVE for frequency, dysuria, or hematuria  MUSCULOSKELETAL: NEGATIVE for significant arthralgias or myalgia  NEURO: NEGATIVE for weakness, dizziness or  paresthesias  ENDOCRINE: NEGATIVE for temperature intolerance, skin/hair changes  HEME: NEGATIVE for bleeding problems  PSYCHIATRIC: NEGATIVE for changes in mood or affect    This document serves as a record of the services and decisions personally performed and made by Mary Timmons PA-C. It was created on her behalf by Parag Katz and Nhung Wallace, trained medical scribes. The creation of this document is based on the provider's statements to the medical scribes.  Parag Katz 10:19 AM 2019      Patient Active Problem List   Diagnosis     Adnexal mass     Abnormal CT scan, neck     Gastroesophageal reflux disease without esophagitis     Sinusitis     Sickle cell trait (H)     Supervision of other normal pregnancy, antepartum     Dry eyes, bilateral     Allergic conjunctivitis of both eyes     Indication for care in labor or delivery     Encounter for triage in pregnant patient     Vaginal discharge     Decreased fetal movement     Pregnancy, supervision, high-risk     S/P      Past Surgical History:   Procedure Laterality Date      SECTION N/A 2019    Procedure:  SECTION;  Surgeon: Wendi Coppola MD;  Location: [facility]     SURGICAL PATHOLOGY EXAM Left 09/10/2015    left paratracheal neck mass removed, benign       Social History     Tobacco Use     Smoking status: Never Smoker     Smokeless tobacco: Never Used   Substance Use Topics     Alcohol use: No     Family History   Problem Relation Age of Onset     Breast Cancer Paternal Aunt      Other - See Comments Maternal Aunt      Melanoma No family hx of      Skin Cancer No family hx of      Glaucoma No family hx of      Macular Degeneration No family hx of            Labs reviewed in EPIC  Patient Active Problem List   Diagnosis     Adnexal mass     Abnormal CT scan, neck     Gastroesophageal reflux disease without esophagitis     Sinusitis     Sickle cell trait (H)     Supervision of other normal pregnancy,  antepartum     Dry eyes, bilateral     Allergic conjunctivitis of both eyes     Indication for care in labor or delivery     Encounter for triage in pregnant patient     Vaginal discharge     Decreased fetal movement     Pregnancy, supervision, high-risk     S/P      Past Surgical History:   Procedure Laterality Date      SECTION N/A 2019    Procedure:  SECTION;  Surgeon: Wendi Coppola MD;  Location: [facility]     SURGICAL PATHOLOGY EXAM Left 09/10/2015    left paratracheal neck mass removed, benign       Social History     Tobacco Use     Smoking status: Never Smoker     Smokeless tobacco: Never Used   Substance Use Topics     Alcohol use: No     Family History   Problem Relation Age of Onset     Breast Cancer Paternal Aunt      Other - See Comments Maternal Aunt      Melanoma No family hx of      Skin Cancer No family hx of      Glaucoma No family hx of      Macular Degeneration No family hx of          Current Outpatient Medications   Medication Sig Dispense Refill     acetaminophen (TYLENOL) 325 MG tablet Take 1-2 tablets (325-650 mg) by mouth every 6 hours as needed for mild pain 30 tablet 1     acetaminophen (TYLENOL) 325 MG tablet Take 2 tablets (650 mg) by mouth every 6 hours as needed for mild pain Start after Delivery. 100 tablet 0     benzoyl peroxide 5 % external liquid Use daily as directed 226 g 3     clindamycin (CLEOCIN T) 1 % external lotion Apply topically 2 times daily 60 mL 3     docusate sodium (COLACE) 100 MG capsule Take 1 capsule (100 mg) by mouth 2 times daily 100 capsule 1     ferrous gluconate (FERGON) 324 (38 Fe) MG tablet Take 1 tablet (324 mg) by mouth daily (with breakfast) 90 tablet 0     ibuprofen (ADVIL/MOTRIN) 600 MG tablet Take 1 tablet (600 mg) by mouth every 6 hours as needed for moderate pain 30 tablet 1     ibuprofen (ADVIL/MOTRIN) 600 MG tablet Take 1 tablet (600 mg) by mouth every 6 hours as needed for moderate pain Start after delivery  "60 tablet 0     nitroFURantoin macrocrystal-monohydrate (MACROBID) 100 MG capsule Take 1 capsule (100 mg) by mouth 2 times daily 14 capsule 0     norethindrone (MICRONOR) 0.35 MG tablet Take 1 tablet (0.35 mg) by mouth daily 90 tablet 3     omeprazole (PRILOSEC) 40 MG DR capsule Take 1 capsule (40 mg) by mouth daily 90 capsule 1     order for DME Equipment being ordered: bilateral electric breast pump 1 Device 0     order for DME Equipment being ordered: maternity support belt 1 Units 1     Prenatal Vit-Fe Fumarate-FA (PRENATAL MULTIVITAMIN W/IRON) 27-0.8 MG tablet Take 1 tablet by mouth daily 100 tablet 3     senna-docusate (SENOKOT-S/PERICOLACE) 8.6-50 MG tablet Take 1 tablet by mouth daily 30 tablet 0     senna-docusate (SENOKOT-S/PERICOLACE) 8.6-50 MG tablet Take 1 tablet by mouth daily Start after delivery. 100 tablet 0       OBJECTIVE:                                                    /72   Pulse 52   Temp 96  F (35.6  C) (Oral)   Ht 1.651 m (5' 5\")   Wt 66.9 kg (147 lb 8 oz)   SpO2 96%   BMI 24.55 kg/m   Body mass index is 24.55 kg/m .   GENERAL: healthy, alert and no distress  HENT: ear canals- normal; TMs- normal; Nose- normal; Mouth- no ulcers, no lesions  NECK: no tenderness, no adenopathy, no asymmetry, no masses, no stiffness; thyroid- Mildly enlarged but severely tender anterior cervical nodes   RESP: lungs clear to auscultation - no rales, no rhonchi, no wheezes  CV: regular rates and rhythm, normal S1 S2, no S3 or S4 and no murmur, no click or rub -  MS: extremities- no gross deformities noted, no edema  ABDOMEN: soft, nontender, no hepatosplenomegaly, no masses and bowel sounds normal  SKIN: no suspicious lesions, no rashes  NEURO: strength and tone- normal, sensory exam- grossly normal, mentation- intact, speech- normal, reflexes- symmetric  PSYCH: Alert and oriented times 3; speech- coherent , normal rate and volume; able to articulate logical thoughts, able to abstract reason, no " tangential thoughts, no hallucinations or delusions, affect- normal    Results for orders placed or performed in visit on 08/13/19 (from the past 24 hour(s))   Strep, Rapid Screen   Result Value Ref Range    Specimen Description Throat     Rapid Strep A Screen       NEGATIVE: No Group A streptococcal antigen detected by immunoassay, await culture report.   CBC with platelets and differential   Result Value Ref Range    WBC 6.1 4.0 - 11.0 10e9/L    RBC Count 4.58 3.8 - 5.2 10e12/L    Hemoglobin 11.6 (L) 11.7 - 15.7 g/dL    Hematocrit 35.5 35.0 - 47.0 %    MCV 78 78 - 100 fl    MCH 25.3 (L) 26.5 - 33.0 pg    MCHC 32.7 31.5 - 36.5 g/dL    RDW 17.3 (H) 10.0 - 15.0 %    Platelet Count 220 150 - 450 10e9/L    % Neutrophils 44.1 %    % Lymphocytes 48.3 %    % Monocytes 5.6 %    % Eosinophils 1.7 %    % Basophils 0.3 %    Absolute Neutrophil 2.7 1.6 - 8.3 10e9/L    Absolute Lymphocytes 2.9 0.8 - 5.3 10e9/L    Absolute Monocytes 0.3 0.0 - 1.3 10e9/L    Absolute Eosinophils 0.1 0.0 - 0.7 10e9/L    Absolute Basophils 0.0 0.0 - 0.2 10e9/L    Diff Method Automated Method           ASSESSMENT/PLAN:                                                        ICD-10-CM    1. Throat pain R07.0 CBC with platelets and differential     Comprehensive metabolic panel     TSH with free T4 reflex     OTOLARYNGOLOGY REFERRAL     Strep, Rapid Screen     OTOLARYNGOLOGY REFERRAL     Beta strep group A culture   2. 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 Follow up with ENT. General labs today. Return to clinic for any new or worsening symptoms or go to ER Urgent care in off hours     Patient Instructions   Get labs done  Follow up with ENT as soon as possible  Return to clinic for any new or worsening symptoms or go to ER Urgent care in off hours           Estimated body mass index is 24.55 kg/m   "as calculated from the following:    Height as of this encounter: 1.651 m (5' 5\").    Weight as of this encounter: 66.9 kg (147 lb 8 oz).     The information in this document, created by the medical scribes for me, accurately reflects the services I personally performed and the decisions made by me. I have reviewed and approved this document for accuracy prior to leaving the patient care area.  August 13, 2019 10:19 AM    Mary Timmons  [facility]    "

## 2019-08-13 NOTE — PATIENT INSTRUCTIONS
Get labs done  Follow up with ENT as soon as possible  Return to clinic for any new or worsening symptoms or go to ER Urgent care in off hours

## 2019-08-14 ENCOUNTER — OFFICE VISIT (OUTPATIENT)
Dept: FAMILY MEDICINE | Facility: CLINIC | Age: 29
End: 2019-08-14
Payer: COMMERCIAL

## 2019-08-14 VITALS
HEIGHT: 65 IN | DIASTOLIC BLOOD PRESSURE: 72 MMHG | WEIGHT: 149.3 LBS | SYSTOLIC BLOOD PRESSURE: 116 MMHG | OXYGEN SATURATION: 100 % | TEMPERATURE: 98 F | BODY MASS INDEX: 24.87 KG/M2 | HEART RATE: 62 BPM

## 2019-08-14 DIAGNOSIS — Z34.80 SUPERVISION OF OTHER NORMAL PREGNANCY, ANTEPARTUM: ICD-10-CM

## 2019-08-14 DIAGNOSIS — R07.0 THROAT PAIN: Primary | ICD-10-CM

## 2019-08-14 DIAGNOSIS — Z98.891 S/P C-SECTION: ICD-10-CM

## 2019-08-14 LAB
BACTERIA SPEC CULT: NORMAL
SPECIMEN SOURCE: NORMAL

## 2019-08-14 PROCEDURE — 99214 OFFICE O/P EST MOD 30 MIN: CPT | Performed by: NURSE PRACTITIONER

## 2019-08-14 RX ORDER — FERROUS GLUCONATE 324(38)MG
324 TABLET ORAL
Qty: 90 TABLET | Refills: 2 | Status: SHIPPED | OUTPATIENT
Start: 2019-08-14 | End: 2020-05-06

## 2019-08-14 ASSESSMENT — MIFFLIN-ST. JEOR: SCORE: 1403.1

## 2019-08-14 NOTE — PATIENT INSTRUCTIONS
Continue to take the norethindrone everyday for as long as you do not want to be pregnant  If you start to have regular periods, then we need to change the birth control  You should not have much bleeding with this, but it can be normal to have irregular spotting, but it should not be a regular periods every month    Your lab results were all normal except that it showed iron deficiency anemia  Take one iron pill IF YOUR PRENATAL DOESN'T HAVE IRON    If you need the colace more than 2 times per week please come in

## 2019-08-14 NOTE — PROGRESS NOTES
"Subjective     Hilaria Asmita Jansen is a 29 year old female who presents to clinic today for the following health issues:    HPI     Patient is here to talk about test results from yesterday for throat pain.  Had sore throat for a couple weeks and fever two weeks ago.  Was not able to swallow yesterday.  Throat is feeling much better today.  Denies current fever, has ENT appt tomorrow    Believes she is supposed to be seeing someone for follow-up for c/s.  Her doctor retired and she had referred to family practice.  The patient is not having any specific complaints.  Incision is doing well without any pain.  The patient has had one period since birth.  She is taking norethindrone for contraception and takes this every day.    Does not have paperwork that she needs to complete for work.    Got a message yesterday to take her prenatal vitamin.  She had stopped this at birth    Takes colace 1-2 times per week     Reviewed and updated as needed this visit by Provider         Review of Systems   ROS COMP: Constitutional, HEENT, cardiovascular, pulmonary, gi and gu systems are negative, except as otherwise noted.      Objective    /72   Pulse 62   Temp 98  F (36.7  C) (Oral)   Ht 1.651 m (5' 5\")   Wt 67.7 kg (149 lb 4.8 oz)   SpO2 100%   BMI 24.84 kg/m    Body mass index is 24.84 kg/m .  Physical Exam   GENERAL: healthy, alert and no distress  EYES: Eyes grossly normal to inspection, PERRL and conjunctivae and sclerae normal  HENT: ear canals and TM's normal, nose and mouth without ulcers or lesions  NECK: no adenopathy, no asymmetry, masses, or scars and thyroid normal to palpation  RESP: lungs clear to auscultation - no rales, rhonchi or wheezes  CV: regular rate and rhythm, normal S1 S2, no S3 or S4, no murmur, click or rub, no peripheral edema and peripheral pulses strong  ABDOMEN: soft, nontender, no hepatosplenomegaly, no masses and bowel sounds normal    Diagnostic Test Results:  Labs reviewed in " Epic  Results for orders placed or performed in visit on 08/13/19   CBC with platelets and differential   Result Value Ref Range    WBC 6.1 4.0 - 11.0 10e9/L    RBC Count 4.58 3.8 - 5.2 10e12/L    Hemoglobin 11.6 (L) 11.7 - 15.7 g/dL    Hematocrit 35.5 35.0 - 47.0 %    MCV 78 78 - 100 fl    MCH 25.3 (L) 26.5 - 33.0 pg    MCHC 32.7 31.5 - 36.5 g/dL    RDW 17.3 (H) 10.0 - 15.0 %    Platelet Count 220 150 - 450 10e9/L    % Neutrophils 44.1 %    % Lymphocytes 48.3 %    % Monocytes 5.6 %    % Eosinophils 1.7 %    % Basophils 0.3 %    Absolute Neutrophil 2.7 1.6 - 8.3 10e9/L    Absolute Lymphocytes 2.9 0.8 - 5.3 10e9/L    Absolute Monocytes 0.3 0.0 - 1.3 10e9/L    Absolute Eosinophils 0.1 0.0 - 0.7 10e9/L    Absolute Basophils 0.0 0.0 - 0.2 10e9/L    Diff Method Automated Method    Comprehensive metabolic panel   Result Value Ref Range    Sodium 142 133 - 144 mmol/L    Potassium 3.9 3.4 - 5.3 mmol/L    Chloride 111 (H) 94 - 109 mmol/L    Carbon Dioxide 23 20 - 32 mmol/L    Anion Gap 8 3 - 14 mmol/L    Glucose 74 70 - 99 mg/dL    Urea Nitrogen 11 7 - 30 mg/dL    Creatinine 0.76 0.52 - 1.04 mg/dL    GFR Estimate >90 >60 mL/min/[1.73_m2]    GFR Estimate If Black >90 >60 mL/min/[1.73_m2]    Calcium 9.1 8.5 - 10.1 mg/dL    Bilirubin Total 0.5 0.2 - 1.3 mg/dL    Albumin 3.9 3.4 - 5.0 g/dL    Protein Total 8.2 6.8 - 8.8 g/dL    Alkaline Phosphatase 97 40 - 150 U/L    ALT 17 0 - 50 U/L    AST 17 0 - 45 U/L   TSH with free T4 reflex   Result Value Ref Range    TSH 1.39 0.40 - 4.00 mU/L   Strep, Rapid Screen   Result Value Ref Range    Specimen Description Throat     Rapid Strep A Screen       NEGATIVE: No Group A streptococcal antigen detected by immunoassay, await culture report.   Beta strep group A culture   Result Value Ref Range    Specimen Description Throat     Culture Micro No beta hemolytic Streptococcus Group A isolated          Please note greater than 50% of this 25 minute appointment were spent face-to-face in  "counseling with the patient of the issues described above in the history of present illness and in the plan, including review of lab results with the patient, iron deficiency anemia and how to correct, birth control use, constipation care.  Assessment & Plan     (R07.0) Throat pain  (primary encounter diagnosis)  Comment:   Plan: Follow-up with ENT as planned    (Z98.891) S/P   Comment:   Plan: ferrous gluconate (FERGON) 324 (38 Fe) MG         tablet  Reviewed birth control use.          Anemia - reviewed hgb is low - take iron supplement if the prenatal doesn't have any iron.    BMI:   Estimated body mass index is 24.84 kg/m  as calculated from the following:    Height as of this encounter: 1.651 m (5' 5\").    Weight as of this encounter: 67.7 kg (149 lb 4.8 oz).           Patient Instructions   Continue to take the norethindrone everyday for as long as you do not want to be pregnant  If you start to have regular periods, then we need to change the birth control  You should not have much bleeding with this, but it can be normal to have irregular spotting, but it should not be a regular periods every month    Your lab results were all normal except that it showed iron deficiency anemia  Take one iron pill IF YOUR PRENATAL DOESN'T HAVE IRON    If you need the colace more than 2 times per week please come in      Return in about 9 months (around 2020) for Physical Exam.    LEONA Neal [facility]      "

## 2019-08-14 NOTE — TELEPHONE ENCOUNTER
FUTURE VISIT INFORMATION      FUTURE VISIT INFORMATION:    Date: 8/15/19    Time: 2PM    [place] [facility] [place]:    Referring provider:  Mary Timmons PA-C    Referring providers clinic:  [facility]    Reason for visit/diagnosis  Throat pain /History of benign thymus tumor     RECORDS REQUESTED FROM:       Clinic name Comments Records Status Imaging Status   [facility] 8/13/19, 8/14/19 notes with Mary GAMA [facility]   09/10/2015 EXCISION LEFT DEEP NECK MASS with Lucia Zimmerman MD [place] Everywhere    [facility] [place]2015 notes with Clifford Worley MD   Care Everywhere    [facility] 06/22/2015 CT NECK SOFT TISSUE W Care Everywhere req 8/14 - [facility]   [facility] 6/3/15 XR Video Swallow Care Everywhere req 8/14 - PACS           8/14/19 3:11PM sent a fax to [facility] and [facility] for images - Amay   8/15/19 9:52AM [facility] called. They confirmed the report in care everywhere but do not have any CT images in their PACS. Called [facility] to make sure they received the fax, someone will be working on this today and will check if they have the 2015 CT Neck images - Amay

## 2019-08-15 ENCOUNTER — OFFICE VISIT (OUTPATIENT)
Dept: OTOLARYNGOLOGY | Facility: CLINIC | Age: 29
End: 2019-08-15
Attending: PHYSICIAN ASSISTANT
Payer: COMMERCIAL

## 2019-08-15 ENCOUNTER — PRE VISIT (OUTPATIENT)
Dept: OTOLARYNGOLOGY | Facility: CLINIC | Age: 29
End: 2019-08-15

## 2019-08-15 VITALS
HEIGHT: 61 IN | WEIGHT: 150 LBS | SYSTOLIC BLOOD PRESSURE: 120 MMHG | DIASTOLIC BLOOD PRESSURE: 68 MMHG | OXYGEN SATURATION: 99 % | RESPIRATION RATE: 16 BRPM | HEART RATE: 58 BPM | TEMPERATURE: 98.4 F | BODY MASS INDEX: 28.32 KG/M2

## 2019-08-15 DIAGNOSIS — R07.0 THROAT PAIN: Primary | ICD-10-CM

## 2019-08-15 DIAGNOSIS — R49.0 HOARSENESS: ICD-10-CM

## 2019-08-15 ASSESSMENT — MIFFLIN-ST. JEOR: SCORE: 1343.16

## 2019-08-15 ASSESSMENT — PAIN SCALES - GENERAL: PAINLEVEL: SEVERE PAIN (7)

## 2019-08-15 NOTE — LETTER
8/15/2019       RE: Hilaria Jansen  [address]  [place] [address]     Dear Colleague,    Thank you for referring your patient, Hilaria Jansen, to the [facility] EAR NOSE AND THROAT at [facility]. Please see a copy of my visit note below.    [facility] Ear, Nose and Throat Clinic New Patient Visit Note        Otolaryngology        August 15, 2019    Referring Provider:  Mary Timmons PA-C        HPI:  Hilaria Jansen is a 29 year old female who presents to the clinic today for evaluation of some throat pain.    Hilaria reports that symptoms started about 1 month ago.  She started with pain on both sides of her next, just above the thyroid gland.  She also feels like there might be a lump along the left side, where she had a previous mass removed.  This pain reminds her of the pain she had back in 2015, when she had a left paratracheal mass.  The only difference is that she is not having any associated shortness of breath.  This time, she is getting some hoarseness of her voice.  Has been going on around the same timeframe.  There is been no trouble swallowing, pain with swallowing, pain with talking, ear pain, unexplained weight loss, facial paresthesias or other concerns.  Pushing on the area of the pain does make it worse.    In the past, she was diagnosed with a left paratracheal mass back in.  It showed a 1.7 x 2.4 cm low-attenuation nodule inferior to the left thyroid gland.  The patient was then sent for an FNA on 7/1/2015.  Which showed a 2.8 cm oval-shaped heterogeneous solid nodular abnormality in the left lower neck, inferior to the left lower thyroid.  She was then sent for a CT of the neck, which was done on 6/22/2015.  It showed a 1.7 x 2.4 cm low-attenuation nodule inferior to the left thyroid gland.  She then had an FNA done on 7/1/2015.  Flow cytometry and cytology were done on the sample.  The flow cytometry did not indicate the  presence of monoclonal B-cell population.  The differential diagnosis included a reactive/inflammatory process, however, neoplasm could not be entirely excluded.  The patient then he had an excision of this the neck mass on 9/10/2015. The surgical description was Left paratracheal deep neck mass measuring approximately 1.5 x 2.5 cm.  The pathology final diagnosis was benign thymus tissue and a small parathyroid gland identified.    Patient reports she was pain-free after the procedure until about a month ago.      ROS:  10 point review of systems completed and is negative except for those listed above    PMH:   Past Medical History:   Diagnosis Date     Hidradenitis suppurativa      NO ACTIVE PROBLEMS        PSH:   Past Surgical History:   Procedure Laterality Date      SECTION N/A 2019    Procedure:  SECTION;  Surgeon: Wendi Coppola MD;  [facility]     SURGICAL PATHOLOGY EXAM Left 09/10/2015    left paratracheal neck mass removed, benign       FamH:   Family History   Problem Relation Age of Onset     Breast Cancer Paternal Aunt      Other - See Comments Maternal Aunt      Melanoma No family hx of      Skin Cancer No family hx of      Glaucoma No family hx of      Macular Degeneration No family hx of        SocH:   Social History     Tobacco Use     Smoking status: Never Smoker     Smokeless tobacco: Never Used   Substance Use Topics     Alcohol use: No     Drug use: No       Medications:   Current Outpatient Medications   Medication     docusate sodium (COLACE) 100 MG capsule     ferrous gluconate (FERGON) 324 (38 Fe) MG tablet     norethindrone (MICRONOR) 0.35 MG tablet     order for DME     Prenatal Vit-Fe Fumarate-FA (PRENATAL MULTIVITAMIN W/IRON) 27-0.8 MG tablet     No current facility-administered medications for this visit.        Allergies:     Allergies   Allergen Reactions     Flagyl [Metronidazole] Itching     Keflex [Cephalexin] Rash         Physical Exam:   BP  "120/68   Pulse 58   Temp 98.4  F (36.9  C)   Resp 16   Ht 1.55 m (5' 1.02\")   Wt 68 kg (150 lb)   SpO2 99%   BMI 28.32 kg/m       Constitutional: The patient was accompanied by Setswana , well-groomed, and in no acute distress.    Head: Normocephalic and atraumatic.   Eyes: Pupils were equal and reactive.  Extraocular movement intact.    Ears: Pinnae and tragus non-tender.  EACs and TMs were clear under microscopic exam.   Nose: Sinuses were non-tender.  Anterior rhinoscopy revealed midline septum and absence of purulence or polyps.    Mouth: Mucosa pink and moist, tonsils non-erythematous, no exudates, uvula midline  Neck: tenderness to palpation in the thyrohyoid muscles bilaterally. Fullness along the left thyroid region without discrete mass, are is tender to palpation. No lymphadenopathy in the neck.   Normal range of motion  Skin: Normal:  warm and pink without rash  Neurologic: Alert and oriented x 3.  CN's III-XII within normal limits.  Voice normal.   Psychiatric: The patient's affect was calm, cooperative, and appropriate.    Communication: Normal; communicates verbally, normal voice quality.        Fiberoptic Endoscopy:  Consent for fiberoptic laryngoscopy was obtained, and we confirmed correctness of procedure and identity of patient.  Fiberoptic laryngoscopy was indicated due to hoarseness of voice and throat pain.  The nose was topically decongested and anesthetized.  The fiberoptic laryngoscope was passed under endoscopic vision.  The turbinates were normal.  The inferior and middle meati were clear bilaterally without purulence, masses, or polyps.  The nasopharynx was clear.  The Eustachian tubes were clear.  The soft palate appeared normal with good mobility.  The epiglottis was sharp and the visualized portion of the vallecula was clear.  The larynx was clear with mobile cords, the left cord is more prominent than the right.  The arytenoids were clear and there was no pooling in the " hypopharynx.               Assessment/Plan:     Throat pain and hoarseness of voice  Patient with pain and hoarseness of voice now for about 1 month.  The pain is palpable in the thyrohyoid region.  Part of my differential diagnosis does include dysphonia.  I did scope the patient and did not see any lesions on the vocal cord.  On physical exam of the neck, there does appear to be an area of fullness in the left side of the neck, lateral to the thyroid.  I have concerns about possible recurrent mass.    Work-up will include a CT of the neck which we will set up at this time.  If that is negative for recurrent mass, I will send her to one of our laryngologist for evaluation and treatment for possible dysphonia.  If there is a mass, I will have her be seen by one of her head neck surgeons.    I discussed this with the patient, with the help of the , and all questions were answered.  I will see her back after the CT scan so we can talk about the results in person and utilize the .      Regina Reeves PA-C  Otolaryngology  Head & Neck Surgery  [phone]      CC:  Mary Timmons PA-C  [facility]    LEONA Sullivan, CNP  [facility]

## 2019-08-15 NOTE — NURSING NOTE
"Chief Complaint   Patient presents with     Consult     throat pain      Blood pressure 120/68, pulse 58, temperature 98.4  F (36.9  C), resp. rate 16, height 1.55 m (5' 1.02\"), weight 68 kg (150 lb), SpO2 99 %, unknown if currently breastfeeding.    Donovan Khan LPN    "

## 2019-08-15 NOTE — PATIENT INSTRUCTIONS
Hilaria Jansen,    It was a pleasure to see you today.    1. You were seen in the ENT Clinic today by Regina Reeves PA-C    If you have any questions or concerns after your appointment, please call   - Option 1: ENT Clinic: [phone]    2. Please schedule a CT soft tissue neck.    3. Please return to see Regina after the CT scan, to go over results.    4.  If there is a new mass, I will refer you to a head and neck surgeon.  If there is no mass, I will have you see a laryngologist, a doctor who works on vocal cords and the surrounding muscles.      Thank you,  Regina Reeves PA-C  Otolaryngology  Head & Neck Surgery  [phone]

## 2019-08-15 NOTE — PROGRESS NOTES
[facility] Ear, Nose and Throat Clinic New Patient Visit Note        Otolaryngology        August 15, 2019    Referring Provider:  Mary Timmons PA-C        HPI:  Hilaria Jansen is a 29 year old female who presents to the clinic today for evaluation of some throat pain.    Hilaria reports that symptoms started about 1 month ago.  She started with pain on both sides of her next, just above the thyroid gland.  She also feels like there might be a lump along the left side, where she had a previous mass removed.  This pain reminds her of the pain she had back in 2015, when she had a left paratracheal mass.  The only difference is that she is not having any associated shortness of breath.  This time, she is getting some hoarseness of her voice.  Has been going on around the same timeframe.  There is been no trouble swallowing, pain with swallowing, pain with talking, ear pain, unexplained weight loss, facial paresthesias or other concerns.  Pushing on the area of the pain does make it worse.    In the past, she was diagnosed with a left paratracheal mass back in.  It showed a 1.7 x 2.4 cm low-attenuation nodule inferior to the left thyroid gland.  The patient was then sent for an FNA on 7/1/2015.  Which showed a 2.8 cm oval-shaped heterogeneous solid nodular abnormality in the left lower neck, inferior to the left lower thyroid.  She was then sent for a CT of the neck, which was done on 6/22/2015.  It showed a 1.7 x 2.4 cm low-attenuation nodule inferior to the left thyroid gland.  She then had an FNA done on 7/1/2015.  Flow cytometry and cytology were done on the sample.  The flow cytometry did not indicate the presence of monoclonal B-cell population.  The differential diagnosis included a reactive/inflammatory process, however, neoplasm could not be entirely excluded.  The patient then he had an excision of this the neck mass on 9/10/2015. The surgical description was Left paratracheal deep neck mass measuring  "approximately 1.5 x 2.5 cm.  The pathology final diagnosis was benign thymus tissue and a small parathyroid gland identified.    Patient reports she was pain-free after the procedure until about a month ago.      ROS:  10 point review of systems completed and is negative except for those listed above    PMH:   Past Medical History:   Diagnosis Date     Hidradenitis suppurativa      NO ACTIVE PROBLEMS        PSH:   Past Surgical History:   Procedure Laterality Date      SECTION N/A 2019    Procedure:  SECTION;  Surgeon: Wendi Coppola MD;  [facility]     SURGICAL PATHOLOGY EXAM Left 09/10/2015    left paratracheal neck mass removed, benign       FamH:   Family History   Problem Relation Age of Onset     Breast Cancer Paternal Aunt      Other - See Comments Maternal Aunt      Melanoma No family hx of      Skin Cancer No family hx of      Glaucoma No family hx of      Macular Degeneration No family hx of        SocH:   Social History     Tobacco Use     Smoking status: Never Smoker     Smokeless tobacco: Never Used   Substance Use Topics     Alcohol use: No     Drug use: No       Medications:   Current Outpatient Medications   Medication     docusate sodium (COLACE) 100 MG capsule     ferrous gluconate (FERGON) 324 (38 Fe) MG tablet     norethindrone (MICRONOR) 0.35 MG tablet     order for DME     Prenatal Vit-Fe Fumarate-FA (PRENATAL MULTIVITAMIN W/IRON) 27-0.8 MG tablet     No current facility-administered medications for this visit.        Allergies:     Allergies   Allergen Reactions     Flagyl [Metronidazole] Itching     Keflex [Cephalexin] Rash         Physical Exam:   /68   Pulse 58   Temp 98.4  F (36.9  C)   Resp 16   Ht 1.55 m (5' 1.02\")   Wt 68 kg (150 lb)   SpO2 99%   BMI 28.32 kg/m      Constitutional: The patient was accompanied by Persian , well-groomed, and in no acute distress.    Head: Normocephalic and atraumatic.   Eyes: Pupils were equal " and reactive.  Extraocular movement intact.    Ears: Pinnae and tragus non-tender.  EACs and TMs were clear under microscopic exam.   Nose: Sinuses were non-tender.  Anterior rhinoscopy revealed midline septum and absence of purulence or polyps.    Mouth: Mucosa pink and moist, tonsils non-erythematous, no exudates, uvula midline  Neck: tenderness to palpation in the thyrohyoid muscles bilaterally. Fullness along the left thyroid region without discrete mass, are is tender to palpation. No lymphadenopathy in the neck.   Normal range of motion  Skin: Normal:  warm and pink without rash  Neurologic: Alert and oriented x 3.  CN's III-XII within normal limits.  Voice normal.   Psychiatric: The patient's affect was calm, cooperative, and appropriate.    Communication: Normal; communicates verbally, normal voice quality.        Fiberoptic Endoscopy:  Consent for fiberoptic laryngoscopy was obtained, and we confirmed correctness of procedure and identity of patient.  Fiberoptic laryngoscopy was indicated due to hoarseness of voice and throat pain.  The nose was topically decongested and anesthetized.  The fiberoptic laryngoscope was passed under endoscopic vision.  The turbinates were normal.  The inferior and middle meati were clear bilaterally without purulence, masses, or polyps.  The nasopharynx was clear.  The Eustachian tubes were clear.  The soft palate appeared normal with good mobility.  The epiglottis was sharp and the visualized portion of the vallecula was clear.  The larynx was clear with mobile cords, the left cord is more prominent than the right.  The arytenoids were clear and there was no pooling in the hypopharynx.               Assessment/Plan:     Throat pain and hoarseness of voice  Patient with pain and hoarseness of voice now for about 1 month.  The pain is palpable in the thyrohyoid region.  Part of my differential diagnosis does include dysphonia.  I did scope the patient and did not see any  lesions on the vocal cord.  On physical exam of the neck, there does appear to be an area of fullness in the left side of the neck, lateral to the thyroid.  I have concerns about possible recurrent mass.    Work-up will include a CT of the neck which we will set up at this time.  If that is negative for recurrent mass, I will send her to one of our laryngologist for evaluation and treatment for possible dysphonia.  If there is a mass, I will have her be seen by one of her head neck surgeons.    I discussed this with the patient, with the help of the , and all questions were answered.  I will see her back after the CT scan so we can talk about the results in person and utilize the .      Regina Reeves PA-C  Otolaryngology  Head & Neck Surgery  [phone]      CC:  Mary Timmons PA-C  [facility] - LEONA Moyer, CNP  [facility]

## 2019-08-17 ENCOUNTER — ANCILLARY PROCEDURE (OUTPATIENT)
Dept: CT IMAGING | Facility: CLINIC | Age: 29
End: 2019-08-17
Attending: PHYSICIAN ASSISTANT
Payer: COMMERCIAL

## 2019-08-17 DIAGNOSIS — R07.0 THROAT PAIN: ICD-10-CM

## 2019-08-17 LAB — COPATH REPORT: NORMAL

## 2019-08-17 RX ORDER — IOPAMIDOL 755 MG/ML
100 INJECTION, SOLUTION INTRAVASCULAR ONCE
Status: COMPLETED | OUTPATIENT
Start: 2019-08-17 | End: 2019-08-17

## 2019-08-17 RX ADMIN — IOPAMIDOL 100 ML: 755 INJECTION, SOLUTION INTRAVASCULAR at 15:09

## 2019-08-17 NOTE — DISCHARGE INSTRUCTIONS

## 2019-08-19 ENCOUNTER — OFFICE VISIT (OUTPATIENT)
Dept: OTOLARYNGOLOGY | Facility: CLINIC | Age: 29
End: 2019-08-19
Payer: COMMERCIAL

## 2019-08-19 VITALS
SYSTOLIC BLOOD PRESSURE: 122 MMHG | HEART RATE: 86 BPM | HEIGHT: 61 IN | BODY MASS INDEX: 28.51 KG/M2 | WEIGHT: 151 LBS | DIASTOLIC BLOOD PRESSURE: 76 MMHG | RESPIRATION RATE: 16 BRPM

## 2019-08-19 DIAGNOSIS — R49.0 DYSPHONIA: ICD-10-CM

## 2019-08-19 DIAGNOSIS — R49.0 HOARSENESS: ICD-10-CM

## 2019-08-19 DIAGNOSIS — R07.0 THROAT PAIN: Primary | ICD-10-CM

## 2019-08-19 ASSESSMENT — PAIN SCALES - GENERAL: PAINLEVEL: NO PAIN (0)

## 2019-08-19 ASSESSMENT — MIFFLIN-ST. JEOR: SCORE: 1347.31

## 2019-08-19 NOTE — PROGRESS NOTES
"[facility] Ear, Nose and Throat Clinic Follow Up Visit Note  Otolaryngology    August 19, 2019       HPI:  Hilaria Jansen is a 29 year old female who is back to discuss the CT results.  She wanted to return here to do it in person, with an , so we avoided any confusion.    Patient reports that the hoarseness of voice is better and the pain is present, but less significant. No other changes from last week's visit.      Physical Exam:  /76 (BP Location: Right arm, Patient Position: Sitting, Cuff Size: Adult Regular)   Pulse 86   Resp 16   Ht 1.549 m (5' 1\")   Wt 68.5 kg (151 lb)   BMI 28.53 kg/m      Constitutional: The patient was accompanied, well-groomed, and in no acute distress.    Communication: Normal; communicates verbally, normal voice quality.          Diagnostic Imaging:    CT soft tissue neck from 8/17/2019:   Normal CT study of the neck with contrast.  No evident mass or adenopathy within the neck.       Assessment/Plan:   Throat pain/Hoarseness/Dysphonia  Patient with symptoms of throat pain and hoarseness of voice, concerning for dysphonia.  CT negative for mass or malignancy.  Previous fiberoptic exam unremarkable.  Tenderness to palpation io the thyrohyoid muscle regions bilaterally.    Will refer to Laryngology for further evaluation and treatment.     Discussed all of this with patient today, with the assistance of a [place] .        Regina Reeves PA-C  Otolaryngology  Head & Neck Surgery  [phone]          CC:  Bora Banerjee MD  [facility] [address]    Mary Timmons PA-C  [facility] -      LEONA Sullivan, CNP  [facility] -     "

## 2019-08-19 NOTE — NURSING NOTE
"Chief Complaint   Patient presents with     RECHECK     Throat pain      CT Results     CT 08/17/19     /76 (BP Location: Right arm, Patient Position: Sitting, Cuff Size: Adult Regular)   Pulse 86   Resp 16   Ht 1.549 m (5' 1\")   Wt 68.5 kg (151 lb)   BMI 28.53 kg/m      Natasha Mccartney CMA    "

## 2019-08-19 NOTE — PATIENT INSTRUCTIONS
Hilaria Jansen,    It was a pleasure to see you today.    1. You were seen in the ENT Clinic today by Regina Reeves PA-C    If you have any questions or concerns after your appointment, please call   - Option 1: ENT Clinic: [phone]      2. Please schedule the appointment with one of our Laryngologists as soon as possible.      Thank you,  Regina Reeves PA-C  Otolaryngology  Head & Neck Surgery  [phone]

## 2019-08-19 NOTE — LETTER
"8/19/2019       RE: Hilaria Jansen  [address]  [place] [address]     Dear Colleague,    Thank you for referring your patient, Hilaria Jansen, to the [facility] EAR NOSE AND THROAT at [facility]. Please see a copy of my visit note below.    [facility] Ear, Nose and Throat Clinic Follow Up Visit Note  Otolaryngology    August 19, 2019       HPI:  Hilaria Jansen is a 29 year old female who is back to discuss the CT results.  She wanted to return here to do it in person, with an , so we avoided any confusion.    Patient reports that the hoarseness of voice is better and the pain is present, but less significant. No other changes from last week's visit.      Physical Exam:  /76 (BP Location: Right arm, Patient Position: Sitting, Cuff Size: Adult Regular)   Pulse 86   Resp 16   Ht 1.549 m (5' 1\")   Wt 68.5 kg (151 lb)   BMI 28.53 kg/m       Constitutional: The patient was accompanied, well-groomed, and in no acute distress.    Communication: Normal; communicates verbally, normal voice quality.          Diagnostic Imaging:    CT soft tissue neck from 8/17/2019:   Normal CT study of the neck with contrast.  No evident mass or adenopathy within the neck.       Assessment/Plan:   Throat pain/Hoarseness/Dysphonia  Patient with symptoms of throat pain and hoarseness of voice, concerning for dysphonia.  CT negative for mass or malignancy.  Previous fiberoptic exam unremarkable.  Tenderness to palpation io the thyrohyoid muscle regions bilaterally.    Will refer to Laryngology for further evaluation and treatment.     Discussed all of this with patient today, with the assistance of a Welsh .        Regina Reeves PA-C  Otolaryngology  Head & Neck Surgery  [phone]      CC:  Bora Banerjee MD  [facility] 396    Mary Timmons PA-C  [facility] -      Clotilde Beltran, APRN, " CNP  [facility] - [facility]

## 2019-08-20 ENCOUNTER — TELEPHONE (OUTPATIENT)
Dept: OBGYN | Facility: CLINIC | Age: 29
End: 2019-08-20

## 2019-08-20 DIAGNOSIS — Z98.891 STATUS POST C-SECTION: Primary | ICD-10-CM

## 2019-08-20 NOTE — TELEPHONE ENCOUNTER
Lilian, PHN [facility], did home visit today for pt. Pt had  on 19. Pt returned back to work last week. Has pain at her incision site and abdomen feels less supported. No fever, no pain now. Moves around a lot at work and wondering if an abdominal binder would help. Order placed and faxed to [facility] in [facility].   Leslee Miller, RN-BSN

## 2019-08-21 NOTE — TELEPHONE ENCOUNTER
FUTURE VISIT INFORMATION      FUTURE VISIT INFORMATION:    Date: 11/14/19    Time: 1:00 pm    Location: [facility]  REFERRAL INFORMATION:    Referring provider:  LANETTE Mishra    Referring providers clinic:  [facility] ENT    Reason for visit/diagnosis  Throat pain    RECORDS REQUESTED FROM:       Clinic name Comments Records Status Imaging Status   [facility] ENT Office Visit-8/19/19, 8/15/19-LANETTE Mishra [facility] 8/13/19, 8/14/19 notes with Mary GAMA [facility] 09/10/2015 EXCISION LEFT DEEP NECK MASS with Lucia Zimmerman MD Care Everywhere    [facility] ENT 07/23/2015 notes with Clifford Worley MD   Care Everywhere    [facility] CT Neck soft tissue-6/22/15 Care Everywhere No images, per [facility]   [facility] Imaging XR Video Swallow-6/3/15 Care Everywhere PACS   [facility] Imaging CT Soft tissue neck Epic PACS

## 2019-11-14 ENCOUNTER — PRE VISIT (OUTPATIENT)
Dept: OTOLARYNGOLOGY | Facility: CLINIC | Age: 29
End: 2019-11-14

## 2019-12-26 ENCOUNTER — PRE VISIT (OUTPATIENT)
Dept: OTOLARYNGOLOGY | Facility: CLINIC | Age: 29
End: 2019-12-26

## 2019-12-26 NOTE — TELEPHONE ENCOUNTER
Previsit Call Details  Reason for previsit call: eCheck-in Assistance  Number of days until appointment: 6  Call attempt number: 1  Call outcome: Successful  Notes: With the assistance of  called talked with patient she said will do e check in

## 2019-12-31 ENCOUNTER — OFFICE VISIT (OUTPATIENT)
Dept: OTOLARYNGOLOGY | Facility: CLINIC | Age: 29
End: 2019-12-31
Attending: PHYSICIAN ASSISTANT
Payer: COMMERCIAL

## 2019-12-31 ENCOUNTER — OFFICE VISIT (OUTPATIENT)
Dept: OTOLARYNGOLOGY | Facility: CLINIC | Age: 29
End: 2019-12-31
Payer: COMMERCIAL

## 2019-12-31 VITALS — BODY MASS INDEX: 28.13 KG/M2 | HEIGHT: 61 IN | WEIGHT: 149 LBS

## 2019-12-31 DIAGNOSIS — R49.0 MUSCLE TENSION DYSPHONIA: ICD-10-CM

## 2019-12-31 DIAGNOSIS — J38.3 LESION OF TRUE VOCAL CORD: ICD-10-CM

## 2019-12-31 DIAGNOSIS — R49.0 DYSPHONIA: ICD-10-CM

## 2019-12-31 DIAGNOSIS — R49.0 HOARSENESS: ICD-10-CM

## 2019-12-31 DIAGNOSIS — R07.0 THROAT PAIN: Primary | ICD-10-CM

## 2019-12-31 ASSESSMENT — PAIN SCALES - GENERAL: PAINLEVEL: EXTREME PAIN (8)

## 2019-12-31 ASSESSMENT — MIFFLIN-ST. JEOR: SCORE: 1337.98

## 2019-12-31 NOTE — LETTER
12/31/2019       RE: Hilaria Jansen  [address]  [facility] [address]     Dear Colleague,    Thank you for referring your patient, Hilaria Jansen, to the [facility] VOICE at [facility]. Please see a copy of my visit note below.    [facility]  Bora Banerjee Jr., M.D., F.A.C.S.  Regina Frank M.D., M.P.H.  Niurka Saini, Ph.D., CCC/SLP  Neetu Jett M.M. (voice), M.A., CCC/SLP  Orlando Worthington M.M. (voice), M.A., CCC/SLP    Evaluation report    Clinician: Neetu Jett M.M. (voice), M.A., CCC/SLP  Seen in conjunction with: Dr. Banerjee  Referring physician:  Chriss  Patient: Hilaria Jansen  Date of Visit: 12/31/2019    HISTORY  Chief complaint: Hilaria Jansen is a 29 year old presenting today for evaluation of voice and throat issues.  Salient history: She has a history significant for throat pain dating back to 2014. She was initially diagnosed by Regina Reeves PA-C in August 2019 for her throat pain in issues.  She is a new mom with a 6 month old baby.    Ms. Jansen is joined by a Gabonese .    CURRENT SYMPTOMS INCLUDE  VOICE    Poor voice quality since July 2019.    Voice quality is intermittent in severity, but gradually worsening    THROAT ISSUES    Was initially seen by Regina Reeves PA-C in August 2019.  At that time she presented with a complaint of throat pain and hoarseness.  The pain was palpable in the thyrohyoid region.  CT scan of the neck was negative for mass or malignancy.    However, she has a history of a left paratracheal mass, which was removed in 2015.    Ms. Jansen reports that her voice changes began after the removal of the mass in 2015.  She denies symptoms of dysphasia, or pain with swallowing following her procedure.    Please review Dr. Banerjee's report for additional information on her mass.    Patient denies significant dyspnea, dysphagia, cough and pain.     OTHER PERTINENT HISTORY    Complex  medical history: please also refer to Dr. Banerjee's dictation.     Past Medical History:   Diagnosis Date     Hidradenitis suppurativa      NO ACTIVE PROBLEMS      Past Surgical History:   Procedure Laterality Date      SECTION N/A 2019    Procedure:  SECTION;  Surgeon: Wendi Coppola MD;  Location:  L+D     SURGICAL PATHOLOGY EXAM Left 09/10/2015    left paratracheal neck mass removed, benign       OBJECTIVE  PATIENT REPORTED MEASURES  Patient Supplied Answers To VHI Questionnaire  No flowsheet data found.     Patient Supplied Answers To CSI Questionnaire  No flowsheet data found.    Patient Supplied Answers To EAT Questionnaire  No flowsheet data found.      PERCEPTUAL EVALUATION (CPT 30938)  POSTURE / TENSION:     upper body    neck and shoulders    BREATHING:     inspirations are inadequate in volume and frequency    clavicular elevation on inspiration    phonation is not coordinated with respiration    LARYNGEAL PALPATION:     firm musculature    tenderness of the thyrohyoid area    reduced thyrohyoid space    left greater than right    VOICE:    Roughness: WNL    Breathiness: Mild to moderate    Strain: Severe    Loudness    Conversational speech:  Severely reduced    Projected speech:  Profoundly reduced    Pitch:    Conversational speech:  Moderate to severely elevated    Pitch glide: limited    Resonance:    Conversational speech:  laryngeal pharyngeal resonance    Singing vs. Speech:  n/a    CAPE-V Overall Severity:  70/100    COUGH/THROAT CLEARING:    Not observed    LARYNGEAL FUNCTION STUDIES (CPT 89301)  Not completed as voice quality is severe.    LARYNGEAL EXAMINATION  Procedure: Flexible endoscopy with chip-tip technology without stroboscopy, left nostril; topical anesthesia with 3% Lidocaine and 0.25% phenylephrine was applied.   Performed by: Dr. Banerjee   The laryngeal and pharyngeal structures were evaluated for gross appearance, mobility, function, and focal  lesions / abnormalities of the associated mucosa.    All findings were within normal limits with the exception of the following salient features:     Mild to moderate presence of thick secretions    Subtle bilateral swellings along the mid-membranous folds bilaterally; right slightly more present than left.    Normal mobility and elongation noted    Moderate to severe 4-way supraglottic hyperfunction during connected speech.    THERAPY PROBES: Improvement was elicited with use of forward resonant stimuli, coordination of respiration and phonation and use of yawn sigh        The laryngeal exam was reviewed with Ms. Jansen, and I provided pertinent explanations, as well as written and oral information.    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  Remarkable findings of the evaluations included subtle bilateral changes along the mid-membranous portion of the vocal folds bilaterally; likely associated with trauma.   Dysphonia/discomfort is accounted for by the hyperfunction and imbalanced function of the intrinsic and extrinsic laryngeal musculature  .     STIMULABILITY: results of therapy probes during perceptual and laryngeal evaluation demonstrate improvement with use of forward resonant stimuli, coordination of respiration and phonation and use of yawn sigh    RECOMMENDATIONS:     A course of speech therapy is recommended to optimize vocal technique, improve voice quality, promote reduced discomfort, effort and fatigue, promote reduction of vocal fold impact to help resolve the lesions and help reduce mucosal irritation.    She demonstrates a Good prognosis for improvement given adherence to therapeutic recommendations.     Positive indicators: positive response to therapy probes diagnosis is known to respond  to treatment high level of comittment    Negative indicators: none    DURATION / FREQUENCY: 3 biweekly one-hour sessions.  A total of 6-8 sessions may be necessary.    GOALS:  Patient goal:   1. To improve and maintain a healthy voice quality  2. To resolve the vocal fold lesions  3. To understand the problem and fix it as much as possible  4. To have a normal and acceptable voice quality    Short-term goal(s): Within the first 4 sessions, Ms. Jansen:  1. will demonstrate assigned laryngeal massage techniques with 80% accuracy or better with no clinician support  2. will be able to independently list key factors in maintenance of good vocal hygiene with 80% accuracy, and report on their use outside the therapy room.  3. will utilize silent inhalation with good low-respiratory engagement 75% of the time during therapy tasks with minimal clinician support  4. will demonstrate semi-occluded vocal tract (SOVT) exercises with at least 80% accuracy with no clinician support    Long-term goal(s): In 6 months, Ms. Jansen will:  1. Report a week of typical vocal activities, in which dysphonia and throat discomfort do not exceed a level of 3 out of 10, 80% of the time   2. Report resolution of symptoms, and use of optimal voice quality and comfort to meet personal, social, and professional needs, 90% of the time during a typical week of vocal activities  Certification period:   Certification date from: 12/31/19  Certification date to: 3/30/20    This treatment plan was developed with the patient who agreed with the recommendations.    _______________________________________________________________________  THERAPY NOTE (CPT 53241)  Date of Service: 12/31/2019    SUBJECTIVE / OBJECTIVE:  Please refer to my evaluation report from today's encounter for full details regarding subjective data, patient reported measures, and diagnostic findings.    THERAPEUTIC ACTIVITIES  Semi-Occluded Vocal Tract (SOVT) exercises instructed to  "reduce laryngeal tension, promote vocal fold pliability, and coordinate respiration and phonation    Straw phonation with water resistance was found to be most facilitating     Sustained phonation, and voice vs. voiceless productions used to promote easy voicing and raise awareness of laryngeal tension    Ascending and descending glides utilized to promote vocal fold pliability    \"Messa di voce\", gradual crescendo and decrescendo to vary medial compression was also utilized to promote vocal fold pliability.    Instructed on the benefits of using these exercises for improved coordination of breath flow with phonation and tissue mobilization.    Instructed on the importance of using these exercises as a warm-up / cool down,  and to re-calibrate the voice throughout the day.    Exercises in techniques for improved airflow during phonation    Speech material with /ju/ glides was facilitating at the word level.    Progressed to easy onset/ flow, and blending phrases    Hermitage techniques to reduce glottal evans and improve breath flow; negative practice improved awareness today.    Counseling and Education    Asked questions about the nature of her symptoms, and I answered all of these thoroughly.    Concepts of an optimal regimen for practice were instructed.  o She should use an interval schedule of practice, with brief periods of practice frequently throughout each day  o Hermitage concepts of volitional practice to facilitate motor learning.    I provided an audio recording and handouts of today's therapeutic activities to facilitate practice.    ASSESSMENT/PLAN  PROGRESS TOWARD LONG TERM GOALS:   Minimal at this point, as this is first session, but good learning today    IMPRESSIONS: R49.0 (Dysphonia), R07.0 (Throat Pain) and J38.3 (Lesion Of The Vocal Fold)     PLAN: I will see Ms. Jansen in March, but intend to see her sooner by wait list.    TOTAL SERVICE TIME: 60 minutes  EVALUATION OF VOICE AND RESONANCE " (10032)  TREATMENT (55722)  NO CHARGE FACILITY FEE (30972)    Neetu Jett M.M. (voice), M.A., CCC/SLP  Speech-Language Pathologist  [facility] Trained Vocologist  [facility]  [phone]  [email or web address]  Prounouns: she/her                                                                                                     Outpatient Speech Language Therapy Evaluation  PLAN OF TREATMENT FOR OUTPATIENT REHABILITATION  (COMPLETE FOR INITIAL CLAIMS ONLY)  Patient's Last Name, First Name, M.I.  YOB: 1990  Hilaria Jansen                        Provider's Name  Neetu Jett, SLP Medical Record No.  [id number]                               Onset Date:  12/31/19   Start of Care Date: 12/31/19     Type: Speech Language Therapy Medical Diagnosis: No primary diagnosis found.                        Therapy Diagnosis:  No primary diagnosis found.   Visits from SOC:  1   _________________________________________________________________________________  Plan of Treatment:   Speech therapy    DURATION/FREQUENCY OF TREATMENT  Six weekly, one-hour sessions, with two monthly one-hour follow-up sessions    PROGNOSIS  Good prognosis for voice improvement with speech therapy and regular practice of therapeutic activities.    BARRIERS TO LEARNING/TEACHING AND LEARNING NEEDS  None/Unremarkable    GOALS:  Patient goal:   5. To improve and maintain a healthy voice quality  6. To resolve the vocal fold lesions  7. To understand the problem and fix it as much as possible  8. To have a normal and acceptable voice quality    Short-term goal(s): Within the first 4 sessions, Ms. Jansen:  5. will demonstrate assigned laryngeal massage techniques with 80% accuracy or better with no clinician support  6. will be able to independently list key factors in maintenance of good vocal hygiene with 80% accuracy, and report on their use outside the therapy room.  7. will utilize silent inhalation with good  low-respiratory engagement 75% of the time during therapy tasks with minimal clinician support  8. will demonstrate semi-occluded vocal tract (SOVT) exercises with at least 80% accuracy with no clinician support    Long-term goal(s): In 6 months, Ms. Jansen will:  1. Report a week of typical vocal activities, in which dysphonia and throat discomfort do not exceed a level of 3 out of 10, 80% of the time   2. Report resolution of symptoms, and use of optimal voice quality and comfort to meet personal, social, and professional needs, 90% of the time during a typical week of vocal activities  _________________________________________________________________________________    I CERTIFY THE NEED FOR THESE SERVICES FURNISHED UNDER        THIS PLAN OF TREATMENT AND WHILE UNDER MY CARE     (Physician attestation of this document indicates review and certification of the therapy plan).     Certification date from: 12/31/19  Certification date to: 3/30/20    Referring Provider: Bora Vitale Goding       Again, thank you for allowing me to participate in the care of your patient.      Sincerely,    Neetu Jett, SLP

## 2019-12-31 NOTE — LETTER
12/31/2019       RE: Hilaria Jansen  [address]  [facility] [address]     Dear Colleague,    Thank you for referring your patient, Hilaria Jansen, to the [facility] EAR NOSE AND THROAT at [facility]. Please see a copy of my visit note below.      HISTORY OF PRESENT ILLNESS: Hilaria Jansen is a 29 year old female with a history of throat pain.  She was initially seen by Regina Reeves PA-C in August with a 1 month history of symptoms.  The pain was on both sides of her neck just above the thyroid gland.  There was also concern about a lump on the left side where she had a previous mass removed back in 2015.  This was a left paratracheal mass.  She did not have any associated shortness of breath.  It was noted that she had hoarseness that him going on around the same amount.  There was no trouble with swallowing or pain with swallowing.  There was no weight loss.  The prior peritracheal mass was a 1.7 x 2.4 cm low-attenuation nodule inferior to the left thyroid gland.  She eventually had excision of the neck mass on 9/10/2015.  Description was of a left paratracheal deep neck mass measuring approximate 1.5 x 2.5 cm.  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     On the 8/15/2019 examination there was tenderness to palpation of the thyrohyoid muscles bilaterally.  Fullness along the left thyroid lesion without was present but was tender to palpation.  A CT scan of the neck was performed 8/17/2019 which was a normal CT scan with contrast.  No evidence of mass or adenopathy within the neck was seen.  Due to the continued tenderness of the thyrohyoid muscle with palpation she was referred to laryngology for further evaluation.  She has no swallowing difficulties and no airway difficulties.  She does have a 6-month-old child    Last 2 Scores for Patient-Answered VHI Questionnaire  No flowsheet data  found.    Last 2 Scores for Patient-Answered CSI Questionnaire  No flowsheet data found.      Last 2 Scores for Patient-Answered EAT Questionnaire  No flowsheet data found.        PAST MEDICAL HISTORY:   Past Medical History:   Diagnosis Date     Hidradenitis suppurativa      NO ACTIVE PROBLEMS        PAST SURGICAL HISTORY:   Past Surgical History:   Procedure Laterality Date      SECTION N/A 2019    Procedure:  SECTION;  Surgeon: Wendi Coppola MD;  Location: [facility] L+D     SURGICAL PATHOLOGY EXAM Left 09/10/2015    left paratracheal neck mass removed, benign       FAMILY HISTORY:   Family History   Problem Relation Age of Onset     Breast Cancer Paternal Aunt      Other - See Comments Maternal Aunt      Melanoma No family hx of      Skin Cancer No family hx of      Glaucoma No family hx of      Macular Degeneration No family hx of        SOCIAL HISTORY:   Social History     Tobacco Use     Smoking status: Never Smoker     Smokeless tobacco: Never Used   Substance Use Topics     Alcohol use: No       REVIEW OF SYSTEMS: Ten point review of systems was performed and is negative except for:   [facility] ROS 2019   Constitutional Weight loss   Ears, Nose, Throat Sore throat        ALLERGIES: Flagyl [metronidazole] and Keflex [cephalexin]    MEDICATIONS:   Current Outpatient Medications   Medication Sig Dispense Refill     norethindrone (MICRONOR) 0.35 MG tablet Take 1 tablet (0.35 mg) by mouth daily 90 tablet 3     order for DME Equipment being ordered: Abdominal binder 1 Device 0     order for DME Equipment being ordered: bilateral electric breast pump 1 Device 0     docusate sodium (COLACE) 100 MG capsule Take 1 capsule (100 mg) by mouth 2 times daily (Patient not taking: Reported on 2019) 100 capsule 1     ferrous gluconate (FERGON) 324 (38 Fe) MG tablet Take 1 tablet (324 mg) by mouth daily (with breakfast) (Patient not taking: Reported on 2019) 90 tablet 2     Prenatal  Vit-Fe Fumarate-FA (PRENATAL MULTIVITAMIN W/IRON) 27-0.8 MG tablet Take 1 tablet by mouth daily (Patient not taking: Reported on 12/31/2019) 100 tablet 3         PHYSICAL EXAMINATION:  She  is awake, alert and in no apparent distress.    Her tympanic membranes are clear and intact bilaterally. External auditory canals are clear.  Nasal exam shows a right septal deviation without obstruction.  Examination of the oral cavity shows no suspicious lesions.  There is symmetric movement of the tongue and soft palate.    The oropharynx is clear.  Her neck is supple without significant adenopathy.  There is significant tenderness to palpation of the thyrohyoid muscle on both sides pulse is regular.  Upper airway is clear.  Cranial nerves II-XII are grossly intact.       PROCEDURE: A flexible laryngoscopy  was performed.  Informed consent was obtained and a time out was performed. 3% lidocaine and 0.25% phenylephrine was sprayed into the nasal cavity and allowed 3 to 5 minutes for effect. The scope was passed through the left sided nostril. She had difficulty tolerating the exam due to nasal discomfort. Examination showed the vocal folds to be mobile and meet in the midline.  No nodules, polyps or ulcerations are seen.  There is mild inflammation or erythema of the supraglottic or glottic larynx.  With phonation there is moderate to severe contraction of the supraglottic larynx.  The hypopharynx is otherwise clear as is the subglottis.     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  She has a prior CT scan which shows no abnormal neck masses.  We did explain the pathology of her neck pain and we will have her work with our speech-language pathologist for her muscle tension dysphonia.  All questions were answered and I will see her back on an as-needed basis after completion of speech therapy trial..      Again, thank you for allowing me to  participate in the care of your patient.      Sincerely,    Bora Banerjee MD

## 2019-12-31 NOTE — PROGRESS NOTES
[facility] VOICE CLINIC  Bora Banerjee Jr., M.D., F.A.C.S.  Regina Frank M.D., M.P.H.  Niurka Saini, Ph.D., CCC/SLP  Neetu Jett M.M. (voice), M.A., CCC/SLP  Orlando Worthington M.M. (voice), M.A., CCC/SLP    Evaluation report    Clinician: Neetu Jett M.M. (voice), M.A., CCC/SLP  Seen in conjunction with: Dr. Banerjee  Referring physician:  Chriss  Patient: Hilaria Jansen  Date of Visit: 2019    HISTORY  Chief complaint: Hilaria Jansen is a 29 year old presenting today for evaluation of voice and throat issues.  Salient history: She has a history significant for throat pain dating back to . She was initially diagnosed by Regina Reeves PA-C in 2019 for her throat pain in issues.  She is a new mom with a 6 month old baby.    Ms. Jansen is joined by a Maori .    CURRENT SYMPTOMS INCLUDE  VOICE    Poor voice quality since 2019.    Voice quality is intermittent in severity, but gradually worsening    THROAT ISSUES    Was initially seen by Regina Reeves PA-C in 2019.  At that time she presented with a complaint of throat pain and hoarseness.  The pain was palpable in the thyrohyoid region.  CT scan of the neck was negative for mass or malignancy.    However, she has a history of a left paratracheal mass, which was removed in .    Ms. Jansen reports that her voice changes began after the removal of the mass in .  She denies symptoms of dysphasia, or pain with swallowing following her procedure.    Please review Dr. Banerjee's report for additional information on her mass.    Patient denies significant dyspnea, dysphagia, cough and pain.     OTHER PERTINENT HISTORY    Complex medical history: please also refer to Dr. Banerjee's dictation.     Past Medical History:   Diagnosis Date     Hidradenitis suppurativa      NO ACTIVE PROBLEMS      Past Surgical History:   Procedure Laterality Date      SECTION N/A 2019    Procedure:  SECTION;  Surgeon:  Wendi Coppola MD;  Location:  L+D     SURGICAL PATHOLOGY EXAM Left 09/10/2015    left paratracheal neck mass removed, benign       OBJECTIVE  PATIENT REPORTED MEASURES  Patient Supplied Answers To VHI Questionnaire  No flowsheet data found.     Patient Supplied Answers To CSI Questionnaire  No flowsheet data found.    Patient Supplied Answers To EAT Questionnaire  No flowsheet data found.      PERCEPTUAL EVALUATION (CPT 29188)  POSTURE / TENSION:     upper body    neck and shoulders    BREATHING:     inspirations are inadequate in volume and frequency    clavicular elevation on inspiration    phonation is not coordinated with respiration    LARYNGEAL PALPATION:     firm musculature    tenderness of the thyrohyoid area    reduced thyrohyoid space    left greater than right    VOICE:    Roughness: WNL    Breathiness: Mild to moderate    Strain: Severe    Loudness    Conversational speech:  Severely reduced    Projected speech:  Profoundly reduced    Pitch:    Conversational speech:  Moderate to severely elevated    Pitch glide: limited    Resonance:    Conversational speech:  laryngeal pharyngeal resonance    Singing vs. Speech:  n/a    CAPE-V Overall Severity:  70/100    COUGH/THROAT CLEARING:    Not observed    LARYNGEAL FUNCTION STUDIES (CPT 19779)  Not completed as voice quality is severe.    LARYNGEAL EXAMINATION  Procedure: Flexible endoscopy with chip-tip technology without stroboscopy, left nostril; topical anesthesia with 3% Lidocaine and 0.25% phenylephrine was applied.   Performed by: Dr. Banerjee   The laryngeal and pharyngeal structures were evaluated for gross appearance, mobility, function, and focal lesions / abnormalities of the associated mucosa.    All findings were within normal limits with the exception of the following salient features:     Mild to moderate presence of thick secretions    Subtle bilateral swellings along the mid-membranous folds bilaterally; right slightly more present  than left.    Normal mobility and elongation noted    Moderate to severe 4-way supraglottic hyperfunction during connected speech.    THERAPY PROBES: Improvement was elicited with use of forward resonant stimuli, coordination of respiration and phonation and use of yawn sigh        The laryngeal exam was reviewed with Ms. Jansen, and I provided pertinent explanations, as well as written and oral information.    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  Remarkable findings of the evaluations included subtle bilateral changes along the mid-membranous portion of the vocal folds bilaterally; likely associated with trauma.   Dysphonia/discomfort is accounted for by the hyperfunction and imbalanced function of the intrinsic and extrinsic laryngeal musculature  .     STIMULABILITY: results of therapy probes during perceptual and laryngeal evaluation demonstrate improvement with use of forward resonant stimuli, coordination of respiration and phonation and use of yawn sigh    RECOMMENDATIONS:     A course of speech therapy is recommended to optimize vocal technique, improve voice quality, promote reduced discomfort, effort and fatigue, promote reduction of vocal fold impact to help resolve the lesions and help reduce mucosal irritation.    She demonstrates a Good prognosis for improvement given adherence to therapeutic recommendations.     Positive indicators: positive response to therapy probes diagnosis is known to respond to treatment high level of comittment    Negative indicators: none    DURATION / FREQUENCY: 3 biweekly one-hour sessions.  A total of 6-8 sessions may be necessary.    GOALS:  Patient goal:   1. To improve and maintain a healthy voice quality  2. To resolve the vocal fold lesions  3. To  understand the problem and fix it as much as possible  4. To have a normal and acceptable voice quality    Short-term goal(s): Within the first 4 sessions, Ms. Jansen:  1. will demonstrate assigned laryngeal massage techniques with 80% accuracy or better with no clinician support  2. will be able to independently list key factors in maintenance of good vocal hygiene with 80% accuracy, and report on their use outside the therapy room.  3. will utilize silent inhalation with good low-respiratory engagement 75% of the time during therapy tasks with minimal clinician support  4. will demonstrate semi-occluded vocal tract (SOVT) exercises with at least 80% accuracy with no clinician support    Long-term goal(s): In 6 months, Ms. Jansen will:  1. Report a week of typical vocal activities, in which dysphonia and throat discomfort do not exceed a level of 3 out of 10, 80% of the time   2. Report resolution of symptoms, and use of optimal voice quality and comfort to meet personal, social, and professional needs, 90% of the time during a typical week of vocal activities  Certification period:   Certification date from: 12/31/19  Certification date to: 3/30/20    This treatment plan was developed with the patient who agreed with the recommendations.    _______________________________________________________________________  THERAPY NOTE (CPT 67565)  Date of Service: 12/31/2019    SUBJECTIVE / OBJECTIVE:  Please refer to my evaluation report from today's encounter for full details regarding subjective data, patient reported measures, and diagnostic findings.    THERAPEUTIC ACTIVITIES  Semi-Occluded Vocal Tract (SOVT) exercises instructed to reduce laryngeal tension, promote vocal fold pliability, and coordinate respiration and phonation    Straw phonation with water resistance was found to be most facilitating     Sustained phonation, and voice vs. voiceless productions used to promote easy voicing and raise awareness of  "laryngeal tension    Ascending and descending glides utilized to promote vocal fold pliability    \"Messa di voce\", gradual crescendo and decrescendo to vary medial compression was also utilized to promote vocal fold pliability.    Instructed on the benefits of using these exercises for improved coordination of breath flow with phonation and tissue mobilization.    Instructed on the importance of using these exercises as a warm-up / cool down,  and to re-calibrate the voice throughout the day.    Exercises in techniques for improved airflow during phonation    Speech material with /ju/ glides was facilitating at the word level.    Progressed to easy onset/ flow, and blending phrases    Whittingham techniques to reduce glottal evans and improve breath flow; negative practice improved awareness today.    Counseling and Education    Asked questions about the nature of her symptoms, and I answered all of these thoroughly.    Concepts of an optimal regimen for practice were instructed.  o She should use an interval schedule of practice, with brief periods of practice frequently throughout each day  o Whittingham concepts of volitional practice to facilitate motor learning.    I provided an audio recording and handouts of today's therapeutic activities to facilitate practice.    ASSESSMENT/PLAN  PROGRESS TOWARD LONG TERM GOALS:   Minimal at this point, as this is first session, but good learning today    IMPRESSIONS: R49.0 (Dysphonia), R07.0 (Throat Pain) and J38.3 (Lesion Of The Vocal Fold)     PLAN: I will see Ms. Jansen in March, but intend to see her sooner by wait list.    TOTAL SERVICE TIME: 60 minutes  EVALUATION OF VOICE AND RESONANCE (98782)  TREATMENT (48273)  NO CHARGE FACILITY FEE (69067)    Neetu Jett M.M. (voice), M.A., CCC/SLP  Speech-Language Pathologist  [facility] Trained Vocologist  [facility]  [phone]  [email or web address]  Prounouns: she/her                  "

## 2019-12-31 NOTE — PROGRESS NOTES
HISTORY OF PRESENT ILLNESS: Hilaria Jansen is a 29 year old female with a history of throat pain.  She was initially seen by Regina Reeves PA-C in August with a 1 month history of symptoms.  The pain was on both sides of her neck just above the thyroid gland.  There was also concern about a lump on the left side where she had a previous mass removed back in 2015.  This was a left paratracheal mass.  She did not have any associated shortness of breath.  It was noted that she had hoarseness that him going on around the same amount.  There was no trouble with swallowing or pain with swallowing.  There was no weight loss.  The prior peritracheal mass was a 1.7 x 2.4 cm low-attenuation nodule inferior to the left thyroid gland.  She eventually had excision of the neck mass on 9/10/2015.  Description was of a left paratracheal deep neck mass measuring approximate 1.5 x 2.5 cm.  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     On the 8/15/2019 examination there was tenderness to palpation of the thyrohyoid muscles bilaterally.  Fullness along the left thyroid lesion without was present but was tender to palpation.  A CT scan of the neck was performed 8/17/2019 which was a normal CT scan with contrast.  No evidence of mass or adenopathy within the neck was seen.  Due to the continued tenderness of the thyrohyoid muscle with palpation she was referred to laryngology for further evaluation.  She has no swallowing difficulties and no airway difficulties.  She does have a 6-month-old child    Last 2 Scores for Patient-Answered VHI Questionnaire  No flowsheet data found.    Last 2 Scores for Patient-Answered CSI Questionnaire  No flowsheet data found.      Last 2 Scores for Patient-Answered EAT Questionnaire  No flowsheet data found.        PAST MEDICAL HISTORY:   Past Medical History:   Diagnosis Date     Hidradenitis suppurativa      NO ACTIVE PROBLEMS        PAST SURGICAL  HISTORY:   Past Surgical History:   Procedure Laterality Date      SECTION N/A 2019    Procedure:  SECTION;  Surgeon: Wendi Coppola MD;  Location: [facility]     SURGICAL PATHOLOGY EXAM Left 09/10/2015    left paratracheal neck mass removed, benign       FAMILY HISTORY:   Family History   Problem Relation Age of Onset     Breast Cancer Paternal Aunt      Other - See Comments Maternal Aunt      Melanoma No family hx of      Skin Cancer No family hx of      Glaucoma No family hx of      Macular Degeneration No family hx of        SOCIAL HISTORY:   Social History     Tobacco Use     Smoking status: Never Smoker     Smokeless tobacco: Never Used   Substance Use Topics     Alcohol use: No       REVIEW OF SYSTEMS: Ten point review of systems was performed and is negative except for:   [facility] ROS 2019   Constitutional Weight loss   Ears, Nose, Throat Sore throat        ALLERGIES: Flagyl [metronidazole] and Keflex [cephalexin]    MEDICATIONS:   Current Outpatient Medications   Medication Sig Dispense Refill     norethindrone (MICRONOR) 0.35 MG tablet Take 1 tablet (0.35 mg) by mouth daily 90 tablet 3     order for DME Equipment being ordered: Abdominal binder 1 Device 0     order for DME Equipment being ordered: bilateral electric breast pump 1 Device 0     docusate sodium (COLACE) 100 MG capsule Take 1 capsule (100 mg) by mouth 2 times daily (Patient not taking: Reported on 2019) 100 capsule 1     ferrous gluconate (FERGON) 324 (38 Fe) MG tablet Take 1 tablet (324 mg) by mouth daily (with breakfast) (Patient not taking: Reported on 2019) 90 tablet 2     Prenatal Vit-Fe Fumarate-FA (PRENATAL MULTIVITAMIN W/IRON) 27-0.8 MG tablet Take 1 tablet by mouth daily (Patient not taking: Reported on 2019) 100 tablet 3         PHYSICAL EXAMINATION:  She  is awake, alert and in no apparent distress.    Her tympanic membranes are clear and intact bilaterally. External auditory canals  are clear.  Nasal exam shows a right septal deviation without obstruction.  Examination of the oral cavity shows no suspicious lesions.  There is symmetric movement of the tongue and soft palate.    The oropharynx is clear.  Her neck is supple without significant adenopathy.  There is significant tenderness to palpation of the thyrohyoid muscle on both sides pulse is regular.  Upper airway is clear.  Cranial nerves II-XII are grossly intact.       PROCEDURE: A flexible laryngoscopy  was performed.  Informed consent was obtained and a time out was performed. 3% lidocaine and 0.25% phenylephrine was sprayed into the nasal cavity and allowed 3 to 5 minutes for effect. The scope was passed through the left sided nostril. She had difficulty tolerating the exam due to nasal discomfort. Examination showed the vocal folds to be mobile and meet in the midline.  No nodules, polyps or ulcerations are seen.  There is mild inflammation or erythema of the supraglottic or glottic larynx.  With phonation there is moderate to severe contraction of the supraglottic larynx.  The hypopharynx is otherwise clear as is the subglottis.     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  She has a prior CT scan which shows no abnormal neck masses.  We did explain the pathology of her neck pain and we will have her work with our speech-language pathologist for her muscle tension dysphonia.  All questions were answered and I will see her back on an as-needed basis after completion of speech therapy trial..

## 2019-12-31 NOTE — NURSING NOTE
"Chief Complaint   Patient presents with     Consult     Throat pain     Height 1.549 m (5' 0.98\"), weight 67.6 kg (149 lb), unknown if currently breastfeeding.    Koki Menendez, EMT  "

## 2019-12-31 NOTE — PATIENT INSTRUCTIONS
Thank you for choosing  [facility].  Please follow up with Dr. Banerjee as needed or sooner if symptoms worsens or does not improve.     [phone] appointment scheduling option 1 and nurse advice option 3.

## 2020-01-01 PROBLEM — R07.0 THROAT PAIN: Status: ACTIVE | Noted: 2020-01-01

## 2020-01-01 PROBLEM — R49.0 MUSCLE TENSION DYSPHONIA: Status: ACTIVE | Noted: 2020-01-01

## 2020-01-08 NOTE — PROGRESS NOTES
Outpatient Speech Language Therapy Evaluation  PLAN OF TREATMENT FOR OUTPATIENT REHABILITATION  (COMPLETE FOR INITIAL CLAIMS ONLY)  Patient's Last Name, First Name, M.I.  YOB: 1990  Hilaria Jansen                        Provider's Name  Neetu Jett, SLP Medical Record No.  [id number]                               Onset Date:  12/31/19   Start of Care Date: 12/31/19     Type: Speech Language Therapy Medical Diagnosis: No primary diagnosis found.                        Therapy Diagnosis:  No primary diagnosis found.   Visits from SOC:  1   _________________________________________________________________________________  Plan of Treatment:   Speech therapy    DURATION/FREQUENCY OF TREATMENT  Six weekly, one-hour sessions, with two monthly one-hour follow-up sessions    PROGNOSIS  Good prognosis for voice improvement with speech therapy and regular practice of therapeutic activities.    BARRIERS TO LEARNING/TEACHING AND LEARNING NEEDS  None/Unremarkable    GOALS:  Patient goal:   1. To improve and maintain a healthy voice quality  2. To resolve the vocal fold lesions  3. To understand the problem and fix it as much as possible  4. To have a normal and acceptable voice quality    Short-term goal(s): Within the first 4 sessions, Ms. Jansen:  1. will demonstrate assigned laryngeal massage techniques with 80% accuracy or better with no clinician support  2. will be able to independently list key factors in maintenance of good vocal hygiene with 80% accuracy, and report on their use outside the therapy room.  3. will utilize silent inhalation with good low-respiratory engagement 75% of the time during therapy tasks with minimal clinician support  4. will demonstrate semi-occluded vocal tract (SOVT) exercises with at least 80% accuracy with no clinician support    Long-term goal(s): In 6 months, Ms. Jansen will:  1.  Report a week of typical vocal activities, in which dysphonia and throat discomfort do not exceed a level of 3 out of 10, 80% of the time   2. Report resolution of symptoms, and use of optimal voice quality and comfort to meet personal, social, and professional needs, 90% of the time during a typical week of vocal activities  _________________________________________________________________________________    I CERTIFY THE NEED FOR THESE SERVICES FURNISHED UNDER        THIS PLAN OF TREATMENT AND WHILE UNDER MY CARE     (Physician attestation of this document indicates review and certification of the therapy plan).     Certification date from: 12/31/19  Certification date to: 3/30/20    Referring Provider: Bora Banerjee

## 2020-03-08 ENCOUNTER — OFFICE VISIT (OUTPATIENT)
Dept: URGENT CARE | Facility: URGENT CARE | Age: 30
End: 2020-03-08
Payer: COMMERCIAL

## 2020-03-08 ENCOUNTER — ANCILLARY PROCEDURE (OUTPATIENT)
Dept: GENERAL RADIOLOGY | Facility: CLINIC | Age: 30
End: 2020-03-08
Attending: FAMILY MEDICINE
Payer: COMMERCIAL

## 2020-03-08 VITALS
BODY MASS INDEX: 28.36 KG/M2 | DIASTOLIC BLOOD PRESSURE: 79 MMHG | SYSTOLIC BLOOD PRESSURE: 120 MMHG | WEIGHT: 150 LBS | HEART RATE: 74 BPM | TEMPERATURE: 97.9 F | OXYGEN SATURATION: 99 % | RESPIRATION RATE: 20 BRPM

## 2020-03-08 DIAGNOSIS — N76.0 BACTERIAL VAGINOSIS: ICD-10-CM

## 2020-03-08 DIAGNOSIS — M25.521 ELBOW PAIN, RIGHT: ICD-10-CM

## 2020-03-08 DIAGNOSIS — K12.2 UVULITIS: ICD-10-CM

## 2020-03-08 DIAGNOSIS — R10.2 PELVIC PAIN IN FEMALE: Primary | ICD-10-CM

## 2020-03-08 DIAGNOSIS — M77.11 LATERAL EPICONDYLITIS OF RIGHT ELBOW: ICD-10-CM

## 2020-03-08 DIAGNOSIS — K59.00 CONSTIPATION, UNSPECIFIED CONSTIPATION TYPE: ICD-10-CM

## 2020-03-08 DIAGNOSIS — B96.89 BACTERIAL VAGINOSIS: ICD-10-CM

## 2020-03-08 DIAGNOSIS — N73.0 PID (ACUTE PELVIC INFLAMMATORY DISEASE): ICD-10-CM

## 2020-03-08 LAB
ALBUMIN SERPL-MCNC: 3.8 G/DL (ref 3.4–5)
ALBUMIN UR-MCNC: NEGATIVE MG/DL
ALP SERPL-CCNC: 62 U/L (ref 40–150)
ALT SERPL W P-5'-P-CCNC: 24 U/L (ref 0–50)
ANION GAP SERPL CALCULATED.3IONS-SCNC: 3 MMOL/L (ref 3–14)
APPEARANCE UR: CLEAR
AST SERPL W P-5'-P-CCNC: 18 U/L (ref 0–45)
BACTERIA #/AREA URNS HPF: ABNORMAL /HPF
BASOPHILS # BLD AUTO: 0 10E9/L (ref 0–0.2)
BASOPHILS NFR BLD AUTO: 0.3 %
BILIRUB SERPL-MCNC: 0.5 MG/DL (ref 0.2–1.3)
BILIRUB UR QL STRIP: NEGATIVE
BUN SERPL-MCNC: 13 MG/DL (ref 7–30)
CALCIUM SERPL-MCNC: 8.7 MG/DL (ref 8.5–10.1)
CHLORIDE SERPL-SCNC: 110 MMOL/L (ref 94–109)
CO2 SERPL-SCNC: 25 MMOL/L (ref 20–32)
COLOR UR AUTO: YELLOW
CREAT SERPL-MCNC: 0.78 MG/DL (ref 0.52–1.04)
DIFFERENTIAL METHOD BLD: NORMAL
EOSINOPHIL # BLD AUTO: 0.1 10E9/L (ref 0–0.7)
EOSINOPHIL NFR BLD AUTO: 1.2 %
ERYTHROCYTE [DISTWIDTH] IN BLOOD BY AUTOMATED COUNT: 13.5 % (ref 10–15)
ERYTHROCYTE [SEDIMENTATION RATE] IN BLOOD BY WESTERGREN METHOD: 11 MM/H (ref 0–20)
GFR SERPL CREATININE-BSD FRML MDRD: >90 ML/MIN/{1.73_M2}
GLUCOSE SERPL-MCNC: 89 MG/DL (ref 70–99)
GLUCOSE UR STRIP-MCNC: NEGATIVE MG/DL
HCG UR QL: NEGATIVE
HCT VFR BLD AUTO: 38.1 % (ref 35–47)
HGB BLD-MCNC: 12.6 G/DL (ref 11.7–15.7)
HGB UR QL STRIP: ABNORMAL
KETONES UR STRIP-MCNC: NEGATIVE MG/DL
LEUKOCYTE ESTERASE UR QL STRIP: NEGATIVE
LIPASE SERPL-CCNC: 257 U/L (ref 73–393)
LYMPHOCYTES # BLD AUTO: 2.6 10E9/L (ref 0.8–5.3)
LYMPHOCYTES NFR BLD AUTO: 42.7 %
MCH RBC QN AUTO: 27.9 PG (ref 26.5–33)
MCHC RBC AUTO-ENTMCNC: 33.1 G/DL (ref 31.5–36.5)
MCV RBC AUTO: 84 FL (ref 78–100)
MONOCYTES # BLD AUTO: 0.4 10E9/L (ref 0–1.3)
MONOCYTES NFR BLD AUTO: 7 %
NEUTROPHILS # BLD AUTO: 3 10E9/L (ref 1.6–8.3)
NEUTROPHILS NFR BLD AUTO: 48.8 %
NITRATE UR QL: NEGATIVE
NON-SQ EPI CELLS #/AREA URNS LPF: ABNORMAL /LPF
PH UR STRIP: 5 PH (ref 5–7)
PLATELET # BLD AUTO: 200 10E9/L (ref 150–450)
POTASSIUM SERPL-SCNC: 3.8 MMOL/L (ref 3.4–5.3)
PROT SERPL-MCNC: 8.6 G/DL (ref 6.8–8.8)
RBC # BLD AUTO: 4.52 10E12/L (ref 3.8–5.2)
RBC #/AREA URNS AUTO: ABNORMAL /HPF
SODIUM SERPL-SCNC: 138 MMOL/L (ref 133–144)
SOURCE: ABNORMAL
SP GR UR STRIP: 1.02 (ref 1–1.03)
SPECIMEN SOURCE: ABNORMAL
UROBILINOGEN UR STRIP-ACNC: 0.2 EU/DL (ref 0.2–1)
WBC # BLD AUTO: 6 10E9/L (ref 4–11)
WBC #/AREA URNS AUTO: ABNORMAL /HPF
WET PREP SPEC: ABNORMAL

## 2020-03-08 PROCEDURE — 87491 CHLMYD TRACH DNA AMP PROBE: CPT | Performed by: FAMILY MEDICINE

## 2020-03-08 PROCEDURE — 85652 RBC SED RATE AUTOMATED: CPT | Performed by: FAMILY MEDICINE

## 2020-03-08 PROCEDURE — 81025 URINE PREGNANCY TEST: CPT | Performed by: FAMILY MEDICINE

## 2020-03-08 PROCEDURE — 85025 COMPLETE CBC W/AUTO DIFF WBC: CPT | Performed by: FAMILY MEDICINE

## 2020-03-08 PROCEDURE — 87210 SMEAR WET MOUNT SALINE/INK: CPT | Performed by: FAMILY MEDICINE

## 2020-03-08 PROCEDURE — 80053 COMPREHEN METABOLIC PANEL: CPT | Performed by: FAMILY MEDICINE

## 2020-03-08 PROCEDURE — 83690 ASSAY OF LIPASE: CPT | Performed by: FAMILY MEDICINE

## 2020-03-08 PROCEDURE — 87591 N.GONORRHOEAE DNA AMP PROB: CPT | Performed by: FAMILY MEDICINE

## 2020-03-08 PROCEDURE — 74019 RADEX ABDOMEN 2 VIEWS: CPT

## 2020-03-08 PROCEDURE — 73070 X-RAY EXAM OF ELBOW: CPT | Mod: RT

## 2020-03-08 PROCEDURE — 81001 URINALYSIS AUTO W/SCOPE: CPT | Performed by: FAMILY MEDICINE

## 2020-03-08 PROCEDURE — 36415 COLL VENOUS BLD VENIPUNCTURE: CPT | Performed by: FAMILY MEDICINE

## 2020-03-08 PROCEDURE — 99215 OFFICE O/P EST HI 40 MIN: CPT | Performed by: FAMILY MEDICINE

## 2020-03-08 RX ORDER — DOXYCYCLINE 100 MG/1
100 CAPSULE ORAL 2 TIMES DAILY
Qty: 20 CAPSULE | Refills: 0 | Status: SHIPPED | OUTPATIENT
Start: 2020-03-08 | End: 2020-08-06

## 2020-03-08 RX ORDER — CLINDAMYCIN HCL 300 MG
300 CAPSULE ORAL 4 TIMES DAILY
Qty: 40 CAPSULE | Refills: 0 | Status: SHIPPED | OUTPATIENT
Start: 2020-03-08 | End: 2020-08-06

## 2020-03-08 RX ORDER — AMOXICILLIN 250 MG
2 CAPSULE ORAL DAILY
Qty: 100 TABLET | Refills: 0 | Status: SHIPPED | OUTPATIENT
Start: 2020-03-08 | End: 2020-05-06

## 2020-03-08 RX ORDER — METRONIDAZOLE 500 MG/1
500 TABLET ORAL 4 TIMES DAILY
Qty: 40 TABLET | Refills: 0 | Status: SHIPPED | OUTPATIENT
Start: 2020-03-08 | End: 2020-08-06

## 2020-03-08 RX ORDER — TROLAMINE SALICYLATE 10 G/100G
CREAM TOPICAL PRN
Qty: 57 G | Refills: 0 | Status: SHIPPED | OUTPATIENT
Start: 2020-03-08 | End: 2020-05-06

## 2020-03-08 NOTE — PATIENT INSTRUCTIONS
Patient Education     What Is Pelvic Inflammatory Disease (PID)?  PID is an infection of the reproductive organs. Left untreated, it can cause severe damage to the body. PID sometimes causes symptoms bad enough to send you to the emergency room. But in many cases, PID is a  silent  infection with few or no symptoms. Rest assured that the infection can be treated. This can help prevent lasting damage.  Who gets PID?  Although PID can happen at any age, most women get it in their late teens or early 20s. Many don t know they have PID until years later. The longer a woman is infected, the higher her risk of further health problems. The more sexual partners you have the higher the risk. You are also more likely to get PID if you have had it before.   What are the symptoms?  If PID symptoms do happen, they are similar to those of many other health problems. This can make PID hard to detect. Symptoms can include:    Pelvic pain    Pain during sex, or bleeding afterward    Painful or frequent urination    Fever, chills, or other flu-like symptoms    Vaginal discharge with a bad odor    Abnormal vaginal bleeding     Nausea and vomiting    Pain in the upper right abdomen  How did I get PID?    PID happens when certain bacteria infect the reproductive organs. Often, this happens because you are infected with an STD (sexually transmitted disease). In a few cases, women develop PID while using an IUD (intrauterine device) for birth control. This usually happens within the first 3 weeks after the IUD is inserted. PID is thought to happen by the following process:   1. Semen is sent from the penis into the vagina during sex. STD-causing bacteria may enter with the semen.  2. Bacteria may pass through the cervix and enter the uterus.  3. Bacteria travel from the uterus into the fallopian tubes and ovaries, which become infected.  4. The infection can leave the fallopian tubes and spread to other parts of the body.  Treatment can  help  When PID is found and treated early, it can often be cured. But if not treated, PID can cause severe health problems. These include damage to the reproductive organs, pelvic pain, and infertility (problems becoming pregnant). Complications of PID can, in rare cases, even be life-threatening. This is why PID should be treated as early as possible.  Date Last Reviewed: 12/1/2016 2000-2019 [employer]. [address] [place] [address], [place] [address]. All rights reserved. This information is not intended as a substitute for professional medical care. Always follow your healthcare professional's instructions.           Patient Education     Complications of Pelvic Inflammatory Disease (PID)  Complications from untreated PID may take many years to develop. The resulting problems can be painful. They can also cause permanent damage to the reproductive organs. Complications can lead to infertility. The longer a woman has untreated PID, the greater the chance that these problems will occur.    Abscess  The body s immune system forms a puss-filled mass around infected tissue in the fallopian tubes or ovaries. This is called an abscess. An abscess is most likely to form soon after PID infection begins. It can be very painful, and may take months to heal on its own. If not treated, it can cause lasting damage and pain.    Scarring and adhesions  Infection causes the tubes and ovaries to become inflamed. As inflamed tissue slowly heals, scar tissue forms. The fallopian tubes, ovaries, uterus, or other organs can become bound together by bands of scar tissue. These are called adhesions. Scarring and adhesions can be painful and may keep the reproductive organs from working right.    Blocked Fallopian Tubes  The fallopian tubes can become blocked by an abscess, scar tissue, or adhesions. Blockages make it harder for sperm to meet and fertilize an egg. The cilia in the fallopian tubes may also be damaged. Damaged  cilia can t help sperm and eggs move through the tubes. Both of these problems make it much harder for a pregnancy to begin.    Tubal pregnancy  A tubal pregnancy (also called an ectopic pregnancy) happens when fallopian tube damage keeps a fertilized egg from moving through the tube to the uterus. Instead, the embryo starts to grow in the tube. The fallopian tube can t stretch like the uterus does. When the embryo reaches a certain size, the tube may burst. This can be life-threatening for the mother and is always fatal to the embryo.  If the infection spreads  PID infection can spread to other parts of the body. Bacteria can leave the fallopian tubes and infect the abdomen. This is called peritonitis. Nearby organs, such as the bowel and the bladder, may become bound together by scar tissue. This can cause pain and can keep organs from working right. In rare cases, PID infection can also enter the bloodstream. This blood infection can be very dangerous and even fatal.   Date Last Reviewed: 10/1/2017    2013-8363 [employer]. [address] [place] [address]. All rights reserved. This information is not intended as a substitute for professional medical care. Always follow your healthcare professional's instructions.           Patient Education     Understanding Lateral Epicondylitis    Tendons are strong bands of tissue that connect muscles to bones. Lateral epicondylitis affects the tendons that connect muscles in the forearm to the lateral epicondyle. This is the bony knob on the outer side of the elbow. The condition occurs if the extensor tendons of the wrist become red and swollen (irritated). This can cause pain in the elbow, forearm, and wrist. Because the condition is sometimes caused by playing tennis, it is also known as  tennis elbow.   How to say it  LA-tuhr-sloan [id number]   What causes lateral epicondylitis?  The condition most often occurs because of overuse. This can  be from any activity that repeatedly puts stress on the forearm extensor muscles or tendons and wrist. For instance, playing tennis, lifting weights, cutting meat, painting, and typing can all cause the condition. Wear and tear of the tendons from aging or an injury to the tendons can also cause the condition.  Symptoms of lateral epicondylitis  The most common symptom is pain. You may feel it on the outer side of the elbow and down the back of the forearm. It may be worse when moving or using the elbow, forearm, or wrist. You may also feel pain when gripping or lifting things.  Treatment for lateral epicondylitis  Treatments may include:    Resting the elbow, forearm, and wrist. You ll need to avoid movements that can make your symptoms worse. You also may need to avoid certain sports and types of work for a time. This helps relieve symptoms and prevent further damage to the tendons.    Changing the action that caused the problem. For instance, if the tendons were damaged from playing tennis, it may help to change your playing technique or use different equipment. This helps prevent further damage to the tendons.    Using cold packs. Putting an ice pack on the injured area can help reduce pain and swelling.    Taking pain medicines. Taking prescription or over-the-counter pain medicines may help reduce pain and swelling.      Wearing a brace. This helps reduce strain on the muscles and tendons in the forearm, which may relieve symptoms. It is very important to wear the brace properly.    Doing exercises and physical therapy. These help improve strength and range of motion in the elbow, forearm, and wrist.    Getting shots of medicine into the injured area. These may help relieve symptoms for a time.    Having surgery. This may be an option if other treatments fail to relieve symptoms. In many cases, the surgeon removes the damaged tissue.  Possible complications of lateral epicondylitis  If the tendons involved  don t heal properly, symptoms may return or get worse. To help prevent this, follow your treatment plan as directed.  When to call your healthcare provider  Call your healthcare provider right away if you have any of these:    Fever of 100.4 F (38 C) or higher, or as directed    Redness, swelling, or warmth in the elbow or forearm that gets worse    Symptoms that don t get better with treatment, or get worse    New symptoms   Date Last Reviewed: 3/10/2016    [id number] [employer]. [address] [place] [address]. All rights reserved. This information is not intended as a substitute for professional medical care. Always follow your healthcare professional's instructions.           Patient Education     Treating Constipation    Constipation is a common and often uncomfortable problem. Constipation means you have bowel movements fewer than 3 times per week, or strain to pass hard, dry stool. It can last a short time. Or it can be a problem that never seems to go away. The good news is that it can often be treated and controlled.  Eat more fiber  One of the best ways to help treat constipation is to increase your fiber intake. You can do this either through diet or by using fiber supplements. Fiber (in whole grains, fruits, and vegetables) adds bulk and absorbs water to soften the stool. This helps the stool pass through the colon more easily. When you increase your fiber intake, do it slowly to avoid side effects such as bloating. Also increase the amount of water that you drink. Eating more of the following foods can add fiber to your diet.    High-fiber cereals    Whole grains, bran, and brown rice    Vegetables such as carrots, broccoli, and greens    Fresh fruits (especially apples, pears, and dried fruits like raisins and apricots)    Nuts and legumes (especially beans such as lentils, kidney beans, and lima beans)  Get physically active  Exercise helps improve the working of your colon  which helps ease constipation. Try to get some physical activity every day. If you haven t been active for a while, talk to your healthcare provider before starting again.  Laxatives  Your healthcare provider may suggest an over-the-counter product to help ease your constipation. He or she may suggest the use of bulk-forming agents or laxatives. The use of laxatives, if used as directed, is common and safe. Follow directions carefully when using them. See your healthcare provider for new-onset constipation, or long-term constipation, to rule out other causes such as medicines or thyroid disease.  Date Last Reviewed: 7/1/2016 2000-2019 [employer]. [address] [place] [address], [place] [address]. All rights reserved. This information is not intended as a substitute for professional medical care. Always follow your healthcare professional's instructions.

## 2020-03-08 NOTE — PROGRESS NOTES
"SUBJECTIVE:  Chief Complaint   Patient presents with     Mouth Problem     Sore on toungue and pt complains of pain as well x over 1 week.     Pain     abdominal pain X 3 days ago. Pt also complains of headache and chills as well as rt arm pain.      HPI:Hilaria Jansen is a 29 year old female   who presents with the CC of lower abdominal/pelvic pain.    Pain is located in the suprapubic and generalized area, with radiation to None.  The pain is characterized as \"pain\" and cramping,     Pain has been present for 3 day(s) and is rapidly progressive.  EXACERBATING FACTORS: movement/walking  RELIEVING FACTORS: nothing.  ASSOCIATED SX: constipation, bloating and vaginal discharge- white/ yellow  Patient has worsening pain with sexual relations? - yes  Patient's sexual partner has problems of burning urination or pain in the testicles or prostate? - not sure  Surgical history:  No history of appendectomy, cholecystectomy, colectomy and diagnostic laproscopy  She had  8 months ago-  She is breast feeding     Last time she passed stool yesterday-  Hard stool  Last time she urinated today, no dysuria    Also has pain of the uvula with swelling, for 1 week    Also has pain right elbow, - worse with lifting especially lateral epicondyle region,  No specific trauma    Past Medical History:   Diagnosis Date     Hidradenitis suppurativa      NO ACTIVE PROBLEMS      Patient Active Problem List   Diagnosis     Adnexal mass     Abnormal CT scan, neck     Gastroesophageal reflux disease without esophagitis     Sinusitis     Sickle cell trait (H)     Supervision of other normal pregnancy, antepartum     Dry eyes, bilateral     Allergic conjunctivitis of both eyes     Indication for care in labor or delivery     Encounter for triage in pregnant patient     Vaginal discharge     Decreased fetal movement     Pregnancy, supervision, high-risk     S/P      Muscle tension dysphonia     Throat pain "       ALLERGIES:  Flagyl [metronidazole] and Keflex [cephalexin]    MEDs  docusate sodium (COLACE) 100 MG capsule, Take 1 capsule (100 mg) by mouth 2 times daily (Patient not taking: Reported on 12/31/2019)  ferrous gluconate (FERGON) 324 (38 Fe) MG tablet, Take 1 tablet (324 mg) by mouth daily (with breakfast) (Patient not taking: Reported on 12/31/2019)  norethindrone (MICRONOR) 0.35 MG tablet, Take 1 tablet (0.35 mg) by mouth daily (Patient not taking: Reported on 3/8/2020)  order for DME, Equipment being ordered: Abdominal binder (Patient not taking: Reported on 3/8/2020)  order for DME, Equipment being ordered: bilateral electric breast pump (Patient not taking: Reported on 3/8/2020)  Prenatal Vit-Fe Fumarate-FA (PRENATAL MULTIVITAMIN W/IRON) 27-0.8 MG tablet, Take 1 tablet by mouth daily (Patient not taking: Reported on 12/31/2019)    No current facility-administered medications on file prior to visit.       Social History     Tobacco Use     Smoking status: Never Smoker     Smokeless tobacco: Never Used   Substance Use Topics     Alcohol use: No       Family History   Problem Relation Age of Onset     Breast Cancer Paternal Aunt      Other - See Comments Maternal Aunt      Melanoma No family hx of      Skin Cancer No family hx of      Glaucoma No family hx of      Macular Degeneration No family hx of        Review Of Systems    Constitutional:  Negative for fevers, chills, has had fatigue  Skin: negative for rash or lesions  Eyes: negative for eye pain, visual changes  Ears/Nose/Throat: negative for earache  , sinus trouble,  sore throat of the uvula  Respiratory: No shortness of breath, dyspnea on exertion  or hemoptysis  Cardiovascular: negative for, palpitations, tachycardia or chest pain  Gastrointestinal: negative for vomiting,  Had generalized abdominal pain and constipation  Genitourinary: negative for dysuria or hematuria-  Has yellow vaginal discharge  Back: negative for pain with back movement,  CVA  pain  Hematologic/Lymphatic/Immunologic: negative for allergies or swollen nodes  Endocrine: negative for thyroid disorder or diabetes    OBJECTIVE:  /79   Pulse 74   Temp 97.9  F (36.6  C) (Oral)   Resp 20   Wt 68 kg (150 lb)   SpO2 99%   BMI 28.36 kg/m    GENERAL APPEARANCE: alert, moderate distress and cooperative  EYES: EOMI,  PERRL, conjunctiva clear  HENT: ear canals and TM's normal.  Nose and mouth without ulcers, erythema or lesions-  Uvula with tip erythematous and swollen  NECK: supple, nontender, no lymphadenopathy  RESP: lungs clear to auscultation - no rales, rhonchi or wheezes  CV: regular rates and rhythm, normal S1 S2, no murmur noted  ABDOMEN: soft, tenderness moderate generalized  GU_female: external genitalia normal, uterus normal size, shape, and consistency, vaginal discharge: yellow and mucopurulent, cervical motion tenderness, adenexal tenderness to palpation  NEURO: Normal strength and tone, sensory exam grossly normal,  normal speech and mentation  SKIN: no suspicious lesions or rashes  MS-  Right elbow-  PROM flexion/ extension with little pain.  Pain to palpation right lateral epicondyle  Increased pain with resisted pronation/ supination right lateral epicondyle        Abdominal x-ray: -  No obstruction, no free air,  No dilated bowel,  increased amount of stool    X-ray right elbow-  No fracture or chronic degenerative changes   x-ray read by me Debra Norton MD      Results for orders placed or performed in visit on 03/08/20   XR Elbow Right 2 Views     Status: None    Narrative    RIGHT ELBOW TWO VIEWS   3/8/2020 10:50 AM     HISTORY: Elbow pain, right.    COMPARISON: None.      Impression    IMPRESSION: No fracture or effusion. Negative.    DELISA HUBBARD MD   Results for orders placed or performed in visit on 03/08/20   XR Abdomen 2 Views     Status: None    Narrative    ABDOMEN TWO VIEWS  3/8/2020 10:50 AM     HISTORY: Pelvic pain in female.    COMPARISON: None.       Impression    IMPRESSION: Moderate amount of stool in the colon. Bowel gas pattern  is otherwise unremarkable. No suspicious abdominal calcifications. No  evidence of bowel obstruction or air-fluid level.     DELISA HUBBARD MD   Comprehensive metabolic panel     Status: Abnormal   Result Value Ref Range    Sodium 138 133 - 144 mmol/L    Potassium 3.8 3.4 - 5.3 mmol/L    Chloride 110 (H) 94 - 109 mmol/L    Carbon Dioxide 25 20 - 32 mmol/L    Anion Gap 3 3 - 14 mmol/L    Glucose 89 70 - 99 mg/dL    Urea Nitrogen 13 7 - 30 mg/dL    Creatinine 0.78 0.52 - 1.04 mg/dL    GFR Estimate >90 >60 mL/min/[1.73_m2]    GFR Estimate If Black >90 >60 mL/min/[1.73_m2]    Calcium 8.7 8.5 - 10.1 mg/dL    Bilirubin Total 0.5 0.2 - 1.3 mg/dL    Albumin 3.8 3.4 - 5.0 g/dL    Protein Total 8.6 6.8 - 8.8 g/dL    Alkaline Phosphatase 62 40 - 150 U/L    ALT 24 0 - 50 U/L    AST 18 0 - 45 U/L   CBC with platelets differential     Status: None   Result Value Ref Range    WBC 6.0 4.0 - 11.0 10e9/L    RBC Count 4.52 3.8 - 5.2 10e12/L    Hemoglobin 12.6 11.7 - 15.7 g/dL    Hematocrit 38.1 35.0 - 47.0 %    MCV 84 78 - 100 fl    MCH 27.9 26.5 - 33.0 pg    MCHC 33.1 31.5 - 36.5 g/dL    RDW 13.5 10.0 - 15.0 %    Platelet Count 200 150 - 450 10e9/L    % Neutrophils 48.8 %    % Lymphocytes 42.7 %    % Monocytes 7.0 %    % Eosinophils 1.2 %    % Basophils 0.3 %    Absolute Neutrophil 3.0 1.6 - 8.3 10e9/L    Absolute Lymphocytes 2.6 0.8 - 5.3 10e9/L    Absolute Monocytes 0.4 0.0 - 1.3 10e9/L    Absolute Eosinophils 0.1 0.0 - 0.7 10e9/L    Absolute Basophils 0.0 0.0 - 0.2 10e9/L    Diff Method Automated Method    UA reflex to Microscopic and Culture     Status: Abnormal    Specimen: Midstream Urine   Result Value Ref Range    Color Urine Yellow     Appearance Urine Clear     Glucose Urine Negative NEG^Negative mg/dL    Bilirubin Urine Negative NEG^Negative    Ketones Urine Negative NEG^Negative mg/dL    Specific Gravity Urine 1.020 1.003 - 1.035    Blood  Urine Trace (A) NEG^Negative    pH Urine 5.0 5.0 - 7.0 pH    Protein Albumin Urine Negative NEG^Negative mg/dL    Urobilinogen Urine 0.2 0.2 - 1.0 EU/dL    Nitrite Urine Negative NEG^Negative    Leukocyte Esterase Urine Negative NEG^Negative    Source Midstream Urine    Erythrocyte sedimentation rate auto     Status: None   Result Value Ref Range    Sed Rate 11 0 - 20 mm/h   HCG qualitative urine     Status: None   Result Value Ref Range    HCG Qual Urine Negative NEG^Negative   Urine Microscopic     Status: Abnormal   Result Value Ref Range    WBC Urine 0 - 5 OTO5^0 - 5 /HPF    RBC Urine O - 2 OTO2^O - 2 /HPF    Squamous Epithelial /LPF Urine Few FEW^Few /LPF    Bacteria Urine Few (A) NEG^Negative /HPF   Wet prep     Status: Abnormal    Specimen: Vagina   Result Value Ref Range    Specimen Description Vagina     Wet Prep No Trichomonas seen     Wet Prep Clue cells seen (A)     Wet Prep No yeast seen        GC/ chlamydia pending    ASSESSMENT:  1. Pelvic pain in female     - NEISSERIA GONORRHOEA PCR  - CHLAMYDIA TRACHOMATIS PCR  - Comprehensive metabolic panel  - Lipase  - XR Abdomen 2 Views  - Wet prep  - CBC with platelets differential  - UA reflex to Microscopic and Culture  - Erythrocyte sedimentation rate auto  - HCG qualitative urine  - Urine Microscopic    2. Elbow pain, right     - XR Elbow Right 2 Views; Future    3. Constipation, unspecified constipation type    - senna-docusate (SENOKOT-S/PERICOLACE) 8.6-50 MG tablet; Take 2 tablets by mouth daily  Dispense: 100 tablet; Refill: 0    Patient was advised to increase intake of fruits and vegetables to soften the stool.  If there is persistent constipation/ hard stool/  Discomfort passing stool then an OTC stool softener/ laxative could be added daily or every other day, like docusate,  Metamucil  - she declined miralax    4. PID (acute pelvic inflammatory disease)       - clindamycin (CLEOCIN) 300 MG capsule; Take 1 capsule (300 mg) by mouth 4 times daily  for 10 days  Dispense: 40 capsule; Refill: 0  - metroNIDAZOLE (FLAGYL) 500 MG tablet; Take 1 tablet (500 mg) by mouth 4 times daily for 10 days  Dispense: 40 tablet; Refill: 0  - doxycycline hyclate (VIBRAMYCIN) 100 MG capsule; Take 1 capsule (100 mg) by mouth 2 times daily for 10 days  Dispense: 20 capsule; Refill: 0    We discussed that her extreme cervical motion tenderness and   adnexal tenderness are likely signs of PID.  We discussed that there is no simple absolute test to confirm PID, but the pain with movement of the uterus are highly suggestive.     We discussed empiric treatment with 3 antibiotics for  PID, also discussed the possibility of further imaging at the ER to try to accurately identify the site of the infection.    The patient chose to treat now with empiric antibiotics for PID    Due to her cephalosporin rash allergy-  Did not give ceftriaxone IM-  Instead clindamycin  She said she can take metronidazole, though difficult- possible itch in the past    Patient was counseled that her infection  Could involve  a sexually transmitted disease, and that her sexual partner (s) also need evaluation and treatment.  She was counselled that the infection could reoccur if her sexual partner does not receive treatment and they have unprotected sexual relations after completing her antibiotic treatment.      Patient was counselled that if the abdominal symptoms worsen, especially with worsening pain, associated fever, chills, and/or vomiting with inability to keep down foods and liquids,    that she should be re-evaluated in the Emergency Department.      5. Lateral epicondylitis of right elbow     - trolamine salicylate (ASPERCREME) 10 % external cream; Apply topically as needed for moderate pain Apply to elbow  Dispense: 57 g; Refill: 0    Likely associated with lifting her baby in car seat-  She will need to decrease lifting-  Recommend topical treatment-  Less passage in breast milk    6. Bacterial  vaginosis    Will be covered by the metronidazole and clindamycin in treatement for PID    7. Breast feeding status of mother    Discussed the limited types of medications to treat PID,  That the metronidazole and doxycycline should not be taken in breast feeding.  -  Do not have other oral alternatives-  Otherwise she would need IV treatment in the hospital    She should pump her breast milk and discard it during the 10 days of taking antibiotic and give her baby bottle instead for those days    8. Uvulitis    Treated with clindamycin for PID that should cover Uvulitis                 Direct patient care for this visit lasted 45 minutes and more than half of the time involved counseling and coordination of care  Concerning her PID, her medication allergies, managing treatment with breast feeding, her elbow pain, uvula pain and vaginal discharge

## 2020-03-09 LAB
C TRACH DNA SPEC QL NAA+PROBE: NEGATIVE
N GONORRHOEA DNA SPEC QL NAA+PROBE: NEGATIVE
SPECIMEN SOURCE: NORMAL
SPECIMEN SOURCE: NORMAL

## 2020-03-10 ENCOUNTER — HEALTH MAINTENANCE LETTER (OUTPATIENT)
Age: 30
End: 2020-03-10

## 2020-05-06 ENCOUNTER — VIRTUAL VISIT (OUTPATIENT)
Dept: FAMILY MEDICINE | Facility: CLINIC | Age: 30
End: 2020-05-06
Payer: COMMERCIAL

## 2020-05-06 DIAGNOSIS — J02.9 SORE THROAT: ICD-10-CM

## 2020-05-06 DIAGNOSIS — Z87.42 HISTORY OF PID: ICD-10-CM

## 2020-05-06 DIAGNOSIS — O92.79 CLOGGED DUCT, POSTPARTUM: Primary | ICD-10-CM

## 2020-05-06 DIAGNOSIS — N61.0 MASTITIS: ICD-10-CM

## 2020-05-06 PROCEDURE — 99215 OFFICE O/P EST HI 40 MIN: CPT | Mod: 95 | Performed by: NURSE PRACTITIONER

## 2020-05-06 RX ORDER — DIAPER,BRIEF,ADULT, DISPOSABLE
1200 EACH MISCELLANEOUS 3 TIMES DAILY
Qty: 90 CAPSULE | Refills: 3 | Status: SHIPPED | OUTPATIENT
Start: 2020-05-06 | End: 2021-02-01

## 2020-05-06 RX ORDER — CEPHALEXIN 500 MG/1
500 CAPSULE ORAL 4 TIMES DAILY
Qty: 40 CAPSULE | Refills: 0 | Status: SHIPPED | OUTPATIENT
Start: 2020-05-06 | End: 2020-07-07

## 2020-05-06 NOTE — PROGRESS NOTES
"Hilaria Jansen is a 30 year old female who is being evaluated via a billable video visit.      The patient has been notified of following:     \"This video visit will be conducted via a call between you and your physician/provider. We have found that certain health care needs can be provided without the need for an in-person physical exam.  This service lets us provide the care you need with a video conversation.  If a prescription is necessary we can send it directly to your pharmacy.  If lab work is needed we can place an order for that and you can then stop by our lab to have the test done at a later time.    Video visits are billed at different rates depending on your insurance coverage.  Please reach out to your insurance provider with any questions.    If during the course of the call the physician/provider feels a video visit is not appropriate, you will not be charged for this service.\"    Patient has given verbal consent for Video visit? Yes    How would you like to obtain your AVS? Angi    Patient would like the video invitation sent by: Send to e-mail at: [email or web address]    Will anyone else be joining your video visit? No      Subjective     Hilaria Jansen is a 30 year old female who presents to clinic today for the following health issues:  Accompanied by Yi , Lisa.    Appt note:  \"Patient went back to work and had engorge breast and was breast feeding with her baby. Now patient has a hard painful lump in her right breast.\"    Feeling pain on left breast for more than a month.  Sometimes right breast, as well.  Feels lump on top portion of left breast.  It has not been reddened.  The pain is like a sharp pain \"like you gets in the lower part of the abdomen.\"  \"I am feeling like I have chills and I have the flu\" for the past two weeks.  Also having sore throat.  Does not hurt to swallow.  Feels like there is a lump on throat - hard to describe, but endorses swollen and " "painful lymph nodes.  Tonsils seem enlarged and there is white  Denies cough, SOB, n/v/d, abdominal pain, dysuria, rash.    On March 8 prescribed antibiotics for abdominal pain (at [facility]).  Patient reports that they diagnosed PID, but the labs did not support this.  Xray showed stool in colon.    Took cephalexin in pregnancy and had itching - no swelling or breathing problems with it    Nurses 11 month old son at the breast.  He is teething - four on top and bottom - and has bitten while nursing and caused a non-bleeding wound.  Working at [employer] since November 2019 - has one break per day to pump and this has not changed      HPI      Reviewed and updated as needed this visit by Provider         Review of Systems   ROS COMP: Constitutional, HEENT, cardiovascular, pulmonary, gi and gu systems are negative, except as otherwise noted.      Objective    There were no vitals taken for this visit.  Estimated body mass index is 28.36 kg/m  as calculated from the following:    Height as of 12/31/19: 1.549 m (5' 0.98\").    Weight as of 3/8/20: 68 kg (150 lb).  Physical Exam     GENERAL: alert and no distress, slightly fatigued  EYES: Eyes grossly normal to inspection, conjunctivae and sclerae normal  THROAT:  Rubs anterior cervical lymph nodes in describing swelling and discomfort, unable to see inside mouth, neck appear symmetric  RESP: no audible wheeze, cough, or visible cyanosis.  No visible retractions or increased work of breathing.  Able to speak fully in complete sentences.  BREAST: left breast has no focal redness in the area patient indicates pain and lump, nipple is intact.  Infant nurses briefly during visit - noted to be on and off regularly  NEURO: Cranial nerves grossly intact, mentation intact and speech normal  PSYCH: mentation appears normal, affect normal/bright, judgement and insight intact, normal speech and appearance well-groomed      Diagnostic Test Results:  Labs reviewed in Epic  none    "      Assessment & Plan     (O92.79) Clogged duct, postpartum  (primary encounter diagnosis)  Comment:   Plan: lecithin (LECITHIN) 1200 MG CAPS capsule        Also see patient instructions that will be mailed to the patient    (N61.0) Mastitis  Comment:   Plan: lecithin (LECITHIN) 1200 MG CAPS capsule,         cephALEXin (KEFLEX) 500 MG capsule   No redness, but hx and symptoms otherwise c/w mastitis including flu-like symptoms, plugged duct and nipple damage preceding flu-like symptoms.  Complicated by sore throat - possible strep.  Discussed allergy to cephalexin - patient had mild itching without respiratory affects and she is not confident it was related to the antibiotic.  Agrees to trial of the medication as this covers both mastitis and strep.  Follow-up in two days if no symptom improvement or new symptoms appear - may need clinic visit    No body aches, headache, respiratory symptoms to suggest covid-19.  Additionally current flu-like symptoms started about 2 weeks ago.    (J02.9) Sore throat  Comment:   Plan: cephALEXin (KEFLEX) 500 MG capsule        Notes exudate, swollen tonsils and chills in absence of cough - would meet Cent[facility] criteria for testing.  Cephalexin will cover both.    (Z87.42) History of PID  Comment:   Plan: Oddly, no note is visible for this encounter, but I can see clindamycin was prescribed and I see that C/GC and urine micro were negative.  The pelvic symptoms have resolved so this is not a likely factor explaining current flu-like symptoms.              Patient Instructions   Start antibiotic  Start sunflower lecithin 1200mg each - 3 times /day     For Plugged ducts:    Nurse often  1. Use warm compress over plugged area and massage before starting to nurse  2. Breast compression while nursing  3. Ice on plugged area after nursing for comfort  4. Ibuprofen 600 mg for mother 3 times/day for pain control  5. Take off bra during nursing and keep clothing and bra loose to avoid pressure on  "milk ducts  6. Change nursing positions during one feeding or every other feeding  7.  Point baby's CHIN to the plugged duct.  This may require holding baby in odd positions or doing a \"dangle feeding\"  8.  Use electric toothbrush or gentle massager over the plugged area before nursing  9. Lecithin  Supplement for mother- 2 capsules (1200mg each ) - 3 times /day or      Lecithin liquid:   1 Tablespoon 3 times/day and reduce saturated fats in diet - this is often VERY helpful.  I also recommend moms consider continuing this if they have recurrent plugged ducts            Return in about 2 days (around 5/8/2020) for if not any better.    LEONA Neal CNP  [facility]      Video-Visit Details    Type of service:  Video Visit  Video End Time:9:58 AM  Video Start Time:  9:16 am  Total:  42 minutes    Originating Location (pt. Location): Home    Distant Location (provider location):  [facility]     Platform used for Video Visit: AmWell    Return in about 2 days (around 5/8/2020) for if not any better.       LEONA Neal CNP      "

## 2020-05-06 NOTE — PATIENT INSTRUCTIONS
"If your symptoms have not improved by Friday OR you have any new symptoms (particularly cough, shortness of breath, chest pain, significant muscle aches, headache or loss of sense of smell) please contact the clinic.    Start antibiotic  Start sunflower lecithin 1200mg each - 3 times /day     For Plugged ducts:    Nurse often  1. Use warm compress over plugged area and massage before starting to nurse  2. Breast compression while nursing  3. Ice on plugged area after nursing for comfort  4. Ibuprofen 600 mg for mother 3 times/day for pain control  5. Take off bra during nursing and keep clothing and bra loose to avoid pressure on milk ducts  6. Change nursing positions during one feeding or every other feeding  7.  Point baby's CHIN to the plugged duct.  This may require holding baby in odd positions or doing a \"dangle feeding\"  8.  Use electric toothbrush or gentle massager over the plugged area before nursing  9. Lecithin  Supplement for mother- 2 capsules (1200mg each ) - 3 times /day or      Lecithin liquid:   1 Tablespoon 3 times/day and reduce saturated fats in diet - this is often VERY helpful.  I also recommend moms consider continuing this if they have recurrent plugged ducts        "

## 2020-07-02 ENCOUNTER — VIRTUAL VISIT (OUTPATIENT)
Dept: OTOLARYNGOLOGY | Facility: CLINIC | Age: 30
End: 2020-07-02
Payer: COMMERCIAL

## 2020-07-02 DIAGNOSIS — R49.0 MUSCLE TENSION DYSPHONIA: Primary | ICD-10-CM

## 2020-07-02 DIAGNOSIS — J38.3 LESION OF TRUE VOCAL CORD: ICD-10-CM

## 2020-07-02 DIAGNOSIS — R07.0 THROAT PAIN: ICD-10-CM

## 2020-07-02 NOTE — LETTER
"7/2/2020       RE: Hilaria Jansen  [address]  [facility] [address]     Dear Colleague,    Thank you for referring your patient, Hilaria Jansen, to the [facility] VOICE at [facility]. Please see a copy of my visit note below.    Hilaria Jansen is a 30 year old female who is being cared for via a billable virtual visit.      The Hilaria Jansen has been notified and verbally consented to the following:     \"This billable virtual visit will be conducted between you and your provider.\"     \"Patient has opted to conduct today's billable virtual visit vs an in-person appointment, and is not able to attend due to possible exposure to COVID-19\"    If during the course of the call the provider feels the appointment is not appropriate, you will not be charged for this service.\"     Provider has received verbal consent for billable virtual visit from the patient? Yes    Preferred method for receiving information: email/ my chart    Call initiated at: 8:57 AM  Platform used to conduct today's virtual appointment: BitX phone  Location of provider: Residence  Location of patient: Residence/ car    Joined by [place]     [facility] VOICE CLINIC  THERAPY NOTE (CPT 91409)  Patient: Hilaria Jansen  Date of Service: 7/2/2020  Referring physician: Dr. Banerjee  Impressions from most recent evaluation (12/31/19):  \"IMPRESSIONS: Hilaria Jansen is a 29 year old female, presenting today with R49.0 (Dysphonia), R07.0 (Throat Pain) and J38.3 (Lesion Of The Vocal Fold), and lesion of the vocal folds, as evidenced by evaluation and  the laryngeal exam.  Remarkable findings of the evaluations included subtle bilateral changes along the mid-membranous portion of the vocal folds bilaterally; likely associated with trauma.   Dysphonia/discomfort is accounted for by the hyperfunction and imbalanced function of the intrinsic and extrinsic laryngeal musculature  . " "    SUBJECTIVE:  Since the last appointment, Ms. Jansen reports the following:     Overall she reports that symptoms have improved    Voice still fatigues    OBJECTIVE:  Ms. Jansen presents today with the following:    PATIENT REPORTED MEASURES:  Patient Supplied Answers To SLP QOL Questionnaire  No flowsheet data found.  Speech follow up as discussed with patient:  Dysponia SLP Goals 7/2/2020   How would you rate your speaking voice quality, if 0 is worst voice quality, and 10 is best voice? 7   How how severe is your [Throat Discomfort - or use patient specific description], if 0 is no [discomfort] at all and 10 is the worst [discomfort]? 7       THERAPEUTIC ACTIVITIES    Semi-Occluded Vocal Tract (SOVT) exercises instructed to reduce laryngeal tension, promote vocal fold pliability, and coordinate respiration and phonation    /z/ s found to be most facilitating     Sustained phonation, and voice vs. voiceless productions used to promote easy voicing and raise awareness of laryngeal tension    Ascending and descending glides utilized to promote vocal fold pliability    \"Messa di voce\", gradual crescendo and decrescendo to vary medial compression was also utilized to promote vocal fold pliability.    Instructed on the benefits of using these exercises for improved coordination of breath flow with phonation and tissue mobilization.    Instructed on the importance of using these exercises as a warm-up / cool down,  and to re-calibrate the voice throughout the day.    Counseling and Education:    Asked many questions about the nature of her symptoms, and I answered all of these thoroughly.    A revised regimen for home practice was instructed.    I provided an audio recording and handouts of today's therapeutic activities to facilitate practice.    ASSESSMENT/PLAN  PROGRESS TOWARD LONG TERM GOALS:   Adequate progress; too early for objective measures    IMPRESSIONS:  R49.0 (Dysphonia), R07.0 (Throat Pain) and J38.3 " (Lesion Of The Vocal Fold), and lesion of the vocal folds.  Ms. Jansen and I had significant difficulty connecting today, and the  was very helpful to get us connected.  I recommended that we not proceed if she was driving; this also took time.  However, she found the exercise learned to be helpful, and I encouraged her to call to schedule an additional appointment.    PLAN: I will see Ms. Jansen in 2 weeks, at which point we will continue therapy.   For practice goals see AVS.     TOTAL SERVICE TIME:   Call Initiated at: 8:57 AM  Call Ended at: 10           CPT Billing Codes:   TREATMENT (62120)  NO CHARGE FACILITY FEE (13056)    Neetu Jett M.M. (voice), M.A., CCC/SLP  Speech-Language Pathologist  [facility] Trained Vocologist  [facility]  [phone]  [email or web address]  Prounouns: she/her      *this report was created in part through the use of computerized dictation software, and though reviewed following completion, some typographic errors may persist.  If there is confusion regarding any of this notes contents, please contact me for clarification            Again, thank you for allowing me to participate in the care of your patient.      Sincerely,    Neetu Jett, SLP

## 2020-07-02 NOTE — PATIENT INSTRUCTIONS
"After Visit Summary    Patient: Hilaria Jansen  Date of Visit: 7/2/2020    PLEASE CHECK EMAIL FOR ADDITIONAL INFORMATION!  Sent by email:    Irritable Larynx syndrome    Tips to Improve Topical Hydration    Hygiene:     Systemic Hydration: internal hydration of the entire body  o Sip liquids throughout the day to improve hydration that is maintained.       Topical Hydration: hydration for the surface mucosa of the larynx and vocal folds.    Please follow strategies learned on the \"Tips for Topical Hydration\" handout    Gargling  o Saline gargles in the morning and evening  o Plain water gargles after meals, and also after throat clearing.    Throat irritation/Cough and Throat clearing suppression:   Problem: throat clearing       Sip of water     Gargle  o With a voice  o Tilt your head side to side  o [place] chirp -  kakakaaa kakakaaa     Swallow    Massage around the \"Jim's apple\" and then pull down and swallow.     Breathe in through rounded lips + out with a repeated  sh   + swallow    Suck on a lozenge with Pectin (avoid mint or menthol) or a sugar-free candy, gum, \"wet\" snacks (apples, pineapple, grapes, etc).  Also consider Xylitol products, like the Spry brand of lozenges, gum, spray    Puppy Sniffs - 2-3 small quick sniffs through the nose and exhale with  sh     Wait \"urge surf\"    Neetu Jett M.M. (voice), MJayyA., CCC/SLP  Speech-Language Pathologist  Certificate of Vocology  [facility]  [phone]  [email or web address]  Prounouns: she/her    "

## 2020-07-07 ENCOUNTER — VIRTUAL VISIT (OUTPATIENT)
Dept: FAMILY MEDICINE | Facility: CLINIC | Age: 30
End: 2020-07-07
Payer: COMMERCIAL

## 2020-07-07 DIAGNOSIS — N92.6 IRREGULAR PERIODS: ICD-10-CM

## 2020-07-07 DIAGNOSIS — N64.4 BREAST PAIN: ICD-10-CM

## 2020-07-07 DIAGNOSIS — Z30.41 ENCOUNTER FOR SURVEILLANCE OF CONTRACEPTIVE PILLS: Primary | ICD-10-CM

## 2020-07-07 PROCEDURE — 99214 OFFICE O/P EST MOD 30 MIN: CPT | Mod: 95 | Performed by: PHYSICIAN ASSISTANT

## 2020-07-07 RX ORDER — ACETAMINOPHEN AND CODEINE PHOSPHATE 120; 12 MG/5ML; MG/5ML
SOLUTION ORAL
COMMUNITY
Start: 2020-04-28 | End: 2020-07-07

## 2020-07-07 RX ORDER — ACETAMINOPHEN AND CODEINE PHOSPHATE 120; 12 MG/5ML; MG/5ML
0.35 SOLUTION ORAL DAILY
Qty: 90 TABLET | Refills: 3 | Status: SHIPPED | OUTPATIENT
Start: 2020-07-07 | End: 2021-02-01

## 2020-07-07 NOTE — PATIENT INSTRUCTIONS
Schedule appointment in person with Юлия Beltran  Be sure to take your birth control everyday at the same time  Return to clinic for any new or worsening symptoms or go to ER Urgent care in off hours

## 2020-07-07 NOTE — PROGRESS NOTES
"Hilaria Jansen is a 30 year old female who is being evaluated via a billable video visit.      The patient has been notified of following:     \"This video visit will be conducted via a call between you and your physician/provider. We have found that certain health care needs can be provided without the need for an in-person physical exam.  This service lets us provide the care you need with a video conversation.  If a prescription is necessary we can send it directly to your pharmacy.  If lab work is needed we can place an order for that and you can then stop by our lab to have the test done at a later time.    Video visits are billed at different rates depending on your insurance coverage.  Please reach out to your insurance provider with any questions.    If during the course of the call the physician/provider feels a video visit is not appropriate, you will not be charged for this service.\"    Patient has given verbal consent for Video visit? Yes  How would you like to obtain your AVS? Angi  Patient would like the video invitation sent by: Text to cell phone: see demographics  Will anyone else be joining your video visit?     Subjective     Hilaria Jansen is a 30 year old female who presents today via video visit for the following health issues:    HPI   Reports irregular periods  She is on Micronor for birth control. She does admit to forgetting doses sometimes.   Gets breakthrough bleeding here and there, just for a day about once month  She is still breastfeeding her baby     The breast pain is still present  It comes and goes  Often comes on with nursing and towards the end of the evening.   She pumps twice a day and nurses 6 times daily   No improvement with the keflex she received in May      Continues to have a sore throat which hasn't changed since her last visit with Юлия. She's been able to have a virtual visit with ENT. She will be getting scheduled for in person visit very soon.    No " other new symptoms             Video Start Time: 1:25        Patient Active Problem List   Diagnosis     Adnexal mass     Abnormal CT scan, neck     Gastroesophageal reflux disease without esophagitis     Sinusitis     Sickle cell trait (H)     Supervision of other normal pregnancy, antepartum     Dry eyes, bilateral     Allergic conjunctivitis of both eyes     Indication for care in labor or delivery     Encounter for triage in pregnant patient     Vaginal discharge     Decreased fetal movement     Pregnancy, supervision, high-risk     S/P      Muscle tension dysphonia     Throat pain     Past Surgical History:   Procedure Laterality Date      SECTION N/A 2019    Procedure:  SECTION;  Surgeon: Wendi Coppola MD;  Location: [facility] L[facility]     SURGICAL PATHOLOGY EXAM Left 09/10/2015    left paratracheal neck mass removed, benign       Social History     Tobacco Use     Smoking status: Never Smoker     Smokeless tobacco: Never Used   Substance Use Topics     Alcohol use: No     Family History   Problem Relation Age of Onset     Breast Cancer Paternal Aunt      Other - See Comments Maternal Aunt      Melanoma No family hx of      Skin Cancer No family hx of      Glaucoma No family hx of      Macular Degeneration No family hx of          Current Outpatient Medications   Medication Sig Dispense Refill     norethindrone (MICRONOR) 0.35 MG tablet Take 1 tablet (0.35 mg) by mouth daily 90 tablet 3     lecithin (LECITHIN) 1200 MG CAPS capsule Take 1 capsule (1,200 mg) by mouth 3 times daily 90 capsule 3     Recent Labs   Lab Test 20  1035 19  1015 19  1156   ALT 24 17  --    CR 0.78 0.76  --    GFRESTIMATED >90 >90  --    GFRESTBLACK >90 >90  --    POTASSIUM 3.8 3.9  --    TSH  --  1.39 1.43        Reviewed and updated as needed this visit by Provider         Review of Systems   CONSTITUTIONAL: NEGATIVE for fever, chills, change in weight  INTEGUMENTARY/SKIN: NEGATIVE for  worrisome rashes, moles or lesions  EYES: NEGATIVE for vision changes or irritation  RESP: NEGATIVE for significant cough or SOB  CV: NEGATIVE for chest pain, palpitations or peripheral edema  GI: NEGATIVE for nausea, abdominal pain, heartburn, or change in bowel habits  : NEGATIVE for frequency, dysuria, or hematuria  MUSCULOSKELETAL: NEGATIVE for significant arthralgias or myalgia  NEURO: NEGATIVE for weakness, dizziness or paresthesias  ENDOCRINE: NEGATIVE for temperature intolerance, skin/hair changes  HEME: NEGATIVE for bleeding problems  PSYCHIATRIC: NEGATIVE for changes in mood or affect      Objective             Physical Exam     GENERAL: Healthy, alert and no distress  EYES: Eyes grossly normal to inspection.  No discharge or erythema, or obvious scleral/conjunctival abnormalities.  RESP: No audible wheeze, cough, or visible cyanosis.  No visible retractions or increased work of breathing.    SKIN: Visible skin clear. No significant rash, abnormal pigmentation or lesions.  NEURO: Cranial nerves grossly intact.  Mentation and speech appropriate for age.  PSYCH: Mentation appears normal, affect normal/bright, judgement and insight intact, normal speech and appearance well-groomed.      Diagnostic Test Results:  Labs reviewed in Epic          ICD-10-CM    1. Encounter for surveillance of contraceptive pills  Z30.41 norethindrone (MICRONOR) 0.35 MG tablet   2. Breast pain  N64.4    3. Irregular periods  N92.6        Irregular periods likely because of missed doses of micronor. Advised to set an alarm on her phone to remind her to take it at the same time each day. Follow up with Юлия for breast exam. Return to clinic for any new or worsening symptoms or go to ER Urgent care in off hours     Patient Instructions   Schedule appointment in person with Юлия Beltran  Be sure to take your birth control everyday at the same time  Return to clinic for any new or worsening symptoms or go to ER Urgent care in off  hours             Video-Visit Details    Type of service:  Video Visit    Video End Time:1:50 PM    Originating Location (pt. Location): Home    Distant Location (provider location):  [facility]     Platform [facility] for Video Visit: [facility]    No follow-ups on file.       Mary Timmons PA-C

## 2020-07-07 NOTE — PROGRESS NOTES
"Hilaria Jansen is a 30 year old female who is being evaluated via a billable telephone visit.      The patient has been notified of following:     \"This telephone visit will be conducted via a call between you and your physician/provider. We have found that certain health care needs can be provided without the need for a physical exam.  This service lets us provide the care you need with a short phone conversation.  If a prescription is necessary we can send it directly to your pharmacy.  If lab work is needed we can place an order for that and you can then stop by our lab to have the test done at a later time.    Telephone visits are billed at different rates depending on your insurance coverage. During this emergency period, for some insurers they may be billed the same as an in-person visit.  Please reach out to your insurance provider with any questions.    If during the course of the call the physician/provider feels a telephone visit is not appropriate, you will not be charged for this service.\"    Patient has given verbal consent for Telephone visit?  Yes    What phone number would you like to be contacted at? [phone]    How would you like to obtain your AVS? MyChart    Subjective     Hilaria Jansen is a 30 year old female who presents via phone visit today for the following health {ACUTE Problem - brief histories:342425}issues:    HPI     Irregular periods coming sometimes a day or nothing since starting birth control  After baby born last year.          {additonal problems for provider to add (Optional):231667}    {HIST REVIEW/ LINKS 2 (Optional):046970}    Reviewed and updated as needed this visit by Provider         Review of Systems   {ROS COMP (Optional):403501}       Objective   Reported vitals:  There were no vitals taken for this visit.   {GENERAL APPEARANCE:50::\"healthy\",\"alert\",\"no distress\"}  PSYCH: Alert and oriented times 3; coherent speech, normal   rate and volume, able to " "articulate logical thoughts, able   to abstract reason, no tangential thoughts, no hallucinations   or delusions  Her affect is { :2306916::\"normal\"}  RESP: No cough, no audible wheezing, able to talk in full sentences  Remainder of exam unable to be completed due to telephone visits    {Diagnostic Test Results (Optional):566633::\"Diagnostic Test Results:\",\"Labs reviewed in Epic\"}        Assessment/Plan:  {Diagnosis, Associated Orders and Comment:753611}    No follow-ups on file.      Phone call duration:  *** minutes    {signature options:324064}      "

## 2020-07-13 ENCOUNTER — OFFICE VISIT (OUTPATIENT)
Dept: URGENT CARE | Facility: URGENT CARE | Age: 30
End: 2020-07-13
Payer: COMMERCIAL

## 2020-07-13 VITALS
SYSTOLIC BLOOD PRESSURE: 118 MMHG | HEART RATE: 82 BPM | DIASTOLIC BLOOD PRESSURE: 78 MMHG | BODY MASS INDEX: 29.72 KG/M2 | TEMPERATURE: 98.3 F | OXYGEN SATURATION: 97 % | WEIGHT: 157.2 LBS

## 2020-07-13 DIAGNOSIS — H02.104: ICD-10-CM

## 2020-07-13 DIAGNOSIS — M77.11 LATERAL EPICONDYLITIS OF RIGHT ELBOW: Primary | ICD-10-CM

## 2020-07-13 PROCEDURE — 65205 REMOVE FOREIGN BODY FROM EYE: CPT | Mod: LT | Performed by: PHYSICIAN ASSISTANT

## 2020-07-13 PROCEDURE — 99213 OFFICE O/P EST LOW 20 MIN: CPT | Mod: 25 | Performed by: PHYSICIAN ASSISTANT

## 2020-07-13 RX ORDER — IBUPROFEN 600 MG/1
600 TABLET, FILM COATED ORAL EVERY 6 HOURS PRN
Qty: 30 TABLET | Refills: 0 | Status: SHIPPED | OUTPATIENT
Start: 2020-07-13 | End: 2021-02-01

## 2020-07-13 NOTE — PATIENT INSTRUCTIONS
Patient Education     Understanding Lateral Epicondylitis    Tendons are strong bands of tissue that connect muscles to bones. Lateral epicondylitis affects the tendons that connect muscles in the forearm to the lateral epicondyle. This is the bony knob on the outer side of the elbow. The condition occurs if the extensor tendons of the wrist become red and swollen (irritated). This can cause pain in the elbow, forearm, and wrist. Because the condition is sometimes caused by playing tennis, it is also known as  tennis elbow.   How to say it  LA-tuhr-sloan ss-ib-UYI-duh-LY-tis   What causes lateral epicondylitis?  The condition most often occurs because of overuse. This can be from any activity that repeatedly puts stress on the forearm extensor muscles or tendons and wrist. For instance, playing tennis, lifting weights, cutting meat, painting, and typing can all cause the condition. Wear and tear of the tendons from aging or an injury to the tendons can also cause the condition.  Symptoms of lateral epicondylitis  The most common symptom is pain. You may feel it on the outer side of the elbow and down the back of the forearm. It may be worse when moving or using the elbow, forearm, or wrist. You may also feel pain when gripping or lifting things.  Treatment for lateral epicondylitis  Treatments may include:    Resting the elbow, forearm, and wrist. You ll need to avoid movements that can make your symptoms worse. You also may need to avoid certain sports and types of work for a time. This helps relieve symptoms and prevent further damage to the tendons.    Changing the action that caused the problem. For instance, if the tendons were damaged from playing tennis, it may help to change your playing technique or use different equipment. This helps prevent further damage to the tendons.    Using cold packs. Putting an ice pack on the injured area can help reduce pain and swelling.    Taking pain medicines. Taking  prescription or over-the-counter pain medicines may help reduce pain and swelling.      Wearing a brace. This helps reduce strain on the muscles and tendons in the forearm, which may relieve symptoms. It is very important to wear the brace properly.    Doing exercises and physical therapy. These help improve strength and range of motion in the elbow, forearm, and wrist.    Getting shots of medicine into the injured area. These may help relieve symptoms for a time.    Having surgery. This may be an option if other treatments fail to relieve symptoms. In many cases, the surgeon removes the damaged tissue.  Possible complications of lateral epicondylitis  If the tendons involved don t heal properly, symptoms may return or get worse. To help prevent this, follow your treatment plan as directed.  When to call your healthcare provider  Call your healthcare provider right away if you have any of these:    Fever of 100.4 F (38 C) or higher, or as directed    Redness, swelling, or warmth in the elbow or forearm that gets worse    Symptoms that don t get better with treatment, or get worse    New symptoms   Date Last Reviewed: 3/10/2016    [id number] [employer]. [address] [place] [address]. All rights reserved. This information is not intended as a substitute for professional medical care. Always follow your healthcare professional's instructions.           Patient Education     Treating Tennis Elbow    Your treatment will depend on how inflamed your tendon is. The goal is to relieve your symptoms and help you regain full use of your elbow.  Rest and medicine  Wearing a tennis elbow splint allows the inflamed tendon to rest. It must be worn properly. It should be placed down the arm past the painful area of the elbow. If it is directly over the inflamed tendon, it can worsen the symptoms. This brace can help the tendon heal. Using your other hand or changing your  also takes stress off the  tendon. Oral nonsteroidal anti-inflammatory medicines (NSAIDs) and ice can relieve pain and reduce swelling.  Exercises and therapy  Your healthcare provider may give you an exercise program. He or she may refer you to a physical therapist. The physical therapist will teach you how to gently stretch and strengthen the muscles around your elbow.  Anti-inflammatory injections  Your healthcare provider may give you injections of an anti-inflammatory medicine, such as cortisone. This helps reduce swelling. You may have more pain at first. But in a few days, your elbow should feel better.  If surgery is needed  If your symptoms persist for a long time, or other treatments don t work, your healthcare provider may recommend surgery. Surgery repairs the inflamed tendon.  Date Last Reviewed: 1/1/2018 2000-2019 [employer]. [address] [place] [address], [place] [address]. All rights reserved. This information is not intended as a substitute for professional medical care. Always follow your healthcare professional's instructions.           Patient Education     Tennis Elbow  Muscles connect to bones by thick, fibrous cords (tendons). When the muscles are overused by repeated motion, the tendons may become inflamed and painful. This condition is called tendonitis.  Tennis elbow (lateral epicondylitis) is a form of tendonitis. It occurs when the forearm muscles are used again and again in a twisting motion. Pain from tennis elbow occurs mainly on the outside of the elbow. But the pain can spread into the forearm and wrist. Your elbow may also be swollen and tender to the touch.  The pain may get worse when you move your arm or do simple activities. Bending your wrist back, shaking hands, or turning a doorknob may cause pain. The pain often gets worse after several weeks or months. Sometimes you may feel pain when your arm is still.  Tennis players who use a backhand stroke with poor technique are more likely to get  tennis elbow. But playing tennis is only one cause of tennis elbow. Other common activities that can cause it include:    Hammering    Painting    Raking  Besides tennis players, people at risk include , gardeners, musicians, and dentists. Sometimes people get tennis elbow without doing anything that would cause the injury.  Treatment includes resting the arm and taking anti-inflammatory medicines. Special splints can help ease symptoms. Symptoms should get better after 4 to 6 weeks of rest. You may need steroid injections if resting and using a splint don t help. After the pain is relieved, you should change your activities so the symptoms don t return. You may need physical therapy. It may include stretching, range-of-motion, and strengthening exercises. These treatments help most cases. You may need surgery if your symptoms continue for 6 months despite treatment.  Home care  Follow these guidelines when caring for yourself at home:    Rest your elbow as needed. Protect it from movement that causes pain. You may be told to use a forearm splint at night to ease symptoms in the morning. Your healthcare provider may recommend a special wrap or splint to compress the muscles of the forearm. This can ease pain during daytime activities. As your symptoms get better, start to move your elbow more.    Put an ice pack on the injured area. Do this for 20 minutes every 1 to 2 hours the first day for pain relief. You can make an ice pack by wrapping a plastic bag of ice cubes in a thin towel. Continue using the ice pack 3 to 4 times a day for the next several days. Then use the ice pack as needed to ease pain and swelling.    You may use acetaminophen or ibuprofen to control pain, unless another pain medicine was prescribed. If you have chronic liver or kidney disease, talk with your healthcare provider before using these medicines. Also talk with your provider if you ve had a stomach ulcer or gastrointestinal  bleeding.    After your elbow heals, avoid the motion that caused your pain. Or learn to move in a way that causes less stress on the tendon. Using a forearm wrap may keep tennis elbow from happening again.    A tennis elbow strap may ease pain and keep you from further injury when you start playing tennis again. You can also lower your risk for injury by warming up before you play and cooling down afterward. You should also use the right equipment. For instance, make sure your racquet has the right  and is the right size for you.  Follow-up care  Follow up with your healthcare provider, or as advised, if your symptoms don t get better after 2 to 3 weeks of treatment.  When to seek medical advice  Call your healthcare provider right away if any of these occur:    Redness over the painful area    Pain, stiffness,  or swelling at the elbow gets worse    Any numbness or tingling in your arm, hands, or fingers    Unexplained fever over 100.4 F (38 C)   Date Last Reviewed: 5/1/2017 2000-2019 [employer]. [address] [place] [address]. All rights reserved. This information is not intended as a substitute for professional medical care. Always follow your healthcare professional's instructions.

## 2020-07-13 NOTE — PROGRESS NOTES
SUBJECTIVE:  Chief Complaint   Patient presents with     Musculoskeletal Problem     rt elbnow     Eye Problem     Hilaria Jansen is a 30 year old female presents with a chief complaint of right elbow tenderness and pain .  How: no injury.  The patient complained of mild and moderate pain  and has had decreased ROM.  Pain exacerbated by repetitive motion and flexion/extension.  Relieved by rest.  She treated it initially with Tylenol. This is not the first time this type of injury has occurred to this patient.     Past Medical History:   Diagnosis Date     Hidradenitis suppurativa      NO ACTIVE PROBLEMS      Allergies   Allergen Reactions     Flagyl [Metronidazole] Itching     07/13/20: Says is not allergic     Keflex [Cephalexin] Rash     Itching all over body     Social History     Tobacco Use     Smoking status: Never Smoker     Smokeless tobacco: Never Used   Substance Use Topics     Alcohol use: No     Family History   Problem Relation Age of Onset     Breast Cancer Paternal Aunt      Other - See Comments Maternal Aunt      Melanoma No family hx of      Skin Cancer No family hx of      Glaucoma No family hx of      Macular Degeneration No family hx of          ROS:  CONSTITUTIONAL:NEGATIVE for fever, chills, change in weight  INTEGUMENTARY/SKIN: NEGATIVE for worrisome rashes, moles or lesions  EYES: POSITIVE for left eyelash growing into eye  ENT/MOUTH: NEGATIVE for ear, mouth and throat problems  RESP:NEGATIVE for significant cough or SOB  CV: NEGATIVE for chest pain, palpitations or peripheral edema  GI: NEGATIVE for nausea, abdominal pain, heartburn, or change in bowel habits  MUSCULOSKELETAL: POSITIVE for left elbow tenderness, pain  VASC: NEGATIVE for cool extremitiy  NEURO: NEGATIVE for weakness, dizziness or paresthesias    EXAM:   /78   Pulse 82   Temp 98.3  F (36.8  C) (Oral)   Wt 71.3 kg (157 lb 3.2 oz)   SpO2 97%   Breastfeeding Yes   BMI 29.72 kg/m    Gen: healthy,alert,no  distress  EYE: Positive for eyelash hanging in eye   Extremity: elbow has point tenderness  and decreased ROM .   VASC: There is not compromise to the distal circulation.  Pulses are +2 and CRT is brisk  CHEST: clear to auscultation  CV: regular rate and rhythm  EXTREMITIES: peripheral pulses normal  MS:  tender to palpation lateral epicondyle  SKIN: no suspicious lesions or rashes  NEURO: Normal strength and tone, sensory exam grossly normal, mentation intact and speech normal    X-RAY was not done.    ASSESSMENT/PLAN:      ICD-10-CM    1. Lateral epicondylitis of right elbow  M77.11 ibuprofen (ADVIL/MOTRIN) 600 MG tablet     order for DME   2. Ectropion of left upper eyelid, unspecified ectropion type  H02.104 REMVOVAL FB EYE, CORNEAL W/O SLIT LAMP         Ice, ROM  Brace   Exercises for lateral epicondylitis    PROCEDURE:  Forceps used to remove hair from eye, pulled from inside skin of upper eyelid

## 2020-08-06 ENCOUNTER — ANCILLARY PROCEDURE (OUTPATIENT)
Dept: GENERAL RADIOLOGY | Facility: CLINIC | Age: 30
End: 2020-08-06
Attending: PHYSICIAN ASSISTANT
Payer: COMMERCIAL

## 2020-08-06 ENCOUNTER — OFFICE VISIT (OUTPATIENT)
Dept: URGENT CARE | Facility: URGENT CARE | Age: 30
End: 2020-08-06
Payer: COMMERCIAL

## 2020-08-06 VITALS
DIASTOLIC BLOOD PRESSURE: 79 MMHG | HEART RATE: 65 BPM | BODY MASS INDEX: 29.02 KG/M2 | TEMPERATURE: 98.3 F | SYSTOLIC BLOOD PRESSURE: 117 MMHG | WEIGHT: 153.5 LBS

## 2020-08-06 DIAGNOSIS — M25.521 RIGHT ELBOW PAIN: Primary | ICD-10-CM

## 2020-08-06 DIAGNOSIS — K29.00 ACUTE GASTRITIS WITHOUT HEMORRHAGE, UNSPECIFIED GASTRITIS TYPE: ICD-10-CM

## 2020-08-06 DIAGNOSIS — R10.84 ABDOMINAL PAIN, GENERALIZED: ICD-10-CM

## 2020-08-06 DIAGNOSIS — M25.521 RIGHT ELBOW PAIN: ICD-10-CM

## 2020-08-06 LAB
ALBUMIN SERPL-MCNC: 3.7 G/DL (ref 3.4–5)
ALBUMIN UR-MCNC: NEGATIVE MG/DL
ALP SERPL-CCNC: 59 U/L (ref 40–150)
ALT SERPL W P-5'-P-CCNC: 18 U/L (ref 0–50)
AMYLASE SERPL-CCNC: 86 U/L (ref 30–110)
ANION GAP SERPL CALCULATED.3IONS-SCNC: 5 MMOL/L (ref 3–14)
APPEARANCE UR: CLEAR
AST SERPL W P-5'-P-CCNC: 15 U/L (ref 0–45)
BACTERIA #/AREA URNS HPF: ABNORMAL /HPF
BASOPHILS # BLD AUTO: 0 10E9/L (ref 0–0.2)
BASOPHILS NFR BLD AUTO: 0.2 %
BILIRUB SERPL-MCNC: 0.5 MG/DL (ref 0.2–1.3)
BILIRUB UR QL STRIP: NEGATIVE
BUN SERPL-MCNC: 10 MG/DL (ref 7–30)
CALCIUM SERPL-MCNC: 8.5 MG/DL (ref 8.5–10.1)
CHLORIDE SERPL-SCNC: 108 MMOL/L (ref 94–109)
CO2 SERPL-SCNC: 24 MMOL/L (ref 20–32)
COLOR UR AUTO: YELLOW
CREAT SERPL-MCNC: 0.73 MG/DL (ref 0.52–1.04)
DIFFERENTIAL METHOD BLD: NORMAL
EOSINOPHIL # BLD AUTO: 0 10E9/L (ref 0–0.7)
EOSINOPHIL NFR BLD AUTO: 0.6 %
ERYTHROCYTE [DISTWIDTH] IN BLOOD BY AUTOMATED COUNT: 13.5 % (ref 10–15)
GFR SERPL CREATININE-BSD FRML MDRD: >90 ML/MIN/{1.73_M2}
GLUCOSE SERPL-MCNC: 89 MG/DL (ref 70–99)
GLUCOSE UR STRIP-MCNC: NEGATIVE MG/DL
HCG UR QL: NEGATIVE
HCT VFR BLD AUTO: 38.9 % (ref 35–47)
HGB BLD-MCNC: 12.5 G/DL (ref 11.7–15.7)
HGB UR QL STRIP: ABNORMAL
KETONES UR STRIP-MCNC: NEGATIVE MG/DL
LEUKOCYTE ESTERASE UR QL STRIP: NEGATIVE
LIPASE SERPL-CCNC: 174 U/L (ref 73–393)
LYMPHOCYTES # BLD AUTO: 1.2 10E9/L (ref 0.8–5.3)
LYMPHOCYTES NFR BLD AUTO: 19.2 %
MCH RBC QN AUTO: 27.5 PG (ref 26.5–33)
MCHC RBC AUTO-ENTMCNC: 32.1 G/DL (ref 31.5–36.5)
MCV RBC AUTO: 86 FL (ref 78–100)
MONOCYTES # BLD AUTO: 0.5 10E9/L (ref 0–1.3)
MONOCYTES NFR BLD AUTO: 8.2 %
NEUTROPHILS # BLD AUTO: 4.4 10E9/L (ref 1.6–8.3)
NEUTROPHILS NFR BLD AUTO: 71.8 %
NITRATE UR QL: NEGATIVE
NON-SQ EPI CELLS #/AREA URNS LPF: ABNORMAL /LPF
PH UR STRIP: 5.5 PH (ref 5–7)
PLATELET # BLD AUTO: 202 10E9/L (ref 150–450)
POTASSIUM SERPL-SCNC: 4.1 MMOL/L (ref 3.4–5.3)
PROT SERPL-MCNC: 8.8 G/DL (ref 6.8–8.8)
RBC # BLD AUTO: 4.55 10E12/L (ref 3.8–5.2)
RBC #/AREA URNS AUTO: ABNORMAL /HPF
SODIUM SERPL-SCNC: 137 MMOL/L (ref 133–144)
SOURCE: ABNORMAL
SP GR UR STRIP: >1.03 (ref 1–1.03)
UROBILINOGEN UR STRIP-ACNC: 0.2 EU/DL (ref 0.2–1)
WBC # BLD AUTO: 6.2 10E9/L (ref 4–11)
WBC #/AREA URNS AUTO: ABNORMAL /HPF

## 2020-08-06 PROCEDURE — 99214 OFFICE O/P EST MOD 30 MIN: CPT | Performed by: PHYSICIAN ASSISTANT

## 2020-08-06 PROCEDURE — 73080 X-RAY EXAM OF ELBOW: CPT | Mod: RT

## 2020-08-06 PROCEDURE — 81001 URINALYSIS AUTO W/SCOPE: CPT | Performed by: PHYSICIAN ASSISTANT

## 2020-08-06 PROCEDURE — 85025 COMPLETE CBC W/AUTO DIFF WBC: CPT | Performed by: PHYSICIAN ASSISTANT

## 2020-08-06 PROCEDURE — 82150 ASSAY OF AMYLASE: CPT | Performed by: PHYSICIAN ASSISTANT

## 2020-08-06 PROCEDURE — 80053 COMPREHEN METABOLIC PANEL: CPT | Performed by: PHYSICIAN ASSISTANT

## 2020-08-06 PROCEDURE — 81025 URINE PREGNANCY TEST: CPT | Performed by: PHYSICIAN ASSISTANT

## 2020-08-06 PROCEDURE — 36415 COLL VENOUS BLD VENIPUNCTURE: CPT | Performed by: PHYSICIAN ASSISTANT

## 2020-08-06 PROCEDURE — 83690 ASSAY OF LIPASE: CPT | Performed by: PHYSICIAN ASSISTANT

## 2020-08-06 RX ORDER — OMEPRAZOLE 40 MG/1
40 CAPSULE, DELAYED RELEASE ORAL DAILY
Qty: 30 CAPSULE | Refills: 0 | Status: SHIPPED | OUTPATIENT
Start: 2020-08-06 | End: 2020-08-06

## 2020-08-06 RX ORDER — IBUPROFEN 600 MG/1
600 TABLET, FILM COATED ORAL EVERY 6 HOURS PRN
Qty: 30 TABLET | Refills: 0 | Status: SHIPPED | OUTPATIENT
Start: 2020-08-06 | End: 2021-02-01

## 2020-08-10 NOTE — PROGRESS NOTES
SUBJECTIVE:   Hilaria Jansen is a 30 year old female presenting with a chief complaint of having right elbow pain and abdominal, stomach discomfort.  Onset of symptoms was 2 week(s) ago.  Course of illness is same.    Severity mild to moderate  Current and Associated symptoms: right elbow tenderness  Treatment measures tried include none.  Predisposing factors include ongoing stomach and elbow problems.    Past Medical History:   Diagnosis Date     Hidradenitis suppurativa      NO ACTIVE PROBLEMS         Allergies   Allergen Reactions     Flagyl [Metronidazole] Itching     07/13/20: Says is not allergic     Keflex [Cephalexin] Rash     Itching all over body     Family History   Problem Relation Age of Onset     Breast Cancer Paternal Aunt      Other - See Comments Maternal Aunt      Melanoma No family hx of      Skin Cancer No family hx of      Glaucoma No family hx of      Macular Degeneration No family hx of        Social History     Tobacco Use     Smoking status: Never Smoker     Smokeless tobacco: Never Used   Substance Use Topics     Alcohol use: No       ROS:  CONSTITUTIONAL:NEGATIVE for fever, chills, change in weight  INTEGUMENTARY/SKIN: NEGATIVE for worrisome rashes, moles or lesions  EYES: NEGATIVE for vision changes or irritation  ENT/MOUTH: NEGATIVE for ear, mouth and throat problems  RESP:NEGATIVE for significant cough or SOB  CV: NEGATIVE for chest pain, palpitations or peripheral edema  GI: POSITIVE for stomach upset  : normal menstrual cycles  MUSCULOSKELETAL: POSITIVE  for right elbow tenderness  NEURO: NEGATIVE for weakness, dizziness or paresthesias    OBJECTIVE  :/79   Pulse 65   Temp 98.3  F (36.8  C) (Tympanic)   Wt 69.6 kg (153 lb 8 oz)   BMI 29.02 kg/m    GENERAL APPEARANCE: healthy, alert and no distress  EYES: EOMI,  PERRL, conjunctiva clear  HENT: ear canals and TM's normal.  Nose and mouth without ulcers, erythema or lesions  NECK: supple, nontender, no  lymphadenopathy  RESP: lungs clear to auscultation - no rales, rhonchi or wheezes  CV: regular rates and rhythm, normal S1 S2, no murmur noted  ABDOMEN:  soft, nontender, no HSM or masses and bowel sounds normal  Extremities: tenderness over olecranon bursa  MS: Positive for right elbow tenderness  NEURO: Normal strength and tone, sensory exam grossly normal,  normal speech and mentation  SKIN: no suspicious lesions or rashes    ASSESSMENT/PLAN      ICD-10-CM    1. Right elbow pain  M25.521 XR Elbow Right G/E 3 Views     ibuprofen (ADVIL/MOTRIN) 600 MG tablet   2. Abdominal pain, generalized  R10.84 CBC with platelets differential     Lipase     Amylase     Comprehensive metabolic panel     UA with Microscopic reflex to Culture     HCG Qual, Urine ([id number])     cimetidine (TAGAMET) 200 MG tablet   3. Acute gastritis without hemorrhage, unspecified gastritis type  K29.00 DISCONTINUED: omeprazole (PRILOSEC) 40 MG DR capsule       Orders Placed This Encounter     XR Elbow Right G/E 3 Views     CBC with platelets differential     Lipase     Amylase     Comprehensive metabolic panel     UA with Microscopic reflex to Culture     HCG Qual, Urine ([id number])         ibuprofen (ADVIL/MOTRIN) 600 MG tablet     cimetidine (TAGAMET) 200 MG tablet       Ice and motrin for elbow  Patient is breast feeding, tagamet given  Wear elbow brace  Follow up with PCP

## 2020-12-20 ENCOUNTER — HEALTH MAINTENANCE LETTER (OUTPATIENT)
Age: 30
End: 2020-12-20

## 2020-12-30 ENCOUNTER — OFFICE VISIT (OUTPATIENT)
Dept: URGENT CARE | Facility: URGENT CARE | Age: 30
End: 2020-12-30
Payer: COMMERCIAL

## 2020-12-30 DIAGNOSIS — R09.89 THROAT FULLNESS: Primary | ICD-10-CM

## 2020-12-30 LAB
DEPRECATED S PYO AG THROAT QL EIA: NEGATIVE
HETEROPH AB SER QL: NEGATIVE
SPECIMEN SOURCE: NORMAL
SPECIMEN SOURCE: NORMAL
STREP GROUP A PCR: NOT DETECTED

## 2020-12-30 PROCEDURE — 86308 HETEROPHILE ANTIBODY SCREEN: CPT | Performed by: PREVENTIVE MEDICINE

## 2020-12-30 PROCEDURE — 36415 COLL VENOUS BLD VENIPUNCTURE: CPT | Performed by: PREVENTIVE MEDICINE

## 2020-12-30 PROCEDURE — 87651 STREP A DNA AMP PROBE: CPT | Performed by: PHYSICIAN ASSISTANT

## 2020-12-30 PROCEDURE — 99215 OFFICE O/P EST HI 40 MIN: CPT | Performed by: PREVENTIVE MEDICINE

## 2020-12-30 PROCEDURE — 99N1174 PR STATISTIC STREP A RAPID: Performed by: PHYSICIAN ASSISTANT

## 2020-12-30 RX ORDER — PANTOPRAZOLE SODIUM 40 MG/1
40 TABLET, DELAYED RELEASE ORAL DAILY
Qty: 30 TABLET | Refills: 1 | Status: SHIPPED | OUTPATIENT
Start: 2020-12-30 | End: 2021-01-29

## 2020-12-30 NOTE — PROGRESS NOTES
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  Not sure if this is similar.  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  She eventually had excision of the neck mass on 9/10/2015.  Description was of a left paratracheal deep neck mass measuring approximate 1.5 x 2.5 cm.  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 A normal soft tissue CT of the neck was done one year ago.  Current symptoms of throat fullness and some pain have been going on for the past 5-7 days.  No hx of tobacco or alcohol use.  Does describe some throat pain and heart burn symptoms when laying down at night.  Pain is tolerable and localized to the posterior oropharynx, feels sharp and has been continuous since it started.  No sick contacts or covid contacts.  No hoarseness or difficulty with speaking this time.  Somewhat painful when swallowing liquids.  No pain with swallowing solids with initial swallow.  No pain in chest as it goes down.  No coughing when swallowing. No nausea or vomiting.  Food does not get stuck as it goes down.  No fever or chills, cough, chest pain, sob, sinus discomfort, post nasal drip, congestion, headache.    Past Medical History:   Diagnosis Date     Hidradenitis suppurativa      NO ACTIVE PROBLEMS      SH -    reports that she has never smoked. She has never used smokeless tobacco. She reports that she does not drink alcohol or use drugs.    Family History   Problem Relation Age of Onset     Breast Cancer Paternal Aunt      Other - See Comments Maternal Aunt      Melanoma No family hx of      Skin Cancer No family hx of      Glaucoma No family hx of      Macular Degeneration No  family hx of        Medical, social, surgical, and family histories reviewed.    ROS:  CONSTITUTIONAL: NEGATIVE for fever, chills  EYES: NEGATIVE for vision changes   RESP: NEGATIVE for significant cough or SOB  CV: NEGATIVE for chest pain, palpitations   GI: NEGATIVE for nausea, abdominal pain, heartburn, or change in bowel habits  : NEGATIVE for frequency, dysuria, or hematuria  MUSCULOSKELETAL: NEGATIVE for significant arthralgias or myalgia  NEURO: NEGATIVE for weakness, dizziness or paresthesias or headache    OBJECTIVE:  There were no vitals taken for this visit.  EXAM:  GENERAL APPEARANCE: healthy, alert and no distress  EYES: EOMI,  PERRL  HENT: ear canals and TM's normal and nose and mouth without ulcers or lesions, no oral lesions, no erythema, thrush, posterior oropharyngeal swelling.  Uvula midline.  RESP: lungs clear to auscultation - no rales, rhonchi or wheezes  CV: regular rates and rhythm, normal S1 S2, no S3 or S4 and no murmur, click or rub -  ABDOMEN:  soft, nontender, no HSM or masses and bowel sounds normal  Neck - no LAD, TM, JVD    RST - negative, Monospot - negative    ASSESSMENT/PLAN:    (R68.89) Throat fullness  ?GERD, globus sensation, muscle tension dysphonia, thyrohyoid muscle pain, doubt mass  Will treat with protonix 40 mg daily given hx of gerd symptoms  Will refer to ENT for further evaluation in the next week.  ED if symptoms worsen and she has difficulty swallowing or breathing in the interim.  Also will do a covid test for patient in the next 1-2 days.  Decided on this after the patient had left the clinic.  Plan: OTOLARYNGOLOGY REFERRAL, pantoprazole         (PROTONIX) 40 MG EC tablet, Streptococcus A Rapid Scr w Reflx to PCR,         Mononucleosis screen, Group A Streptococcus PCR        Throat Swab    40 minutes spent face to face with patient, over 50% time counseling, coordinating care and explaining about nature of the patient's conditions.

## 2021-02-01 ENCOUNTER — TELEPHONE (OUTPATIENT)
Dept: OBGYN | Facility: CLINIC | Age: 31
End: 2021-02-01

## 2021-02-01 ENCOUNTER — PRENATAL OFFICE VISIT (OUTPATIENT)
Dept: NURSING | Facility: CLINIC | Age: 31
End: 2021-02-01
Payer: COMMERCIAL

## 2021-02-01 VITALS — HEIGHT: 61 IN | BODY MASS INDEX: 28.89 KG/M2 | WEIGHT: 153 LBS

## 2021-02-01 DIAGNOSIS — Z34.90 ENCOUNTER FOR SUPERVISION OF NORMAL PREGNANCY: Primary | ICD-10-CM

## 2021-02-01 DIAGNOSIS — Z34.80 SUPERVISION OF OTHER NORMAL PREGNANCY, ANTEPARTUM: Primary | ICD-10-CM

## 2021-02-01 DIAGNOSIS — Z23 NEED FOR TDAP VACCINATION: ICD-10-CM

## 2021-02-01 PROBLEM — N89.8 VAGINAL DISCHARGE: Status: RESOLVED | Noted: 2019-06-13 | Resolved: 2021-02-01

## 2021-02-01 PROBLEM — O09.90 PREGNANCY, SUPERVISION, HIGH-RISK: Status: RESOLVED | Noted: 2019-06-15 | Resolved: 2021-02-01

## 2021-02-01 PROBLEM — R07.0 THROAT PAIN: Status: RESOLVED | Noted: 2020-01-01 | Resolved: 2021-02-01

## 2021-02-01 PROBLEM — O36.8190 DECREASED FETAL MOVEMENT: Status: RESOLVED | Noted: 2019-06-15 | Resolved: 2021-02-01

## 2021-02-01 PROBLEM — Z36.89 ENCOUNTER FOR TRIAGE IN PREGNANT PATIENT: Status: RESOLVED | Noted: 2019-06-13 | Resolved: 2021-02-01

## 2021-02-01 PROCEDURE — 99207 PR NO CHARGE NURSE ONLY: CPT

## 2021-02-01 RX ORDER — PRENATAL VIT/IRON FUM/FOLIC AC 27MG-0.8MG
1 TABLET ORAL DAILY
Qty: 90 TABLET | Refills: 3 | Status: SHIPPED | OUTPATIENT
Start: 2021-02-01 | End: 2021-05-12

## 2021-02-01 RX ORDER — PYRIDOXINE HCL (VITAMIN B6) 25 MG
25 TABLET ORAL 4 TIMES DAILY
Qty: 90 TABLET | Refills: 3 | Status: SHIPPED | OUTPATIENT
Start: 2021-02-01 | End: 2021-02-26

## 2021-02-01 SDOH — SOCIAL STABILITY: SOCIAL NETWORK: HOW OFTEN DO YOU ATTEND CHURCH OR RELIGIOUS SERVICES?: NOT ASKED

## 2021-02-01 SDOH — SOCIAL STABILITY: SOCIAL NETWORK: HOW OFTEN DO YOU GET TOGETHER WITH FRIENDS OR RELATIVES?: NOT ASKED

## 2021-02-01 SDOH — HEALTH STABILITY: MENTAL HEALTH
STRESS IS WHEN SOMEONE FEELS TENSE, NERVOUS, ANXIOUS, OR CAN'T SLEEP AT NIGHT BECAUSE THEIR MIND IS TROUBLED. HOW STRESSED ARE YOU?: NOT AT ALL

## 2021-02-01 SDOH — SOCIAL STABILITY: SOCIAL NETWORK: IN A TYPICAL WEEK, HOW MANY TIMES DO YOU TALK ON THE PHONE WITH FAMILY, FRIENDS, OR NEIGHBORS?: NOT ASKED

## 2021-02-01 SDOH — HEALTH STABILITY: PHYSICAL HEALTH: ON AVERAGE, HOW MANY DAYS PER WEEK DO YOU ENGAGE IN MODERATE TO STRENUOUS EXERCISE (LIKE A BRISK WALK)?: NOT ASKED

## 2021-02-01 SDOH — HEALTH STABILITY: PHYSICAL HEALTH: ON AVERAGE, HOW MANY MINUTES DO YOU ENGAGE IN EXERCISE AT THIS LEVEL?: NOT ASKED

## 2021-02-01 SDOH — SOCIAL STABILITY: SOCIAL NETWORK
DO YOU BELONG TO ANY CLUBS OR ORGANIZATIONS SUCH AS CHURCH GROUPS UNIONS, FRATERNAL OR ATHLETIC GROUPS, OR SCHOOL GROUPS?: NOT ASKED

## 2021-02-01 SDOH — SOCIAL STABILITY: SOCIAL NETWORK: ARE YOU MARRIED, WIDOWED, DIVORCED, SEPARATED, NEVER MARRIED, OR LIVING WITH A PARTNER?: NOT ASKED

## 2021-02-01 SDOH — HEALTH STABILITY: MENTAL HEALTH
DO YOU FEEL STRESS - TENSE, RESTLESS, NERVOUS, OR ANXIOUS, OR UNABLE TO SLEEP AT NIGHT BECAUSE YOUR MIND IS TROUBLED ALL THE TIME - THESE DAYS?: NOT AT ALL

## 2021-02-01 SDOH — SOCIAL STABILITY: SOCIAL INSECURITY
WITHIN THE LAST YEAR, HAVE YOU BEEN KICKED, HIT, SLAPPED, OR OTHERWISE PHYSICALLY HURT BY YOUR PARTNER OR EX-PARTNER?: NO

## 2021-02-01 SDOH — SOCIAL STABILITY: SOCIAL NETWORK
DO YOU BELONG TO ANY CLUBS OR ORGANIZATIONS SUCH AS CHURCH GROUPS, UNIONS, FRATERNAL OR ATHLETIC GROUPS, OR SCHOOL GROUPS?: NOT ASKED

## 2021-02-01 SDOH — SOCIAL STABILITY: SOCIAL INSECURITY: WITHIN THE LAST YEAR, HAVE YOU BEEN HUMILIATED OR EMOTIONALLY ABUSED IN OTHER WAYS BY YOUR PARTNER OR EX-PARTNER?: NO

## 2021-02-01 SDOH — SOCIAL STABILITY: SOCIAL INSECURITY
WITHIN THE LAST YEAR, HAVE YOU BEEN RAPED OR FORCED TO HAVE ANY KIND OF SEXUAL ACTIVITY BY YOUR PARTNER OR EX-PARTNER?: NO

## 2021-02-01 SDOH — SOCIAL STABILITY: SOCIAL NETWORK: HOW OFTEN DO YOU ATTENT MEETINGS OF THE CLUB OR ORGANIZATION YOU BELONG TO?: NOT ASKED

## 2021-02-01 SDOH — SOCIAL STABILITY: SOCIAL INSECURITY: ARE YOU MARRIED, WIDOWED, DIVORCED, SEPARATED, NEVER MARRIED, OR LIVING WITH A PARTNER?: NOT ASKED

## 2021-02-01 SDOH — SOCIAL STABILITY: SOCIAL INSECURITY: WITHIN THE LAST YEAR, HAVE YOU BEEN AFRAID OF YOUR PARTNER OR EX-PARTNER?: NO

## 2021-02-01 SDOH — SOCIAL STABILITY: SOCIAL INSECURITY
WITHIN THE LAST YEAR, HAVE TO BEEN RAPED OR FORCED TO HAVE ANY KIND OF SEXUAL ACTIVITY BY YOUR PARTNER OR EX-PARTNER?: NO

## 2021-02-01 SDOH — SOCIAL STABILITY: SOCIAL NETWORK: HOW OFTEN DO YOU ATTEND MEETINGS OF THE CLUBS OR ORGANIZATIONS YOU BELONG TO?: NOT ASKED

## 2021-02-01 ASSESSMENT — MIFFLIN-ST. JEOR: SCORE: 1351.38

## 2021-02-01 NOTE — PROGRESS NOTES
Important Information for Provider:     New ob nurse intake by phone, fourth pregnancy. History of C section, previous pregnancy 6/17/2019. Abnormal 1 hour GCT, passed 3 hour GTT . TE sent to Dr Capps to send R to updated pharmacy, prenatal vitamins, B6, Unisom. Handouts reviewed and mailed. Patient did the quad screening previous pregnancy. Has NOB with Dr Capps 2/26/2021    Caffeine intake/servings daily - 0  Calcium intake/servings daily - 3  Exercise 7 times weekly - describe ; walks daily, precautions given   Sunscreen used - Yes  Seatbelts used - Yes  Guns stored in the home - No  Self Breast Exam - Yes  Pap test up to date -  No  Eye exam up to date -  No  Dental exam up to date -  No  Immunizations reviewed and up to date - Yes  Abuse: Current or Past (Physical, Sexual or Emotional) - No  Do you feel safe in your environment - Yes  Do you cope well with stress - Yes  Do you suffer from insomnia - previously in the past     Prenatal OB Questionnaire  Patient supplied answers from flow sheet for:  Prenatal OB Questionnaire.  Past Medical History  Diabetes?: abnormal 1 hour GCT, passed 3 hour GTT  Hypertension : No  Heart disease, mitral valve prolapse or rheumatic fever?: No  An autoimmune disease such as lupus or rheumatoid arthritis?: No  Kidney disease or urinary tract infection?: No  Epilepsy, seizures or spells?: No  Migraine headaches?: No  A stroke or loss of function or sensation?: No  Any other neurological problems?: No  Have you ever been treated for depression?: No  Are you having problems with crying spells or loss of self-esteem?: No  Have you ever required psychiatric care?: No  Have you ever had hepatitis, liver disease or jaundice?: No  Have you been treated for blood clots in your veins, deep vein thromosis, inflammation in the veins, thrombosis, phlebitis, pulmonary embolism or varicosities?: No  Have you had excessive bleeding after surgery or dental work?: No  Do you bleed more than  other women after a cut or scratch?: No  Do you have a history of anemia?: No  Have you ever had thyroid problems or taken thyroid medication?: No   Do you have any endocrine problems?: No  Have you ever been in a major accident or suffered serious trauma?: No  Within the last year, has anyone hit, slapped, kicked or otherwise hurt you?: No  In the last year, has anyone forced you to have sex when you didn't want to?: No    Past Medical History 2   Have you ever received a blood transfusion?: No  Would you refuse a blood transfusion if a doctor judged it to be medically necessary?: No   If you answered Yes, would you rather die than receive a blood transfusion?: No  If you answered Yes, is this for Anglican reasons?: No  Does anyone in your home smoke?: No  Do you use tobacco products?: No  Do you drink beer, wine or hard liquor?: No  Do you use any of the following: marijuana, speed, cocaine, heroin, hallucinogens or other drugs?: No   Is your blood type Rh negative?: No  Have you ever had abnormal antibodies in your blood?: No  Have you ever had asthma?: No  Have you ever had tuberculosis?: No  Do you have any allergies to drugs or over-the-counter medications?: (!) Yes(Keflex)  Allergies: Dust Mites, Aspartame, Ethanol, Venlafaxine, Hydrochloride, Sertraline: sinus/allergies  Have you had any breast problems?: No  Have you ever ?: (!) Yes  Have you had any gynecological surgical procedures such as cervical conization, a LEEP procedure, laser treatment, cryosurgery of the cervix or a dilation and curettage, etc?: No  Have you ever had any other surgical procedures?: (!) Yes  C section  Have you been hospitalized for a nonsurgical reason excluding normal delivery?: No  Have you ever had any anesthetic complications?: No  Have you ever had an abnormal pap smear?: No    Past Medical History (Continued)  Do you have a history of abnormalities of the uterus?: No  Did your mother take DANYEL or any other hormones  when she was pregnant with you?: No  Did it take you more than a year to become pregnant?: No  Have you ever been evaluated or treated for infertility?: No  Is there a history of medical problems in your family, which you feel may be important to this pregnancy?: No  Do you have any other problems we have not asked about which you feel may be important to this pregnancy?: No    Symptoms since last menstrual period  Do you have any of the following symptoms: abdominal pain, blood in stools or urine, chest pain, shortness of breath, coughing or vomiting up blood, your heart racing or skipping beats, nausea and vomiting, pain on urination or vaginal discharge or bleed: (!) Yes  Will the patient be 35 years old or older at the time of delivery?: No    Has the patient, baby's father or anyone in either family had:  Thalassemia (Italian, Greek, Mediterranean or  background only) and an MCV result less than 80?: No  Neural tube defect such as meningomyelocele, spina bifida or anencephaly?: No  Congenital heart defect?: No  Down's Syndrome?: No  Aftab-Sachs disease (Gnosticism, Cajun, Nepali-Montcalm)?: No  Sickle cell disease or trait ()?: (!) Yes(patient has trait)  Hemophilia or other inherited problems of blood?: No  Muscular dystrophy?: No  Cystic fibrosis?: No  Patric's chorea?: No  Mental retardation/autism?: No  If yes, was the person tested for fragile X?: No  Any other inherited genetic or chromosomal disorder?: No  Maternal metabolic disorder (e.g Insulin-dependent diabetes, PKU)?: No  A child with birth defects not listed above?: No  Recurrent pregnancy loss or stillbirth?: No   Has the patient had any medications/street drugs/alcohol since her last menstrual period?: No  Does the patient or baby's father have any other genetic risks?: No    Infection History   Do you object to being tested for Hepatitis B?: No  Do you object to being tested for HIV?: No   Do you feel that you are at high risk for  coming in contact with the AIDS virus?: No  Have you ever been treated for tuberculosis?: No  Have you ever had a positive skin test for tuberculosis?: No  Do you live with someone who has tuberculosis?: No  Have you ever been exposed to tuberculosis?: No  Do you have genital herpes?: No  Does your partner have genital herpes?: No  Have you had a viral illness since your last period?: No  Have you ever had gonorrhea, chlamydia, syphilis, venereal warts, trichomoniasis, pelvic inflammatory disease or any other sexually transmitted disease?: No  Do you know if you are a genital group B streptococcus carrier?: No  Have you had chicken pox/varicella?: No   Have you been vaccinated against chicken Pox?: No  Have you had any other infectious diseases?: No      Allergies as of 2/1/2021:    Allergies as of 02/01/2021 - Reviewed 02/01/2021   Allergen Reaction Noted     Keflex [cephalexin] Rash 06/15/2019       Current medications are:  No current outpatient medications on file.         Early ultrasound screening tool:    Does patient have irregular periods?  No  Did patient use hormonal birth control in the three months prior to positive urine pregnancy test? No  Is the patient breastfeeding?  No  Is the patient 10 weeks or greater at time of education visit?  No

## 2021-02-01 NOTE — TELEPHONE ENCOUNTER
Patient requesting prenatal vitamins, B6, [facility]    Pharmacy updated    Lani Miller LPN

## 2021-02-15 ENCOUNTER — HOSPITAL ENCOUNTER (EMERGENCY)
Facility: CLINIC | Age: 31
Discharge: HOME | End: 2021-02-15
Attending: EMERGENCY MEDICINE | Admitting: EMERGENCY MEDICINE
Payer: COMMERCIAL

## 2021-02-15 ENCOUNTER — APPOINTMENT (OUTPATIENT)
Dept: ULTRASOUND IMAGING | Facility: CLINIC | Age: 31
End: 2021-02-15
Attending: EMERGENCY MEDICINE
Payer: COMMERCIAL

## 2021-02-15 VITALS
BODY MASS INDEX: 30.04 KG/M2 | HEART RATE: 72 BPM | DIASTOLIC BLOOD PRESSURE: 78 MMHG | OXYGEN SATURATION: 99 % | RESPIRATION RATE: 16 BRPM | SYSTOLIC BLOOD PRESSURE: 117 MMHG | WEIGHT: 153 LBS | HEIGHT: 60 IN | TEMPERATURE: 98.1 F

## 2021-02-15 DIAGNOSIS — O03.9 SPONTANEOUS MISCARRIAGE: ICD-10-CM

## 2021-02-15 LAB
ABO + RH BLD: NORMAL
ABO + RH BLD: NORMAL
ANION GAP SERPL CALCULATED.3IONS-SCNC: 6 MMOL/L (ref 3–14)
B-HCG SERPL-ACNC: 1131 IU/L (ref 0–5)
BASOPHILS # BLD AUTO: 0 10E9/L (ref 0–0.2)
BASOPHILS NFR BLD AUTO: 0.3 %
BLD GP AB SCN SERPL QL: NORMAL
BLOOD BANK CMNT PATIENT-IMP: NORMAL
BUN SERPL-MCNC: 15 MG/DL (ref 7–30)
CALCIUM SERPL-MCNC: 7.5 MG/DL (ref 8.5–10.1)
CHLORIDE SERPL-SCNC: 114 MMOL/L (ref 94–109)
CO2 SERPL-SCNC: 23 MMOL/L (ref 20–32)
CREAT SERPL-MCNC: 0.73 MG/DL (ref 0.52–1.04)
DIFFERENTIAL METHOD BLD: ABNORMAL
EOSINOPHIL # BLD AUTO: 0.1 10E9/L (ref 0–0.7)
EOSINOPHIL NFR BLD AUTO: 1.1 %
ERYTHROCYTE [DISTWIDTH] IN BLOOD BY AUTOMATED COUNT: 13.6 % (ref 10–15)
GFR SERPL CREATININE-BSD FRML MDRD: >90 ML/MIN/{1.73_M2}
GLUCOSE SERPL-MCNC: 86 MG/DL (ref 70–99)
HCT VFR BLD AUTO: 34.9 % (ref 35–47)
HGB BLD-MCNC: 11.3 G/DL (ref 11.7–15.7)
IMM GRANULOCYTES # BLD: 0 10E9/L (ref 0–0.4)
IMM GRANULOCYTES NFR BLD: 0.1 %
LYMPHOCYTES # BLD AUTO: 2.4 10E9/L (ref 0.8–5.3)
LYMPHOCYTES NFR BLD AUTO: 34.3 %
MCH RBC QN AUTO: 27.5 PG (ref 26.5–33)
MCHC RBC AUTO-ENTMCNC: 32.4 G/DL (ref 31.5–36.5)
MCV RBC AUTO: 85 FL (ref 78–100)
MONOCYTES # BLD AUTO: 0.4 10E9/L (ref 0–1.3)
MONOCYTES NFR BLD AUTO: 5.3 %
NEUTROPHILS # BLD AUTO: 4.1 10E9/L (ref 1.6–8.3)
NEUTROPHILS NFR BLD AUTO: 58.9 %
NRBC # BLD AUTO: 0 10*3/UL
NRBC BLD AUTO-RTO: 0 /100
PLATELET # BLD AUTO: 221 10E9/L (ref 150–450)
POTASSIUM SERPL-SCNC: 3.1 MMOL/L (ref 3.4–5.3)
RBC # BLD AUTO: 4.11 10E12/L (ref 3.8–5.2)
SODIUM SERPL-SCNC: 143 MMOL/L (ref 133–144)
SPECIMEN EXP DATE BLD: NORMAL
WBC # BLD AUTO: 7 10E9/L (ref 4–11)

## 2021-02-15 PROCEDURE — 88305 TISSUE EXAM BY PATHOLOGIST: CPT | Mod: TC | Performed by: EMERGENCY MEDICINE

## 2021-02-15 PROCEDURE — 86901 BLOOD TYPING SEROLOGIC RH(D): CPT | Performed by: EMERGENCY MEDICINE

## 2021-02-15 PROCEDURE — 99284 EMERGENCY DEPT VISIT MOD MDM: CPT | Mod: 25

## 2021-02-15 PROCEDURE — 85025 COMPLETE CBC W/AUTO DIFF WBC: CPT | Performed by: EMERGENCY MEDICINE

## 2021-02-15 PROCEDURE — 84702 CHORIONIC GONADOTROPIN TEST: CPT | Performed by: EMERGENCY MEDICINE

## 2021-02-15 PROCEDURE — 80048 BASIC METABOLIC PNL TOTAL CA: CPT | Performed by: EMERGENCY MEDICINE

## 2021-02-15 PROCEDURE — 88305 TISSUE EXAM BY PATHOLOGIST: CPT | Mod: 26 | Performed by: PATHOLOGY

## 2021-02-15 PROCEDURE — 76801 OB US < 14 WKS SINGLE FETUS: CPT

## 2021-02-15 PROCEDURE — 86850 RBC ANTIBODY SCREEN: CPT | Performed by: EMERGENCY MEDICINE

## 2021-02-15 PROCEDURE — 86900 BLOOD TYPING SEROLOGIC ABO: CPT | Performed by: EMERGENCY MEDICINE

## 2021-02-15 PROCEDURE — 999N001020 HC STATISTIC H-SEND OUTS PREP: Performed by: EMERGENCY MEDICINE

## 2021-02-15 PROCEDURE — 250N000013 HC RX MED GY IP 250 OP 250 PS 637: Performed by: EMERGENCY MEDICINE

## 2021-02-15 RX ORDER — ACETAMINOPHEN 325 MG/1
650 TABLET ORAL ONCE
Status: COMPLETED | OUTPATIENT
Start: 2021-02-15 | End: 2021-02-15

## 2021-02-15 RX ORDER — HYDROCODONE BITARTRATE AND ACETAMINOPHEN 5; 325 MG/1; MG/1
1 TABLET ORAL EVERY 6 HOURS PRN
Qty: 6 TABLET | Refills: 0 | Status: SHIPPED | OUTPATIENT
Start: 2021-02-15 | End: 2021-02-18

## 2021-02-15 RX ORDER — IBUPROFEN 600 MG/1
600 TABLET, FILM COATED ORAL EVERY 6 HOURS PRN
Qty: 30 TABLET | Refills: 1 | Status: SHIPPED | OUTPATIENT
Start: 2021-02-15 | End: 2021-02-26

## 2021-02-15 RX ADMIN — ACETAMINOPHEN 650 MG: 325 TABLET, FILM COATED ORAL at 06:27

## 2021-02-15 ASSESSMENT — MIFFLIN-ST. JEOR: SCORE: 1335.5

## 2021-02-15 ASSESSMENT — ENCOUNTER SYMPTOMS: ABDOMINAL PAIN: 1

## 2021-02-15 NOTE — LETTER
February 15, 2021      To Whom It May Concern:      Hilaria Jansen was seen in our Emergency Department today, 02/15/21.  I expect her condition to improve over the next 2 days.  She may return to work/school when improved.    Sincerely,        Mahamed Talamantes MD

## 2021-02-15 NOTE — DISCHARGE INSTRUCTIONS
Your ultrasound showed no fetal tissue in the uterus and you have passed tissue that suggest you have completed a miscarriage.  We would expect the slowing of bleeding over the next 12 to 24 hours until resolved.  Return to the emergency room with persistent heavy bleeding more than a pad an hour for multiple hours.  Please follow-up with your primary obstetrician for recheck including a negative pregnancy just test and discussion for future family planning.  We are sorry for miscarriage, but wish you luck in your future family-planning.

## 2021-02-15 NOTE — ED PROVIDER NOTES
History   Chief Complaint:  Vaginal Bleeding       The history is provided by the patient.      Hilaria Jansen is a  30 year old female who is 9 weeks pregnant who presents for evaluation of vaginal bleeding this morning, passing a large piece of tissue. The patient states that she was bled around one pad today which was similar to her regular menstrual bleeding. She then had abdominal cramping which resolved after passing a large tissue, which the patient brings with her today. She notes her last menstrual cycle ended on  and she has had no ultrasounds yet.       Review of Systems   Gastrointestinal: Positive for abdominal pain (cramping).   Genitourinary: Positive for vaginal bleeding.   All other systems reviewed and are negative.        Allergies:  Keflex [Cephalexin]    Medications:  Unisom    Past Medical History:    Hidradenitis suppurativa  Sickle cell trait  GERD  Adnexal mass     Past Surgical History:     section  left paratracheal neck mass removal     Social History:  The patient presents to the emergency department with a male .      Physical Exam     Patient Vitals for the past 24 hrs:   BP Temp Temp src Pulse Resp SpO2 Height Weight   02/15/21 0600 -- -- -- -- -- 100 % -- --   02/15/21 0552 125/73 98.1  F (36.7  C) Oral 74 16 100 % 1.524 m (5') 69.4 kg (153 lb)       Physical Exam  Vitals signs and nursing note reviewed.   Cardiovascular:      Rate and Rhythm: Normal rate and regular rhythm.   Pulmonary:      Effort: Pulmonary effort is normal.      Breath sounds: Normal breath sounds.   Abdominal:      General: Abdomen is flat.      Comments: mild suprapubic tenderness no guarding or rebound   Neurological:      General: No focal deficit present.      Mental Status: She is alert.   Psychiatric:         Mood and Affect: Mood normal.           Emergency Department Course     Imaging:  OB  US 1st trimester w transvag:  No evidence of intra- or extrauterine  gestation, adnexal mass, or pelvic free fluid. The endometrium is thickened, suggestive of spontaneous .  As per radiology.     Laboratory:  CBC: WBC: 7.0, HB.3 (low), PLT: 221    BMP: Potassium: 3.1 (low), Chloride: 114 (high), Calcium: 7.5 (low), o/w WNL (Creatinine: 0.73)    HCG Quantitative Blood: 1,131 (high)    ABO/Rh Type and Screen: B Positive, Antibody Screen Negative    Surgical Pathology Exam: Pending    Emergency Department Course:    Reviewed:  610: I reviewed the patient's nursing notes, vitals, past medical records, Care Everywhere.     Assessments:  615: I assessed the patient and performed a physical exam at this time.  705: I reassessed the patient and informed them of their workup thus far.     Interventions:  627 Tylenol 650mg PO     Disposition:  The patient was discharged to home.     Impression & Plan     Medical Decision Making:  Patient presents with vaginal bleeding in pregnancy.  Patient is approximately 9 weeks no prior ultrasound has been performed patient arrives and does present tissue from home suspect spontaneous miscarriage.  Bleeding has been menstrual-like but not heavier ultrasound is performed that shows thickening of the endometrium but no concern for ectopic pregnancy and no clear-cut retained products.  hCG is low recommend discharge monitor to negative pregnancy through primary gynecology and return with persistent heavy bleeding.  Patient is offered condolences reassurance and discharged home.    Diagnosis:    ICD-10-CM    1. Spontaneous miscarriage  O03.9        Discharge Medications:  New Prescriptions    HYDROCODONE-ACETAMINOPHEN (NORCO) 5-325 MG TABLET    Take 1 tablet by mouth every 6 hours as needed for severe pain    IBUPROFEN (ADVIL/MOTRIN) 600 MG TABLET    Take 1 tablet (600 mg) by mouth every 6 hours as needed for moderate pain       Scribe Disclosure:  Donal DUNLAP, am serving as a scribe at 6:04 AM on 2/15/2021 to document services  personally performed by Mahamed Talamantes MD based on my observations and the provider's statements to me.          Mahamed Talamantes MD  02/16/21 0754

## 2021-02-16 LAB — COPATH REPORT: NORMAL

## 2021-02-26 ENCOUNTER — PRENATAL OFFICE VISIT (OUTPATIENT)
Dept: OBGYN | Facility: CLINIC | Age: 31
End: 2021-02-26
Payer: COMMERCIAL

## 2021-02-26 VITALS
SYSTOLIC BLOOD PRESSURE: 117 MMHG | HEART RATE: 91 BPM | BODY MASS INDEX: 30.92 KG/M2 | OXYGEN SATURATION: 100 % | HEIGHT: 60 IN | WEIGHT: 157.5 LBS | DIASTOLIC BLOOD PRESSURE: 75 MMHG

## 2021-02-26 DIAGNOSIS — O03.9 SPONTANEOUS MISCARRIAGE: Primary | ICD-10-CM

## 2021-02-26 PROBLEM — Z98.891 S/P C-SECTION: Status: RESOLVED | Noted: 2019-06-17 | Resolved: 2021-02-26

## 2021-02-26 LAB — B-HCG SERPL-ACNC: 6 IU/L (ref 0–5)

## 2021-02-26 PROCEDURE — 36415 COLL VENOUS BLD VENIPUNCTURE: CPT | Performed by: OBSTETRICS & GYNECOLOGY

## 2021-02-26 PROCEDURE — 99213 OFFICE O/P EST LOW 20 MIN: CPT | Performed by: OBSTETRICS & GYNECOLOGY

## 2021-02-26 PROCEDURE — 84702 CHORIONIC GONADOTROPIN TEST: CPT | Performed by: OBSTETRICS & GYNECOLOGY

## 2021-02-26 RX ORDER — FERROUS GLUCONATE 324(38)MG
324 TABLET ORAL
Qty: 90 TABLET | Refills: 2 | Status: ON HOLD | OUTPATIENT
Start: 2021-02-26 | End: 2021-12-17

## 2021-02-26 ASSESSMENT — MIFFLIN-ST. JEOR: SCORE: 1355.92

## 2021-02-26 NOTE — PROGRESS NOTES
SUBJECTIVE: Hilaria Jansen is a 30 year old female  .  Had large amount of vaginal bleeding on February 15 and was seen in emergency room.  Products of conception were sent to pathology and confirmed passage.  She believes her standing job of 12 hours a day is probably why she had a miscarriage.  Pt expresses appropriate sadness at loss.  Reassured her that the loss could not have been stopped by her actions or any other persons actions.     OBJECTIVE: Blood pressure 117/75, pulse 91, height 1.524 m (5'), weight 71.4 kg (157 lb 8 oz), last menstrual period 2020, SpO2 100 %, not currently breastfeeding.   Physical exam is deferred.     ASSESSMENT: SAB    PLAN: Patient is currently bleeding. We discussed causes of miscarriage including chromosome abnormalities.  She understands that nothing can be done to prevent a miscarriage from occuring.  We will check hCG level today and probably do home pregnancy test in 1 to 2 weeks.  She had used progesterone in the past and would like to take this again.  Prescription for Prometrium was done to start with after ovulation until 10 weeks gestation.    Will call the office when has positive UPT and then will check serial quants, schedule early ultrasound for viablity 7-8 weeks and NOB for about 10-11 weeks.  She does not want to wait 10 weeks to see me next time.  May want note for work for sitting position during the first trimester even though we discussed that is likely not related to this miscarriage.    Blood type B+    Brooke Capps MD

## 2021-02-26 NOTE — PATIENT INSTRUCTIONS
Check HCG level today and I will let you know when to do home pregnancy test and make sure it's negative.    Then okay to start trying for pregnancy again. If not pregnant by September come and see me    Start progesterone (prometrium) after ovulation until 10 weeks gestation.    Should be having intercourse about 2 weeks after menses starts (cycle day 12+)  First day needing a pad is day 1

## 2021-04-13 ENCOUNTER — OFFICE VISIT (OUTPATIENT)
Dept: URGENT CARE | Facility: URGENT CARE | Age: 31
End: 2021-04-13
Payer: COMMERCIAL

## 2021-04-13 ENCOUNTER — HOSPITAL ENCOUNTER (EMERGENCY)
Facility: CLINIC | Age: 31
Discharge: HOME | End: 2021-04-13
Attending: EMERGENCY MEDICINE | Admitting: EMERGENCY MEDICINE
Payer: COMMERCIAL

## 2021-04-13 ENCOUNTER — APPOINTMENT (OUTPATIENT)
Dept: ULTRASOUND IMAGING | Facility: CLINIC | Age: 31
End: 2021-04-13
Attending: EMERGENCY MEDICINE
Payer: COMMERCIAL

## 2021-04-13 VITALS
HEART RATE: 75 BPM | OXYGEN SATURATION: 100 % | SYSTOLIC BLOOD PRESSURE: 123 MMHG | RESPIRATION RATE: 16 BRPM | HEIGHT: 60 IN | DIASTOLIC BLOOD PRESSURE: 80 MMHG | TEMPERATURE: 98.6 F | WEIGHT: 153 LBS | BODY MASS INDEX: 30.04 KG/M2

## 2021-04-13 VITALS
HEART RATE: 96 BPM | RESPIRATION RATE: 16 BRPM | OXYGEN SATURATION: 100 % | WEIGHT: 153 LBS | TEMPERATURE: 97.7 F | BODY MASS INDEX: 28.16 KG/M2 | SYSTOLIC BLOOD PRESSURE: 112 MMHG | HEIGHT: 62 IN | DIASTOLIC BLOOD PRESSURE: 68 MMHG

## 2021-04-13 DIAGNOSIS — O20.0 THREATENED MISCARRIAGE: ICD-10-CM

## 2021-04-13 DIAGNOSIS — N89.8 VAGINAL DISCHARGE: ICD-10-CM

## 2021-04-13 DIAGNOSIS — B96.89 BACTERIAL VAGINOSIS: ICD-10-CM

## 2021-04-13 DIAGNOSIS — N76.0 BACTERIAL VAGINOSIS: ICD-10-CM

## 2021-04-13 DIAGNOSIS — R10.84 ABDOMINAL PAIN, GENERALIZED: Primary | ICD-10-CM

## 2021-04-13 DIAGNOSIS — M54.50 ACUTE BILATERAL LOW BACK PAIN WITHOUT SCIATICA: ICD-10-CM

## 2021-04-13 DIAGNOSIS — Z32.01 PREGNANCY TEST POSITIVE: ICD-10-CM

## 2021-04-13 DIAGNOSIS — B37.31 CANDIDIASIS OF VAGINA: ICD-10-CM

## 2021-04-13 LAB
ALBUMIN SERPL-MCNC: 4.1 G/DL (ref 3.4–5)
ALBUMIN UR-MCNC: NEGATIVE MG/DL
ALP SERPL-CCNC: 46 U/L (ref 40–150)
ALT SERPL W P-5'-P-CCNC: 26 U/L (ref 0–50)
AMYLASE SERPL-CCNC: 91 U/L (ref 30–110)
ANION GAP SERPL CALCULATED.3IONS-SCNC: 1 MMOL/L (ref 3–14)
APPEARANCE UR: CLEAR
AST SERPL W P-5'-P-CCNC: 17 U/L (ref 0–45)
B-HCG SERPL-ACNC: 353 IU/L (ref 0–5)
BASOPHILS # BLD AUTO: 0 10E9/L (ref 0–0.2)
BASOPHILS NFR BLD AUTO: 0.3 %
BILIRUB SERPL-MCNC: 0.8 MG/DL (ref 0.2–1.3)
BILIRUB UR QL STRIP: NEGATIVE
BUN SERPL-MCNC: 11 MG/DL (ref 7–30)
CALCIUM SERPL-MCNC: 9.5 MG/DL (ref 8.5–10.1)
CHLORIDE SERPL-SCNC: 107 MMOL/L (ref 94–109)
CO2 SERPL-SCNC: 28 MMOL/L (ref 20–32)
COLOR UR AUTO: YELLOW
CREAT SERPL-MCNC: 0.82 MG/DL (ref 0.52–1.04)
DIFFERENTIAL METHOD BLD: NORMAL
EOSINOPHIL # BLD AUTO: 0.1 10E9/L (ref 0–0.7)
EOSINOPHIL NFR BLD AUTO: 0.9 %
ERYTHROCYTE [DISTWIDTH] IN BLOOD BY AUTOMATED COUNT: 13.1 % (ref 10–15)
GFR SERPL CREATININE-BSD FRML MDRD: >90 ML/MIN/{1.73_M2}
GLUCOSE SERPL-MCNC: 93 MG/DL (ref 70–99)
GLUCOSE UR STRIP-MCNC: NEGATIVE MG/DL
HCG UR QL: POSITIVE
HCT VFR BLD AUTO: 36.9 % (ref 35–47)
HGB BLD-MCNC: 12.4 G/DL (ref 11.7–15.7)
HGB UR QL STRIP: NEGATIVE
KETONES UR STRIP-MCNC: NEGATIVE MG/DL
LEUKOCYTE ESTERASE UR QL STRIP: NEGATIVE
LIPASE SERPL-CCNC: 221 U/L (ref 73–393)
LYMPHOCYTES # BLD AUTO: 2.7 10E9/L (ref 0.8–5.3)
LYMPHOCYTES NFR BLD AUTO: 41.3 %
MCH RBC QN AUTO: 28.2 PG (ref 26.5–33)
MCHC RBC AUTO-ENTMCNC: 33.6 G/DL (ref 31.5–36.5)
MCV RBC AUTO: 84 FL (ref 78–100)
MONOCYTES # BLD AUTO: 0.4 10E9/L (ref 0–1.3)
MONOCYTES NFR BLD AUTO: 6 %
NEUTROPHILS # BLD AUTO: 3.3 10E9/L (ref 1.6–8.3)
NEUTROPHILS NFR BLD AUTO: 51.5 %
NITRATE UR QL: NEGATIVE
NON-SQ EPI CELLS #/AREA URNS LPF: NORMAL /LPF
PH UR STRIP: 6 PH (ref 5–7)
PLATELET # BLD AUTO: 207 10E9/L (ref 150–450)
POTASSIUM SERPL-SCNC: 3.8 MMOL/L (ref 3.4–5.3)
PROT SERPL-MCNC: 8.9 G/DL (ref 6.8–8.8)
RBC # BLD AUTO: 4.39 10E12/L (ref 3.8–5.2)
RBC #/AREA URNS AUTO: NORMAL /HPF
SODIUM SERPL-SCNC: 136 MMOL/L (ref 133–144)
SOURCE: NORMAL
SP GR UR STRIP: 1.01 (ref 1–1.03)
SPECIMEN SOURCE: ABNORMAL
UROBILINOGEN UR STRIP-ACNC: 0.2 EU/DL (ref 0.2–1)
WBC # BLD AUTO: 6.5 10E9/L (ref 4–11)
WBC #/AREA URNS AUTO: NORMAL /HPF
WET PREP SPEC: ABNORMAL

## 2021-04-13 PROCEDURE — 84702 CHORIONIC GONADOTROPIN TEST: CPT | Performed by: EMERGENCY MEDICINE

## 2021-04-13 PROCEDURE — 85025 COMPLETE CBC W/AUTO DIFF WBC: CPT | Performed by: FAMILY MEDICINE

## 2021-04-13 PROCEDURE — 83690 ASSAY OF LIPASE: CPT | Performed by: FAMILY MEDICINE

## 2021-04-13 PROCEDURE — 81001 URINALYSIS AUTO W/SCOPE: CPT | Performed by: FAMILY MEDICINE

## 2021-04-13 PROCEDURE — 82150 ASSAY OF AMYLASE: CPT | Performed by: FAMILY MEDICINE

## 2021-04-13 PROCEDURE — 81025 URINE PREGNANCY TEST: CPT | Performed by: FAMILY MEDICINE

## 2021-04-13 PROCEDURE — 250N000013 HC RX MED GY IP 250 OP 250 PS 637: Performed by: EMERGENCY MEDICINE

## 2021-04-13 PROCEDURE — 80053 COMPREHEN METABOLIC PANEL: CPT | Performed by: FAMILY MEDICINE

## 2021-04-13 PROCEDURE — 36415 COLL VENOUS BLD VENIPUNCTURE: CPT | Performed by: FAMILY MEDICINE

## 2021-04-13 PROCEDURE — 87591 N.GONORRHOEAE DNA AMP PROB: CPT | Performed by: FAMILY MEDICINE

## 2021-04-13 PROCEDURE — 87491 CHLMYD TRACH DNA AMP PROBE: CPT | Performed by: FAMILY MEDICINE

## 2021-04-13 PROCEDURE — 76801 OB US < 14 WKS SINGLE FETUS: CPT

## 2021-04-13 PROCEDURE — 87210 SMEAR WET MOUNT SALINE/INK: CPT | Performed by: FAMILY MEDICINE

## 2021-04-13 PROCEDURE — 99285 EMERGENCY DEPT VISIT HI MDM: CPT | Mod: 25

## 2021-04-13 PROCEDURE — 99214 OFFICE O/P EST MOD 30 MIN: CPT | Performed by: FAMILY MEDICINE

## 2021-04-13 RX ORDER — METRONIDAZOLE 500 MG/1
500 TABLET ORAL 2 TIMES DAILY
Qty: 14 TABLET | Refills: 0 | Status: SHIPPED | OUTPATIENT
Start: 2021-04-13 | End: 2021-05-12

## 2021-04-13 RX ORDER — ACETAMINOPHEN 500 MG
1000 TABLET ORAL ONCE
Status: COMPLETED | OUTPATIENT
Start: 2021-04-13 | End: 2021-04-13

## 2021-04-13 RX ORDER — CLOTRIMAZOLE 1 %
1 CREAM WITH APPLICATOR VAGINAL AT BEDTIME
Qty: 1 G | Refills: 0 | Status: SHIPPED | OUTPATIENT
Start: 2021-04-13 | End: 2021-04-20

## 2021-04-13 RX ADMIN — ACETAMINOPHEN 1000 MG: 500 TABLET, FILM COATED ORAL at 12:56

## 2021-04-13 ASSESSMENT — MIFFLIN-ST. JEOR
SCORE: 1367.25
SCORE: 1335.5

## 2021-04-13 ASSESSMENT — ENCOUNTER SYMPTOMS
BACK PAIN: 1
ABDOMINAL PAIN: 1

## 2021-04-13 NOTE — ED PROVIDER NOTES
"History     Chief Complaint:  Abdominal Pain    The history is provided by the patient.     Hilaria Janesn is a 30 year old who presents for evaluation of lower abdominal pain. Hilaria's pain is cramping in quality and started 2 days ago. It moves toward her lower back. She visited urgent care for these symptoms and was found to have a positive pregnancy test. The remainder of the results are as below. This pregnancy makes her  and 3w4d by stated LMP. She had a small amount of pinkish discharge yesterday without joseline vaginal bleeding. She has had no analgesics prior to arrival.    Of note, Hilaria has been on progesterone as she is trying to get pregnant and had a miscarriage in 2021.    Laboratory results from urgent care visit this morning   HCG qualitative urine: positive   Chlamydia and Gonorrhea pending  Wet prep: moderate clue cells and moderate yeast seen, o/w normal  CBC: WNL  Lipase 221  Amylase 91   CMP: anion gap 1 (L) o/w WNL    Review of Systems   Gastrointestinal: Positive for abdominal pain.   Genitourinary: Positive for vaginal discharge (pinkish). Negative for vaginal bleeding.   Musculoskeletal: Positive for back pain.   All other systems reviewed and are negative.    Allergies:  Keflex    Medications:    Progesterone    Past Medical History:    Adnexal mass  Hidradenitis suppurativa  Gastroesophageal reflux disease without esophagitis  Sinusitis  Sickle cell trait   Dry eyes    Past Surgical History:    Surgical pathology left   section    Social History:  The patient arrives alone  No tobacco or alcohol use    Physical Exam     Patient Vitals for the past 24 hrs:   BP Temp Temp src Pulse Resp SpO2 Height Weight   21 1424 112/68 -- -- 96 16 100 % -- --   21 1210 132/71 97.7  F (36.5  C) Temporal 90 16 100 % 1.575 m (5' 2\") 69.4 kg (153 lb)     Physical Exam  General: Well-developed and well-nourished. Well appearing young woman. " Cooperative.  Head:  Atraumatic.  Eyes:  Conjunctivae, lids, and sclerae are normal.  Neck:  Supple. Normal range of motion.  CV:  Regular rate and rhythm. Normal heart sounds with no murmurs, rubs, or gallops detected.  Resp:  No respiratory distress. Clear to auscultation bilaterally without decreased breath sounds, wheezing, rales, or rhonchi.  GI:  Soft. Non-distended.  Mild suprapubic tenderness.    MS:  Normal ROM.  Skin:  Warm. Non-diaphoretic. No pallor.  Neuro:  Awake. A&Ox3. Normal strength.  Psych: Normal mood and affect. Normal speech.  Vitals reviewed.    Emergency Department Course     Imaging:  US OB 1st trimester w transvag  IMPRESSION: No convincing evidence of intrauterine products of   conception. Differential includes spontaneous  and normal   pregnancy with incorrect dates. Ectopic pregnancy is not excluded.   Followup as clinically indicated. No evidence for hemorrhage  Reading per radiology    Laboratory:  HCG Quantitative Blood: 353 (H)     Emergency Department Course:    Reviewed:  I reviewed nursing notes, vitals, and past medical history.    Assessments:  1240 I obtained history and examined the patient as noted above.   1409 I rechecked the patient and explained findings.     Interventions:  1256 Tylenol 1000 mg PO    Disposition:  The patient was discharged home.     Impression & Plan      Medical Decision Making:  Hilaria is a 30 year old  at 3w4d by stated LMP who developed suprapubic abdominal pain with a small amount of pinkish discharge 2 days ago.  She was seen at urgent care where her pregnancy test was positive and referred to the emergency department.  She also has some low back pain and is uncertain if this is radiating from her abdomen or possibly from heavy lifting at work.  She has mild suprapubic tenderness on exam without other findings.  Pelvic exam was deferred as one was completed at urgent care including chlamydia and gonorrhea PCR's which are pending.   There was also of wet prep completed which did reveal evidence of BV and vaginal candidiasis.  Otherwise her labs are unremarkable.  I obtained a beta quant here which is only 353.  As expected, ultrasound did not reveal evidence of pregnancy.  I explained to Hilaria this may be related to it being too early in her pregnancy, which is my suspicion, versus ectopic pregnancy or miscarriage.  She understands it is very important to have repeat beta-hCG in 48 hours to differentiate among these possibilities.  She is B+ and does not require RhoGam.  She also understands to start Flagyl for her BV as well as PV clotrimazole for her yeast infection.  I gave her Tylenol during her work-up and on repeat evaluation she did feel improved.  She understands she can continue this but should avoid NSAIDs.  She will call her OB for follow-up but we did discuss return precautions including, but not limited to, large volume bleeding. All questions answered.    Covid-19  Hilaria Jansen was evaluated during a global COVID-19 pandemic, which necessitated consideration that the patient might be at risk for infection with the SARS-CoV-2 virus that causes COVID-19.   Applicable protocols for evaluation were followed during the patient's care.   COVID-19 was considered as part of the patient's evaluation. The plan for testing is:  a test was obtained at a previous visit and reviewed & considered today.      Diagnosis:    ICD-10-CM    1. Threatened miscarriage  O20.0    2. Bacterial vaginosis  N76.0     B96.89    3. Candidiasis of vagina  B37.3        Discharge Medications:  New Prescriptions    CLOTRIMAZOLE (LOTRIMIN) 1 % VAGINAL CREAM    Place 1 Applicatorful vaginally At Bedtime for 7 days    METRONIDAZOLE (FLAGYL) 500 MG TABLET    Take 1 tablet (500 mg) by mouth 2 times daily for 7 days       Scribe Disclosure:  Starr DUNLAP, am serving as a scribe at 12:40 PM on 4/13/2021 to document services personally performed by Cher Mclaughlin MD  based on my observations and the provider's statements to me.    [facility] EMERGENCY DEPT     Cher Mclaughlin MD  04/14/21 1959

## 2021-04-13 NOTE — PROGRESS NOTES
SUBJECTIVE  HPI: Hilaria Jansen is a 30 year old female  who presents with the CC of abdominal/pelvic pain.   Pain is located in the suprapubic area, with radiation to back    The pain is characterized as cramping.    Pain has been present for 2 day(s) and is slowly progressive.     EXACERBATING FACTORS: NEGATIVE.   RELIEVING FACTORS: NEGATIVE.    ASSOCIATED SX: none.     Past Medical History:   Diagnosis Date     Hidradenitis suppurativa      Allergies   Allergen Reactions     Keflex [Cephalexin] Rash     Itching all over body     Social History     Tobacco Use     Smoking status: Never Smoker     Smokeless tobacco: Never Used   Substance Use Topics     Alcohol use: No       ROS:CONSTITUTIONAL:NEGATIVE for fever, chills, change in weight  GI: negative for diarrhea and vomiting  : negative for, dysuria and hematuria    EXAMINATION:  /80   Pulse 75   Temp 98.6  F (37  C)   Resp 16   Ht 1.524 m (5')   Wt 69.4 kg (153 lb)   LMP 03/19/2021   SpO2 100%   BMI 29.88 kg/m  GENERAL APPEARANCE: healthy, alert and no distress  ABDOMEN: soft, tenderness moderate suprapubic      ICD-10-CM    1. Abdominal pain, generalized  R10.84 CBC with platelets differential     UA with Microscopic reflex to Culture     HCG qualitative urine     Comprehensive metabolic panel     Amylase     Lipase     NEISSERIA GONORRHOEA PCR     CHLAMYDIA TRACHOMATIS PCR   2. Pregnancy test positive  Z32.01    3. Acute bilateral low back pain without sciatica  M54.5 CBC with platelets differential     UA with Microscopic reflex to Culture     HCG qualitative urine     Comprehensive metabolic panel     Amylase     Lipase     NEISSERIA GONORRHOEA PCR     CHLAMYDIA TRACHOMATIS PCR   4. Vaginal discharge  N89.8 Wet prep     CBC with platelets differential     UA with Microscopic reflex to Culture     HCG qualitative urine     Comprehensive metabolic panel     Amylase     Lipase     NEISSERIA GONORRHOEA PCR     CHLAMYDIA TRACHOMATIS PCR      Pt will go through ED for cont w/u of positive pregnancy and abd pain

## 2021-04-13 NOTE — DISCHARGE INSTRUCTIONS
Because it is still early we cannot tell if this pregnancy will proceed normally or if this is a miscarriage or ectopic pregnancy.  It is very important that you have a repeat pregnancy hormone in 48 hours and talk with your OB so that we know the health of this pregnancy.  We did see evidence of yeast infection so use the vaginal cream.  We also saw evidence of BV so take the antibiotics.  For pain you can use Tylenol but do not use any other over-the-counter medications.  Return immediately if you have severe pain, fever, or if you have heavy bleeding like we discussed.

## 2021-04-13 NOTE — ED TRIAGE NOTES
Pt sent here from [facility] with abdominal pain and newly positive pregnancy test.

## 2021-04-16 DIAGNOSIS — Z32.01 PREGNANCY TEST POSITIVE: Primary | ICD-10-CM

## 2021-04-16 DIAGNOSIS — Z32.01 PREGNANCY TEST POSITIVE: ICD-10-CM

## 2021-04-16 LAB — B-HCG SERPL-ACNC: 1140 IU/L (ref 0–5)

## 2021-04-16 PROCEDURE — 36415 COLL VENOUS BLD VENIPUNCTURE: CPT | Performed by: OBSTETRICS & GYNECOLOGY

## 2021-04-16 PROCEDURE — 84702 CHORIONIC GONADOTROPIN TEST: CPT | Performed by: OBSTETRICS & GYNECOLOGY

## 2021-04-24 ENCOUNTER — HEALTH MAINTENANCE LETTER (OUTPATIENT)
Age: 31
End: 2021-04-24

## 2021-04-27 ENCOUNTER — PRENATAL OFFICE VISIT (OUTPATIENT)
Dept: NURSING | Facility: CLINIC | Age: 31
End: 2021-04-27
Payer: COMMERCIAL

## 2021-04-27 VITALS — HEIGHT: 62 IN | BODY MASS INDEX: 27.98 KG/M2

## 2021-04-27 DIAGNOSIS — Z34.90 ENCOUNTER FOR SUPERVISION OF NORMAL PREGNANCY: Primary | ICD-10-CM

## 2021-04-27 PROBLEM — Z23 NEED FOR TDAP VACCINATION: Status: RESOLVED | Noted: 2021-02-01 | Resolved: 2021-04-27

## 2021-04-27 PROCEDURE — 99207 PR NO CHARGE NURSE ONLY: CPT

## 2021-04-27 RX ORDER — DIPHENHYDRAMINE HYDROCHLORIDE 25 MG/1
CAPSULE ORAL
COMMUNITY
Start: 2021-02-01 | End: 2021-11-10

## 2021-04-27 NOTE — PROGRESS NOTES
Important Information for Provider:     New ob nurse intake by phone, fifth pregnancy. Recent SAB 2/15/2021. Patient was seen at urgent care  4/12/21, positive pregnancy test. ED visit  41/13/21 for abdominal pain, pinkish discharge. Patient was diagnosis with yeast/BV given RX.  Patient has been on progesterone since previous SAB. Quants were drawn(numbers doubled). Handouts reviewed and mailed. Patient will plan the quad screening. History of C section 6/17/2019,  Abnormal 1 hour GCT, passed 3 hour GTT. Ultrasound and NOB with Dr Capps 5/12/2021    Caffeine intake/servings daily - 0  Calcium intake/servings daily - 3  Exercise 5 times weekly - describe ; walks, precautions given  Sunscreen used - Yes  Seatbelts used - Yes  Guns stored in the home - No  Self Breast Exam - Yes  Pap test up to date -  Yes  Eye exam up to date -  Yes  Dental exam up to date -  Yes  Immunizations reviewed and up to date - Yes  Abuse: Current or Past (Physical, Sexual or Emotional) - No  Do you feel safe in your environment - Yes  Do you cope well with stress - Yes  Do you suffer from insomnia - No         Prenatal OB Questionnaire  Patient supplied answers from flow sheet for:  Prenatal OB Questionnaire.  Past Medical History  Have you ever recieved care for your mental health? : No  Have you ever been in a major accident or suffered serious trauma?: No  Within the last year, has anyone hit, slapped, kicked or otherwise hurt you?: No  In the last year, has anyone forced you to have sex when you didn't want to?: No    Past Medical History 2   Have you ever received a blood transfusion?: No  Would you accept a blood transfusion if was medically recommended?: Yes  Does anyone in your home smoke?: No   Is your blood type Rh negative?: No  Have you ever ?: (!) Yes  Have you been hospitalized for a nonsurgical reason excluding normal delivery?: No  Have you ever had an abnormal pap smear?: No    Past Medical History  (Continued)  Do you have a history of abnormalities of the uterus?: No  Did your mother take DANYEL or any other hormones when she was pregnant with you?: No  Do you have any other problems we have not asked about which you feel may be important to this pregnancy?: No                     Allergies as of 4/27/2021:    Allergies as of 04/27/2021     (No Known Allergies)       Current medications are:  Current Outpatient Medications   Medication Sig Dispense Refill     doxylamine (UNISOM) 25 MG TABS tablet Take 25 mg by mouth At Bedtime       ferrous gluconate (FERGON) 324 (38 Fe) MG tablet Take 1 tablet (324 mg) by mouth daily (with breakfast) 90 tablet 2     Prenatal Vit-Fe Fumarate-FA (PRENATAL MULTIVITAMIN W/IRON) 27-0.8 MG tablet Take 1 tablet by mouth daily 90 tablet 3     VITAMIN  B-6 25 MG tablet        progesterone (PROMETRIUM) 200 MG capsule Take 1 capsule (200 mg) by mouth daily Day after ovulation until 10 weeks gestation (Patient not taking: Reported on 4/27/2021) 90 capsule 3         Early ultrasound screening tool:    Does patient have irregular periods?  No  Did patient use hormonal birth control in the three months prior to positive urine pregnancy test? No  Is the patient breastfeeding?  No  Is the patient 10 weeks or greater at time of education visit?  No

## 2021-05-12 ENCOUNTER — PRENATAL OFFICE VISIT (OUTPATIENT)
Dept: OBGYN | Facility: CLINIC | Age: 31
End: 2021-05-12
Payer: COMMERCIAL

## 2021-05-12 ENCOUNTER — ANCILLARY PROCEDURE (OUTPATIENT)
Dept: ULTRASOUND IMAGING | Facility: CLINIC | Age: 31
End: 2021-05-12
Attending: OBSTETRICS & GYNECOLOGY
Payer: COMMERCIAL

## 2021-05-12 VITALS
HEART RATE: 83 BPM | HEIGHT: 62 IN | WEIGHT: 159.8 LBS | OXYGEN SATURATION: 100 % | SYSTOLIC BLOOD PRESSURE: 122 MMHG | BODY MASS INDEX: 29.4 KG/M2 | DIASTOLIC BLOOD PRESSURE: 73 MMHG

## 2021-05-12 DIAGNOSIS — B96.89 BV (BACTERIAL VAGINOSIS): ICD-10-CM

## 2021-05-12 DIAGNOSIS — Z32.01 PREGNANCY TEST POSITIVE: ICD-10-CM

## 2021-05-12 DIAGNOSIS — N76.0 BV (BACTERIAL VAGINOSIS): ICD-10-CM

## 2021-05-12 DIAGNOSIS — Z34.80 SUPERVISION OF OTHER NORMAL PREGNANCY, ANTEPARTUM: Primary | ICD-10-CM

## 2021-05-12 LAB
ABO + RH BLD: NORMAL
ABO + RH BLD: NORMAL
ALBUMIN UR-MCNC: NEGATIVE MG/DL
APPEARANCE UR: CLEAR
BASOPHILS # BLD AUTO: 0 10E9/L (ref 0–0.2)
BASOPHILS NFR BLD AUTO: 0.3 %
BILIRUB UR QL STRIP: NEGATIVE
BLD GP AB SCN SERPL QL: NORMAL
BLOOD BANK CMNT PATIENT-IMP: NORMAL
COLOR UR AUTO: YELLOW
DIFFERENTIAL METHOD BLD: ABNORMAL
EOSINOPHIL # BLD AUTO: 0.1 10E9/L (ref 0–0.7)
EOSINOPHIL NFR BLD AUTO: 0.7 %
ERYTHROCYTE [DISTWIDTH] IN BLOOD BY AUTOMATED COUNT: 13.2 % (ref 10–15)
GLUCOSE UR STRIP-MCNC: NEGATIVE MG/DL
HBA1C MFR BLD: 5 % (ref 0–5.6)
HCT VFR BLD AUTO: 33.2 % (ref 35–47)
HGB BLD-MCNC: 11.4 G/DL (ref 11.7–15.7)
HGB UR QL STRIP: NEGATIVE
KETONES UR STRIP-MCNC: NEGATIVE MG/DL
LEUKOCYTE ESTERASE UR QL STRIP: NEGATIVE
LYMPHOCYTES # BLD AUTO: 2.2 10E9/L (ref 0.8–5.3)
LYMPHOCYTES NFR BLD AUTO: 30.3 %
MCH RBC QN AUTO: 28.8 PG (ref 26.5–33)
MCHC RBC AUTO-ENTMCNC: 34.3 G/DL (ref 31.5–36.5)
MCV RBC AUTO: 84 FL (ref 78–100)
MONOCYTES # BLD AUTO: 0.4 10E9/L (ref 0–1.3)
MONOCYTES NFR BLD AUTO: 5.5 %
NEUTROPHILS # BLD AUTO: 4.6 10E9/L (ref 1.6–8.3)
NEUTROPHILS NFR BLD AUTO: 63.2 %
NITRATE UR QL: NEGATIVE
PH UR STRIP: 7 PH (ref 5–7)
PLATELET # BLD AUTO: 203 10E9/L (ref 150–450)
RBC # BLD AUTO: 3.96 10E12/L (ref 3.8–5.2)
SOURCE: NORMAL
SP GR UR STRIP: 1.01 (ref 1–1.03)
SPECIMEN EXP DATE BLD: NORMAL
UROBILINOGEN UR STRIP-ACNC: 0.2 EU/DL (ref 0.2–1)
WBC # BLD AUTO: 7.2 10E9/L (ref 4–11)

## 2021-05-12 PROCEDURE — 85025 COMPLETE CBC W/AUTO DIFF WBC: CPT | Performed by: OBSTETRICS & GYNECOLOGY

## 2021-05-12 PROCEDURE — 87086 URINE CULTURE/COLONY COUNT: CPT | Performed by: OBSTETRICS & GYNECOLOGY

## 2021-05-12 PROCEDURE — 86762 RUBELLA ANTIBODY: CPT | Performed by: OBSTETRICS & GYNECOLOGY

## 2021-05-12 PROCEDURE — 86850 RBC ANTIBODY SCREEN: CPT | Performed by: OBSTETRICS & GYNECOLOGY

## 2021-05-12 PROCEDURE — 76817 TRANSVAGINAL US OBSTETRIC: CPT | Performed by: OBSTETRICS & GYNECOLOGY

## 2021-05-12 PROCEDURE — 87389 HIV-1 AG W/HIV-1&-2 AB AG IA: CPT | Performed by: OBSTETRICS & GYNECOLOGY

## 2021-05-12 PROCEDURE — 99000 SPECIMEN HANDLING OFFICE-LAB: CPT | Performed by: OBSTETRICS & GYNECOLOGY

## 2021-05-12 PROCEDURE — 81003 URINALYSIS AUTO W/O SCOPE: CPT | Performed by: OBSTETRICS & GYNECOLOGY

## 2021-05-12 PROCEDURE — 86803 HEPATITIS C AB TEST: CPT | Performed by: OBSTETRICS & GYNECOLOGY

## 2021-05-12 PROCEDURE — 83036 HEMOGLOBIN GLYCOSYLATED A1C: CPT | Performed by: OBSTETRICS & GYNECOLOGY

## 2021-05-12 PROCEDURE — 86900 BLOOD TYPING SEROLOGIC ABO: CPT | Performed by: OBSTETRICS & GYNECOLOGY

## 2021-05-12 PROCEDURE — 87340 HEPATITIS B SURFACE AG IA: CPT | Performed by: OBSTETRICS & GYNECOLOGY

## 2021-05-12 PROCEDURE — 36415 COLL VENOUS BLD VENIPUNCTURE: CPT | Performed by: OBSTETRICS & GYNECOLOGY

## 2021-05-12 PROCEDURE — 99207 PR FIRST OB VISIT: CPT | Performed by: OBSTETRICS & GYNECOLOGY

## 2021-05-12 PROCEDURE — 86901 BLOOD TYPING SEROLOGIC RH(D): CPT | Performed by: OBSTETRICS & GYNECOLOGY

## 2021-05-12 PROCEDURE — 86780 TREPONEMA PALLIDUM: CPT | Mod: 90 | Performed by: OBSTETRICS & GYNECOLOGY

## 2021-05-12 RX ORDER — METRONIDAZOLE 500 MG/1
500 TABLET ORAL 2 TIMES DAILY
Qty: 14 TABLET | Refills: 0 | Status: SHIPPED | OUTPATIENT
Start: 2021-05-12 | End: 2021-05-25

## 2021-05-12 RX ORDER — PRENATAL VIT/IRON FUM/FOLIC AC 27MG-0.8MG
1 TABLET ORAL DAILY
Qty: 90 TABLET | Refills: 3 | Status: SHIPPED | OUTPATIENT
Start: 2021-05-12 | End: 2022-04-08

## 2021-05-12 ASSESSMENT — MIFFLIN-ST. JEOR: SCORE: 1393.1

## 2021-05-13 PROBLEM — Z34.80 SUPERVISION OF OTHER NORMAL PREGNANCY, ANTEPARTUM: Status: ACTIVE | Noted: 2021-05-13

## 2021-05-13 LAB
BACTERIA SPEC CULT: NO GROWTH
HBV SURFACE AG SERPL QL IA: NONREACTIVE
HCV AB SERPL QL IA: NONREACTIVE
HIV 1+2 AB+HIV1 P24 AG SERPL QL IA: NONREACTIVE
Lab: NORMAL
RUBV IGG SERPL IA-ACNC: 29 IU/ML
SPECIMEN SOURCE: NORMAL
T PALLIDUM AB SER QL: NONREACTIVE

## 2021-05-13 NOTE — PROGRESS NOTES
"Feeling very fatigued and some nausea, but super happy to have normal ultrasound today with [facility]. Dates by LMP c/w 8 wk u/s. Providers/residents, weight gain/exercise, delivery discussed. Declines resident care since last time the resident \"hurt her\" and she did not feel comfortable. Prior c/s and unsure route this time, ?TOLAC. Check labs today and plan pap smear at a future visit. Had SAB at 9 wks last time so plan RTC 2 weeks and make sure pregnancy is progressing. She prefers  location. BE    HPI:  Hilaria Jansen is a 31 year old female Patient's last menstrual period was 2021. at 8w0d, Estimated Date of Delivery: Dec 22, 2021.  She denies vaginal bleeding and abdominal pain. Slight cramping noted.   No other c/o.    Past Medical History:   Diagnosis Date     Anemia      Hidradenitis suppurativa      Infertility, female      Neurological disorder      Sickle cell anemia (H)      Varicella        Past Surgical History:   Procedure Laterality Date      SECTION N/A 2019    Procedure:  SECTION;  Surgeon: Wendi Coppola MD;  Location: [facility] L[facility]D     SURGICAL PATHOLOGY EXAM Left 09/10/2015    left paratracheal neck mass removed, benign       Allergies: Patient has no known allergies.     EXAM:  Blood pressure 122/73, pulse 83, height 1.575 m (5' 2\"), weight 72.5 kg (159 lb 12.8 oz), last menstrual period 2021, SpO2 100 %, not currently breastfeeding.   BMI= Body mass index is 29.23 kg/m .  General - pleasant female in no acute distress.  Neck - supple without lymphadenopathy or thyromegaly.  Lungs - clear to auscultation bilaterally.  Heart - regular rate and rhythm without murmur.  Breast - deferred  Abdomen -  deferred.  Musculoskeletal - no gross deformities.  Neurological - normal strength, sensation, and mental status.    Doptones were not attempted at this early GA    ASSESSMENT/PLAN:  (Z34.80) Supervision of other normal pregnancy, antepartum  (primary encounter " diagnosis)  Comment: Prior SAB  Plan: Prenatal Vit-Fe Fumarate-FA (PRENATAL         MULTIVITAMIN W/IRON) 27-0.8 MG tablet,         Hepatitis C antibody        Check labs today and plan for Pap smear in the future.  We will need to decide if she wants first trimester screen.    (N76.0,  B96.89) BV (bacterial vaginosis)  Comment: Still with symptoms  Plan: metroNIDAZOLE (FLAGYL) 500 MG tablet        We will repeat her Flagyl.    Weight gain and exercise during pregnancy was discussed at today's visit.  The patient will return to clinic in 4 weeks for continued prenatal care.

## 2021-05-25 ENCOUNTER — PRENATAL OFFICE VISIT (OUTPATIENT)
Dept: OBGYN | Facility: CLINIC | Age: 31
End: 2021-05-25
Payer: COMMERCIAL

## 2021-05-25 ENCOUNTER — TRANSCRIBE ORDERS (OUTPATIENT)
Dept: MATERNAL FETAL MEDICINE | Facility: CLINIC | Age: 31
End: 2021-05-25

## 2021-05-25 VITALS
DIASTOLIC BLOOD PRESSURE: 68 MMHG | SYSTOLIC BLOOD PRESSURE: 110 MMHG | TEMPERATURE: 98.6 F | BODY MASS INDEX: 31.69 KG/M2 | WEIGHT: 161.4 LBS | HEIGHT: 60 IN

## 2021-05-25 DIAGNOSIS — Z34.80 SUPERVISION OF OTHER NORMAL PREGNANCY, ANTEPARTUM: Primary | ICD-10-CM

## 2021-05-25 DIAGNOSIS — O26.90 PREGNANCY RELATED CONDITION, ANTEPARTUM: Primary | ICD-10-CM

## 2021-05-25 PROCEDURE — 99207 PR PRENATAL VISIT: CPT | Performed by: OBSTETRICS & GYNECOLOGY

## 2021-05-25 ASSESSMENT — MIFFLIN-ST. JEOR: SCORE: 1368.61

## 2021-05-25 NOTE — PROGRESS NOTES
Feeling tired but okay. Happy only gained 1 lb since last visit. BSUS used to see cardiac activity, 153 per m mode. She is interested in first trimester screen so that was ordered. RTC 4 weeks. BE

## 2021-06-08 ENCOUNTER — PRE VISIT (OUTPATIENT)
Dept: MATERNAL FETAL MEDICINE | Facility: CLINIC | Age: 31
End: 2021-06-08

## 2021-06-15 ENCOUNTER — OFFICE VISIT (OUTPATIENT)
Dept: MATERNAL FETAL MEDICINE | Facility: CLINIC | Age: 31
End: 2021-06-15
Attending: OBSTETRICS & GYNECOLOGY
Payer: COMMERCIAL

## 2021-06-15 ENCOUNTER — HOSPITAL ENCOUNTER (OUTPATIENT)
Dept: ULTRASOUND IMAGING | Facility: CLINIC | Age: 31
End: 2021-06-15
Attending: OBSTETRICS & GYNECOLOGY
Payer: COMMERCIAL

## 2021-06-15 ENCOUNTER — HOSPITAL ENCOUNTER (OUTPATIENT)
Dept: LAB | Facility: CLINIC | Age: 31
End: 2021-06-15
Attending: OBSTETRICS & GYNECOLOGY
Payer: COMMERCIAL

## 2021-06-15 DIAGNOSIS — D57.3 SICKLE CELL TRAIT (H): ICD-10-CM

## 2021-06-15 DIAGNOSIS — O26.90 PREGNANCY RELATED CONDITION, ANTEPARTUM: ICD-10-CM

## 2021-06-15 DIAGNOSIS — Z36.9 ENCOUNTER FOR ANTENATAL SCREENING: Primary | ICD-10-CM

## 2021-06-15 DIAGNOSIS — Z36.9 ENCOUNTER FOR ANTENATAL SCREENING: ICD-10-CM

## 2021-06-15 DIAGNOSIS — Z36.82 ENCOUNTER FOR ANTENATAL SCREENING FOR NUCHAL TRANSLUCENCY: Primary | ICD-10-CM

## 2021-06-15 LAB — MISCELLANEOUS TEST: NORMAL

## 2021-06-15 PROCEDURE — 96040 HC GENETIC COUNSELING, EACH 30 MINUTES: CPT | Performed by: GENETIC COUNSELOR, MS

## 2021-06-15 PROCEDURE — 76813 OB US NUCHAL MEAS 1 GEST: CPT | Mod: 26 | Performed by: OBSTETRICS & GYNECOLOGY

## 2021-06-15 PROCEDURE — 76813 OB US NUCHAL MEAS 1 GEST: CPT

## 2021-06-15 PROCEDURE — 36415 COLL VENOUS BLD VENIPUNCTURE: CPT | Performed by: OBSTETRICS & GYNECOLOGY

## 2021-06-15 NOTE — PROGRESS NOTES
Please see full imaging report from ViewPoint program under imaging tab.      Venecia Bowen MD  Maternal Fetal Medicine

## 2021-06-15 NOTE — PROGRESS NOTES
[facility] [place]  Genetic Counseling Consult    Patient: Hilaria Jansen YOB: 1990   Date of Service: 6/15/21      iHlaria Jansen was seen at [facility] Fetal [facility] [place] with the assistance of Citizen of [place] iPad  (Shilpi #[id number]) for genetic consultation to discuss the options for routine screening for fetal chromosome abnormalities. The patient was accompanied by her partner, Lux to today's visit.       Impression/Plan:   1.  Hilaria had an ultrasound and blood draw for NIPT through [facility].  Results are expected within 7 days, and will be available in [facility].  We will contact her with the assistance of a Citizen of [place]  to discuss the results, and a copy will be forwarded to the office of the referring OB provider. Hilaria requested that we do not leave results in her voicemail.     2. Maternal serum AFP (single marker screen) is recommended after 15 weeks to screen for open neural tube defects. A quad screen should not be performed.    3. 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 Her partner, Lux reported that he was tested back in [place] and believes he was not found to be a carrier, however does not have any record of this testing and is interested in pursuing a hemoglobin evaluation today. This will be drawn and results are expected within one week. We will contact Lux with his result with the assistance of a Citizen of [place]  and he requested that we do not leave the result in his voicemail.     Pregnancy History:   /Parity:    Age at Delivery: 31 year old  GEOFF: 2021, by Last Menstrual Period  Gestational Age: 12w6d    No significant complications or exposures were reported in the current pregnancy.    Hilaria s pregnancy history is significant for three AB's and one term c/s.    Medical History:   Hilaria s reported medical history is not expected to impact pregnancy management or risks to fetal development.      "  Family History:   A three-generation pedigree was obtained, and is scanned under the  Media  tab.   The following significant findings were reported by Hilaria:    Hilaria's partner, Lux is 33 and healthy.    Lux's father passed away at age 49 due to kidney failure. He is unsure of any further details or if an underlying cause was identified. In the absence of further information, risk assessment is challenging. Lux was encouraged to share this family history information with his primary care provider.   Hilaria's paternal aunt reportedly passed away in her 50's due to breast cancer. No other family history of cancer was reported. Most cancer seen in families occurs sporadically, but about 5-10% may be due to an underlying genetic etiology. Hilaria was encouraged to share this family history information with her primary care provider to ensure appropriate screening.   Hilaria's paternal half brother reportedly passed away due to sickle cell disease. Hilaria is a known carrier for sickle cell trait. Sickle cell disease is a genetic condition that affects the shape of red blood cells and has significant health complications. Sickle cell disease is an autosomal recessive condition caused by genetic variants in both copies of the HBB gene. Carriers, commonly said to have \"sickle cell trait\", have a genetic variant in one copy of their HBB gene and rarely have symptoms, expect in extreme conditions. There are also other variants of the HBB gene that can combine with a sickle cell trait to cause various hemoglobinopathy conditions. Lux reported that he was tested back in [place] and believes he was not found to be a carrier, however does not have any record of this testing and is interested in pursuing a hemoglobin evaluation today. We reviewed at minimum a 50% chance for the couple's children to be carriers for sickle cell disease and they were encouraged to share this family history information with their pediatrician. "     Otherwise, the reported family history is negative for multiple miscarriages, stillbirths, birth defects, intellectual disability, known genetic conditions, and consanguinity.       Carrier Screening:   The patient reports that she and the father of the pregnancy have  ancestry:     The hemoglobinopathies are a group of genetic blood diseases that occur with increased frequency in individuals of  ancestry and carrier screening for these conditions is available.  Carrier screening for the hemoglobinopathies includes a CBC with red blood cell indices, a ferritin level, and a quantitative hemoglobin electrophoresis or HPLC.  In addition,  screening in the [facility] includes many of the hemoglobinopathies.      Expanded carrier screening for mutations in a large panel of genes associated with autosomal recessive conditions including cystic fibrosis, spinal muscular atrophy, and others, is now available.      Hilaria is a known sickle cell carrier (see lab from 18). Her partner, Lux reported that he was tested back in [place] and believes he was not found to be a carrier, however does not have any record of this testing and is interested in pursuing a hemoglobin evaluation today. This will be drawn and results are expected within one week. We will contact Lux with his result with the assistance of a German  and he requested that we do not leave the result in his voicemail. Expanded carrier screening options will remain available to the couple and our clinic would be happy to help coordinate if interested.        Risk Assessment for Chromosome Conditions:   We explained that the risk for fetal chromosome abnormalities increases with maternal age. We discussed specific features of common chromosome abnormalities, including Down syndrome, trisomy 13, trisomy 18, and sex chromosome trisomies.      - At age 31 at midtrimester, the risk to have a baby with Down syndrome is 1  in 597.    - At age 31 at midtrimester, the risk to have a baby with any chromosome abnormality is 1 in 299.        Testing Options:   We discussed the following options:   First trimester screening    First trimester ultrasound with nuchal translucency and nasal bone assessments, maternal plasma hCG, FOREIGN-A, and AFP measurement    Screens for fetal trisomy 21, trisomy 13, and trisomy 18    Cannot screen for open neural tube defects; maternal serum AFP after 15 weeks is recommended     Non-invasive Prenatal Testing (NIPT)    Maternal plasma cell-free DNA testing; first trimester ultrasound with nuchal translucency and nasal bone assessment is recommended, when appropriate    Screens for fetal trisomy 21, trisomy 13, trisomy 18, and sex chromosome aneuploidy    Cannot screen for open neural tube defects; maternal serum AFP after 15 weeks is recommended     Chorionic villus sampling (CVS)    Invasive procedure typically performed in the first trimester by which placental villi are obtained for the purpose of chromosome analysis and/or other prenatal genetic analysis    Diagnostic results; >99% sensitivity for fetal chromosome abnormalities    Cannot test for open neural tube defects; maternal serum AFP after 15 weeks is recommended     Genetic Amniocentesis    Invasive procedure typically performed in the second trimester by which amniotic fluid is obtained for the purpose of chromosome analysis and/or other prenatal genetic analysis    Diagnostic results; >99% sensitivity for fetal chromosome abnormalities    AFAFP measurement tests for open neural tube defects      We reviewed the benefits and limitations of this testing.  Screening tests provide a risk assessment specific to the pregnancy for certain fetal chromosome abnormalities, but cannot definitively diagnose or exclude a fetal chromosome abnormality.  Follow-up genetic counseling and consideration of diagnostic testing is recommended with any abnormal  screening result.     Diagnostic tests carry inherent risks- including risk of miscarriage- that require careful consideration.  These tests can detect fetal chromosome abnormalities with greater than 99% certainty.  Results can be compromised by maternal cell contamination or mosaicism, and are limited by the resolution of cytogenetic G-banding technology.  There is no screening nor diagnostic test that can detect all forms of birth defects or mental disability.     It was a pleasure to be involved with Hilaria s care. Face-to-face time of the meeting was 45 minutes.    Mansi Montalvo MS, [facility]  Licensed Genetic Counselor  [facility]  Maternal Fetal Medicine  Ph: [phone]  [email or web address]

## 2021-06-17 ENCOUNTER — TELEPHONE (OUTPATIENT)
Dept: MATERNAL FETAL MEDICINE | Facility: CLINIC | Age: 31
End: 2021-06-17

## 2021-06-17 NOTE — TELEPHONE ENCOUNTER
June 17, 2021    I called Lux with the assistance of a phone Khmer  (Iman #[id number]) to review available hemoglobin results for Lux. Lux elected to pursue testing due to his partner, Hilaria being a known sickle cell carrier. He reported that he was tested back in [place] and believes he was not found to be a carrier, however does not have any record of this testing.     Lux's hemoglobin evaluation is normal, he was not identified to be a carrier of sickle cell disease. This test does not rule out the possibility of all hemoglobinopathy variants and molecular genetic testing remains available. He will plan to share this information with his partner, Hilaria and has no additional questions at this time.    Mansi Montalvo MS, [facility]  Licensed Genetic Counselor  [facility]  Maternal Fetal Medicine  Ph: [phone]  [email or web address]

## 2021-06-22 ENCOUNTER — TELEPHONE (OUTPATIENT)
Dept: OBGYN | Facility: CLINIC | Age: 31
End: 2021-06-22

## 2021-06-22 ENCOUNTER — PRENATAL OFFICE VISIT (OUTPATIENT)
Dept: OBGYN | Facility: CLINIC | Age: 31
End: 2021-06-22
Payer: COMMERCIAL

## 2021-06-22 VITALS
HEIGHT: 62 IN | WEIGHT: 165.6 LBS | HEART RATE: 97 BPM | SYSTOLIC BLOOD PRESSURE: 123 MMHG | DIASTOLIC BLOOD PRESSURE: 79 MMHG | BODY MASS INDEX: 30.47 KG/M2 | OXYGEN SATURATION: 99 %

## 2021-06-22 DIAGNOSIS — Z34.80 SUPERVISION OF OTHER NORMAL PREGNANCY, ANTEPARTUM: Primary | ICD-10-CM

## 2021-06-22 DIAGNOSIS — O99.891 BACK PAIN AFFECTING PREGNANCY IN SECOND TRIMESTER: ICD-10-CM

## 2021-06-22 DIAGNOSIS — M54.9 BACK PAIN AFFECTING PREGNANCY IN SECOND TRIMESTER: ICD-10-CM

## 2021-06-22 PROCEDURE — 99207 PR PRENATAL VISIT: CPT | Performed by: OBSTETRICS & GYNECOLOGY

## 2021-06-22 RX ORDER — ACETAMINOPHEN 500 MG
500-1000 TABLET ORAL EVERY 6 HOURS PRN
Status: ON HOLD | COMMUNITY
End: 2021-12-17

## 2021-06-22 RX ORDER — ACETAMINOPHEN 500 MG
500-1000 TABLET ORAL EVERY 6 HOURS PRN
Qty: 100 TABLET | Refills: 1 | Status: SHIPPED | OUTPATIENT
Start: 2021-06-22 | End: 2021-07-26

## 2021-06-22 ASSESSMENT — MIFFLIN-ST. JEOR: SCORE: 1419.41

## 2021-06-22 NOTE — PROGRESS NOTES
Low back noted on pt left side daily, some days worse than others. Brought FMLA paperwork since has had to miss work for the pain. Tender to palpation on left side L5 area paraspinals.  Letter will be done for this week and fill out intermittent FMLA, fitted for maternity support belt today as well and discussed heat and tylenol. Spouse was negative for sickle cell carrier. RTC 4 weeks. BE

## 2021-06-22 NOTE — TELEPHONE ENCOUNTER
Forms received in appt, already signed by BE. Filled out and faxed back to number listed on green sheet.     Brooke

## 2021-06-22 NOTE — LETTER
[facility]  [address] [place] [address] [address]  [facility] [address]  [phone]      June 22, 2021      Hilaria Jansen  [address] [place] [address] [place] [address]              To Whom It May Concern:    Hilaria Jansen is being seen in our clinic for prenatal care.  Her Estimated Date of Delivery: Dec 22, 2021. Please excuse patient from work from 6/24/21-6/26/21 due to back pain. If you have any questions, you can contact the clinic at [phone].      Sincerely,    Brooke Capps MD

## 2021-06-25 ENCOUNTER — TELEPHONE (OUTPATIENT)
Dept: MATERNAL FETAL MEDICINE | Facility: CLINIC | Age: 31
End: 2021-06-25

## 2021-06-25 LAB — LAB SCANNED RESULT: NORMAL

## 2021-06-25 NOTE — TELEPHONE ENCOUNTER
June 25, 2021  I spoke with Hilaria with the assistance of British phone  (Shivani #[id number]) regarding her NIPT results.     Results indicate NO ANEUPLOIDY DETECTED for chromosomes 21, 18, 13, or sex chromosomes (XX).     This puts her current pregnancy at low risk for Down syndrome, trisomy 18, trisomy 13 and sex chromosome abnormalities. This test is reported to have the following sensitivities: Down syndrome: 99.99%, trisomy 18: 99.99%, and trisomy 13: 99.99%. Although these results are reassuring, this does not replace a standard chromosome analysis from a chorionic villus sampling or amniocentesis.     MSAFP is the appropriate second trimester screening test for open neural tube defects; the maternal quad screen is not recommended.    Her results are available in her Epic chart for her primary OB to review.     Mansi Montalvo MS, [facility]  Licensed Genetic Counselor  [facility]  Maternal Fetal Medicine  Ph: [phone]  [email or web address]

## 2021-07-10 ENCOUNTER — OFFICE VISIT (OUTPATIENT)
Dept: URGENT CARE | Facility: URGENT CARE | Age: 31
End: 2021-07-10
Payer: COMMERCIAL

## 2021-07-10 VITALS
BODY MASS INDEX: 30.54 KG/M2 | TEMPERATURE: 97.5 F | OXYGEN SATURATION: 100 % | DIASTOLIC BLOOD PRESSURE: 66 MMHG | HEART RATE: 85 BPM | RESPIRATION RATE: 20 BRPM | SYSTOLIC BLOOD PRESSURE: 108 MMHG | WEIGHT: 167 LBS

## 2021-07-10 DIAGNOSIS — H57.89 IRRITATION OF LEFT EYE: Primary | ICD-10-CM

## 2021-07-10 PROCEDURE — 99213 OFFICE O/P EST LOW 20 MIN: CPT | Performed by: FAMILY MEDICINE

## 2021-07-10 NOTE — PROGRESS NOTES
Hilaria Jansen is a 31 year old female who comes in today for eye problem    Eyelash got  In the eye     Since Sunday, almost a week    Full physical not done     Mentation and affect are fine    No tremor of speech or extremity    Of note patient states she does not wear contacts    conjuctivae clear bilat    eom intact    Patient has  Curved eyelash  That  Is on upper lid, pointing down and irritating surface of eye    With consent we used sterile tweezers to pull on the offending hair/ lash    It did not come out by the root but most of the hair/lash did break off so only about a mm remained.     The portion that was irritating the eye is now gone.  Patient felt much better.  No complications.    ASSESSMENT / PLAN:  (H57.89) Irritation of left eye  (primary encounter diagnosis)  Comment: patient feels fine now.  If the lash grows back and starts irritating again, then she can see an eye doctor to get a more definitive procedure done  Plan: as above    Follow up prn       I reviewed the patient's medications, allergies, medical history, family history, and social history.    Oleg Sultana MD

## 2021-07-10 NOTE — PATIENT INSTRUCTIONS
If problem recurs, advise seeing optometrist or ophthalmalogist to get lid problem taken care of

## 2021-07-16 ENCOUNTER — TELEPHONE (OUTPATIENT)
Dept: OBGYN | Facility: CLINIC | Age: 31
End: 2021-07-16

## 2021-07-16 NOTE — LETTER
[place] [id number] [place] [address]  [facility] [address]  [phone]      July 16, 2021      Hilaria Jansen  [address] [place] [address] [place] [address]              To Whom It May Concern:    Hilaria Jansen is being seen in our clinic for prenatal care.  Her Estimated Date of Delivery: Dec 22, 2021.  It is not advised that Hilaria work the night shift as this leaves her unable to sleep and being up during the night causes her to feel unwell. Her sleep and overall health is important to maintain a healthy pregnancy. Her  is also available at night to bring her into the hospital if she is needing care. For this reason, it is my recommendation that Hilaria be put back on her previous evening shift hours of 2pm-10:30pm.     If you have any questions, please call the clinic at [phone].      Sincerely,        Lucia Gannon MD

## 2021-07-16 NOTE — TELEPHONE ENCOUNTER
Patient is 17.2 weeks pregnant. Recently her hours have changed at work from the evening shift to the night shift. She now is working 8:30pm-4:30am. Patient states she needs her sleep and does not feel well when she is up during the night. Is it okay to provide a letter for patient requesting that she be put back on the evening shift? (2pm-10:30pm). Leslee Mendez RN

## 2021-07-26 ENCOUNTER — PRENATAL OFFICE VISIT (OUTPATIENT)
Dept: OBGYN | Facility: CLINIC | Age: 31
End: 2021-07-26
Attending: OBSTETRICS & GYNECOLOGY
Payer: COMMERCIAL

## 2021-07-26 ENCOUNTER — ANCILLARY PROCEDURE (OUTPATIENT)
Dept: ULTRASOUND IMAGING | Facility: CLINIC | Age: 31
End: 2021-07-26
Attending: OBSTETRICS & GYNECOLOGY
Payer: COMMERCIAL

## 2021-07-26 VITALS
OXYGEN SATURATION: 99 % | BODY MASS INDEX: 31.46 KG/M2 | HEART RATE: 87 BPM | WEIGHT: 172 LBS | DIASTOLIC BLOOD PRESSURE: 64 MMHG | SYSTOLIC BLOOD PRESSURE: 101 MMHG

## 2021-07-26 DIAGNOSIS — Z34.80 SUPERVISION OF OTHER NORMAL PREGNANCY, ANTEPARTUM: ICD-10-CM

## 2021-07-26 DIAGNOSIS — Z34.80 SUPERVISION OF OTHER NORMAL PREGNANCY, ANTEPARTUM: Primary | ICD-10-CM

## 2021-07-26 PROCEDURE — 82105 ALPHA-FETOPROTEIN SERUM: CPT | Mod: 90 | Performed by: OBSTETRICS & GYNECOLOGY

## 2021-07-26 PROCEDURE — 99207 PR PRENATAL VISIT: CPT | Performed by: OBSTETRICS & GYNECOLOGY

## 2021-07-26 PROCEDURE — 76805 OB US >/= 14 WKS SNGL FETUS: CPT | Performed by: OBSTETRICS & GYNECOLOGY

## 2021-07-26 PROCEDURE — 99000 SPECIMEN HANDLING OFFICE-LAB: CPT | Performed by: OBSTETRICS & GYNECOLOGY

## 2021-07-26 PROCEDURE — 36415 COLL VENOUS BLD VENIPUNCTURE: CPT | Performed by: OBSTETRICS & GYNECOLOGY

## 2021-07-26 NOTE — PROGRESS NOTES
Had u/s today and having a girl! Here with spouse. Now working nights and hard to sleep, using unisom. Discussed fine in pregnancy. Check AFP today and spent some time discuss covid vaccine in pregnancy. Answered questions. RTC 5 weeks and GCT then. BE

## 2021-07-29 LAB
# FETUSES US: NORMAL
AFP MOM SERPL: 0.81
AFP SERPL-MCNC: 40 NG/ML
AGE - REPORTED: 31.7 YR
CURRENT SMOKER: NO
FAMILY MEMBER DISEASES HX: NO
GA METHOD: NORMAL
GA: NORMAL WK
IDDM PATIENT QL: NO
INTEGRATED SCN PATIENT-IMP: NORMAL
SPECIMEN DRAWN SERPL: NORMAL

## 2021-09-08 ENCOUNTER — PRENATAL OFFICE VISIT (OUTPATIENT)
Dept: OBGYN | Facility: CLINIC | Age: 31
End: 2021-09-08
Payer: COMMERCIAL

## 2021-09-08 ENCOUNTER — TELEPHONE (OUTPATIENT)
Dept: OBGYN | Facility: CLINIC | Age: 31
End: 2021-09-08

## 2021-09-08 VITALS
HEIGHT: 62 IN | SYSTOLIC BLOOD PRESSURE: 109 MMHG | HEART RATE: 90 BPM | WEIGHT: 181.3 LBS | DIASTOLIC BLOOD PRESSURE: 70 MMHG | BODY MASS INDEX: 33.36 KG/M2

## 2021-09-08 DIAGNOSIS — Z34.80 SUPERVISION OF OTHER NORMAL PREGNANCY, ANTEPARTUM: Primary | ICD-10-CM

## 2021-09-08 LAB
ERYTHROCYTE [DISTWIDTH] IN BLOOD BY AUTOMATED COUNT: 14.9 % (ref 10–15)
GLUCOSE 1H P 50 G GLC PO SERPL-MCNC: 123 MG/DL (ref 70–129)
HCT VFR BLD AUTO: 31 % (ref 35–47)
HGB BLD-MCNC: 10.3 G/DL (ref 11.7–15.7)
HGB BLD-MCNC: 10.3 G/DL (ref 11.7–15.7)
MCH RBC QN AUTO: 27.5 PG (ref 26.5–33)
MCHC RBC AUTO-ENTMCNC: 33.2 G/DL (ref 31.5–36.5)
MCV RBC AUTO: 83 FL (ref 78–100)
PLATELET # BLD AUTO: 207 10E3/UL (ref 150–450)
RBC # BLD AUTO: 3.74 10E6/UL (ref 3.8–5.2)
WBC # BLD AUTO: 7.4 10E3/UL (ref 4–11)

## 2021-09-08 PROCEDURE — 86803 HEPATITIS C AB TEST: CPT | Performed by: OBSTETRICS & GYNECOLOGY

## 2021-09-08 PROCEDURE — 36415 COLL VENOUS BLD VENIPUNCTURE: CPT | Performed by: OBSTETRICS & GYNECOLOGY

## 2021-09-08 PROCEDURE — 85027 COMPLETE CBC AUTOMATED: CPT | Performed by: OBSTETRICS & GYNECOLOGY

## 2021-09-08 PROCEDURE — 83550 IRON BINDING TEST: CPT | Performed by: OBSTETRICS & GYNECOLOGY

## 2021-09-08 PROCEDURE — 99207 PR PRENATAL VISIT: CPT | Performed by: OBSTETRICS & GYNECOLOGY

## 2021-09-08 PROCEDURE — 82952 GTT-ADDED SAMPLES: CPT | Performed by: OBSTETRICS & GYNECOLOGY

## 2021-09-08 PROCEDURE — 82728 ASSAY OF FERRITIN: CPT | Performed by: OBSTETRICS & GYNECOLOGY

## 2021-09-08 ASSESSMENT — MIFFLIN-ST. JEOR: SCORE: 1490.62

## 2021-09-08 NOTE — PROGRESS NOTES
Having some right side back pain. Discussed heat, massage and tylenol. GCT today. TOLAC consent signed today, 65% of success. (last c/s for cat2 remote from delivery) RTC 4 weeks and tdap then. BE    Childbirth classes? NO  Plan on breastfeeding? Yes  Birthcontrol? Not sure yet  Sex on ultrasound? girl  Circumsion? NA  Peds doc? [facility] Dr. Dent    TOLAC Candidate Assessment    OB History    Para Term  AB Living   5 1 1 0 3 1   SAB TAB Ectopic Multiple Live Births   1 0 0 0 1      # Outcome Date GA Lbr Ignacio/2nd Weight Sex Delivery Anes PTL Lv   5 Current            4 AB 02/15/21 9w0d             Birth Comments: no D&C   3 Term 19 37w6d  3.4 kg (7 lb 7.9 oz) M CS-LTranv Nitrous, EPI N JESSE      Complications: Fetal Intolerance      Name: SHAWN,MALE-KAROLINA      Apgar1: 8  Apgar5: 9   2 AB 2017     TAB         Birth Comments: took pill   1 SAB      SAB         Birth Comments: chemical pregnancy       Prior c/s date      category 2 or 3 tracing and failed IOL remote from delivery     OP note reviewed and in EPIC:  yes  Uterine closure:  double layer     calculator: [email or web address]

## 2021-09-09 LAB
FERRITIN SERPL-MCNC: 11 NG/ML (ref 12–150)
HCV AB SERPL QL IA: NONREACTIVE
IRON SATN MFR SERPL: 14 % (ref 15–46)
IRON SERPL-MCNC: 60 UG/DL (ref 35–180)
TIBC SERPL-MCNC: 419 UG/DL (ref 240–430)

## 2021-10-03 ENCOUNTER — HEALTH MAINTENANCE LETTER (OUTPATIENT)
Age: 31
End: 2021-10-03

## 2021-10-06 ENCOUNTER — PRENATAL OFFICE VISIT (OUTPATIENT)
Dept: OBGYN | Facility: CLINIC | Age: 31
End: 2021-10-06
Payer: COMMERCIAL

## 2021-10-06 VITALS
SYSTOLIC BLOOD PRESSURE: 117 MMHG | OXYGEN SATURATION: 99 % | DIASTOLIC BLOOD PRESSURE: 68 MMHG | HEART RATE: 109 BPM | BODY MASS INDEX: 34.37 KG/M2 | WEIGHT: 187.9 LBS

## 2021-10-06 DIAGNOSIS — Z23 NEED FOR PROPHYLACTIC VACCINATION AND INOCULATION AGAINST INFLUENZA: ICD-10-CM

## 2021-10-06 DIAGNOSIS — A63.0 CONDYLOMA ACUMINATUM: Primary | ICD-10-CM

## 2021-10-06 DIAGNOSIS — Z23 NEED FOR TDAP VACCINATION: ICD-10-CM

## 2021-10-06 PROCEDURE — 90472 IMMUNIZATION ADMIN EACH ADD: CPT | Performed by: OBSTETRICS & GYNECOLOGY

## 2021-10-06 PROCEDURE — 90715 TDAP VACCINE 7 YRS/> IM: CPT | Performed by: OBSTETRICS & GYNECOLOGY

## 2021-10-06 PROCEDURE — 90471 IMMUNIZATION ADMIN: CPT | Performed by: OBSTETRICS & GYNECOLOGY

## 2021-10-06 PROCEDURE — 99207 PR PRENATAL VISIT: CPT | Performed by: OBSTETRICS & GYNECOLOGY

## 2021-10-06 PROCEDURE — 90686 IIV4 VACC NO PRSV 0.5 ML IM: CPT | Performed by: OBSTETRICS & GYNECOLOGY

## 2021-10-06 RX ORDER — IMIQUIMOD 12.5 MG/.25G
CREAM TOPICAL
Qty: 12 PACKET | Refills: 3 | Status: SHIPPED | OUTPATIENT
Start: 2021-10-06 | End: 2022-04-19

## 2021-10-06 NOTE — PROGRESS NOTES
Did start Fe and feeling very tired. Lots of pelivc pressure and low back pain. Has support belt and not helping. Has a wart on left buttock. Had these last pregnancy as well and I needed to remove vaginally. Will try aldara cream. Has growth u/s in 2 weeks, S>D. Flu and tdap today. Considering covid vaccine. RTC as scheduled. BE    Patient has not yet been vaccinated against COVID-19. We discussed that vaccination is now recommended by the CDC, SMFM and ACOG for people who are pregnant, lactating and trying to get pregnant. We reviewed that pregnancy poses an increased risk for severe COVID-19 including an increased risk of hospitalization and death. We reviewed the available data on the vaccine and patient was given opportunity to ask questions. I encouraged her to get vaccinated and gave her information on where she can do this. We also discussed COVID-19 precautions.

## 2021-10-06 NOTE — NURSING NOTE
Clinic Administered Medication Documentation          Injectable Medication Documentation    Patient was given FLU VACCINE. Prior to medication administration, verified patients identity using patient s name and date of birth. Please see MAR and medication order for additional information. Patient instructed to remain in clinic for 15 minutes.      Was entire vial of medication used? Yes  Vial/Syringe: Single dose vial  Expiration Date:  6/30/22  Was this medication supplied by the patient? No     Clinic Administered Medication Documentation          Injectable Medication Documentation    Patient was given TDAP. Prior to medication administration, verified patients identity using patient s name and date of birth. Please see MAR and medication order for additional information. Patient instructed to remain in clinic for 15 minutes.      Was entire vial of medication used? Yes  Vial/Syringe: Single dose vial  Expiration Date:  4/27/23  Was this medication supplied by the patient? No

## 2021-10-15 ENCOUNTER — ANCILLARY PROCEDURE (OUTPATIENT)
Dept: ULTRASOUND IMAGING | Facility: CLINIC | Age: 31
End: 2021-10-15
Attending: OBSTETRICS & GYNECOLOGY
Payer: COMMERCIAL

## 2021-10-15 ENCOUNTER — PRENATAL OFFICE VISIT (OUTPATIENT)
Dept: OBGYN | Facility: CLINIC | Age: 31
End: 2021-10-15
Attending: OBSTETRICS & GYNECOLOGY
Payer: COMMERCIAL

## 2021-10-15 VITALS
BODY MASS INDEX: 34.58 KG/M2 | OXYGEN SATURATION: 99 % | DIASTOLIC BLOOD PRESSURE: 79 MMHG | HEIGHT: 62 IN | HEART RATE: 108 BPM | WEIGHT: 187.9 LBS | SYSTOLIC BLOOD PRESSURE: 118 MMHG

## 2021-10-15 DIAGNOSIS — Z34.80 SUPERVISION OF OTHER NORMAL PREGNANCY, ANTEPARTUM: ICD-10-CM

## 2021-10-15 DIAGNOSIS — Z34.80 SUPERVISION OF OTHER NORMAL PREGNANCY, ANTEPARTUM: Primary | ICD-10-CM

## 2021-10-15 PROCEDURE — 99207 PR PRENATAL VISIT: CPT | Performed by: OBSTETRICS & GYNECOLOGY

## 2021-10-15 PROCEDURE — 76816 OB US FOLLOW-UP PER FETUS: CPT | Performed by: OBSTETRICS & GYNECOLOGY

## 2021-10-15 RX ORDER — BREAST PUMP
EACH MISCELLANEOUS
Qty: 1 EACH | Refills: 0 | Status: SHIPPED | OUTPATIENT
Start: 2021-10-15 | End: 2022-05-12

## 2021-10-15 ASSESSMENT — MIFFLIN-ST. JEOR: SCORE: 1520.56

## 2021-10-15 NOTE — PROGRESS NOTES
Using the aldara cream and discussed will take time to work. Had u/s today and growth 25% so discussed 6 lbs something at delivery. Breech today. Here with spouse so again discussed covid vaccine and safety in pregnancy. Still thinks will vaccine postpartum. Will fax breast pump script. Wants to take 2 weeks off work so letter done and discussed FMLA and may only get 12 weeks off total so better check with work. Discussed can do a letter with work breaks, etc. RTC 2 weeks. BE

## 2021-10-15 NOTE — LETTER
October 15, 2021      Hilaria Jansen  [address] [place] [address] [place] [id number]        To Whom It May Concern:    Hilaria Jansen was seen in our clinic. She is in the 3rd trimester of pregnancy and should not work starting 10/15/2021 for at least 2 weeks. She will be reassessed at that time.    Estimated Date of Delivery: Dec 22, 2021    Sincerely,            JAIMIE CHANG MD

## 2021-10-29 ENCOUNTER — PRENATAL OFFICE VISIT (OUTPATIENT)
Dept: OBGYN | Facility: CLINIC | Age: 31
End: 2021-10-29
Payer: COMMERCIAL

## 2021-10-29 VITALS
DIASTOLIC BLOOD PRESSURE: 74 MMHG | WEIGHT: 190.2 LBS | HEART RATE: 100 BPM | SYSTOLIC BLOOD PRESSURE: 116 MMHG | BODY MASS INDEX: 34.79 KG/M2

## 2021-10-29 DIAGNOSIS — D50.8 OTHER IRON DEFICIENCY ANEMIA: ICD-10-CM

## 2021-10-29 DIAGNOSIS — Z34.80 SUPERVISION OF OTHER NORMAL PREGNANCY, ANTEPARTUM: Primary | ICD-10-CM

## 2021-10-29 PROCEDURE — 99207 PR PRENATAL VISIT: CPT | Performed by: OBSTETRICS & GYNECOLOGY

## 2021-10-29 RX ORDER — ACETAMINOPHEN 500 MG
1000 TABLET ORAL EVERY 6 HOURS PRN
Qty: 30 TABLET | Refills: 3 | Status: SHIPPED | OUTPATIENT
Start: 2021-10-29 | End: 2021-11-10

## 2021-10-29 NOTE — PROGRESS NOTES
Has work info and stopping work 11/1/2021. Just cannot be bending around etc and has checked into FMLA. Wants work note done and has specifics, will do. Has pain everywhere. Feeling weak and not sleeping well. Has NOT been taking Fe so script done. Script also done for tylenol. Has appts. RTC as scheduled. BE

## 2021-10-29 NOTE — LETTER
[facility]  [address] [place] [address]  [facility] [id number]  [phone]      October 29, 2021      Hilaria Jansen  [address] [place] [address] [place] [id number]              To Whom It May Concern:    Hilaria Jansen is being seen in our clinic for prenatal care.  Her Estimated Date of Delivery: Dec 22, 2021.  She will be off work starting November 2, 2021 for the remainder of her pregnancy.  If you have any questions, you can contact my office by calling [phone].      Sincerely,          Brooke Capps MD

## 2021-11-10 ENCOUNTER — PRENATAL OFFICE VISIT (OUTPATIENT)
Dept: OBGYN | Facility: CLINIC | Age: 31
End: 2021-11-10
Payer: COMMERCIAL

## 2021-11-10 VITALS
DIASTOLIC BLOOD PRESSURE: 72 MMHG | BODY MASS INDEX: 35.51 KG/M2 | HEART RATE: 109 BPM | WEIGHT: 193 LBS | HEIGHT: 62 IN | OXYGEN SATURATION: 99 % | SYSTOLIC BLOOD PRESSURE: 115 MMHG

## 2021-11-10 DIAGNOSIS — O36.8130 DECREASED FETAL MOVEMENTS IN THIRD TRIMESTER, SINGLE OR UNSPECIFIED FETUS: ICD-10-CM

## 2021-11-10 DIAGNOSIS — Z34.80 SUPERVISION OF OTHER NORMAL PREGNANCY, ANTEPARTUM: Primary | ICD-10-CM

## 2021-11-10 PROCEDURE — 99207 PR PRENATAL VISIT: CPT | Mod: 59 | Performed by: OBSTETRICS & GYNECOLOGY

## 2021-11-10 RX ORDER — ACETAMINOPHEN 500 MG
1000 TABLET ORAL EVERY 6 HOURS PRN
Qty: 30 TABLET | Refills: 3 | Status: SHIPPED | OUTPATIENT
Start: 2021-11-10 | End: 2021-12-07

## 2021-11-10 ASSESSMENT — MIFFLIN-ST. JEOR: SCORE: 1543.69

## 2021-11-10 NOTE — PROGRESS NOTES
Now just at home and still not sleeping. Did start taking iron. States baby is not moving as much. NST done today for decreased fetal movement. EFM:   130 baseline, moderate variablility, + accels, no decels, Category I, reactive NST. Kick counts discussed. S>D so will check growth u/s with next visit. RTC 2 weeks and GBS then. BE

## 2021-11-24 ENCOUNTER — ANCILLARY PROCEDURE (OUTPATIENT)
Dept: ULTRASOUND IMAGING | Facility: CLINIC | Age: 31
End: 2021-11-24
Attending: OBSTETRICS & GYNECOLOGY
Payer: COMMERCIAL

## 2021-11-24 ENCOUNTER — PRENATAL OFFICE VISIT (OUTPATIENT)
Dept: OBGYN | Facility: CLINIC | Age: 31
End: 2021-11-24
Payer: COMMERCIAL

## 2021-11-24 VITALS
WEIGHT: 195.1 LBS | HEIGHT: 62 IN | SYSTOLIC BLOOD PRESSURE: 123 MMHG | DIASTOLIC BLOOD PRESSURE: 63 MMHG | HEART RATE: 95 BPM | BODY MASS INDEX: 35.9 KG/M2

## 2021-11-24 DIAGNOSIS — Z34.80 SUPERVISION OF OTHER NORMAL PREGNANCY, ANTEPARTUM: ICD-10-CM

## 2021-11-24 DIAGNOSIS — D50.8 OTHER IRON DEFICIENCY ANEMIA: ICD-10-CM

## 2021-11-24 DIAGNOSIS — Z34.80 SUPERVISION OF OTHER NORMAL PREGNANCY, ANTEPARTUM: Primary | ICD-10-CM

## 2021-11-24 LAB
ERYTHROCYTE [DISTWIDTH] IN BLOOD BY AUTOMATED COUNT: 17 % (ref 10–15)
FERRITIN SERPL-MCNC: 16 NG/ML (ref 12–150)
HCT VFR BLD AUTO: 34.4 % (ref 35–47)
HGB BLD-MCNC: 11 G/DL (ref 11.7–15.7)
IRON SATN MFR SERPL: 11 % (ref 15–46)
IRON SERPL-MCNC: 50 UG/DL (ref 35–180)
MCH RBC QN AUTO: 25.2 PG (ref 26.5–33)
MCHC RBC AUTO-ENTMCNC: 32 G/DL (ref 31.5–36.5)
MCV RBC AUTO: 79 FL (ref 78–100)
PLATELET # BLD AUTO: 247 10E3/UL (ref 150–450)
RBC # BLD AUTO: 4.37 10E6/UL (ref 3.8–5.2)
TIBC SERPL-MCNC: 460 UG/DL (ref 240–430)
WBC # BLD AUTO: 7.3 10E3/UL (ref 4–11)

## 2021-11-24 PROCEDURE — 36415 COLL VENOUS BLD VENIPUNCTURE: CPT | Performed by: OBSTETRICS & GYNECOLOGY

## 2021-11-24 PROCEDURE — 83550 IRON BINDING TEST: CPT | Performed by: OBSTETRICS & GYNECOLOGY

## 2021-11-24 PROCEDURE — 82728 ASSAY OF FERRITIN: CPT | Performed by: OBSTETRICS & GYNECOLOGY

## 2021-11-24 PROCEDURE — 85027 COMPLETE CBC AUTOMATED: CPT | Performed by: OBSTETRICS & GYNECOLOGY

## 2021-11-24 PROCEDURE — 76816 OB US FOLLOW-UP PER FETUS: CPT | Performed by: OBSTETRICS & GYNECOLOGY

## 2021-11-24 PROCEDURE — 99207 PR PRENATAL VISIT: CPT | Performed by: OBSTETRICS & GYNECOLOGY

## 2021-11-24 RX ORDER — PRENATAL VIT/IRON FUM/FOLIC AC 27MG-0.8MG
1 TABLET ORAL DAILY
Qty: 100 TABLET | Refills: 3 | Status: SHIPPED | OUTPATIENT
Start: 2021-11-24 | End: 2021-12-07

## 2021-11-24 ASSESSMENT — MIFFLIN-ST. JEOR: SCORE: 1553.22

## 2021-11-26 ENCOUNTER — VIRTUAL VISIT (OUTPATIENT)
Dept: OBGYN | Facility: CLINIC | Age: 31
End: 2021-11-26
Payer: COMMERCIAL

## 2021-11-26 PROCEDURE — 99207 PR PRENATAL VISIT: CPT | Performed by: OBSTETRICS & GYNECOLOGY

## 2021-11-26 NOTE — PROGRESS NOTES
Telephone visit for ultrasound follow-up.   Previous two visits presentation documented as cephalic - but Hilaria thinks it's been breech for a while.   Discussed options for management: ECV at 37 weeks vs RCS at 39 weeks. Discussed ECV procedure, with or without regional block, may or may not be successful. Discussed if successful she would then be eligible for TOLAC, but will not definitely have successful .   Questions answered.   She will think about it and talk to her  and let Dr. Capps know.       Obstetrical Ultrasound Report  OB U/S Growth Follow Up > 14 Weeks - Transabdominal   [facility] Obstetrics and Gynecology  Referring physician: Brooke Capps MD   Sonographer: Braulio Garcia RDMS  Indication:  F/U Growth     Dating (mm/dd/yyyy):   LMP:  2021.               EDC:Dec 22, 2021   GA by LMP:  36w0d     Current Scan On (mm/dd/yyyy):  2021                     EDC:   2021        GA by Current Scan:      35w5d  The calculation of the gestational age by current scan was based on BPD, HC, AC and FL.     Anatomy Scan:  Long gestation.  Visualized: Stomach, Kidneys, and Bladder.  Biometry:  BPD 8.79 cm 35w4d 44.2%   HC 32.94 cm 37w3d 55.9%   AC 32.48 cm 36w3d 71.3%   FL 7.00 cm 35w6d 42.6%   HL 5.8 cm 35w6d 16.2%   EFW (lbs/oz) 6 lbs               6ozs       EFW (g) 2898 g 59%        Fetal heart rate: 137 bpm  Fetal presentation: Breech  Amniotic fluid: 6.1cm MVP  Placenta: Fundal , placental edge not visualized     Maternal Anatomy:  Right adnexa: wnl  Left adnexa: wnl     Impression:   Growth is appropriate for gestational age.  EFW by today's ultrasound is 5898grams, which is the 59%tile.  Normal MVP, BREECH presentation.     Lucia Rascon MD        8 minutes

## 2021-11-26 NOTE — PROGRESS NOTES
Doing well.   Good fetal movement - much improved from last week.  Feels breech today - has ultrasound after appointment.   Has growth ultrasound now - will review results by MyChart and phone appointment if needed.  CBC with iron studies today. Has sickle cell trait but also iron deficiency this pregnancy.   GBS today.   RTC weekly

## 2021-11-27 NOTE — PATIENT INSTRUCTIONS
Patient Education     If Your Baby Is Breech: External Cephalic Version (ECV)   Toward the end of pregnancy, most babies move into a head-first position for childbirth. But in some cases, a baby is in a breech position. This means the baby s buttocks or feet are in place to be delivered first. A breech position makes it hard to have a vaginal delivery.   If your baby is in a breech position, your healthcare provider may try to turn the baby so that he or she is head-first. This procedure is called an external cephalic version (ECV). An ECV may be done if you are between 36 to 38 weeks (near term) in your pregnancy, unless there are reasons not to do it. If the ECV works well, a vaginal delivery is more likely.   Before the procedure  This procedure is usually done in a hospital. Follow all instructions you re given for not eating or drinking before the procedure.   Before the ECV, the medical team will connect you to a fetal monitor. This is done to check your baby s well-being during and after the procedure. You may also need these tests:   Ultrasound  This may be done to:    Confirm that the baby is in a breech position    Find out how much amniotic fluid is in the uterus    Confirm where the placenta is    Find or rule out any birth defects (congenital problems)  Nonstress test and biophysical profile   These tests check your baby's heart, well-being, and contraction pattern. One or both of these tests may be done before and after the ECV.   Blood tests  Medical staff will take a small sample of your blood to test. This is done to find out your blood type, screen for problems, and do a complete blood count in case of an emergency.   During the procedure      Your provider may use ultrasound during the procedure.    An IV (intravenous) line may be placed in your arm to give fluids or medicines if needed.    The team may give you medicine to relax your uterus. This medicine can make it easier for the healthcare  provider to rotate your baby.    You will be placed in position on the hospital bed.    The healthcare provider will then put his or her hands at certain points on your lower belly over your uterus.     The provider will try to push the baby into a head-down position. This is done by trying to make the baby do a slow-motion forward roll or back flip. You will feel pressure during this part of the procedure.     Once the procedure is done, the medical team will raise the head of your bed. This will help keep the baby in the head-down position.    Changing your baby s position    Your healthcare provider presses down on your belly and locates your baby s head and bottom.     By pressing down on your belly, your healthcare provider may be able to rotate the baby into a head-first position. This often will allow a vaginal birth.   After the procedure    You will stay connected to the fetal monitor. This is done to check your baby s well-being. It also checks for contractions, which can happen after an ECV. You will be monitored up to 2 hours after the ECV, or as directed by your provider.    If you are Rh-negative, you may have an Rh immunoglobulin injection. This is done to prevent an immune system response (Rh sensitization). Rh sensitization can cause problems in a future pregnancy if the baby's blood goes into your bloodstream.    Once you are home, follow any directions your healthcare provider gives you for eating or drinking after the procedure.    Follow all discharge instructions from your healthcare provider.    Follow-up appointments  You may need to have appointments more often to check your baby s position. Follow the instructions from your healthcare provider.   When to call your healthcare provider   Call your healthcare provider if any of these happen:     You have more contractions    Fluid or blood is leaking from your vagina    Your baby is moving less    You have other signs or symptoms as directed  by your healthcare provider    You have a lot of vaginal bleeding   Delivering your baby after ECV  Even if your baby s position can t be changed, you may be able to have a vaginal delivery. The type of delivery you have should depend on your healthcare provider s experience. Most providers prefer to do a  section () for a breech baby. This is a delivery done with surgery. For a , you will have medicine to block pain (anesthesia). But you are usually awake and alert.   Alisha last reviewed this educational content on 2020-2021 [employer]. All rights reserved. This information is not intended as a substitute for professional medical care. Always follow your healthcare professional's instructions.

## 2021-12-03 ENCOUNTER — PRENATAL OFFICE VISIT (OUTPATIENT)
Dept: OBGYN | Facility: CLINIC | Age: 31
End: 2021-12-03
Payer: COMMERCIAL

## 2021-12-03 VITALS
WEIGHT: 196.5 LBS | HEART RATE: 101 BPM | BODY MASS INDEX: 35.94 KG/M2 | OXYGEN SATURATION: 100 % | SYSTOLIC BLOOD PRESSURE: 112 MMHG | DIASTOLIC BLOOD PRESSURE: 71 MMHG

## 2021-12-03 DIAGNOSIS — O36.8130 DECREASED FETAL MOVEMENTS IN THIRD TRIMESTER, SINGLE OR UNSPECIFIED FETUS: ICD-10-CM

## 2021-12-03 PROCEDURE — 87653 STREP B DNA AMP PROBE: CPT | Performed by: OBSTETRICS & GYNECOLOGY

## 2021-12-03 PROCEDURE — 59025 FETAL NON-STRESS TEST: CPT | Performed by: OBSTETRICS & GYNECOLOGY

## 2021-12-03 PROCEDURE — 99207 PR PRENATAL VISIT: CPT | Performed by: OBSTETRICS & GYNECOLOGY

## 2021-12-03 NOTE — Clinical Note
On for NST at 11 am for a double check.   NST reactive for me, but I am just not reassured. MVP also normal. jarred

## 2021-12-03 NOTE — PROGRESS NOTES
"NST done for decreased fetal movement. States baby only moving 3-4 times a day now. EFM:   140 baseline, moderate variablility, + accels, no decels, Category I NST reactive. I was present for about 15 min and no fetal movements felt. She is still not feeling like it's \"normal\". BSUS with MVP 5 cm and clear fluid seen. Plan triage visit tomorrow for repeat NST. Baby is breech and she declines ECV, plan repeat c/s for 39 wks. Repeat c/s was discussed in detail including risk of bleeding, infection, damage to abdominal organs including bowel, bladder, blood vessels, nerves, and baby.   Recovery period/hosptial stay and restrictions discussed.  Pain medications after surgery were discussed.  All questions were answered. Has appt next week in clinic. BE  "

## 2021-12-04 ENCOUNTER — TRANSFERRED RECORDS (OUTPATIENT)
Dept: HEALTH INFORMATION MANAGEMENT | Facility: CLINIC | Age: 31
End: 2021-12-04

## 2021-12-04 ENCOUNTER — HOSPITAL ENCOUNTER (OUTPATIENT)
Facility: CLINIC | Age: 31
Discharge: HOME | End: 2021-12-04
Attending: OBSTETRICS & GYNECOLOGY | Admitting: OBSTETRICS & GYNECOLOGY
Payer: COMMERCIAL

## 2021-12-04 VITALS
DIASTOLIC BLOOD PRESSURE: 71 MMHG | HEART RATE: 111 BPM | SYSTOLIC BLOOD PRESSURE: 115 MMHG | TEMPERATURE: 98.6 F | RESPIRATION RATE: 16 BRPM

## 2021-12-04 PROBLEM — O36.8190 DECREASED FETAL MOVEMENT: Status: ACTIVE | Noted: 2021-12-04

## 2021-12-04 LAB
GP B STREP DNA SPEC QL NAA+PROBE: NEGATIVE
PATIENT PENICILLIN, AMOXICILLIN, CEPHALOSPORINS ALLERGY: NO

## 2021-12-04 PROCEDURE — G0463 HOSPITAL OUTPT CLINIC VISIT: HCPCS | Mod: 25

## 2021-12-04 PROCEDURE — 59025 FETAL NON-STRESS TEST: CPT | Mod: 26 | Performed by: OBSTETRICS & GYNECOLOGY

## 2021-12-04 PROCEDURE — 59025 FETAL NON-STRESS TEST: CPT

## 2021-12-04 PROCEDURE — 99213 OFFICE O/P EST LOW 20 MIN: CPT | Mod: 25 | Performed by: OBSTETRICS & GYNECOLOGY

## 2021-12-04 RX ORDER — METOCLOPRAMIDE 10 MG/1
10 TABLET ORAL EVERY 6 HOURS PRN
Status: DISCONTINUED | OUTPATIENT
Start: 2021-12-04 | End: 2021-12-04 | Stop reason: HOSPADM

## 2021-12-04 RX ORDER — PROCHLORPERAZINE MALEATE 10 MG
10 TABLET ORAL EVERY 6 HOURS PRN
Status: DISCONTINUED | OUTPATIENT
Start: 2021-12-04 | End: 2021-12-04 | Stop reason: HOSPADM

## 2021-12-04 RX ORDER — METOCLOPRAMIDE HYDROCHLORIDE 5 MG/ML
10 INJECTION INTRAMUSCULAR; INTRAVENOUS EVERY 6 HOURS PRN
Status: DISCONTINUED | OUTPATIENT
Start: 2021-12-04 | End: 2021-12-04 | Stop reason: HOSPADM

## 2021-12-04 RX ORDER — ONDANSETRON 2 MG/ML
4 INJECTION INTRAMUSCULAR; INTRAVENOUS EVERY 6 HOURS PRN
Status: DISCONTINUED | OUTPATIENT
Start: 2021-12-04 | End: 2021-12-04 | Stop reason: HOSPADM

## 2021-12-04 RX ORDER — ONDANSETRON 4 MG/1
4 TABLET, ORALLY DISINTEGRATING ORAL EVERY 6 HOURS PRN
Status: DISCONTINUED | OUTPATIENT
Start: 2021-12-04 | End: 2021-12-04 | Stop reason: HOSPADM

## 2021-12-04 RX ORDER — PROCHLORPERAZINE 25 MG
25 SUPPOSITORY, RECTAL RECTAL EVERY 12 HOURS PRN
Status: DISCONTINUED | OUTPATIENT
Start: 2021-12-04 | End: 2021-12-04 | Stop reason: HOSPADM

## 2021-12-04 NOTE — DISCHARGE INSTRUCTIONS
Data: Patient presented [facility] the [facility] at 1115.   Reason for maternal/fetal assessment per patient is Decreased Fetal Movement  . Patient is a . Prenatal record reviewed.      OB History    Para Term  AB Living   5 1 1 0 3 1   SAB IAB Ectopic Multiple Live Births   1 0 0 0 1      # Outcome Date GA Lbr Ignacio/2nd Weight Sex Delivery Anes PTL Lv   5 Current            4 AB 02/15/21 9w0d             Birth Comments: no D&C   3 Term 19 37w6d  3.4 kg (7 lb 7.9 oz) M CS-LTranv Nitrous, EPI N JESSE      Complications: Fetal Intolerance      Name: SHAWN,MALE-KAROLINA      Apgar1: 8  Apgar5: 9   2 AB 2017     IAB         Birth Comments: took pill   1 SAB      SAB         Birth Comments: chemical pregnancy      Medical History:   Past Medical History:   Diagnosis Date     Anemia      Hidradenitis suppurativa      Infertility, female      Neurological disorder      Sickle cell anemia (H)      Varicella    . Gestational Age 37w3d. VSS. Cervix: not examined.  Fetal movement present. Patient denies cramping, backache, vaginal discharge, pelvic pressure, UTI symptoms, GI problems, bloody show, vaginal bleeding, edema, headache, visual disturbances, epigastric or URQ pain, abdominal pain, rupture of membranes. Support persons spouse present.  Action: Verbal consent for EFM. Triage assessment completed. EFM applied for fetal assessment. Uterine assessment by [facility] [place]. Fetal assessment: Presumed adequate fetal oxygenation documented (see flow record). Patient education pamphlets given on fetal movement counts and when to call provider. Patient instructed to report change in fetal movement, vaginal leaking of fluid or bleeding, abdominal pain, or any concerns related to the pregnancy to her nurse/physician.   Response: Dr. Gannon informed of patient's arrival. Plan per provider is to dishcarge to home after reactive NST. Patient verbalized understanding of education and verbalized agreement with plan.  Discharged ambulatory at.

## 2021-12-04 NOTE — PROGRESS NOTES
[facility]    OB Triage Note  Obstetrics and Gynecology     Date of Admission:  2021   Date of Service (when I saw the patient): 21    ASSESSMENT:   Karolina Jansen is a 31 year old  at 37w3d who presents for NST due to decreased fetal movement in clinic yesterday  NST reactive.  Category  I  BPP at bedside  done for patient reassurance  Fetus in breech presentation  H/o CS for cat 2 tracing, planning repeat CS at 39w now that baby is breech    PLAN:   Discussed reassuring monitoring. Patient feeling more movement today than yesterday but still feeling nervous.  Plan for her to follow up in clinic for NST on Tuesday, sooner PRN.     Lucia Gannon MD      History of Present Illness  Karolina Jansen is a 31 year old  at 37w3d who [facility] to [facility] T[facility] for NST due to decreased fetal movement yesterday.  Estimated Date of Delivery: Dec 22, 2021   Patient's last menstrual period was 2021.     Pregnancy complicated by h/o CS x1.  She was last seen in clinic on 12/3    Contractions: not feeling them  Leakage of fluid:  No  Vaginal bleeding:  No  Fetal movement:   Present    OB History    Para Term  AB Living   5 1 1 0 3 1   SAB IAB Ectopic Multiple Live Births   1 0 0 0 1      # Outcome Date GA Lbr Ignacio/2nd Weight Sex Delivery Anes PTL Lv   5 Current            4 AB 02/15/21 9w0d             Birth Comments: no D&C   3 Term 19 37w6d  3.4 kg (7 lb 7.9 oz) M CS-LTranv Nitrous, EPI N JESSE      Complications: Fetal Intolerance      Name: SHAWNMALE-KAROLINA      Apgar1: 8  Apgar5: 9   2 AB 2017     IAB         Birth Comments: took pill   1 2016     SAB         Birth Comments: chemical pregnancy       Prenatal Course  Prenatal course was  complicated by    Patient Active Problem List    Diagnosis Date Noted     Decreased fetal movement 2021     Priority: Medium     Supervision of other normal pregnancy,  "antepartum 05/13/2021     Priority: Medium     Dates by LMP c/w 8 wk u/s    NIPT--  AFP--negative    Previous c/s for cat 2 tracing after IOL for non reassuring fetal status. 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 Repeat US ordered (6/12/18) FS       Abnormal CT scan, neck 09/09/2015     Priority: Medium     Gastroesophageal reflux disease without esophagitis 06/15/2015     Priority: Medium     Sinusitis 05/19/2014     Priority: Medium         Physical Exam  Vitals:    12/04/21 1124   BP: 115/71   BP Location: Left arm   Pulse: 111   Resp: 16   Temp: 98.6  F (37  C)   TempSrc: Oral     Estimated body mass index is 35.94 kg/m  as calculated from the following:    Height as of 11/24/21: 1.575 m (5' 2\").    Weight as of 12/3/21: 89.1 kg (196 lb 8 oz).    Fetal Heart Tones: 145 baseline, moderate variablility, + accels, no decels and Category I  TOCO:   Frequency irreg, q 4-7 minutes, mild, patient not feeling them    Constitutional: healthy, alert and active   Abdomen: gravid, single breech fetus, non-tender, EFW 7lb, palpable fetal movement       Bedside ultrasound: single fetus in joseline breech presentation with fetal back along maternal left, incidental BPP 8/8, MVP 5.7cm, +fetal breathing >30s, +gross movements, +fine movements.     Labs  No results found for this or any previous visit (from the past 24 hour(s)).          "

## 2021-12-04 NOTE — PLAN OF CARE
Data: Patient presented to the [facility] at 1115.   Reason for maternal/fetal assessment per patient is Decreased Fetal Movement  . Patient is a . Prenatal record reviewed.      OB History    Para Term  AB Living   5 1 1 0 3 1   SAB IAB Ectopic Multiple Live Births   1 0 0 0 1      # Outcome Date GA Lbr Ignacio/2nd Weight Sex Delivery Anes PTL Lv   5 Current            4 AB 02/15/21 9w0d             Birth Comments: no D&C   3 Term 19 37w6d  3.4 kg (7 lb 7.9 oz) M CS-LTranv Nitrous, EPI N JESSE      Complications: Fetal Intolerance      Name: SHAWN,MALE-KAROLINA      Apgar1: 8  Apgar5: 9   2 AB 2017     IAB         Birth Comments: took pill   1 SAB      SAB         Birth Comments: chemical pregnancy      Medical History:   Past Medical History:   Diagnosis Date    Anemia     Hidradenitis suppurativa     Infertility, female     Neurological disorder     Sickle cell anemia (H)     Varicella    . Gestational Age 37w3d. VSS. Cervix: not examined.  Fetal movement present. Patient denies cramping, backache, vaginal discharge, pelvic pressure, UTI symptoms, GI problems, bloody show, vaginal bleeding, edema, headache, visual disturbances, epigastric or URQ pain, abdominal pain, rupture of membranes. Support persons spouse present.  Action: Verbal consent for EFM. Triage assessment completed. EFM applied for fetal assessment. Uterine assessment by [facility]. Fetal assessment: Presumed adequate fetal oxygenation documented (see flow record). Patient education pamphlets given on fetal movement counts and when to call provider. Patient instructed to report change in fetal movement, vaginal leaking of fluid or bleeding, abdominal pain, or any concerns related to the pregnancy to her nurse/physician.   Response: Dr. Gannon informed of patient's arrival. Plan per provider is to dishcarge to home after reactive NST. Patient verbalized understanding of education and verbalized agreement with plan. Discharged  ambulatory at.

## 2021-12-06 ENCOUNTER — TELEPHONE (OUTPATIENT)
Dept: OBGYN | Facility: CLINIC | Age: 31
End: 2021-12-06
Payer: COMMERCIAL

## 2021-12-06 NOTE — TELEPHONE ENCOUNTER
Type of surgery: ob  Location of surgery: [facility]  Date and time of surgery: 12/15/21 8:30AM  Surgeon: Génesis  Pre-Op Appt Date: surgeon  Post-Op Appt Date: 01/27/22   Packet sent out: will  at next [facility]  Pre-cert/Authorization completed:  Not Applicable  Date: 12/06/21     [facility] OB/GYN Surgery Scheduler

## 2021-12-07 ENCOUNTER — PRENATAL OFFICE VISIT (OUTPATIENT)
Dept: OBGYN | Facility: CLINIC | Age: 31
End: 2021-12-07
Payer: COMMERCIAL

## 2021-12-07 ENCOUNTER — APPOINTMENT (OUTPATIENT)
Dept: OBGYN | Facility: CLINIC | Age: 31
End: 2021-12-07
Payer: COMMERCIAL

## 2021-12-07 VITALS
HEART RATE: 90 BPM | DIASTOLIC BLOOD PRESSURE: 76 MMHG | HEIGHT: 62 IN | WEIGHT: 197.8 LBS | SYSTOLIC BLOOD PRESSURE: 120 MMHG | BODY MASS INDEX: 36.4 KG/M2

## 2021-12-07 DIAGNOSIS — Z34.80 SUPERVISION OF OTHER NORMAL PREGNANCY, ANTEPARTUM: Primary | ICD-10-CM

## 2021-12-07 PROCEDURE — 99207 PR PRENATAL VISIT: CPT | Performed by: OBSTETRICS & GYNECOLOGY

## 2021-12-07 ASSESSMENT — MIFFLIN-ST. JEOR: SCORE: 1565.46

## 2021-12-07 NOTE — PROGRESS NOTES
Good FM now. Had repeat NST on L&D Saturday and on monitor today so much movement cannot read NST. Discussed cannot do c/s before 39 wks without indication. She wants it now. Has appt again later this week. RTC as scheduled. BE

## 2021-12-08 DIAGNOSIS — Z01.818 PRE-OP EXAM: Primary | ICD-10-CM

## 2021-12-10 ENCOUNTER — PRENATAL OFFICE VISIT (OUTPATIENT)
Dept: OBGYN | Facility: CLINIC | Age: 31
End: 2021-12-10
Payer: COMMERCIAL

## 2021-12-10 VITALS
WEIGHT: 196.8 LBS | BODY MASS INDEX: 36 KG/M2 | DIASTOLIC BLOOD PRESSURE: 80 MMHG | OXYGEN SATURATION: 98 % | HEART RATE: 93 BPM | SYSTOLIC BLOOD PRESSURE: 124 MMHG

## 2021-12-10 DIAGNOSIS — Z34.80 SUPERVISION OF OTHER NORMAL PREGNANCY, ANTEPARTUM: Primary | ICD-10-CM

## 2021-12-10 PROCEDURE — 99207 PR PRENATAL VISIT: CPT | Performed by: OBSTETRICS & GYNECOLOGY

## 2021-12-10 RX ORDER — AMOXICILLIN 250 MG
1 CAPSULE ORAL DAILY
Qty: 100 TABLET | Refills: 0 | Status: SHIPPED | OUTPATIENT
Start: 2021-12-10 | End: 2022-04-08

## 2021-12-10 RX ORDER — IBUPROFEN 600 MG/1
600 TABLET, FILM COATED ORAL EVERY 6 HOURS PRN
Qty: 60 TABLET | Refills: 0 | Status: SHIPPED | OUTPATIENT
Start: 2021-12-10 | End: 2022-05-12

## 2021-12-10 RX ORDER — ACETAMINOPHEN 325 MG/1
650 TABLET ORAL EVERY 6 HOURS PRN
Qty: 100 TABLET | Refills: 0 | Status: SHIPPED | OUTPATIENT
Start: 2021-12-10 | End: 2022-05-17

## 2021-12-10 NOTE — PROGRESS NOTES
GBS: Negative  Hemoglobin   Date Value Ref Range Status   11/24/2021 11.0 (L) 11.7 - 15.7 g/dL Final   05/12/2021 11.4 (L) 11.7 - 15.7 g/dL Final   ]    Breast pump rx: after delivery  Labor orders: having a c/s  Length of stay: discussed  Disability paperwork: completed  Resident involvement: discussed and declines.--okay if needed to assist with c/s only  Discharge meds done : YES

## 2021-12-10 NOTE — Clinical Note
She has not gotten surgery packet as of today. Please call and make sure she knows instructions. Thanks jarred

## 2021-12-12 ENCOUNTER — HOSPITAL ENCOUNTER (OUTPATIENT)
Facility: CLINIC | Age: 31
Discharge: HOME | End: 2021-12-12
Attending: OBSTETRICS & GYNECOLOGY | Admitting: OBSTETRICS & GYNECOLOGY
Payer: COMMERCIAL

## 2021-12-12 ENCOUNTER — NURSE TRIAGE (OUTPATIENT)
Dept: NURSING | Facility: CLINIC | Age: 31
End: 2021-12-12
Payer: COMMERCIAL

## 2021-12-12 VITALS
DIASTOLIC BLOOD PRESSURE: 73 MMHG | OXYGEN SATURATION: 99 % | TEMPERATURE: 98.6 F | SYSTOLIC BLOOD PRESSURE: 115 MMHG | RESPIRATION RATE: 16 BRPM

## 2021-12-12 PROBLEM — Z36.89 ENCOUNTER FOR TRIAGE IN PREGNANT PATIENT: Status: ACTIVE | Noted: 2021-12-12

## 2021-12-12 LAB
ABO/RH(D): NORMAL
ANTIBODY SCREEN: NEGATIVE
ERYTHROCYTE [DISTWIDTH] IN BLOOD BY AUTOMATED COUNT: 17.4 % (ref 10–15)
HCT VFR BLD AUTO: 36.2 % (ref 35–47)
HGB BLD-MCNC: 11.6 G/DL (ref 11.7–15.7)
MCH RBC QN AUTO: 25.5 PG (ref 26.5–33)
MCHC RBC AUTO-ENTMCNC: 32 G/DL (ref 31.5–36.5)
MCV RBC AUTO: 80 FL (ref 78–100)
PLATELET # BLD AUTO: 219 10E3/UL (ref 150–450)
RBC # BLD AUTO: 4.55 10E6/UL (ref 3.8–5.2)
SARS-COV-2 RNA RESP QL NAA+PROBE: NEGATIVE
SPECIMEN EXPIRATION DATE: NORMAL
WBC # BLD AUTO: 7.5 10E3/UL (ref 4–11)

## 2021-12-12 PROCEDURE — U0005 INFEC AGEN DETEC AMPLI PROBE: HCPCS | Performed by: OBSTETRICS & GYNECOLOGY

## 2021-12-12 PROCEDURE — G0463 HOSPITAL OUTPT CLINIC VISIT: HCPCS | Mod: 25

## 2021-12-12 PROCEDURE — 59025 FETAL NON-STRESS TEST: CPT

## 2021-12-12 PROCEDURE — 99213 OFFICE O/P EST LOW 20 MIN: CPT | Mod: 25 | Performed by: OBSTETRICS & GYNECOLOGY

## 2021-12-12 PROCEDURE — 85027 COMPLETE CBC AUTOMATED: CPT | Performed by: OBSTETRICS & GYNECOLOGY

## 2021-12-12 PROCEDURE — 86901 BLOOD TYPING SEROLOGIC RH(D): CPT | Performed by: OBSTETRICS & GYNECOLOGY

## 2021-12-12 PROCEDURE — 36415 COLL VENOUS BLD VENIPUNCTURE: CPT | Performed by: OBSTETRICS & GYNECOLOGY

## 2021-12-12 PROCEDURE — C9803 HOPD COVID-19 SPEC COLLECT: HCPCS

## 2021-12-12 PROCEDURE — 59025 FETAL NON-STRESS TEST: CPT | Mod: 26 | Performed by: OBSTETRICS & GYNECOLOGY

## 2021-12-12 RX ORDER — LIDOCAINE 40 MG/G
CREAM TOPICAL
Status: DISCONTINUED | OUTPATIENT
Start: 2021-12-12 | End: 2021-12-12 | Stop reason: HOSPADM

## 2021-12-12 NOTE — PROVIDER NOTIFICATION
12/12/21 1200   Provider Notification   Provider Name/Title Dr. Rascon   Method of Notification At Bedside   Request Evaluate in Person   Notification Reason Patient Arrived   Patient 38.4 weeks gestation presents with abdominal cramping since 2200 last night, headache, diarrhea x3 episodes since 0200. No LOF or bleeding. No one else sick at home. VSS. Afebrile. EFM applied, normal baseline with moderate variability, accelerations present. Contractions occur about every 7 minutes and palpate mild. Bedside ultrasound checking for fetal position and amniotic fluid. Dr. Rascon performed ultrasound. No concerns for low fluid. Fetal presents breech. Patient scheduled for repeat c/s on Wednesday 12/15/21 with Dr. Capps. Plan today is draw pre-op labs and perform COVID swab, hydrate orally and continuous monitoring to see any change in frequency/duration/strength of contractions or any FHR concerns. Support person Lux (spouse) present.

## 2021-12-12 NOTE — TELEPHONE ENCOUNTER
OB Triage Call      Is patient's OB/Midwife with the [facility] or [facility]? [facility]- Proceed with triage     Reason for call: Abdominal Pain    Assessment: Since last night, she is having abdominal pain and cramps and the pain is constant during the night, but now it is intermittent.  Rates pain 5/10.  Diarrhea and a headache.  Baby moving less today than usual.    Plan: L&D    Patient plans to deliver at [facility]    Patient's primary OB Provider is Génesis.      Per protocol recommendations Patient to be evaluated in L&D. Patient's primary OB is [facility].  Labor and delivery at [facility] ([phone]) notified of patient's pending arrival.  Report given to Alan.  Is patient's delivering hospital on divert? No      38w4d    Estimated Date of Delivery: Dec 22, 2021        OB History    Para Term  AB Living   5 1 1 0 3 1   SAB IAB Ectopic Multiple Live Births   1 0 0 0 1      # Outcome Date GA Lbr Ignacio/2nd Weight Sex Delivery Anes PTL Lv   5 Current            4 AB 02/15/21 9w0d             Birth Comments: no D&C   3 Term 19 37w6d  3.4 kg (7 lb 7.9 oz) M CS-LTranv Nitrous, EPI N JESSE      Complications: Fetal Intolerance      Name: SHAWNMALE-KAROLINA      Apgar1: 8  Apgar5: 9   2 AB 2017     IAB         Birth Comments: took pill   1 SAB 2016     SAB         Birth Comments: chemical pregnancy       Lab Results   Component Value Date    GBS Negative 2019          Beckie Mccoy RN 21 9:23 AM  [facility] Nurse Advisor    Reason for Disposition    MODERATE-SEVERE abdominal pain (e.g., interferes with normal activities, awakens from sleep)    Additional Information    Negative: Passed out (i.e., lost consciousness, collapsed and was not responding)    Negative: Shock suspected (e.g., cold/pale/clammy skin, too weak to stand, low BP, rapid pulse)    Negative: Difficult to awaken or acting confused (e.g., disoriented, slurred speech)    Negative: [1] SEVERE  "abdominal pain (e.g., excruciating) AND [2] constant AND [3] present > 1 hour    Negative: SEVERE vaginal bleeding (e.g., continuous red blood from vagina, large blood clots)    Negative: Sounds like a life-threatening emergency to the triager    Negative: Followed an abdomen (stomach) injury    Negative: [1] Having contractions or other symptoms of labor (such as vaginal pressure) AND [2] >= 37 weeks pregnant (i.e., term pregnancy)    Negative: [1] Having contractions or other symptoms of labor (such as vaginal pressure) AND [2] < 37 weeks pregnant (i.e., )    Negative: [1] Abdominal pain AND [2] pregnant < 20 weeks    Negative: [1] Vomiting AND [2] contains red blood or black (\"coffee ground\") material  (Exception: few red streaks in vomit that only happened once)    Protocols used: PREGNANCY - ABDOMINAL PAIN GREATER THAN 20 WEEKS EGA-A-      "

## 2021-12-12 NOTE — H&P
[facility]    History and Physical  Obstetrics and Gynecology     Date of Admission:  2021    Assessment & Plan   Hilaria Jansen is a 31 year old female who presents with diarrhea, headache and abdominal cramping.  ASSESSMENT:   IUP @ 38w4d for observation.  NST reactive.  Category  I  BREECH presentation    PLAN:   Plan hydration.  Discussed oral vs IV and patient prefers oral hydration.  Will check pre-op labs and COVID test.  While not overtly symptomatic from COVID perspective, her symptoms can be associated with COVID.  Bedside MVP checked per patient request.  Will re-evaluate after hydration.      Lucia CARLJayy Rascon           History of Present Illness   Hilaria Jansen is a 31 year old female  38w4d  Estimated Date of Delivery: 21 is calculated from Patient's last menstrual period was 2021. [facility]  for [facility].  Patient reports she started having diarrhea with associated HA and abdominal cramping.  Headache did improve with Tylenol and cramping has improved since onset.  Denies fever/chills, nausea/vomiting.  No leaking fluid or vaginal bleeding.    PRENATAL COURSE  Prenatal course was complicated by history of  delivery, breech presentation.      Recent Labs   Lab Test 21  1054   ABO B   RH Pos   AS Neg     Rhogam not indicated   Recent Labs   Lab Test 21  1053 19  1210   HEPBANG Nonreactive  --    HIAGAB Nonreactive  --    GBS  --  Negative   RUQIGG 29  --        Past Medical History    I have reviewed this patient's medical history and updated it with pertinent information if needed.   Past Medical History:   Diagnosis Date     Anemia      Hidradenitis suppurativa      Infertility, female      Neurological disorder      Sickle cell anemia (H)      Varicella        Past Surgical History   I have reviewed this patient's surgical history and updated it with pertinent information  if needed.  Past Surgical History:   Procedure Laterality Date      SECTION N/A 2019    Procedure:  SECTION;  Surgeon: Wendi Coppola MD;  Location:  L+D     SURGICAL PATHOLOGY EXAM Left 09/10/2015    left paratracheal neck mass removed, benign       Prior to Admission Medications   Prior to Admission Medications   Prescriptions Last Dose Informant Patient Reported? Taking?   Misc. Devices (BREAST PUMP) MISC   No No   Sig: Double electric breast pump   Patient not taking: Reported on 2021   Prenatal Vit-Fe Fumarate-FA (PRENATAL MULTIVITAMIN W/IRON) 27-0.8 MG tablet   No No   Sig: Take 1 tablet by mouth daily   acetaminophen (TYLENOL) 325 MG tablet   No No   Sig: Take 2 tablets (650 mg) by mouth every 6 hours as needed for mild pain Start after Delivery.   acetaminophen (TYLENOL) 500 MG tablet   Yes No   Sig: Take 500-1,000 mg by mouth every 6 hours as needed for mild pain   doxylamine (UNISOM) 25 MG TABS tablet   Yes No   Sig: Take 25 mg by mouth At Bedtime   Patient not taking: Reported on 2021   ferrous gluconate (FERGON) 324 (38 Fe) MG tablet   No No   Sig: Take 1 tablet (324 mg) by mouth daily (with breakfast)   ibuprofen (ADVIL/MOTRIN) 600 MG tablet   No No   Sig: Take 1 tablet (600 mg) by mouth every 6 hours as needed for moderate pain Start after delivery   imiquimod (ALDARA) 5 % external cream   No No   Sig: Apply a small sized amount to warts or molluscum three times weekly at bedtime.   Wash off after 8 hours.   May use for up to 16 weeks.   senna-docusate (SENOKOT-S/PERICOLACE) 8.6-50 MG tablet   No No   Sig: Take 1 tablet by mouth daily Start after delivery.      Facility-Administered Medications: None     Allergies   No Known Allergies    Social History   I have reviewed this patient's social history and updated it with pertinent information if needed. Hilaria Jansen  reports that she has never smoked. She has never used smokeless tobacco. She reports that  she does not drink alcohol and does not use drugs.    Family History   I have reviewed this patient's family history and updated it with pertinent information if needed.   Family History   Problem Relation Age of Onset     Breast Cancer Paternal Aunt        Immunization History   Immunization History   Administered Date(s) Administered     Influenza Vaccine IM > 6 months Valent IIV4 (Alfuria,Fluzone) 12/18/2018, 10/06/2021     TDAP Vaccine (Adacel) 04/09/2019     Tdap (Adacel,Boostrix) 10/06/2021       Physical Exam   Temp: (P) 98.6  F (37  C) Temp src: (P) Oral BP: 115/73     Resp: (P) 16        Vital Signs with Ranges  Temp:  [98.6  F (37  C)] (P) 98.6  F (37  C)  Resp:  [16] (P) 16  BP: (115)/(73) 115/73    Abdomen: gravid, single vertex fetus, non-tender  Cervical Exam: deferred     Fetal Heart Tones: 135 baseline, moderate variablility, + accels, no decels and Category I  TOCO:   q 6-10+ min    Bedside MVP 6.12    Constitutional: healthy, alert, active and no distress  Respiratory: no increased work of breathing  Cardiovascular:  Normal pulse  Skin/Extremites: no bruising or bleeding, normal skin color, texture, turgor and no redness, warmth, or swelling  Neurologic: Awake, alert, oriented to name, place and time.  Cranial nerves II-XII are grossly intact.    Neuropsychiatric: appropriate mood and affect          -------------------------------------  After oral hydration, patient feeling much better.  HA resolved.  Cramping significantly improved.  No further diarrhea.  FHT cat 1.  Given precautions and discharged home.    Lucia Rascon MD

## 2021-12-12 NOTE — DISCHARGE INSTRUCTIONS
Discharge Instruction for Undelivered Patients      You were seen for: Labor Assessment and Fetal Assessment  We Consulted: Dr. Lucia Rascon  You had (Test or Medicine):Fetal Monitoring, Bedside ultrasound, Blood Labs, COVID Swab,      Diet:   Drink 8 to 12 glasses of liquids (milk, juice, water) every day.  You may eat meals and snacks.     Activity:  Count fetal kicks everyday (see handout)  Call your doctor or nurse midwife if your baby is moving less than usual.     Call your provider if you notice:  Swelling in your face or increased swelling in your hands or legs.  Headaches that are not relieved by Tylenol (acetaminophen).  Changes in your vision (blurring: seeing spots or stars.)  Nausea (sick to your stomach) and vomiting (throwing up).   Weight gain of 5 pounds or more per week.  Heartburn that doesn't go away.  Signs of bladder infection: pain when you urinate (use the toilet), need to go more often and more urgently.  The bag of gottlieb (rupture of membranes) breaks, or you notice leaking in your underwear.  Bright red blood in your underwear.  Abdominal (lower belly) or stomach pain.  For first baby: Contractions (tightening) less than 5 minutes apart for one hour or more.  Second (plus) baby: Contractions (tightening) less than 10 minutes apart and getting stronger.  *If less than 34 weeks: Contractions (tightening) more than 6 times in one hour.  Increase or change in vaginal discharge (note the color and amount)  Other:     Follow-up:  As scheduled in the clinicTuesday 12/14/21 with Dr. Capps

## 2021-12-12 NOTE — PROVIDER NOTIFICATION
12/12/21 1404   Provider Notification   Provider Name/Title Dr. Rascon   Method of Notification Electronic Page   Request Evaluate - Remote   Notification Reason Status Update   Patient feeling better, no more headache, less cramps. Hydrated with water and apple juice. EFM strip review with Dr. Rascon evaluating remotely. Discharge ordered. Patient given instructions for home management and when to call provider. Patient to follow up at scheduled appointment Tuesday 12/14/21 with Dr. Capps.

## 2021-12-14 ENCOUNTER — ANESTHESIA EVENT (OUTPATIENT)
Dept: OBGYN | Facility: CLINIC | Age: 31
End: 2021-12-14
Payer: COMMERCIAL

## 2021-12-14 ENCOUNTER — PRENATAL OFFICE VISIT (OUTPATIENT)
Dept: OBGYN | Facility: CLINIC | Age: 31
End: 2021-12-14
Payer: COMMERCIAL

## 2021-12-14 VITALS
WEIGHT: 198.5 LBS | BODY MASS INDEX: 36.53 KG/M2 | SYSTOLIC BLOOD PRESSURE: 127 MMHG | HEIGHT: 62 IN | DIASTOLIC BLOOD PRESSURE: 75 MMHG | HEART RATE: 89 BPM

## 2021-12-14 DIAGNOSIS — Z34.80 SUPERVISION OF OTHER NORMAL PREGNANCY, ANTEPARTUM: Primary | ICD-10-CM

## 2021-12-14 PROCEDURE — 99207 PR PRENATAL VISIT: CPT | Performed by: OBSTETRICS & GYNECOLOGY

## 2021-12-14 ASSESSMENT — MIFFLIN-ST. JEOR: SCORE: 1568.64

## 2021-12-14 NOTE — ANESTHESIA PREPROCEDURE EVALUATION
Anesthesia Pre-Procedure Evaluation    Patient: Hilaria Jansen   MRN: [id number] : 1990        Preoperative Diagnosis: Breech presentation, single or unspecified fetus [O32.1XX0]    Procedure : Procedure(s):   SECTION          Past Medical History:   Diagnosis Date     Anemia      Hidradenitis suppurativa      Infertility, female      Neurological disorder      Sickle cell anemia (H)      Varicella       Past Surgical History:   Procedure Laterality Date      SECTION N/A 2019    Procedure:  SECTION;  Surgeon: Wendi Coppola MD;  Location: [facility] L+D     SURGICAL PATHOLOGY EXAM Left 09/10/2015    left paratracheal neck mass removed, benign      No Known Allergies   Social History     Tobacco Use     Smoking status: Never Smoker     Smokeless tobacco: Never Used   Substance Use Topics     Alcohol use: No      Wt Readings from Last 1 Encounters:   12/10/21 89.3 kg (196 lb 12.8 oz)        Anesthesia Evaluation   Pt has had prior anesthetic. Type: Regional and General.    No history of anesthetic complications       ROS/MED HX  ENT/Pulmonary: Comment: L paratracheal neck mass removed - neg pulmonary ROS     Neurologic:  - neg neurologic ROS     Cardiovascular:  - neg cardiovascular ROS     METS/Exercise Tolerance: >4 METS    Hematologic: Comments: Sickle cell trait    (+) anemia,     Musculoskeletal:  - neg musculoskeletal ROS     GI/Hepatic:     (+) GERD,     Renal/Genitourinary:  - neg Renal ROS     Endo:     (+) Obesity,     Psychiatric/Substance Use:       Infectious Disease:  - neg infectious disease ROS     Malignancy:       Other:      (+) Possibly pregnant, ,         Physical Exam    Airway        Mallampati: IV   TM distance: > 3 FB   Neck ROM: full   Mouth opening: > 3 cm    Respiratory Devices and Support         Dental  no notable dental history         Cardiovascular   cardiovascular exam normal          Pulmonary   pulmonary exam normal                OUTSIDE  LABS:  CBC:   Lab Results   Component Value Date    WBC 7.5 12/12/2021    WBC 7.3 11/24/2021    HGB 11.6 (L) 12/12/2021    HGB 11.0 (L) 11/24/2021    HCT 36.2 12/12/2021    HCT 34.4 (L) 11/24/2021     12/12/2021     11/24/2021     BMP:   Lab Results   Component Value Date     04/13/2021     02/15/2021    POTASSIUM 3.8 04/13/2021    POTASSIUM 3.1 (L) 02/15/2021    CHLORIDE 107 04/13/2021    CHLORIDE 114 (H) 02/15/2021    CO2 28 04/13/2021    CO2 23 02/15/2021    BUN 11 04/13/2021    BUN 15 02/15/2021    CR 0.82 04/13/2021    CR 0.73 02/15/2021    GLC 93 04/13/2021    GLC 86 02/15/2021     COAGS: No results found for: PTT, INR, FIBR  POC:   Lab Results   Component Value Date    HCG Positive (A) 04/13/2021     HEPATIC:   Lab Results   Component Value Date    ALBUMIN 4.1 04/13/2021    PROTTOTAL 8.9 (H) 04/13/2021    ALT 26 04/13/2021    AST 17 04/13/2021    ALKPHOS 46 04/13/2021    BILITOTAL 0.8 04/13/2021     OTHER:   Lab Results   Component Value Date    A1C 5.0 05/12/2021    JESSICA 9.5 04/13/2021    LIPASE 221 04/13/2021    AMYLASE 91 04/13/2021    TSH 1.39 08/13/2019    SED 11 03/08/2020       Anesthesia Plan    ASA Status:  3   NPO Status:  ELEVATED Aspiration Risk/Unknown    Anesthesia Type: Spinal.              Consents    Anesthesia Plan(s) and associated risks, benefits, and realistic alternatives discussed. Questions answered and patient/representative(s) expressed understanding.     - Discussed: Risks, Benefits and Alternatives for BOTH SEDATION and the PROCEDURE were discussed     - Discussed with:  Patient      - Extended Intubation/Ventilatory Support Discussed: No.      - Patient is DNR/DNI Status: No    Use of blood products discussed: Yes.     - Discussed with: Patient.     Postoperative Care    Pain management: Oral pain medications, intrathecal morphine, Neuraxial analgesia, Peripheral nerve block (Single Shot), Multi-modal analgesia.   PONV prophylaxis: Ondansetron (or other  5HT-3)     Comments:    Other Comments: 31 year old female  here for planned             Paradise Wilson MD

## 2021-12-14 NOTE — PROGRESS NOTES
Labs done Sunday are normal, covid negative. No questions about c/s tomorrow and knows to arrive at 0630. Still having some temo corbin contractions. BE

## 2021-12-15 ENCOUNTER — ANESTHESIA (OUTPATIENT)
Dept: OBGYN | Facility: CLINIC | Age: 31
End: 2021-12-15
Payer: COMMERCIAL

## 2021-12-15 ENCOUNTER — HOSPITAL ENCOUNTER (INPATIENT)
Facility: CLINIC | Age: 31
LOS: 3 days | Discharge: HOME HEALTH | End: 2021-12-18
Attending: OBSTETRICS & GYNECOLOGY | Admitting: OBSTETRICS & GYNECOLOGY
Payer: COMMERCIAL

## 2021-12-15 DIAGNOSIS — Z34.80 SUPERVISION OF OTHER NORMAL PREGNANCY, ANTEPARTUM: ICD-10-CM

## 2021-12-15 DIAGNOSIS — Z98.891 S/P CESAREAN SECTION: ICD-10-CM

## 2021-12-15 DIAGNOSIS — Z36.89 ENCOUNTER FOR TRIAGE IN PREGNANT PATIENT: Primary | ICD-10-CM

## 2021-12-15 PROCEDURE — 250N000013 HC RX MED GY IP 250 OP 250 PS 637: Performed by: OBSTETRICS & GYNECOLOGY

## 2021-12-15 PROCEDURE — 360N000076 HC SURGERY LEVEL 3, PER MIN: Performed by: OBSTETRICS & GYNECOLOGY

## 2021-12-15 PROCEDURE — 250N000011 HC RX IP 250 OP 636

## 2021-12-15 PROCEDURE — 710N000010 HC RECOVERY PHASE 1, LEVEL 2, PER MIN: Performed by: OBSTETRICS & GYNECOLOGY

## 2021-12-15 PROCEDURE — 271N000001 HC OR GENERAL SUPPLY NON-STERILE: Performed by: OBSTETRICS & GYNECOLOGY

## 2021-12-15 PROCEDURE — 250N000011 HC RX IP 250 OP 636: Performed by: OBSTETRICS & GYNECOLOGY

## 2021-12-15 PROCEDURE — C1765 ADHESION BARRIER: HCPCS | Performed by: OBSTETRICS & GYNECOLOGY

## 2021-12-15 PROCEDURE — 258N000003 HC RX IP 258 OP 636: Performed by: OBSTETRICS & GYNECOLOGY

## 2021-12-15 PROCEDURE — 999N000141 HC STATISTIC PRE-PROCEDURE NURSING ASSESSMENT: Performed by: OBSTETRICS & GYNECOLOGY

## 2021-12-15 PROCEDURE — 272N000001 HC OR GENERAL SUPPLY STERILE: Performed by: OBSTETRICS & GYNECOLOGY

## 2021-12-15 PROCEDURE — 258N000003 HC RX IP 258 OP 636

## 2021-12-15 PROCEDURE — 59510 CESAREAN DELIVERY: CPT | Performed by: OBSTETRICS & GYNECOLOGY

## 2021-12-15 PROCEDURE — 250N000013 HC RX MED GY IP 250 OP 250 PS 637

## 2021-12-15 PROCEDURE — 370N000017 HC ANESTHESIA TECHNICAL FEE, PER MIN: Performed by: OBSTETRICS & GYNECOLOGY

## 2021-12-15 PROCEDURE — 250N000009 HC RX 250: Performed by: OBSTETRICS & GYNECOLOGY

## 2021-12-15 PROCEDURE — C9290 INJ, BUPIVACAINE LIPOSOME: HCPCS

## 2021-12-15 PROCEDURE — 120N000002 HC R&B MED SURG/OB UMMC

## 2021-12-15 PROCEDURE — 59514 CESAREAN DELIVERY ONLY: CPT | Mod: 80 | Performed by: OBSTETRICS & GYNECOLOGY

## 2021-12-15 RX ORDER — IBUPROFEN 800 MG/1
800 TABLET, FILM COATED ORAL EVERY 6 HOURS
Status: DISCONTINUED | OUTPATIENT
Start: 2021-12-16 | End: 2021-12-18 | Stop reason: HOSPADM

## 2021-12-15 RX ORDER — OXYCODONE HYDROCHLORIDE 5 MG/1
5 TABLET ORAL EVERY 4 HOURS PRN
Status: DISCONTINUED | OUTPATIENT
Start: 2021-12-15 | End: 2021-12-18 | Stop reason: HOSPADM

## 2021-12-15 RX ORDER — BUPIVACAINE HYDROCHLORIDE 2.5 MG/ML
INJECTION, SOLUTION EPIDURAL; INFILTRATION; INTRACAUDAL
Status: DISCONTINUED | OUTPATIENT
Start: 2021-12-15 | End: 2021-12-15

## 2021-12-15 RX ORDER — MISOPROSTOL 200 UG/1
400 TABLET ORAL
Status: DISCONTINUED | OUTPATIENT
Start: 2021-12-15 | End: 2021-12-15 | Stop reason: HOSPADM

## 2021-12-15 RX ORDER — METOCLOPRAMIDE 10 MG/1
10 TABLET ORAL EVERY 6 HOURS PRN
Status: DISCONTINUED | OUTPATIENT
Start: 2021-12-15 | End: 2021-12-18 | Stop reason: HOSPADM

## 2021-12-15 RX ORDER — DEXTROSE, SODIUM CHLORIDE, SODIUM LACTATE, POTASSIUM CHLORIDE, AND CALCIUM CHLORIDE 5; .6; .31; .03; .02 G/100ML; G/100ML; G/100ML; G/100ML; G/100ML
INJECTION, SOLUTION INTRAVENOUS CONTINUOUS
Status: DISCONTINUED | OUTPATIENT
Start: 2021-12-15 | End: 2021-12-18 | Stop reason: HOSPADM

## 2021-12-15 RX ORDER — OXYTOCIN 10 [USP'U]/ML
10 INJECTION, SOLUTION INTRAMUSCULAR; INTRAVENOUS
Status: DISCONTINUED | OUTPATIENT
Start: 2021-12-15 | End: 2021-12-18 | Stop reason: HOSPADM

## 2021-12-15 RX ORDER — FENTANYL CITRATE-0.9 % NACL/PF 10 MCG/ML
PLASTIC BAG, INJECTION (ML) INTRAVENOUS CONTINUOUS PRN
Status: DISCONTINUED | OUTPATIENT
Start: 2021-12-15 | End: 2021-12-15

## 2021-12-15 RX ORDER — CARBOPROST TROMETHAMINE 250 UG/ML
250 INJECTION, SOLUTION INTRAMUSCULAR
Status: DISCONTINUED | OUTPATIENT
Start: 2021-12-15 | End: 2021-12-18 | Stop reason: HOSPADM

## 2021-12-15 RX ORDER — MODIFIED LANOLIN
OINTMENT (GRAM) TOPICAL
Status: DISCONTINUED | OUTPATIENT
Start: 2021-12-15 | End: 2021-12-18 | Stop reason: HOSPADM

## 2021-12-15 RX ORDER — CITRIC ACID/SODIUM CITRATE 334-500MG
30 SOLUTION, ORAL ORAL
Status: COMPLETED | OUTPATIENT
Start: 2021-12-15 | End: 2021-12-15

## 2021-12-15 RX ORDER — OXYTOCIN/0.9 % SODIUM CHLORIDE 30/500 ML
100-340 PLASTIC BAG, INJECTION (ML) INTRAVENOUS CONTINUOUS PRN
Status: DISCONTINUED | OUTPATIENT
Start: 2021-12-15 | End: 2021-12-15 | Stop reason: HOSPADM

## 2021-12-15 RX ORDER — CEFAZOLIN SODIUM 2 G/100ML
2 INJECTION, SOLUTION INTRAVENOUS SEE ADMIN INSTRUCTIONS
Status: DISCONTINUED | OUTPATIENT
Start: 2021-12-15 | End: 2021-12-15 | Stop reason: HOSPADM

## 2021-12-15 RX ORDER — PROCHLORPERAZINE MALEATE 10 MG
10 TABLET ORAL EVERY 6 HOURS PRN
Status: DISCONTINUED | OUTPATIENT
Start: 2021-12-15 | End: 2021-12-18 | Stop reason: HOSPADM

## 2021-12-15 RX ORDER — LIDOCAINE 40 MG/G
CREAM TOPICAL
Status: DISCONTINUED | OUTPATIENT
Start: 2021-12-15 | End: 2021-12-18 | Stop reason: HOSPADM

## 2021-12-15 RX ORDER — BUPIVACAINE HYDROCHLORIDE 7.5 MG/ML
INJECTION, SOLUTION INTRASPINAL
Status: COMPLETED | OUTPATIENT
Start: 2021-12-15 | End: 2021-12-15

## 2021-12-15 RX ORDER — AMOXICILLIN 250 MG
2 CAPSULE ORAL 2 TIMES DAILY
Status: DISCONTINUED | OUTPATIENT
Start: 2021-12-15 | End: 2021-12-18 | Stop reason: HOSPADM

## 2021-12-15 RX ORDER — TRANEXAMIC ACID 10 MG/ML
1 INJECTION, SOLUTION INTRAVENOUS EVERY 30 MIN PRN
Status: DISCONTINUED | OUTPATIENT
Start: 2021-12-15 | End: 2021-12-15 | Stop reason: HOSPADM

## 2021-12-15 RX ORDER — MISOPROSTOL 200 UG/1
400 TABLET ORAL
Status: DISCONTINUED | OUTPATIENT
Start: 2021-12-15 | End: 2021-12-18 | Stop reason: HOSPADM

## 2021-12-15 RX ORDER — ACETAMINOPHEN 325 MG/1
975 TABLET ORAL EVERY 6 HOURS
Status: DISCONTINUED | OUTPATIENT
Start: 2021-12-15 | End: 2021-12-18 | Stop reason: HOSPADM

## 2021-12-15 RX ORDER — METOCLOPRAMIDE HYDROCHLORIDE 5 MG/ML
10 INJECTION INTRAMUSCULAR; INTRAVENOUS EVERY 6 HOURS PRN
Status: DISCONTINUED | OUTPATIENT
Start: 2021-12-15 | End: 2021-12-18 | Stop reason: HOSPADM

## 2021-12-15 RX ORDER — CITRIC ACID/SODIUM CITRATE 334-500MG
SOLUTION, ORAL ORAL
Status: COMPLETED
Start: 2021-12-15 | End: 2021-12-15

## 2021-12-15 RX ORDER — NALOXONE HYDROCHLORIDE 0.4 MG/ML
0.2 INJECTION, SOLUTION INTRAMUSCULAR; INTRAVENOUS; SUBCUTANEOUS
Status: DISCONTINUED | OUTPATIENT
Start: 2021-12-15 | End: 2021-12-18 | Stop reason: HOSPADM

## 2021-12-15 RX ORDER — KETOROLAC TROMETHAMINE 30 MG/ML
30 INJECTION, SOLUTION INTRAMUSCULAR; INTRAVENOUS EVERY 6 HOURS
Status: COMPLETED | OUTPATIENT
Start: 2021-12-15 | End: 2021-12-16

## 2021-12-15 RX ORDER — PROCHLORPERAZINE 25 MG
25 SUPPOSITORY, RECTAL RECTAL EVERY 12 HOURS PRN
Status: DISCONTINUED | OUTPATIENT
Start: 2021-12-15 | End: 2021-12-18 | Stop reason: HOSPADM

## 2021-12-15 RX ORDER — OXYTOCIN/0.9 % SODIUM CHLORIDE 30/500 ML
340 PLASTIC BAG, INJECTION (ML) INTRAVENOUS CONTINUOUS PRN
Status: DISCONTINUED | OUTPATIENT
Start: 2021-12-15 | End: 2021-12-15 | Stop reason: HOSPADM

## 2021-12-15 RX ORDER — FENTANYL CITRATE 50 UG/ML
INJECTION, SOLUTION INTRAMUSCULAR; INTRAVENOUS
Status: COMPLETED | OUTPATIENT
Start: 2021-12-15 | End: 2021-12-15

## 2021-12-15 RX ORDER — ONDANSETRON 4 MG/1
4 TABLET, ORALLY DISINTEGRATING ORAL EVERY 6 HOURS PRN
Status: DISCONTINUED | OUTPATIENT
Start: 2021-12-15 | End: 2021-12-18 | Stop reason: HOSPADM

## 2021-12-15 RX ORDER — MORPHINE SULFATE 1 MG/ML
INJECTION, SOLUTION EPIDURAL; INTRATHECAL; INTRAVENOUS
Status: COMPLETED | OUTPATIENT
Start: 2021-12-15 | End: 2021-12-15

## 2021-12-15 RX ORDER — AMOXICILLIN 250 MG
1 CAPSULE ORAL 2 TIMES DAILY
Status: DISCONTINUED | OUTPATIENT
Start: 2021-12-15 | End: 2021-12-18 | Stop reason: HOSPADM

## 2021-12-15 RX ORDER — OXYTOCIN 10 [USP'U]/ML
10 INJECTION, SOLUTION INTRAMUSCULAR; INTRAVENOUS
Status: DISCONTINUED | OUTPATIENT
Start: 2021-12-15 | End: 2021-12-15 | Stop reason: HOSPADM

## 2021-12-15 RX ORDER — ONDANSETRON 2 MG/ML
4 INJECTION INTRAMUSCULAR; INTRAVENOUS EVERY 30 MIN PRN
Status: DISCONTINUED | OUTPATIENT
Start: 2021-12-15 | End: 2021-12-15 | Stop reason: HOSPADM

## 2021-12-15 RX ORDER — SODIUM CHLORIDE, SODIUM LACTATE, POTASSIUM CHLORIDE, CALCIUM CHLORIDE 600; 310; 30; 20 MG/100ML; MG/100ML; MG/100ML; MG/100ML
INJECTION, SOLUTION INTRAVENOUS
Status: COMPLETED
Start: 2021-12-15 | End: 2021-12-15

## 2021-12-15 RX ORDER — OXYTOCIN/0.9 % SODIUM CHLORIDE 30/500 ML
340 PLASTIC BAG, INJECTION (ML) INTRAVENOUS CONTINUOUS PRN
Status: DISCONTINUED | OUTPATIENT
Start: 2021-12-15 | End: 2021-12-18 | Stop reason: HOSPADM

## 2021-12-15 RX ORDER — CEFAZOLIN SODIUM 2 G/100ML
2 INJECTION, SOLUTION INTRAVENOUS
Status: COMPLETED | OUTPATIENT
Start: 2021-12-15 | End: 2021-12-15

## 2021-12-15 RX ORDER — KETOROLAC TROMETHAMINE 30 MG/ML
INJECTION, SOLUTION INTRAMUSCULAR; INTRAVENOUS PRN
Status: DISCONTINUED | OUTPATIENT
Start: 2021-12-15 | End: 2021-12-15

## 2021-12-15 RX ORDER — HYDROCORTISONE 2.5 %
CREAM (GRAM) TOPICAL 3 TIMES DAILY PRN
Status: DISCONTINUED | OUTPATIENT
Start: 2021-12-15 | End: 2021-12-18 | Stop reason: HOSPADM

## 2021-12-15 RX ORDER — NALBUPHINE HYDROCHLORIDE 10 MG/ML
2.5-5 INJECTION, SOLUTION INTRAMUSCULAR; INTRAVENOUS; SUBCUTANEOUS EVERY 6 HOURS PRN
Status: DISCONTINUED | OUTPATIENT
Start: 2021-12-15 | End: 2021-12-18 | Stop reason: HOSPADM

## 2021-12-15 RX ORDER — ACETAMINOPHEN 325 MG/1
975 TABLET ORAL ONCE
Status: COMPLETED | OUTPATIENT
Start: 2021-12-15 | End: 2021-12-15

## 2021-12-15 RX ORDER — ONDANSETRON 2 MG/ML
4 INJECTION INTRAMUSCULAR; INTRAVENOUS EVERY 6 HOURS PRN
Status: DISCONTINUED | OUTPATIENT
Start: 2021-12-15 | End: 2021-12-18 | Stop reason: HOSPADM

## 2021-12-15 RX ORDER — LIDOCAINE 40 MG/G
CREAM TOPICAL
Status: DISCONTINUED | OUTPATIENT
Start: 2021-12-15 | End: 2021-12-15 | Stop reason: HOSPADM

## 2021-12-15 RX ORDER — BISACODYL 10 MG
10 SUPPOSITORY, RECTAL RECTAL DAILY PRN
Status: DISCONTINUED | OUTPATIENT
Start: 2021-12-17 | End: 2021-12-18 | Stop reason: HOSPADM

## 2021-12-15 RX ORDER — ONDANSETRON 2 MG/ML
INJECTION INTRAMUSCULAR; INTRAVENOUS PRN
Status: DISCONTINUED | OUTPATIENT
Start: 2021-12-15 | End: 2021-12-15

## 2021-12-15 RX ORDER — TRANEXAMIC ACID 10 MG/ML
1 INJECTION, SOLUTION INTRAVENOUS EVERY 30 MIN PRN
Status: DISCONTINUED | OUTPATIENT
Start: 2021-12-15 | End: 2021-12-18 | Stop reason: HOSPADM

## 2021-12-15 RX ORDER — METHYLERGONOVINE MALEATE 0.2 MG/ML
200 INJECTION INTRAVENOUS
Status: DISCONTINUED | OUTPATIENT
Start: 2021-12-15 | End: 2021-12-15 | Stop reason: HOSPADM

## 2021-12-15 RX ORDER — NALOXONE HYDROCHLORIDE 0.4 MG/ML
0.4 INJECTION, SOLUTION INTRAMUSCULAR; INTRAVENOUS; SUBCUTANEOUS
Status: DISCONTINUED | OUTPATIENT
Start: 2021-12-15 | End: 2021-12-18 | Stop reason: HOSPADM

## 2021-12-15 RX ORDER — SODIUM CHLORIDE, SODIUM LACTATE, POTASSIUM CHLORIDE, CALCIUM CHLORIDE 600; 310; 30; 20 MG/100ML; MG/100ML; MG/100ML; MG/100ML
INJECTION, SOLUTION INTRAVENOUS CONTINUOUS
Status: DISCONTINUED | OUTPATIENT
Start: 2021-12-15 | End: 2021-12-15 | Stop reason: HOSPADM

## 2021-12-15 RX ORDER — ONDANSETRON 4 MG/1
4 TABLET, ORALLY DISINTEGRATING ORAL EVERY 30 MIN PRN
Status: DISCONTINUED | OUTPATIENT
Start: 2021-12-15 | End: 2021-12-15 | Stop reason: HOSPADM

## 2021-12-15 RX ORDER — MORPHINE SULFATE 1 MG/ML
150 INJECTION, SOLUTION EPIDURAL; INTRATHECAL; INTRAVENOUS ONCE
Status: DISCONTINUED | OUTPATIENT
Start: 2021-12-15 | End: 2021-12-18 | Stop reason: HOSPADM

## 2021-12-15 RX ORDER — FENTANYL CITRATE-0.9 % NACL/PF 10 MCG/ML
100 PLASTIC BAG, INJECTION (ML) INTRAVENOUS EVERY 5 MIN PRN
Status: DISCONTINUED | OUTPATIENT
Start: 2021-12-15 | End: 2021-12-18 | Stop reason: HOSPADM

## 2021-12-15 RX ORDER — MISOPROSTOL 200 UG/1
800 TABLET ORAL
Status: DISCONTINUED | OUTPATIENT
Start: 2021-12-15 | End: 2021-12-18 | Stop reason: HOSPADM

## 2021-12-15 RX ORDER — MISOPROSTOL 200 UG/1
800 TABLET ORAL
Status: DISCONTINUED | OUTPATIENT
Start: 2021-12-15 | End: 2021-12-15 | Stop reason: HOSPADM

## 2021-12-15 RX ORDER — METHYLERGONOVINE MALEATE 0.2 MG/ML
200 INJECTION INTRAVENOUS
Status: DISCONTINUED | OUTPATIENT
Start: 2021-12-15 | End: 2021-12-18 | Stop reason: HOSPADM

## 2021-12-15 RX ORDER — CARBOPROST TROMETHAMINE 250 UG/ML
250 INJECTION, SOLUTION INTRAMUSCULAR
Status: DISCONTINUED | OUTPATIENT
Start: 2021-12-15 | End: 2021-12-15 | Stop reason: HOSPADM

## 2021-12-15 RX ORDER — FENTANYL CITRATE 50 UG/ML
10 INJECTION, SOLUTION INTRAMUSCULAR; INTRAVENOUS ONCE
Status: DISCONTINUED | OUTPATIENT
Start: 2021-12-15 | End: 2021-12-18 | Stop reason: HOSPADM

## 2021-12-15 RX ORDER — SIMETHICONE 80 MG
80 TABLET,CHEWABLE ORAL 4 TIMES DAILY PRN
Status: DISCONTINUED | OUTPATIENT
Start: 2021-12-15 | End: 2021-12-18 | Stop reason: HOSPADM

## 2021-12-15 RX ADMIN — SODIUM CITRATE AND CITRIC ACID MONOHYDRATE 30 ML: 500; 334 SOLUTION ORAL at 08:15

## 2021-12-15 RX ADMIN — SODIUM CHLORIDE, POTASSIUM CHLORIDE, SODIUM LACTATE AND CALCIUM CHLORIDE: 600; 310; 30; 20 INJECTION, SOLUTION INTRAVENOUS at 07:45

## 2021-12-15 RX ADMIN — PHENYLEPHRINE HYDROCHLORIDE 100 MCG: 10 INJECTION INTRAVENOUS at 09:21

## 2021-12-15 RX ADMIN — ACETAMINOPHEN 975 MG: 325 TABLET, FILM COATED ORAL at 22:12

## 2021-12-15 RX ADMIN — PHENYLEPHRINE HYDROCHLORIDE 100 MCG: 10 INJECTION INTRAVENOUS at 09:32

## 2021-12-15 RX ADMIN — KETOROLAC TROMETHAMINE 30 MG: 30 INJECTION, SOLUTION INTRAMUSCULAR; INTRAVENOUS at 22:12

## 2021-12-15 RX ADMIN — PHENYLEPHRINE HYDROCHLORIDE 100 MCG: 10 INJECTION INTRAVENOUS at 08:44

## 2021-12-15 RX ADMIN — BUPIVACAINE 20 ML: 13.3 INJECTION, SUSPENSION, LIPOSOMAL INFILTRATION at 07:45

## 2021-12-15 RX ADMIN — Medication 2 G: at 08:50

## 2021-12-15 RX ADMIN — PHENYLEPHRINE HYDROCHLORIDE 200 MCG: 10 INJECTION INTRAVENOUS at 08:43

## 2021-12-15 RX ADMIN — SODIUM CHLORIDE, POTASSIUM CHLORIDE, SODIUM LACTATE AND CALCIUM CHLORIDE: 600; 310; 30; 20 INJECTION, SOLUTION INTRAVENOUS at 08:31

## 2021-12-15 RX ADMIN — SODIUM CHLORIDE, SODIUM LACTATE, POTASSIUM CHLORIDE, CALCIUM CHLORIDE AND DEXTROSE MONOHYDRATE: 5; 600; 310; 30; 20 INJECTION, SOLUTION INTRAVENOUS at 12:40

## 2021-12-15 RX ADMIN — BUPIVACAINE HYDROCHLORIDE IN DEXTROSE 1.8 ML: 7.5 INJECTION, SOLUTION SUBARACHNOID at 08:42

## 2021-12-15 RX ADMIN — FENTANYL CITRATE 10 MCG: 50 INJECTION, SOLUTION INTRAMUSCULAR; INTRAVENOUS at 08:42

## 2021-12-15 RX ADMIN — ONDANSETRON 4 MG: 2 INJECTION INTRAMUSCULAR; INTRAVENOUS at 14:53

## 2021-12-15 RX ADMIN — MORPHINE SULFATE 0.15 MG: 1 INJECTION EPIDURAL; INTRATHECAL; INTRAVENOUS at 08:42

## 2021-12-15 RX ADMIN — BUPIVACAINE HYDROCHLORIDE 20 ML: 2.5 INJECTION, SOLUTION EPIDURAL; INFILTRATION; INTRACAUDAL at 07:45

## 2021-12-15 RX ADMIN — KETOROLAC TROMETHAMINE 30 MG: 30 INJECTION, SOLUTION INTRAMUSCULAR; INTRAVENOUS at 16:44

## 2021-12-15 RX ADMIN — Medication 50 MCG/MIN: at 08:37

## 2021-12-15 RX ADMIN — OXYCODONE HYDROCHLORIDE 5 MG: 5 TABLET ORAL at 12:40

## 2021-12-15 RX ADMIN — DOCUSATE SODIUM AND SENNOSIDES 2 TABLET: 8.6; 5 TABLET, FILM COATED ORAL at 19:30

## 2021-12-15 RX ADMIN — PHENYLEPHRINE HYDROCHLORIDE 100 MCG: 10 INJECTION INTRAVENOUS at 08:42

## 2021-12-15 RX ADMIN — Medication 30 ML: at 08:15

## 2021-12-15 RX ADMIN — ACETAMINOPHEN 975 MG: 325 TABLET, FILM COATED ORAL at 08:13

## 2021-12-15 RX ADMIN — ONDANSETRON 4 MG: 2 INJECTION INTRAMUSCULAR; INTRAVENOUS at 09:35

## 2021-12-15 RX ADMIN — KETOROLAC TROMETHAMINE 30 MG: 30 INJECTION, SOLUTION INTRAMUSCULAR at 09:35

## 2021-12-15 RX ADMIN — METOCLOPRAMIDE HYDROCHLORIDE 10 MG: 5 INJECTION INTRAMUSCULAR; INTRAVENOUS at 19:30

## 2021-12-15 RX ADMIN — OXYTOCIN-SODIUM CHLORIDE 0.9% IV SOLN 30 UNIT/500ML 300 ML/HR: 30-0.9/5 SOLUTION at 09:08

## 2021-12-15 RX ADMIN — ACETAMINOPHEN 975 MG: 325 TABLET, FILM COATED ORAL at 16:44

## 2021-12-15 ASSESSMENT — MIFFLIN-ST. JEOR: SCORE: 1545.97

## 2021-12-15 NOTE — ANESTHESIA PROCEDURE NOTES
TAP Procedure Note    Pre-Procedure   Staff -        Anesthesiologist:  Mac Valdez DO       Resident/Fellow: Katherine Long MD       Other Anesthesia Staff: Paradise Wilson MD       Performed By: resident       Location: pre-op       Pre-Anesthestic Checklist: patient identified, IV checked, site marked, risks and benefits discussed, informed consent, monitors and equipment checked, pre-op evaluation, at physician/surgeon's request and post-op pain management  Timeout:       Correct Patient: Yes        Correct Procedure: Yes        Correct Site: Yes        Correct Position: Yes        Correct Laterality: Yes        Site Marked: Yes  Procedure Documentation  Procedure: TAP       Diagnosis: POST OPERATIVE PAIN       Laterality: bilateral       Patient Position: supine       Patient Prep/Sterile Barriers: sterile gloves, mask       Skin prep: Chloraprep       Needle Type: short bevel       Needle Gauge: 21.        Needle Length (millimeters): 110        Ultrasound guided       1. Ultrasound was used to identify targeted nerve, plexus, vascular marker, or fascial plane and place a needle adjacent to it in real-time.       2. Ultrasound was used to visualize the spread of anesthetic in close proximity to the above referenced structure.       3. A permanent image is entered into the patient's record.       4. The visualized anatomic structures appeared normal.       5. There were no apparent abnormal pathologic findings.    Assessment/Narrative         The placement was negative for: blood aspirated, painful injection and site bleeding       Paresthesias: No.     Bolus given via needle..        Secured via.        Insertion/Infusion Method: Single Shot       Complications: none       Injection made incrementally with aspirations every 5 mL.    Medication(s) Administered   Bupivacaine 0.25% PF (Infiltration), 20 mL  Bupivacaine liposome (Exparel) 1.3% LA inj susp (Infiltration), 20 mL

## 2021-12-15 NOTE — OP NOTE
[facility]  Obstetrics Service Operative Report    Surgery Date:  December 15, 2021  Surgeon(s): Dr. Capps  Assistants:  Dr. Gannon--Surgical assistant was vital for retraction, hemostasis and delivery as well as closure    Preoperative Diagnoses:  1.  Intrauterine pregnancy at 39w0d  2.  Breech presentation  3. Prior c/s, plan repeat    Postoperative diagnoses: Same     Procedure performed:  Repeat low segment transverse  section via Pfannenstiel incision with two layer uterine closure    Anesthesia:  Spinal with duramorph  Est Blood Loss (mL): 1000 mL  Specimens: None  Complications: None      Operative findings: Single viable female infant at Apgars of 8 and 9 at one and five minutes.   Fetal presentation: joseline breech.  Amniotic fluid: clear.  Placenta intact with 3 vessel cord.   Normal appearing uterus, fallopian tubes, ovaries.  No intraabdominal adhesions. Moderate facial scarring.      Indication: .Hilaria Jansen is a 31 year old year old, , who was admitted at 39w0d  Breech with h/o prior  section, plan repeat.   The risks, benefits, and alternatives of  delivery were explained and the patient agreed to proceed.     Procedure details:  After obtaining informed consent the patient was taken to the operating room. She received Ancef prior to the skin incision. She was placed in the dorsal supine position with a leftward tilt and prepped and draped in the usual sterile fashion. Following test of adequate spinal anesthesia, the abdomen was entered through a transverse skin incision. The skin incision was made sharply, excising prior scar, and carried through the subcutaneous tissue to the fascia.  Fascia was incised in the midline and extended laterally with the [place] scissors.  The superior margin of the fascial incision was grasped with Kocher clamps and dissected from the underlying muscle sharp and blunt dissecton, which was then repeated at the lower  margin of the fascial incision.  The muscle was  in the midline.  The peritoneum was entered bluntly and the opening extended by digital dissection with care to avoid the bladder. A bladder blade was placed. The lower segment of the uterus was opened sharply in a transverse fashion and extended with digital pressure. The infant's breech was elevated to the level of the hysterotomy and was delivered atraumatically using standard breech maneuvers . The cord was doubly clamped and cut and the infant was handed off to the waiting [facility] staff. A segment of the cord was cut and set aside for cord gases if needed. The placenta was expressed.  The uterus was cleared of all clots and debris.  The uterus was massaged and was noted to be firm.  Oxytocin was given through the running IV.  With vigorous massage as well as administration of oxytocin, good uterine tone was achieved. The hysterotomy was repaired with 0-monocryl suture in a running locked fashion. A 2nd layer of 0-monocryl was used to imbricate the incision and good hemostasis was achieved. The bladder flap was inspected and found to be hemostatic The bilateral pericolic gutters were sponged.  Afterward the hysterotomy was again inspected and found to have no active sites of bleeding.  Seprafilm was placed over the hysterotomy. The abdominal wall was examined and found to have no active sites of bleeding. Seprafilm was then placed beneath the fascia. The fascia was closed with a running suture of 0-vicryl.  Subcutaneous tissue was irrigated. Areas that were oozing were controlled with cautery. The subcutaneous tissue was less than 3cm in depth and did not require reapproximation. The skin was closed with 4-0 Monocryl and exophin. The patient tolerated the procedure well and was taken to the recovery room in stable condition. All sponge, needle and instrument counts were correct x2.     JAIMIE CHANG MD

## 2021-12-15 NOTE — PROVIDER NOTIFICATION
12/15/21 0808   Provider Notification   Provider Name/Title Dr. Capps   Method of Notification At Bedside   Request Evaluate in Person   Notification Reason Status Update   MD at bedside with Ultrasound, breech position confirmed, discussing plan for  delivery.  Patient and spouse verbalizing agreement with plan.

## 2021-12-15 NOTE — ANESTHESIA POSTPROCEDURE EVALUATION
Patient: Hilaria Jansen    Procedure: Procedure(s):   SECTION       Diagnosis:Breech presentation, single or unspecified fetus [O32.1XX0]  Diagnosis Additional Information: No value filed.    Anesthesia Type:  Spinal    Note:  Disposition: Inpatient   Postop Pain Control: Uneventful            Sign Out: Well controlled pain   PONV: No   Neuro/Psych: Uneventful            Sign Out: Acceptable/Baseline neuro status   Airway/Respiratory: Uneventful            Sign Out: Acceptable/Baseline resp. status   CV/Hemodynamics: Uneventful            Sign Out: Acceptable CV status   Other NRE: NONE   DID A NON-ROUTINE EVENT OCCUR? No           Last vitals:  Vitals Value Taken Time   /79 12/15/21 1130   Temp 37.1  C (98.8  F) 12/15/21 1000   Pulse 89 12/15/21 1139   Resp 18 12/15/21 1130   SpO2 99 % 12/15/21 1130   Vitals shown include unvalidated device data.    Electronically Signed By: Mac Valdez DO  December 15, 2021  11:41 AM

## 2021-12-15 NOTE — H&P
[facility]    History and Physical  Obstetrics and Gynecology     Date of Admission:  12/15/2021    Assessment & Plan   Hilaria Jansen is a 31 year old female who presents for repeat c/s with breech baby at term    ASSESSMENT:   IUP @ 39w0d for repeat c/s.  NST reactive.  Category  I    PLAN:   Admit - see IP orders  Prepare for  section     JAIMIE CHANG    History of Present Illness   Hilaria Jansen is a 31 year old female  39w0d  Estimated Date of Delivery: Dec 22, 2021 is calculated from Patient's last menstrual period was 2021. [facility]  For repeat c/s at term.    PRENATAL COURSE  Prenatal course was essentially uncomplicated      Recent Labs   Lab Test 21  1254 21  1054   ABO  --  B   RH  --  Pos   AS Negative Neg     Rhogam not indicated   Recent Labs   Lab Test 21  1053 19  1210   HEPBANG Nonreactive  --    HIAGAB Nonreactive  --    GBS  --  Negative   RUQIGG 29  --          Prior to Admission Medications   Prior to Admission Medications   Prescriptions Last Dose Informant Patient Reported? Taking?   Misc. Devices (BREAST PUMP) MISC   No No   Sig: Double electric breast pump   Patient not taking: Reported on 2021   Prenatal Vit-Fe Fumarate-FA (PRENATAL MULTIVITAMIN W/IRON) 27-0.8 MG tablet 2021 at Unknown time  No Yes   Sig: Take 1 tablet by mouth daily   acetaminophen (TYLENOL) 325 MG tablet Unknown at Unknown time  No Yes   Sig: Take 2 tablets (650 mg) by mouth every 6 hours as needed for mild pain Start after Delivery.   acetaminophen (TYLENOL) 500 MG tablet 2021 at Unknown time  Yes Yes   Sig: Take 500-1,000 mg by mouth every 6 hours as needed for mild pain   doxylamine (UNISOM) 25 MG TABS tablet Past Week at Unknown time  Yes Yes   Sig: Take 25 mg by mouth At Bedtime    ferrous gluconate (FERGON) 324 (38 Fe) MG tablet Unknown at Unknown time  No Yes   Sig: Take  1 tablet (324 mg) by mouth daily (with breakfast)   ibuprofen (ADVIL/MOTRIN) 600 MG tablet More than a month at Unknown time  No Yes   Sig: Take 1 tablet (600 mg) by mouth every 6 hours as needed for moderate pain Start after delivery   imiquimod (ALDARA) 5 % external cream Past Week at Unknown time  No Yes   Sig: Apply a small sized amount to warts or molluscum three times weekly at bedtime.   Wash off after 8 hours.   May use for up to 16 weeks.   senna-docusate (SENOKOT-S/PERICOLACE) 8.6-50 MG tablet   No No   Sig: Take 1 tablet by mouth daily Start after delivery.   Patient not taking: Reported on 12/14/2021      Facility-Administered Medications: None     Allergies   No Known Allergies      Immunization History   Immunization History   Administered Date(s) Administered     Influenza Vaccine IM > 6 months Valent IIV4 (Alfuria,Fluzone) 12/18/2018, 10/06/2021     TDAP Vaccine (Adacel) 04/09/2019     Tdap (Adacel,Boostrix) 10/06/2021       Physical Exam   Temp: 98.3  F (36.8  C) Temp src: Oral BP: 126/81 Pulse: 94   Resp: 20 SpO2: 98 %      Vital Signs with Ranges  Temp:  [98.3  F (36.8  C)] 98.3  F (36.8  C)  Pulse:  [89-94] 94  Resp:  [20] 20  BP: (126-127)/(75-81) 126/81  SpO2:  [98 %] 98 %    Abdomen: gravid, single brech fetus, non-tender, EFW 7 lbs   BSUS with breech baby noted    Fetal Heart Tones: 140 baseline, moderate variablility, + accels, no decels and Category I  TOCO:   external monitor and occasional    Constitutional: healthy, alert, active and no distress   Extremities: NT, no edema  Neurologic: Awake, alert, oriented x3  Neuropsychiatric: General: normal, calm and normal eye contact    JAIMIE CHANG MD

## 2021-12-15 NOTE — PLAN OF CARE
Data: Hilaria Jansen transferred to [id number] via cart at 1220. Baby transferred via parent's arms.  Action: Receiving unit notified of transfer: Yes. Patient and family notified of room change. Report given to Hortencia Spaulding RN at 1220. Belongings sent to receiving unit. Accompanied by Registered Nurse. Oriented patient to surroundings. Call light within reach. ID bands double-checked with receiving RN.  Response: Patient tolerated transfer and is stable.

## 2021-12-15 NOTE — PROGRESS NOTES
Patient arrived for AM scheduled repeat . Admission process was started and report was given to Francy GUTIERREZ

## 2021-12-15 NOTE — PLAN OF CARE
Patient arrived to [facility] unit via zoom cart at 1220,with belongings, accompanied by spouse/ significant other, with infant in arms. Received report from Caron ARELLANO and checked bands. Unit and room orientation started. Call light given; no concerns present at this time. Continue with plan of care.

## 2021-12-15 NOTE — ANESTHESIA CARE TRANSFER NOTE
Patient: Hilaria Jansen    Procedure: Procedure(s):   SECTION       Diagnosis: Breech presentation, single or unspecified fetus [O32.1XX0]  Diagnosis Additional Information: No value filed.    Anesthesia Type:   Spinal     Note:    Oropharynx: oropharynx clear of all foreign objects and spontaneously breathing  Level of Consciousness: awake  Oxygen Supplementation: room air    Independent Airway: airway patency satisfactory and stable  Dentition: dentition unchanged  Vital Signs Stable: post-procedure vital signs reviewed and stable  Report to RN Given: handoff report given  Patient transferred to: PACU    Handoff Report: Identifed the Patient, Identified the Reponsible Provider, Reviewed the pertinent medical history, Discussed the surgical course, Reviewed Intra-OP anesthesia mangement and issues during anesthesia, Set expectations for post-procedure period and Allowed opportunity for questions and acknowledgement of understanding      Vitals:  Vitals Value Taken Time   BP     Temp     Pulse 97 12/15/21 1001   Resp     SpO2     Vitals shown include unvalidated device data.    Electronically Signed By: Paradise Wilson MD  December 15, 2021  10:02 AM

## 2021-12-15 NOTE — ANESTHESIA PROCEDURE NOTES
Intrathecal catheter Procedure Note    Pre-Procedure   Staff -        Anesthesiologist:  Mac Valdez DO       Resident/Fellow: Katherine Long MD       Performed By: resident and with residents       Procedure performed by resident/fellow/CRNA in presence of a teaching physician.         Location: OR       Procedure Start/Stop Times: 12/15/2021 9:35 AM and 12/15/2021 8:42 AM       Pre-Anesthestic Checklist: patient identified, IV checked, risks and benefits discussed, informed consent, monitors and equipment checked, pre-op evaluation, at physician/surgeon's request and post-op pain management  Timeout:       Correct Patient: Yes        Correct Procedure: Yes        Correct Site: Yes        Correct Position: Yes   Procedure Documentation  Procedure: intrathecal catheter       Diagnosis: c section       Patient Position: sitting       Patient Prep/Sterile Barriers: sterile gloves, mask, patient draped       Skin prep: Chloraprep       Insertion Site: L4-5. (midline approach).       Needle Gauge: 25.        Needle Length (Inches): 3.5        Spinal Needle Type: Pencan       Introducer used       Introducer: 20 G       # of attempts: 1 and  # of redirects:  0    Assessment/Narrative         Paresthesias: Resolved.       Sensory Level: T6       CSF fluid: clear.      Opening pressure was cmH2O while  Sitting.      Medication(s) Administered   0.75% Hyperbaric Bupivacaine (Intrathecal), 1.8 mL  Fentanyl PF (Intrathecal), 10 mcg  Morphine PF 1 mg/mL (Intrathecal), 0.15 mg  Medication Administration Time: 12/15/2021 8:42 AM

## 2021-12-15 NOTE — PROVIDER NOTIFICATION
12/15/21 0758   Provider Notification   Provider Name/Title Dr. Capps   Method of Notification At Bedside   Request Evaluate in Person   Notification Reason Patient Arrived   MD at bedside, reviewing tracing, may be off of EFM and toco.

## 2021-12-16 LAB — HGB BLD-MCNC: 9.7 G/DL (ref 11.7–15.7)

## 2021-12-16 PROCEDURE — 250N000011 HC RX IP 250 OP 636: Performed by: OBSTETRICS & GYNECOLOGY

## 2021-12-16 PROCEDURE — 250N000013 HC RX MED GY IP 250 OP 250 PS 637: Performed by: OBSTETRICS & GYNECOLOGY

## 2021-12-16 PROCEDURE — 120N000002 HC R&B MED SURG/OB UMMC

## 2021-12-16 PROCEDURE — 36415 COLL VENOUS BLD VENIPUNCTURE: CPT | Performed by: OBSTETRICS & GYNECOLOGY

## 2021-12-16 PROCEDURE — 85018 HEMOGLOBIN: CPT | Performed by: OBSTETRICS & GYNECOLOGY

## 2021-12-16 RX ADMIN — ACETAMINOPHEN 975 MG: 325 TABLET, FILM COATED ORAL at 04:09

## 2021-12-16 RX ADMIN — IBUPROFEN 800 MG: 800 TABLET, FILM COATED ORAL at 22:31

## 2021-12-16 RX ADMIN — IBUPROFEN 800 MG: 800 TABLET, FILM COATED ORAL at 16:41

## 2021-12-16 RX ADMIN — DOCUSATE SODIUM AND SENNOSIDES 2 TABLET: 8.6; 5 TABLET, FILM COATED ORAL at 10:37

## 2021-12-16 RX ADMIN — ACETAMINOPHEN 975 MG: 325 TABLET, FILM COATED ORAL at 16:41

## 2021-12-16 RX ADMIN — IBUPROFEN 800 MG: 800 TABLET, FILM COATED ORAL at 10:38

## 2021-12-16 RX ADMIN — KETOROLAC TROMETHAMINE 30 MG: 30 INJECTION, SOLUTION INTRAMUSCULAR; INTRAVENOUS at 04:09

## 2021-12-16 RX ADMIN — ACETAMINOPHEN 975 MG: 325 TABLET, FILM COATED ORAL at 10:38

## 2021-12-16 RX ADMIN — DOCUSATE SODIUM AND SENNOSIDES 1 TABLET: 8.6; 5 TABLET, FILM COATED ORAL at 22:31

## 2021-12-16 RX ADMIN — OXYCODONE HYDROCHLORIDE 5 MG: 5 TABLET ORAL at 22:31

## 2021-12-16 RX ADMIN — ACETAMINOPHEN 975 MG: 325 TABLET, FILM COATED ORAL at 22:31

## 2021-12-16 RX ADMIN — OXYCODONE HYDROCHLORIDE 5 MG: 5 TABLET ORAL at 18:41

## 2021-12-16 NOTE — PLAN OF CARE
Post partum assessment WNLs. VSS. Afebrile. Abdominal pain managed with scheduled Tylenol and Toradol. Incision clean, dry and intact with liquid bandage. Fundus firm and midline. Lochia scant. Up with SBA. Voiding without difficulty. Passing flatus. PIV patent,, saline locked. Denies nausea, headache, dizziness, lightheadedness, chest pain or SOB.  Breastfeeding  on demand with assist with latch and positioning. Hand expressing colostrum  and started pumping. Mom and baby bonding as expected.  Stable post  C section patient, resting between feedings. Baby's grandmother at bedside, supportive. Continue with plan of care.

## 2021-12-16 NOTE — PROGRESS NOTES
[facility] Obstetrics Post-Op  Section Progress Note     Postop day # 1    SUBJECTIVE:   No complaints.  Feeling well today. Voiding ok, but not passing gas yet.   Bleeding is appropriate.  Breast feeding -- working on it.  Pain controlled ok.          Physical Exam:     Vitals:    12/15/21 2100 21 0100 21 0500 21 1036   BP: 129/75 102/64 110/69 119/77   BP Location:  Left arm Left arm    Patient Position:  Supine Supine    Cuff Size:       Pulse: 89 69 73 99   Resp: 17 18 16 18   Temp: 98.7  F (37.1  C) 98.3  F (36.8  C) 98  F (36.7  C) 98.1  F (36.7  C)   TempSrc: Oral Oral Oral Oral   SpO2: 100% 99% 98%    Weight:       Height:           Gen:  NAD,   normal respiratory and cardiovascular effort   ABD:  Soft, NT, uterus is U-2 and nontender  Incision: bandage is still on  bilateral LE's: no edema, nontender    Hemoglobin   Date Value Ref Range Status   2021 9.7 (L) 11.7 - 15.7 g/dL Final   2021 11.6 (L) 11.7 - 15.7 g/dL Final   2021 11.4 (L) 11.7 - 15.7 g/dL Final   2021 12.4 11.7 - 15.7 g/dL Final             Assessment and Plan:    Doing well POD # 1   Routine postop cares   Contraception: did not discuss today  Plan D/C  Likely tomorrow afternoon.      Opal Messina MD

## 2021-12-16 NOTE — PLAN OF CARE
Stable patient but still nauseous. Vomited x 2 this afternoon and was given zofran IV. Up to the bathroom with assist of 2 as she was light headed and vomiting at that time. She was able to pee with good amount. Given ice chips  and ice cream which she kept it down. Pain is well controlled with tylenol and toradol. Bonding well  with baby. Breastfeeding with assist. Will continue with plan of care.

## 2021-12-16 NOTE — PROGRESS NOTES
"Post  Anesthesia Follow Up Note    Patient: Hilaria Jansen    Patient location: Postpartum floor.    Chief complaint: Acute postoperative pain management s/p intrathecal morphine administration     Procedure(s) Performed:  Procedure(s):   SECTION    Anesthesia type: Spinal Block    Subjective:     Pain Control: 2-3/10 at rest and 7/10 with ambulation    Additional ROS:  She does not complain of pruritis at this time. She denies weakness, denies paresthesia, denies difficulties breathing or voiding, endorses nausea and vomiting yesterday evening, however she states that this has since resolved. She is able to ambulate and tolerates regular diet.    Objective:    Respiratory Function (RR / SpO2 / Airway Patency): Satisfactory    Cardiac Function (HR / Rhythm / BP): Satisfactory    Strength and sensation lower extremities: Normal    Site of spinal/epidural insertion: No signs of infection or inflammation.     Last Vitals: /69 (BP Location: Left arm, Patient Position: Supine)   Pulse 73   Temp 36.7  C (98  F) (Oral)   Resp 16   Ht 1.549 m (5' 1\")   Wt 89.4 kg (197 lb)   LMP 2021   SpO2 98%   Breastfeeding Unknown   BMI 37.22 kg/m      Assessment and plan:   Hilaria Jansen is a 31 year old female  POD #1 s/p  C FULL ROUT OBSTE CARE, DELIV [79770] ( SECTION)  C  DELIVERY ONLY [50770]  C  DELIVERY+POSTPARTUM CARE [03064] with IT bupivacaine (1.6mL), fentanyl (10mcg) and morphine (150mcg); and single shot TAP nerve block injections with 20 mL bupivacaine 0.25% with epinephrine 1:200,000, then 20mL liposome bupivacaine (Exparel) long-acting 1.3% given in the PACU for postoperative analgesia.  Pt is ambulating without difficulty, no weakness or paresthesias.  There is no evidence of adverse side effects associated with spinal and nerve block injections.  The patient is receiving adequate incisional pain control at this time and anticipate " up to 72 hours of incisional pain control.  However, we further anticipate that the patient will require opioid/nonopioid analgesics for visceral and muscle pain that is not controlled with local anesthetic.      In brief summary, her post-operative analgesia is adequate today. She believes that her current pain regiment is adequately managing her pain. Further interventional analgesic strategies would be of little utility at this time. Thus, we recommend proceeding with PO analgesics including staggered dosing of NSAIDs (ibuprofen) and acetaminophen, with a taper of oxycodone.     Thank you for including us in the care for this patient.    Neil Wilson MD  CA-2/PGY-4  Anesthesiology

## 2021-12-16 NOTE — PLAN OF CARE
Data: Vital signs within normal limits. Postpartum checks within normal limits - see flow record. Patient eating and drinking normally. Patient able to empty bladder independently and is up ambulating. No apparent signs of infection. Incision healing well. Patient performing self cares and is able to care for infant.  Action: Patient medicated during the shift for pain. See MAR. Patient reassessed within 1 hour after each medication and pain was improved - patient stated she was comfortable. Patient education done ,see flow record.  Response: Positive attachment behaviors observed with infant. Support person (FOB) present.

## 2021-12-16 NOTE — PLAN OF CARE
Data: Vital signs and postpartum checks WDL  Patient tolerated clear liquid diet due to N/V. Patient able to empty bladder with assist and is up ambulating.  Patient performing self cares and is able to care for infant. Breastfeeding on demand with full assist. Assisted in doing hand expression.   Action: Patient medicated during the shift for pain with Tylenol and Toradol with relief after 1 hour.  Reglan given once with relief no more emesis. Patient education done see education record.  Response: Positive attachment behaviors observed with infant. Support persons SO present.    Plan: Continue with the plan of care

## 2021-12-17 PROCEDURE — 250N000011 HC RX IP 250 OP 636

## 2021-12-17 PROCEDURE — 250N000013 HC RX MED GY IP 250 OP 250 PS 637: Performed by: OBSTETRICS & GYNECOLOGY

## 2021-12-17 PROCEDURE — 120N000002 HC R&B MED SURG/OB UMMC

## 2021-12-17 RX ORDER — OXYCODONE HYDROCHLORIDE 5 MG/1
5 TABLET ORAL EVERY 6 HOURS PRN
Qty: 12 TABLET | Refills: 0 | Status: SHIPPED | OUTPATIENT
Start: 2021-12-17 | End: 2022-04-08

## 2021-12-17 RX ADMIN — IBUPROFEN 800 MG: 800 TABLET, FILM COATED ORAL at 11:38

## 2021-12-17 RX ADMIN — DOCUSATE SODIUM AND SENNOSIDES 1 TABLET: 8.6; 5 TABLET, FILM COATED ORAL at 20:08

## 2021-12-17 RX ADMIN — OXYCODONE HYDROCHLORIDE 5 MG: 5 TABLET ORAL at 09:03

## 2021-12-17 RX ADMIN — SIMETHICONE 80 MG: 80 TABLET, CHEWABLE ORAL at 09:03

## 2021-12-17 RX ADMIN — OXYCODONE HYDROCHLORIDE 5 MG: 5 TABLET ORAL at 02:29

## 2021-12-17 RX ADMIN — OXYCODONE HYDROCHLORIDE 5 MG: 5 TABLET ORAL at 21:40

## 2021-12-17 RX ADMIN — ACETAMINOPHEN 975 MG: 325 TABLET, FILM COATED ORAL at 17:26

## 2021-12-17 RX ADMIN — ACETAMINOPHEN 975 MG: 325 TABLET, FILM COATED ORAL at 11:38

## 2021-12-17 RX ADMIN — DOCUSATE SODIUM AND SENNOSIDES 2 TABLET: 8.6; 5 TABLET, FILM COATED ORAL at 09:03

## 2021-12-17 RX ADMIN — IBUPROFEN 800 MG: 800 TABLET, FILM COATED ORAL at 05:14

## 2021-12-17 RX ADMIN — OXYCODONE HYDROCHLORIDE 5 MG: 5 TABLET ORAL at 17:29

## 2021-12-17 RX ADMIN — IBUPROFEN 800 MG: 800 TABLET, FILM COATED ORAL at 23:31

## 2021-12-17 RX ADMIN — ACETAMINOPHEN 975 MG: 325 TABLET, FILM COATED ORAL at 05:14

## 2021-12-17 RX ADMIN — IBUPROFEN 800 MG: 800 TABLET, FILM COATED ORAL at 17:26

## 2021-12-17 RX ADMIN — NALBUPHINE HYDROCHLORIDE 2.5 MG: 10 INJECTION, SOLUTION INTRAMUSCULAR; INTRAVENOUS; SUBCUTANEOUS at 06:47

## 2021-12-17 RX ADMIN — ACETAMINOPHEN 975 MG: 325 TABLET, FILM COATED ORAL at 23:32

## 2021-12-17 NOTE — PROGRESS NOTES
"[facility]    Obstetrics Post-Op / Progress Note    Assessment & Plan   Assessment:  -2 Days Post-Op  Procedure(s):   SECTION    Doing well.  No excessive bleeding  Pain well-controlled.    Plan:  Anticipate discharge tomorrow  Script for oxy #12 done  Reviewed discharge instructions    JAIMIE CHANG     Interval History   Doing well.  Pain is well-controlled.  No fevers.  No history of wound drainage, warmth or significant erythema.  Good appetite.  Denies chest pain, shortness of breath, nausea or vomiting.  Ambulatory.  Baby in NICU for blood sugar control.    Medications     bupivacaine liposome (EXPAREL) LA inj was given in the infiltration site to produce post-op analgesia. Duration of action is up to 72 hours. Other \"cale\" meds should not be given for 96 hours except for lidocaine 4% patch. This is for INFORMATION ONLY.       dextrose 5% lactated ringers 125 mL/hr at 12/15/21 1240     - MEDICATION INSTRUCTIONS -       - MEDICATION INSTRUCTIONS -       - MEDICATION INSTRUCTIONS -       - MEDICATION INSTRUCTIONS -       oxytocin in 0.9% NaCl         acetaminophen  975 mg Oral Q6H     fentaNYL  10 mcg Intrathecal Once     ibuprofen  800 mg Oral Q6H     morphine (PF)  150 mcg Intrathecal Once     senna-docusate  1 tablet Oral BID    Or     senna-docusate  2 tablet Oral BID     sodium chloride (PF)  3 mL Intracatheter Q8H       Physical Exam   Temp: 98.2  F (36.8  C) Temp src: Oral BP: 111/73 Pulse: 83   Resp: 17   O2 Device: None (Room air)    Vitals:    12/15/21 0701   Weight: 89.4 kg (197 lb)     Vital Signs with Ranges  Temp:  [98.1  F (36.7  C)-98.7  F (37.1  C)] 98.2  F (36.8  C)  Pulse:  [83-99] 83  Resp:  [17-18] 17  BP: (111-119)/(73-77) 111/73  No intake/output data recorded.    Uterine fundus is firm, non-tender and at the level of the umbilicus  Incision C/D/I    Data   Recent Labs   Lab Test 21  1254 21  1054   ABO  --  B   RH  -- "  Pos   AS Negative Neg     Recent Labs   Lab Test 12/16/21  1101 12/12/21  1254   HGB 9.7* 11.6*     Recent Labs   Lab Test 05/12/21  1053   RUQIGG 29

## 2021-12-17 NOTE — PROGRESS NOTES
Received order for SW to see for NICU psychosocial assessment.  Met with patient this morning to assess needs and to offer support.     Brief NICU psychosocial assessment completed.  Details can be found in baby's chart.     SW provided assistance with baby supplies- an identified concern from Hilaria.  Provided blanket, diapers, wipes, outfit, and [employer] gift card to go toward a bassinet.  She has a [employer] but it does not have a bassinet insert and she needs a safe place for baby to sleep.    Will follow along throughout Bob's NICU admission to assist with needs and to provide support.

## 2021-12-17 NOTE — LACTATION NOTE
"This note was copied from a baby's chart.  D:  I met with Hilaria on [employer], Bob is her second baby; she  her 2.5 year old for 18months. She is normally in good health (per chart anemia, sickle cell), takes no medications, and has no history of breast/chest surgery or trauma.  She has already started to pump, getting 10ml as we talked.  I:  I gave her a folder of introductory materials and went over pumping guidelines.  I reviewed physiology of colostrum and milk production, pumping guidelines, and I gave her a log and encouraged her to use it.   I explained how to access the videos \"Hand Expression\" and \"Maximizing Milk Production\"; as well as other helpful books and websites.   We discussed hands-on pumping techniques and usefulness of a hands-free pumping bra.  We discussed skin to skin holding and how to reach your breastfeeding goals.  We talked about birth control and other medications during breastfeeding.  She verbalized understanding via teachback.  I advised her to call her insurance company about pump coverage.    A:  Mom has information she needs to initiate her supply.   P: Will continue to provide lactation support.    PARESH Tinsley, RN, IBCLC            "

## 2021-12-17 NOTE — PLAN OF CARE
VSS, postpartum assessment WDL, voiding without difficulty, passing flatus and stool.  Postoperative pain managed well with ibu/tyl and PRN oxycodone.  Pumping for infant who was transferred to the NICU overnight.  Continue with plan of care.

## 2021-12-17 NOTE — PLAN OF CARE
Vitals & PP assessments are within normal range. Lungs clear.   Up ad ivelisse. Pumping & visits her infant in NICU.   Pain well controled with PO pain meds. Had BM & Passing flatus.   Encouraged to walk more.   Will continue on supportive cares.

## 2021-12-17 NOTE — PROGRESS NOTES
"Post  Anesthesia Follow Up Note    Patient: Hilaria Jansen    Patient location: Postpartum floor.    Chief complaint: Acute postoperative pain management s/p intrathecal morphine administration     Procedure(s) Performed:  Procedure(s):   SECTION    Anesthesia type: Spinal Block    Subjective:     Pain Control: 2/10 at rest and 6/10 with ambulation    Additional ROS:  She does complain of pruritis at this time. I counseled her regarding the likely MOA of her pruritis and therapies she can try to mitigate this, including Nubain and Zofran. She denies weakness, denies paresthesia, denies difficulties breathing or voiding, denies nausea or vomiting. She did have an episode of nausea and emesis earlier in her hospital course, however that has since resolved. She is able to ambulate and tolerates regular diet.    Objective:    Respiratory Function (RR / SpO2 / Airway Patency): Satisfactory    Cardiac Function (HR / Rhythm / BP): Satisfactory    Strength and sensation lower extremities: Normal    Site of spinal/epidural insertion: No signs of infection or inflammation.     Last Vitals: /73 (BP Location: Left arm)   Pulse 83   Temp 36.8  C (98.2  F) (Oral)   Resp 17   Ht 1.549 m (5' 1\")   Wt 89.4 kg (197 lb)   LMP 2021   SpO2 98%   Breastfeeding Unknown   BMI 37.22 kg/m      Assessment and plan:   Hilaria Jansen is a 31 year old female  POD #2 s/p  C FULL ROUT OBSTE CARE, DELIV [81793] ( SECTION)  C  DELIVERY ONLY [40567]  C  DELIVERY+POSTPARTUM CARE [03300] with IT bupivacaine, fentanyl (10 mcg) and morphine (150 mcg); and single shot TAP nerve block injections with 20 mL bupivacaine 0.25% with epinephrine 1:200,000, then 20mL liposome bupivacaine (Exparel) long-acting 1.3% given in the PACU for postoperative analgesia.  Pt is ambulating without difficulty, no weakness or paresthesias.  There is no evidence of adverse side effects " associated with spinal and nerve block injections.  The patient is receiving adequate incisional pain control at this time and anticipate up to 72 hours of incisional pain control.  However, we further anticipate that the patient will require opioid/nonopioid analgesics for visceral and muscle pain that is not controlled with local anesthetic.      In brief summary, her post-operative analgesia is adequate today. Further interventional analgesic strategies would be of little utility at this time. Thus, we recommend proceeding with PO analgesics including staggered dosing of NSAIDs (ibuprofen) and acetaminophen, with a taper of oxycodone.     Thank you for including us in the care for this patient.    Neil Wilson MD  CA-2/PGY-4  Dept of Anesthesiology

## 2021-12-18 VITALS
SYSTOLIC BLOOD PRESSURE: 127 MMHG | TEMPERATURE: 98.1 F | HEART RATE: 83 BPM | DIASTOLIC BLOOD PRESSURE: 78 MMHG | BODY MASS INDEX: 37.19 KG/M2 | WEIGHT: 197 LBS | RESPIRATION RATE: 17 BRPM | OXYGEN SATURATION: 98 % | HEIGHT: 61 IN

## 2021-12-18 PROCEDURE — 250N000013 HC RX MED GY IP 250 OP 250 PS 637: Performed by: OBSTETRICS & GYNECOLOGY

## 2021-12-18 RX ADMIN — OXYCODONE HYDROCHLORIDE 5 MG: 5 TABLET ORAL at 02:02

## 2021-12-18 RX ADMIN — IBUPROFEN 800 MG: 800 TABLET, FILM COATED ORAL at 11:24

## 2021-12-18 RX ADMIN — DOCUSATE SODIUM AND SENNOSIDES 2 TABLET: 8.6; 5 TABLET, FILM COATED ORAL at 07:48

## 2021-12-18 RX ADMIN — ACETAMINOPHEN 975 MG: 325 TABLET, FILM COATED ORAL at 11:24

## 2021-12-18 RX ADMIN — ACETAMINOPHEN 975 MG: 325 TABLET, FILM COATED ORAL at 05:10

## 2021-12-18 RX ADMIN — IBUPROFEN 800 MG: 800 TABLET, FILM COATED ORAL at 05:10

## 2021-12-18 RX ADMIN — OXYCODONE HYDROCHLORIDE 5 MG: 5 TABLET ORAL at 07:48

## 2021-12-18 NOTE — PROVIDER NOTIFICATION
12/17/21 2031   Provider Notification   Provider Name/Title G3   Method of Notification Electronic Page   Request Evaluate in Person   Notification Reason Status Update   when get a chance, take a look at incision. doesnt look infected but small part of red noticed on left side of incision and pt reporting pain still a 7 despite all pain medications. thanks

## 2021-12-18 NOTE — PLAN OF CARE
VSS and postpartum assessments WDL.  Up ad ivelisse with steady gait and independent with cares.  Infant in NICU. Pumping every 3 hours.  Pain managed with Tylenol, Ibuprofen, and Oxycodone.  Mother is present and supportive. Patient has small pink spot on incision line. Doctor notified and will see it in the morning.  Will continue with postpartum cares and education per plan of care.

## 2021-12-18 NOTE — PROGRESS NOTES
"S; Feeling good, no c/o.  Pain well controlled with po meds.  Tolerating a general diet.  Passing flatus.  Ambulating without difficulty.  Breastfeeding.  Urinating OK.    O: /78   Pulse 83   Temp 98.1  F (36.7  C) (Oral)   Resp 17   Ht 1.549 m (5' 1\")   Wt 89.4 kg (197 lb)   LMP 03/17/2021   SpO2 98%   Breastfeeding Unknown   BMI 37.22 kg/m        Abd: SNT, mildly distended.  Appropriately TTP  Wound: incision C/D/I  Ex: no calf tenderness    Hgb: 9.7    A/P;  1) POD #3 S/P rLTCS   Doing well, ok for d/c.  Instructions given.  Rx for ibuprofen, tylenol and oxycodone given with instructions.  RTC 6 weeks.    PARISH MOON MD    "

## 2021-12-18 NOTE — PLAN OF CARE
VSS. Postpartum assessment WNL. Incision open to air with liquid bandage and intact although noted tiny amount of pink skin seen (incision appears to not be well approximated), no drainage noted. Reporting pain 7/10 - tylenol, ibuprofen and oxycodone and per pt, pain did not improve. Physically, pt able to move around and doesn't appear in severe pain. MD paged to assess incision for any abnormalities and to address pain. Using hot pack on abdomen for cramping.   Pumping for infant in NICU and visited with baby first half of shift. Continue current plan of care.

## 2021-12-18 NOTE — PLAN OF CARE
Pt discharged to home/ NICU by her baby.   Instructions & prescriptions given/reviewed.   Pt verbalized understanding her plan & had no further questions.   Instructed to follow up at clinic within 6 weeks for PP check.

## 2021-12-18 NOTE — DISCHARGE SUMMARY
[facility]    Discharge Summary  Obstetrics    Date of Admission:  12/15/2021  Date of Discharge:  2021  Discharging Provider: Beckie Mason    Discharge Diagnoses   Breech presentation, single or unspecified fetus [O32.1XX0]    Procedure/Surgery Information   Procedure: Procedure(s):   SECTION   Surgeon(s): Surgeon(s) and Role:     * Brooke Capps MD - Primary     * Lucia Gannon MD - Assisting     History of Present Illness   Hilaria Jansen is a 31 year old female who presented with breech presentation for scheduled .    Her surgery was uncomplicated and she was moved to recovery in stable condition.     Hospital Course   The patient's hospital course was unremarkable.  She recovered as anticipated and experienced no post-operative complications. On discharge, her pain was well controlled. Vaginal bleeding is similar to peak menstrual flow.  Voiding without difficulty.  Ambulating well and tolerating a normal diet.  No fever or significant wound drainage.  Breastfeeding well (pumping while baby in NICU).  Infant is stable in NICU.  She was discharged on post-partum day #3.    Post-partum hemoglobin:   Hemoglobin   Date Value Ref Range Status   2021 9.7 (L) 11.7 - 15.7 g/dL Final   2021 11.4 (L) 11.7 - 15.7 g/dL Final       [place] Depression Scale  Thoughts of Harming Self: 0-->never  Total Score: 1      Beckie Mason MD    Discharge Disposition   Discharged to home   Condition at discharge: Stable    Pending Results   Final pathology results: No pathology submitted    Unresulted Labs Ordered in the Past 30 Days of this Admission     No orders found from 11/15/2021 to 2021.          Primary Care Physician   Clotilde Beltran    Consultations This Hospital Stay   LACTATION IP CONSULT  SOCIAL WORK IP CONSULT  HOME CARE POST PARTUM/ IP CONSULT    Discharge Orders      Activity    Activity as  tolerated     Reason for your hospital stay    Maternity care     Follow Up    Follow up with provider in 6 weeks for post-delivery check     Breast pump    Breast Pump Documentation:  Manual/Electric Pump: To support adequate breast milk production and nutrition for infant.     I, the undersigned, certify that the above prescribed supplies are medically necessary for this patient and is both reasonable and necessary in reference to accepted standards of medical and necessary in reference to accepted standards of medical practice in the treatment of this patient's condition and is not prescribed as a convenience.     Diet    Resume previous diet     Discharge Medications   Current Discharge Medication List      START taking these medications    Details   oxyCODONE (ROXICODONE) 5 MG tablet Take 1 tablet (5 mg) by mouth every 6 hours as needed for severe pain  Qty: 12 tablet, Refills: 0    Associated Diagnoses: S/P  section         CONTINUE these medications which have NOT CHANGED    Details   acetaminophen (TYLENOL) 325 MG tablet Take 2 tablets (650 mg) by mouth every 6 hours as needed for mild pain Start after Delivery.  Qty: 100 tablet, Refills: 0    Associated Diagnoses: Supervision of other normal pregnancy, antepartum      ibuprofen (ADVIL/MOTRIN) 600 MG tablet Take 1 tablet (600 mg) by mouth every 6 hours as needed for moderate pain Start after delivery  Qty: 60 tablet, Refills: 0    Associated Diagnoses: Supervision of other normal pregnancy, antepartum      imiquimod (ALDARA) 5 % external cream Apply a small sized amount to warts or molluscum three times weekly at bedtime.   Wash off after 8 hours.   May use for up to 16 weeks.  Qty: 12 packet, Refills: 3    Associated Diagnoses: Condyloma acuminatum      Prenatal Vit-Fe Fumarate-FA (PRENATAL MULTIVITAMIN W/IRON) 27-0.8 MG tablet Take 1 tablet by mouth daily  Qty: 90 tablet, Refills: 3    Associated Diagnoses: Supervision of other normal pregnancy,  antepartum      Misc. Devices (BREAST PUMP) MISC Double electric breast pump  Qty: 1 each, Refills: 0    Associated Diagnoses: Supervision of other normal pregnancy, antepartum      senna-docusate (SENOKOT-S/PERICOLACE) 8.6-50 MG tablet Take 1 tablet by mouth daily Start after delivery.  Qty: 100 tablet, Refills: 0    Associated Diagnoses: Supervision of other normal pregnancy, antepartum         STOP taking these medications       doxylamine (UNISOM) 25 MG TABS tablet Comments:   Reason for Stopping:         ferrous gluconate (FERGON) 324 (38 Fe) MG tablet Comments:   Reason for Stopping:             Allergies   No Known Allergies

## 2021-12-20 ENCOUNTER — PATIENT OUTREACH (OUTPATIENT)
Dept: CARE COORDINATION | Facility: CLINIC | Age: 31
End: 2021-12-20
Payer: COMMERCIAL

## 2021-12-20 DIAGNOSIS — Z71.89 OTHER SPECIFIED COUNSELING: ICD-10-CM

## 2021-12-20 NOTE — PROGRESS NOTES
Clinic Care Coordination Contact  [facility]/Voicemail       Clinical Data: Care Coordinator Outreach  Outreach attempted x 1.  Unable to leave a  message on patient's voicemail with call back information and requested return call.  Plan: Care Coordinator will try to reach patient again in 1-2 business days.    .Kaylie JEFFERS Community Health Worker  Clinic Care Coordination  [facility]  Phone: [phone]

## 2021-12-21 NOTE — PROGRESS NOTES
Clinic Care Coordination Contact  [facility]/Voicemail       Clinical Data: Care Coordinator Outreach  Outreach attempted x 2.Unable to leave a  message on patient's on patient's voicemail with call back information and requested return call.  Plan:  Care Coordinator will do no further outreaches at this time.    Kaylie JEFFERS Community Health Worker  Clinic Care Coordination  [facility]  Phone: [phone]

## 2022-01-10 RX ORDER — BREAST PUMP
1 EACH MISCELLANEOUS CONTINUOUS PRN
Qty: 1 EACH | Refills: 0 | Status: SHIPPED | OUTPATIENT
Start: 2022-01-10 | End: 2022-05-12

## 2022-01-23 ENCOUNTER — HEALTH MAINTENANCE LETTER (OUTPATIENT)
Age: 32
End: 2022-01-23

## 2022-01-27 ENCOUNTER — PRENATAL OFFICE VISIT (OUTPATIENT)
Dept: OBGYN | Facility: CLINIC | Age: 32
End: 2022-01-27

## 2022-01-27 VITALS
SYSTOLIC BLOOD PRESSURE: 144 MMHG | DIASTOLIC BLOOD PRESSURE: 93 MMHG | HEART RATE: 75 BPM | BODY MASS INDEX: 31.89 KG/M2 | OXYGEN SATURATION: 97 % | WEIGHT: 168.8 LBS

## 2022-01-27 PROBLEM — Z34.80 SUPERVISION OF OTHER NORMAL PREGNANCY, ANTEPARTUM: Status: RESOLVED | Noted: 2021-05-13 | Resolved: 2022-01-27

## 2022-01-27 PROBLEM — Z36.89 ENCOUNTER FOR TRIAGE IN PREGNANT PATIENT: Status: RESOLVED | Noted: 2021-12-12 | Resolved: 2022-01-27

## 2022-01-27 PROBLEM — N94.89 ADNEXAL MASS: Status: RESOLVED | Noted: 2018-06-12 | Resolved: 2022-01-27

## 2022-01-27 PROBLEM — Z98.891 S/P CESAREAN SECTION: Status: RESOLVED | Noted: 2021-12-15 | Resolved: 2022-01-27

## 2022-01-27 PROCEDURE — 99207 PR POST PARTUM EXAM: CPT | Performed by: OBSTETRICS & GYNECOLOGY

## 2022-01-27 RX ORDER — ACETAMINOPHEN AND CODEINE PHOSPHATE 120; 12 MG/5ML; MG/5ML
0.35 SOLUTION ORAL DAILY
Qty: 112 TABLET | Refills: 3 | Status: SHIPPED | OUTPATIENT
Start: 2022-01-27 | End: 2022-05-17

## 2022-01-27 ASSESSMENT — ANXIETY QUESTIONNAIRES
2. NOT BEING ABLE TO STOP OR CONTROL WORRYING: MORE THAN HALF THE DAYS
5. BEING SO RESTLESS THAT IT IS HARD TO SIT STILL: NOT AT ALL
3. WORRYING TOO MUCH ABOUT DIFFERENT THINGS: NOT AT ALL
1. FEELING NERVOUS, ANXIOUS, OR ON EDGE: NEARLY EVERY DAY
7. FEELING AFRAID AS IF SOMETHING AWFUL MIGHT HAPPEN: NOT AT ALL
GAD7 TOTAL SCORE: 11
6. BECOMING EASILY ANNOYED OR IRRITABLE: NEARLY EVERY DAY

## 2022-01-27 ASSESSMENT — PATIENT HEALTH QUESTIONNAIRE - PHQ9
5. POOR APPETITE OR OVEREATING: NEARLY EVERY DAY
SUM OF ALL RESPONSES TO PHQ QUESTIONS 1-9: 10

## 2022-01-27 NOTE — PROGRESS NOTES
SUBJECTIVE:  Hilaria Jansen is a 31 year old female  here for a postpartum visit.  She had a  Section  on 12/15/21 delivering a healthy baby girl weighing 7 lbs 5 oz at term.      delivery complications:  No  breast feeding:  Yes, but not going well. Pumping mostly  bladder problems:  No  bowel problems/hemorrhoids:  No  episiotomy/laceration/incision healed? Yes  vaginal flow:  None  [place]:  No  contraception:  oral contraceptive  emotional adjustment:  doing well and happy    OBJECTIVE:  Blood pressure (!) 144/93, pulse 75, weight 76.6 kg (168 lb 12.8 oz), last menstrual period 2021, SpO2 97 %, unknown if currently breastfeeding.   General - pleasant female in no acute distress.  Breast - deferred  Abdomen - soft, nontender, nondistended, incision well healed.  Pelvic - deferred.    ASSESSMENT:  normal postpartum exam    PLAN:  May resume normal activities without restrictions  Pap smear was not  done today--pt showed up to appt late and had a baby appt right after visit    The patient will use oral contraceptive for birth control. Full counseling was provided, and all questions answered. Compliance is strongly emphasized.  Return to clinic in one year for an annual PRN.    Discussed no pregnancies for at least one year due to  delivery.    JAIMIE CHANG MD

## 2022-01-28 ASSESSMENT — ANXIETY QUESTIONNAIRES: GAD7 TOTAL SCORE: 11

## 2022-03-02 ENCOUNTER — TELEPHONE (OUTPATIENT)
Dept: OBGYN | Facility: CLINIC | Age: 32
End: 2022-03-02
Payer: COMMERCIAL

## 2022-03-02 DIAGNOSIS — M54.6 BILATERAL THORACIC BACK PAIN, UNSPECIFIED CHRONICITY: Primary | ICD-10-CM

## 2022-03-02 NOTE — TELEPHONE ENCOUNTER
Return call to patient via English . Patient states that her birth control is causing her breast milk to dry up, and is wondering if that is typical. Advised that the option that she was prescribed is typically chosen because it should not cause milk production to decrease. Nursing wondering if she should see lactation for this, or if an OB may have a recommendation.     She also states that she is having abdominal pain in the C/S incision area and back pain. She started working again on 2/23 and the pain has accompanied the return to work. She works at the [employer] and does a lot of walking and lifting. Wondering if she needs to be put on restrictions. She called to schedule an appointment to discuss these concerns with her provider, but her provider is unavailable for the next two weeks. Should she see another? Please advise.   Veronica Chicas RN

## 2022-03-02 NOTE — TELEPHONE ENCOUNTER
Pt called, stating she wanted to talk to a nurse about the birth control BE prescribed. However, she also has a concern regarding her  area. Please call back at [phone]   and discuss these things, as well as if she needs to be seen by BE since she is gone starting Monday. Thank you! Pt called w/o , and things were generally understandable, but may need one for more specific details.

## 2022-03-02 NOTE — TELEPHONE ENCOUNTER
Return to work can often affect milk production, but the progesterone only pill is not doing that. May want to see lactation about pumping to keep up supply but often drops with going back to work.    She is too far out to still be needing restrictions. At this point would recc PT and order is pending if she agrees.    Brooke Capps MD

## 2022-03-02 NOTE — TELEPHONE ENCOUNTER
Call to patient with provider recommendations. Patient verbalizes understanding, agreeable to plan. Sent scheduling information via 4th aspect per patient preference.   Veronica Chicas RN

## 2022-03-09 ENCOUNTER — HOSPITAL ENCOUNTER (EMERGENCY)
Facility: CLINIC | Age: 32
Discharge: HOME | End: 2022-03-09
Attending: PHYSICIAN ASSISTANT | Admitting: EMERGENCY MEDICINE
Payer: COMMERCIAL

## 2022-03-09 VITALS
DIASTOLIC BLOOD PRESSURE: 81 MMHG | HEIGHT: 61 IN | WEIGHT: 168 LBS | BODY MASS INDEX: 31.72 KG/M2 | RESPIRATION RATE: 18 BRPM | OXYGEN SATURATION: 100 % | HEART RATE: 67 BPM | TEMPERATURE: 98.1 F | SYSTOLIC BLOOD PRESSURE: 149 MMHG

## 2022-03-09 DIAGNOSIS — R10.84 ABDOMINAL PAIN, GENERALIZED: ICD-10-CM

## 2022-03-09 DIAGNOSIS — M54.50 ACUTE BILATERAL LOW BACK PAIN WITHOUT SCIATICA: ICD-10-CM

## 2022-03-09 LAB
ALBUMIN SERPL-MCNC: 4.1 G/DL (ref 3.4–5)
ALBUMIN UR-MCNC: NEGATIVE MG/DL
ALP SERPL-CCNC: 68 U/L (ref 40–150)
ALT SERPL W P-5'-P-CCNC: 24 U/L (ref 0–50)
ANION GAP SERPL CALCULATED.3IONS-SCNC: 4 MMOL/L (ref 3–14)
APPEARANCE UR: CLEAR
AST SERPL W P-5'-P-CCNC: 17 U/L (ref 0–45)
BASOPHILS # BLD AUTO: 0 10E3/UL (ref 0–0.2)
BASOPHILS NFR BLD AUTO: 0 %
BILIRUB SERPL-MCNC: 0.5 MG/DL (ref 0.2–1.3)
BILIRUB UR QL STRIP: NEGATIVE
BUN SERPL-MCNC: 7 MG/DL (ref 7–30)
CALCIUM SERPL-MCNC: 9.5 MG/DL (ref 8.5–10.1)
CHLORIDE BLD-SCNC: 107 MMOL/L (ref 94–109)
CO2 SERPL-SCNC: 29 MMOL/L (ref 20–32)
COLOR UR AUTO: ABNORMAL
CREAT SERPL-MCNC: 0.83 MG/DL (ref 0.52–1.04)
EOSINOPHIL # BLD AUTO: 0 10E3/UL (ref 0–0.7)
EOSINOPHIL NFR BLD AUTO: 1 %
ERYTHROCYTE [DISTWIDTH] IN BLOOD BY AUTOMATED COUNT: 15.9 % (ref 10–15)
GFR SERPL CREATININE-BSD FRML MDRD: >90 ML/MIN/1.73M2
GLUCOSE BLD-MCNC: 97 MG/DL (ref 70–99)
GLUCOSE UR STRIP-MCNC: NEGATIVE MG/DL
HCG SERPL QL: NEGATIVE
HCT VFR BLD AUTO: 39.9 % (ref 35–47)
HGB BLD-MCNC: 12.2 G/DL (ref 11.7–15.7)
HGB UR QL STRIP: ABNORMAL
IMM GRANULOCYTES # BLD: 0 10E3/UL
IMM GRANULOCYTES NFR BLD: 0 %
KETONES UR STRIP-MCNC: NEGATIVE MG/DL
LEUKOCYTE ESTERASE UR QL STRIP: NEGATIVE
LIPASE SERPL-CCNC: 216 U/L (ref 73–393)
LYMPHOCYTES # BLD AUTO: 2.3 10E3/UL (ref 0.8–5.3)
LYMPHOCYTES NFR BLD AUTO: 43 %
MCH RBC QN AUTO: 25.7 PG (ref 26.5–33)
MCHC RBC AUTO-ENTMCNC: 30.6 G/DL (ref 31.5–36.5)
MCV RBC AUTO: 84 FL (ref 78–100)
MONOCYTES # BLD AUTO: 0.3 10E3/UL (ref 0–1.3)
MONOCYTES NFR BLD AUTO: 6 %
MUCOUS THREADS #/AREA URNS LPF: PRESENT /LPF
NEUTROPHILS # BLD AUTO: 2.7 10E3/UL (ref 1.6–8.3)
NEUTROPHILS NFR BLD AUTO: 50 %
NITRATE UR QL: NEGATIVE
NRBC # BLD AUTO: 0 10E3/UL
NRBC BLD AUTO-RTO: 0 /100
PH UR STRIP: 6.5 [PH] (ref 5–7)
PLATELET # BLD AUTO: 280 10E3/UL (ref 150–450)
POTASSIUM BLD-SCNC: 3.8 MMOL/L (ref 3.4–5.3)
PROT SERPL-MCNC: 8.6 G/DL (ref 6.8–8.8)
RBC # BLD AUTO: 4.74 10E6/UL (ref 3.8–5.2)
RBC URINE: 1 /HPF
SODIUM SERPL-SCNC: 140 MMOL/L (ref 133–144)
SP GR UR STRIP: 1.01 (ref 1–1.03)
SPERM #/AREA URNS HPF: PRESENT /HPF
SQUAMOUS EPITHELIAL: 1 /HPF
UROBILINOGEN UR STRIP-MCNC: NORMAL MG/DL
WBC # BLD AUTO: 5.4 10E3/UL (ref 4–11)
WBC URINE: 1 /HPF

## 2022-03-09 PROCEDURE — 80053 COMPREHEN METABOLIC PANEL: CPT | Performed by: EMERGENCY MEDICINE

## 2022-03-09 PROCEDURE — 84703 CHORIONIC GONADOTROPIN ASSAY: CPT | Performed by: EMERGENCY MEDICINE

## 2022-03-09 PROCEDURE — 81001 URINALYSIS AUTO W/SCOPE: CPT | Performed by: EMERGENCY MEDICINE

## 2022-03-09 PROCEDURE — 83690 ASSAY OF LIPASE: CPT | Performed by: EMERGENCY MEDICINE

## 2022-03-09 PROCEDURE — 99283 EMERGENCY DEPT VISIT LOW MDM: CPT

## 2022-03-09 PROCEDURE — 85025 COMPLETE CBC W/AUTO DIFF WBC: CPT | Performed by: EMERGENCY MEDICINE

## 2022-03-09 PROCEDURE — 36415 COLL VENOUS BLD VENIPUNCTURE: CPT | Performed by: EMERGENCY MEDICINE

## 2022-03-09 ASSESSMENT — ENCOUNTER SYMPTOMS
DYSURIA: 0
VOMITING: 0
NAUSEA: 0
BACK PAIN: 1
CONSTIPATION: 0
HEMATURIA: 0
HEADACHES: 1
ABDOMINAL PAIN: 1
FEVER: 0

## 2022-03-09 NOTE — ED TRIAGE NOTES
Pt reports having pain at c section incision and low back pain which developed after going back to work. Denies n/v/d

## 2022-03-09 NOTE — ED PROVIDER NOTES
"  History   Chief Complaint:  Abdominal Pain       The history is provided by the patient.      Hilaria Jansen is a 31 year old female s/p  section on 12/15/21 at [facility] who presents with constant diffuse abdominal and low back pain accompanied by a headache which have lasted for a week since she has returned back to work. 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 She has taken Tylenol for the pain. No history of abdominal surgeries besides her  section. No dysuria or hematuria. No constipation. Denies nausea, vomiting and fever.  No radiation of the pain into her legs and no numbness, tingling, or weakness.    Review of Systems   Constitutional: Negative for fever.   Gastrointestinal: Positive for abdominal pain. Negative for constipation, nausea and vomiting.   Genitourinary: Negative for dysuria and hematuria.   Musculoskeletal: Positive for back pain.   Neurological: Positive for headaches.   All other systems reviewed and are negative.      Allergies:  Metronidazole    Medications:  Micronor  Roxicodone    Past Medical History:     Anemia  Hidradenitis suppurativa  Sickle cell trait  GERD  Sinusitis  Allergic conjunctivitis  Muscle tension dysphonia  Neurological disorder    Insomnia  Thyroid disease    Past Surgical History:     section x2  Paratracheal neck mass removed     Social History:  The patient presents to the ED with her child.    Physical Exam     Patient Vitals for the past 24 hrs:   BP Temp Temp src Pulse Resp SpO2 Height Weight   22 0842 (!) 149/81 98.1  F (36.7  C) Oral 67 18 100 % 1.549 m (5' 1\") 76.2 kg (168 lb)       Physical Exam  Nursing note and vitals reviewed.  Constitutional:  Oriented to person, place, and time. Cooperative.   HENT:   Nose:    Nose normal.   Mouth/Throat:   Face mask in place.  Eyes:    Conjunctivae normal and EOM are normal.      Pupils are equal, round, and reactive to light.   Neck:    Trachea normal. "   Cardiovascular:  Normal rate, regular rhythm, normal heart sounds and normal pulses. No murmur heard.  Pulmonary/Chest:  Effort normal and breath sounds normal.   Abdominal:   Soft. Normal appearance and bowel sounds are normal.      There is some mild diffuse tenderness to palpation with maximal tenderness along the previous  incision which is normal in appearance. There is no rebound and no CVA tenderness.   Musculoskeletal:  Some tenderness to palpation across the low back region.  Extremities atraumatic x 4.   Lymphadenopathy:  No cervical adenopathy.   Neurological:   Alert and oriented to person, place, and time. Normal strength.      No cranial nerve deficit or sensory deficit. GCS eye subscore is 4. GCS verbal subscore is 5. GCS motor subscore is 6.   Skin:    Skin is intact. No rash noted.   Psychiatric:   Normal mood and affect.      Emergency Department Course     Laboratory:  Labs Ordered and Resulted from Time of ED Arrival to Time of ED Departure   ROUTINE UA WITH MICROSCOPIC REFLEX TO CULTURE - Abnormal       Result Value    Color Urine Light Yellow      Appearance Urine Clear      Glucose Urine Negative      Bilirubin Urine Negative      Ketones Urine Negative      Specific Gravity Urine 1.014      Blood Urine Trace (*)     pH Urine 6.5      Protein Albumin Urine Negative      Urobilinogen Urine Normal      Nitrite Urine Negative      Leukocyte Esterase Urine Negative      Mucus Urine Present (*)     Sperm Urine Present (*)     RBC Urine 1      WBC Urine 1      Squamous Epithelials Urine 1     CBC WITH PLATELETS AND DIFFERENTIAL - Abnormal    WBC Count 5.4      RBC Count 4.74      Hemoglobin 12.2      Hematocrit 39.9      MCV 84      MCH 25.7 (*)     MCHC 30.6 (*)     RDW 15.9 (*)     Platelet Count 280      % Neutrophils 50      % Lymphocytes 43      % Monocytes 6      % Eosinophils 1      % Basophils 0      % Immature Granulocytes 0      NRBCs per 100 WBC 0      Absolute Neutrophils 2.7       Absolute Lymphocytes 2.3      Absolute Monocytes 0.3      Absolute Eosinophils 0.0      Absolute Basophils 0.0      Absolute Immature Granulocytes 0.0      Absolute NRBCs 0.0     COMPREHENSIVE METABOLIC PANEL - Normal    Sodium 140      Potassium 3.8      Chloride 107      Carbon Dioxide (CO2) 29      Anion Gap 4      Urea Nitrogen 7      Creatinine 0.83      Calcium 9.5      Glucose 97      Alkaline Phosphatase 68      AST 17      ALT 24      Protein Total 8.6      Albumin 4.1      Bilirubin Total 0.5      GFR Estimate >90     LIPASE - Normal    Lipase 216     HCG QUALITATIVE PREGNANCY - Normal    hCG Serum Qualitative Negative          Emergency Department Course:      Reviewed:  I reviewed nursing notes, vitals, past medical history and Care Everywhere    Assessments:  1011 I obtained history and examined the patient as noted above.   1111 I rechecked the patient and explained findings.     Disposition:  The patient was discharged to home.     Impression & Plan     CMS Diagnoses: None      Medical Decision Making:  This is a 31-year-old female came in for further evaluation of abdominal pain and low back pain after returning to work recently where she has to do some lifting.  I felt it was reasonable to check the above blood work and urine.  Her work-up here is unremarkable, and therefore I do not feel that she requires any imaging.  I suspect all of her symptoms are related to returning to work and lifting at work.  I recommended she follow-up with her physician, and I agreed to provide her a note for work.    Diagnosis:    ICD-10-CM    1. Abdominal pain, generalized  R10.84    2. Acute bilateral low back pain without sciatica  M54.50        Scribe Disclosure:  I, Liz Pendleton, am serving as a scribe at 9:57 AM on 3/9/2022 to document services personally performed by Guevara Lazaro MD based on my observations and the provider's statements to me.            Guevara Lazaro MD  03/09/22 1848

## 2022-03-09 NOTE — LETTER
March 9, 2022      To Whom It May Concern:      Hilaria Jansen was seen in our Emergency Department today, 03/09/22.  I expect her condition to improve over the next 2-3 days.  She may return to work when improved.  Hilaria Jansen should refrain from heavy lifting until cleared by primary MD.  Sincerely,        Parveen Salazar RN

## 2022-03-11 ENCOUNTER — OFFICE VISIT (OUTPATIENT)
Dept: OBGYN | Facility: CLINIC | Age: 32
End: 2022-03-11
Payer: COMMERCIAL

## 2022-03-11 VITALS
WEIGHT: 169.7 LBS | DIASTOLIC BLOOD PRESSURE: 88 MMHG | HEIGHT: 61 IN | BODY MASS INDEX: 32.04 KG/M2 | HEART RATE: 90 BPM | SYSTOLIC BLOOD PRESSURE: 132 MMHG

## 2022-03-11 DIAGNOSIS — M54.41 ACUTE MIDLINE LOW BACK PAIN WITH RIGHT-SIDED SCIATICA: Primary | ICD-10-CM

## 2022-03-11 PROCEDURE — 99213 OFFICE O/P EST LOW 20 MIN: CPT | Performed by: OBSTETRICS & GYNECOLOGY

## 2022-03-11 RX ORDER — ACETAMINOPHEN 500 MG
1000 TABLET ORAL EVERY 6 HOURS PRN
Qty: 60 TABLET | Refills: 0 | Status: SHIPPED | OUTPATIENT
Start: 2022-03-11 | End: 2022-04-08

## 2022-03-11 RX ORDER — CYCLOBENZAPRINE HCL 5 MG
5 TABLET ORAL 3 TIMES DAILY PRN
Qty: 12 TABLET | Refills: 0 | Status: SHIPPED | OUTPATIENT
Start: 2022-03-11 | End: 2022-05-12

## 2022-03-11 RX ORDER — IBUPROFEN 600 MG/1
600 TABLET, FILM COATED ORAL EVERY 6 HOURS PRN
Qty: 60 TABLET | Refills: 0 | Status: SHIPPED | OUTPATIENT
Start: 2022-03-11 | End: 2022-04-08

## 2022-03-11 RX ORDER — LIDOCAINE 50 MG/G
1 PATCH TOPICAL EVERY 24 HOURS
Qty: 7 PATCH | Refills: 0 | Status: SHIPPED | OUTPATIENT
Start: 2022-03-11 | End: 2022-03-18

## 2022-03-11 NOTE — PROGRESS NOTES
"GYN Progress Note     CC: lower back and abdominal pain     HPI: Hilaria Jansen is a 32 YO  who is three months s/p repeat CS presenting for follow up due to acute lower back pain and abdominal pain. 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 She reports that the pain started acutely while at work and is a sharp pain in her lower back that radiates down her right leg and over to her right flank/lower abdomen. She denies any fevers, nausea, vomiting, changes in bowel habits. No issues with incontinence or urinary retention. She was seen in the ED on 3/9 for evaluation and her exam and some basic blood work and UA were all within normal limits. She has been taking tylenol without much relief.     O: Vital signs:      BP: 132/88 Pulse: 90           Height: 154.9 cm (5' 1\") Weight: 77 kg (169 lb 11.2 oz)  Estimated body mass index is 32.06 kg/m  as calculated from the following:    Height as of this encounter: 1.549 m (5' 1\").    Weight as of this encounter: 77 kg (169 lb 11.2 oz).      General: Patient alert and oriented, no acute distress  CV: no peripheral edema or cyanosis  Resp: normal respiratory effort and equal lung expansion  Abdomen:  incision intact, tender to palpation over the right aspect of the incision and diffuse tenderness over upper and lower abdomen on the right and left side. No guarding or rebound.   Ext: non-tender, no edema    Assessment and plan:   Hilaria Jansen is a 32 YO  who presents with lower back pain that radiates to her right side and  incision   -We discussed that her pain is most consistent with a musculoskeletal etiology. I recommend that she keep her upcoming appointment with PT and a letter with work restrictions was provided so that she can avoid frequent lifting/bending at her job.   -I recommended that she continue to take Ibuprofen, tylenol as needed and " can try heating pads/ice packs. Rx also sent for Flexeril and for topical lidocaine patches. She was also encouraged to try a back brace to see if that helps.   -Based on clinical history her back, abdominal and incisional pain all seem related but we discussed that if her incision pain does not resolve she should follow up in our clinic but that her PCP would probably be better able to manage her back pain if not improving with the above measures.     Dispo: RTC as needed   Mansi Andres MD

## 2022-03-14 ENCOUNTER — THERAPY VISIT (OUTPATIENT)
Dept: PHYSICAL THERAPY | Facility: CLINIC | Age: 32
End: 2022-03-14
Attending: OBSTETRICS & GYNECOLOGY
Payer: COMMERCIAL

## 2022-03-14 DIAGNOSIS — M54.6 BILATERAL THORACIC BACK PAIN, UNSPECIFIED CHRONICITY: ICD-10-CM

## 2022-03-14 DIAGNOSIS — M54.41 BILATERAL LOW BACK PAIN WITH RIGHT-SIDED SCIATICA: ICD-10-CM

## 2022-03-14 DIAGNOSIS — M54.42 BILATERAL LOW BACK PAIN WITH LEFT-SIDED SCIATICA: ICD-10-CM

## 2022-03-14 PROCEDURE — 97110 THERAPEUTIC EXERCISES: CPT | Mod: GP | Performed by: PHYSICAL THERAPIST

## 2022-03-14 PROCEDURE — 97161 PT EVAL LOW COMPLEX 20 MIN: CPT | Mod: GP | Performed by: PHYSICAL THERAPIST

## 2022-03-14 NOTE — PROGRESS NOTES
[facility]    OUTPATIENT Physical Therapy ORTHOPEDIC EVALUATION  PLAN OF TREATMENT FOR OUTPATIENT REHABILITATION  (COMPLETE FOR INITIAL CLAIMS ONLY)  Patient's Last Name, First Name, M.I.  YOB: 1990  Hilaria Jansenjenniferjudith    Provider s Name:  [facility]   Medical Record No.  [id number]   Start of Care Date:  03/14/22   Onset Date:   03/02/22 (MD order date )   Type:     _X__PT   ___OT Medical Diagnosis:    Encounter Diagnoses   Name Primary?     Bilateral thoracic back pain, unspecified chronicity      Bilateral low back pain with right-sided sciatica      Bilateral low back pain with left-sided sciatica         Treatment Diagnosis:  LBP and bilateral LE pain         Goals:     03/14/22 0500   Body Part   Goals listed below are for Lumbar   Goal #1   Goal #1 self cares/transfers/bed mobility   Previous Functional Level No restrictions   Performance Level pain/dififculty up to 10/10 with sit-stand transfer   Performance Level pain and difficulty 5/10 or less with sit-stand transfer    Rationale for independent self care such as dressing, personal hygiene, bathing;for independent community transportation;for independent living   Due Date 04/04/22   Performance level pain 2/10 or less, no difficulty with sit-stand transfer    Rationale independence in self cares;for independent community transportation;for independent living   Due Date 05/09/22   Goal #2   Goal #2 posture/body mechanics   Current Functional Level Poor posture/body mechanics   STG Target Performance Patient able to demonstrate good posture and body mechanics when prompted   Rationale to prevent neck/back pain and avoid injury when lifting/carrying and performing tasks requiring bending   Due Date 04/04/22   LTG Target Performance Patient able to demonstrate good posture and body mechanics without  prompting   Rationale to prevent neck/back pain and avoid injury when lifting/carrying and performing tasks requiring bending   Due Date 04/25/22       Therapy Frequency:  1x/week   Predicted Duration of Therapy Intervention:  8 weeks    Sarah Morales, PT                 I CERTIFY THE NEED FOR THESE SERVICES FURNISHED UNDER        THIS PLAN OF TREATMENT AND WHILE UNDER MY CARE     (Physician attestation of this document indicates review and certification of the therapy plan).                       Certification Date From:  03/14/22   Certification Date To:  06/11/22    Referring Provider:  Brooke Capps    Initial Assessment        See Epic Evaluation SOC Date: 03/14/22

## 2022-03-14 NOTE — PROGRESS NOTES
"Physical Therapy Initial Evaluation  Subjective:  The history is provided by the patient. A  was used.   Therapist Generated HPI Evaluation  Problem details: MD orders for PT 3-2-22 for bilateral thoracic pain.  Patient then went to ER 3-9-22 for continued pain, described as LB as well as abdominal and right LE.  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  She continues to work, but now has work restrictions of no lifting >15# which will start with her shift today.  She has a history of bilateral LE and LBP with first pregnancy (3+ years ago), had PT without help and then went away on its own.  Pain returned LB and left LE during the second pregnancy, returned to work 22 and now pain has progressed to the right LE.  Currently pain is constant bilateral low thoracic and lumbar right>left, intermittent pain left LE posterior to the calf, intermittent right LE to the knee.  Pain is \"sharp\", ranges 5-10/10.  Since starting back to work she also has had pain right lower abdomen in area of  incision.  Symptoms increase with sit-stand and difficult to do, standing >1 hour, bending forward, (sneeze for  area pain, not back), losing >1 hour sleep due to pain, walking immediately, lifting.  Symptoms decrease with nothing. .                     Pain is the same all the time.  Since onset symptoms are unchanged.       Past treatment: not this episode.   Restrictions due to condition include:  Working in normal job with restrictions.      Patient Health History           General health as reported by patient is good.  Pertinent medical history includes: none.   Red flags:  None as reported by patient.  Medical allergies: none.   Surgeries include:  Other. Other surgery history details: 2 c-sections, removal mass neck (non cancerous).    Current medications: Tylenol     Current " occupation is /:  standing, lifting >50# (starting restrictions) .                                       Objective:  Standing Alignment:        Lumbar:  Lordosis decr (poor sitting posture; stand and walk with slight trunk-flexed posture)            Gait:  Slow angie, slight trunk-flexed posture  Assistive Devices:  None                Lumbar AROM:   Flexion:  Fingers to knees, pain right LB  Extension:  Deviation to the left, pain right LB  Right SG:  Mod limited, pain right LB  Left SG:  Min limited    Reflexes:    Bilateral quad 0, left achilles 0, right achilles 2    Slump test:  Negative bilaterally    Light touch sensation:  Equal/symetrical bilateral LEs    Myotomes:  Not tested    Palpation/spring testing:  Pain with light touch right and left lumbar and gentle spring testing spine     Symptoms prior to test movements:  LBP  Correction of sitting posture with lumbar roll:  Produce right LE pain, worse; trial with smaller roll LBP  Prone:  Increase LBP, no LE sxs  Prone over 1 pillow:  No effect  REIL:  Increase LBP, no worse  Prone over 2 pillows:  No effect  REIL over 2 pillows: increase LBP, no worse  Prone over 3 pillows:  No effect      Assessment/Plan:    Patient is a 31 year old female with lumbar and bilateral LE complaints.    Patient has the following significant findings with corresponding treatment plan.                Diagnosis 1:  LBP and bilateral LE pain     Pain -  hot/cold therapy, self management, education and home program  Decreased ROM/flexibility - therapeutic exercise and home program  Decreased strength - therapeutic exercise, therapeutic activities and home program  Inflammation - cold therapy and self management/home program  Impaired gait - home program  Decreased function - therapeutic activities and home program  Impaired posture - neuro re-education and home program    Cumulative Therapy Evaluation is: Low complexity.    Previous and current  functional limitations:  (See Goal Flow Sheet for this information)    Short term and Long term goals: (See Goal Flow Sheet for this information)     Communication ability:  Patient appears to be able to clearly communicate and understand verbal and written communication and follow directions correctly.  Treatment Explanation - The following has been discussed with the patient:   RX ordered/plan of care  Anticipated outcomes  Possible risks and side effects  This patient would benefit from PT intervention to resume normal activities.   Rehab potential is good.    Frequency:  1 X week, once daily  Duration:  for 8 weeks  Discharge Plan:  Achieve all LTG.  Independent in home treatment program.  Reach maximal therapeutic benefit.    Please refer to the daily flowsheet for treatment today, total treatment time and time spent performing 1:1 timed codes.

## 2022-03-16 ENCOUNTER — MYC MEDICAL ADVICE (OUTPATIENT)
Dept: OBGYN | Facility: CLINIC | Age: 32
End: 2022-03-16
Payer: COMMERCIAL

## 2022-03-16 NOTE — LETTER
March 22, 2022      Hilaria Jansen  [address]  [place] [place] [facility] [address]        Hilaria Brownejenniferjudith Jorgerosa  [address]  [facility] [address]         To Whom it May Concern:     Hilaria Jansen was seen in our office on 3/11/202. She may continue to work with the following restrictions: no lifting more than 10 lbs for the next 6 weeks (until 4/22/22). She needs to be allowed to sit at work.      Sincerely,     Mansi Andres MD

## 2022-03-16 NOTE — LETTER
March 11, 2022        Hilaria Jansen  [address]  [place] [address]    To Whom it May Concern:    Hilaria Jansen was seen in our office on 3/11/202. She may continue to work with the following restrictions: no lifting more than 10 lbs for the next 6 weeks (until 4/22/22)    Sincerely,    Mnasi Andres MD

## 2022-03-17 NOTE — TELEPHONE ENCOUNTER
Will route to the provider to review and advise in regards to work note and lidocaine patch rx.   Kaitlynn OWEN RN

## 2022-03-17 NOTE — TELEPHONE ENCOUNTER
Mansi Andres MD  You 3 minutes ago (9:06 AM)     ANNEL    Juan Calderón,     You can update the letter that I provided at her last visit to say 10 lbs instead of 15 lbs for 6 weeks from the date of her last office visit. Unfortunately if her insurance won't cover the lidocaine patches she may have to purchase them out of pocket since they are available over the counter. She can find them at any drug store or online and she should look for the lidocaine 4% patch (like Salonpas or the generic) and they typically cost $1-2 per patch. If that isn't doable, she can continue to alternate using ice and heating pads on the area.     Thank you,   Mansi Andres     Routing comment

## 2022-03-21 ENCOUNTER — THERAPY VISIT (OUTPATIENT)
Dept: PHYSICAL THERAPY | Facility: CLINIC | Age: 32
End: 2022-03-21
Payer: COMMERCIAL

## 2022-03-21 DIAGNOSIS — M54.42 BILATERAL LOW BACK PAIN WITH LEFT-SIDED SCIATICA: ICD-10-CM

## 2022-03-21 DIAGNOSIS — M54.41 BILATERAL LOW BACK PAIN WITH RIGHT-SIDED SCIATICA: ICD-10-CM

## 2022-03-21 PROCEDURE — 97530 THERAPEUTIC ACTIVITIES: CPT | Mod: GP | Performed by: PHYSICAL THERAPIST

## 2022-03-21 PROCEDURE — 97110 THERAPEUTIC EXERCISES: CPT | Mod: GP | Performed by: PHYSICAL THERAPIST

## 2022-03-28 ENCOUNTER — THERAPY VISIT (OUTPATIENT)
Dept: PHYSICAL THERAPY | Facility: CLINIC | Age: 32
End: 2022-03-28
Payer: COMMERCIAL

## 2022-03-28 DIAGNOSIS — M54.42 BILATERAL LOW BACK PAIN WITH LEFT-SIDED SCIATICA: ICD-10-CM

## 2022-03-28 DIAGNOSIS — M54.41 BILATERAL LOW BACK PAIN WITH RIGHT-SIDED SCIATICA: ICD-10-CM

## 2022-03-28 PROCEDURE — 97110 THERAPEUTIC EXERCISES: CPT | Mod: GP | Performed by: PHYSICAL THERAPIST

## 2022-03-28 PROCEDURE — 97530 THERAPEUTIC ACTIVITIES: CPT | Mod: GP | Performed by: PHYSICAL THERAPIST

## 2022-03-30 ENCOUNTER — TELEPHONE (OUTPATIENT)
Dept: OBGYN | Facility: CLINIC | Age: 32
End: 2022-03-30
Payer: COMMERCIAL

## 2022-04-06 ENCOUNTER — THERAPY VISIT (OUTPATIENT)
Dept: PHYSICAL THERAPY | Facility: CLINIC | Age: 32
End: 2022-04-06
Payer: COMMERCIAL

## 2022-04-06 DIAGNOSIS — M54.41 BILATERAL LOW BACK PAIN WITH RIGHT-SIDED SCIATICA: ICD-10-CM

## 2022-04-06 DIAGNOSIS — M54.42 BILATERAL LOW BACK PAIN WITH LEFT-SIDED SCIATICA: ICD-10-CM

## 2022-04-06 PROCEDURE — 97110 THERAPEUTIC EXERCISES: CPT | Mod: GP | Performed by: PHYSICAL THERAPIST

## 2022-04-06 PROCEDURE — 97140 MANUAL THERAPY 1/> REGIONS: CPT | Mod: GP | Performed by: PHYSICAL THERAPIST

## 2022-04-08 ENCOUNTER — OFFICE VISIT (OUTPATIENT)
Dept: OBGYN | Facility: CLINIC | Age: 32
End: 2022-04-08
Payer: COMMERCIAL

## 2022-04-08 VITALS
HEART RATE: 87 BPM | SYSTOLIC BLOOD PRESSURE: 128 MMHG | WEIGHT: 168 LBS | DIASTOLIC BLOOD PRESSURE: 85 MMHG | BODY MASS INDEX: 31.74 KG/M2 | OXYGEN SATURATION: 99 %

## 2022-04-08 DIAGNOSIS — G89.29 CHRONIC LEFT-SIDED LOW BACK PAIN WITHOUT SCIATICA: Primary | ICD-10-CM

## 2022-04-08 DIAGNOSIS — M54.50 CHRONIC LEFT-SIDED LOW BACK PAIN WITHOUT SCIATICA: Primary | ICD-10-CM

## 2022-04-08 DIAGNOSIS — Z12.4 PAP SMEAR FOR CERVICAL CANCER SCREENING: ICD-10-CM

## 2022-04-08 PROCEDURE — 87624 HPV HI-RISK TYP POOLED RSLT: CPT | Performed by: OBSTETRICS & GYNECOLOGY

## 2022-04-08 PROCEDURE — 99213 OFFICE O/P EST LOW 20 MIN: CPT | Performed by: OBSTETRICS & GYNECOLOGY

## 2022-04-08 PROCEDURE — G0145 SCR C/V CYTO,THINLAYER,RESCR: HCPCS | Performed by: OBSTETRICS & GYNECOLOGY

## 2022-04-09 NOTE — PROGRESS NOTES
CC:  Pain at incision  HPI:  Hilaria Jansen is a 31 year old female   Patient's last menstrual period was 03/10/2022.  Had c/s delivery 12/15/21 and states having some sensitivity and swelling at her incision site still.     Has been going to PT and also states has low back pain on left side. States has had back pain prior to surgery but feels like she went back to work too soon and hurt the incision and herself. Wants to get imaging and paperwork done.    Allergies: Patient has no known allergies.    EXAM:  Blood pressure 128/85, pulse 87, weight 76.2 kg (168 lb), last menstrual period 03/10/2022, SpO2 99 %, currently breastfeeding.   BMI= Body mass index is 31.74 kg/m .  General - pleasant female in no acute distress.  Abdomen - soft, nontender, nondistended, incision with skin sensitivity but no hernia, well healed.  Pelvic - EG: adult female, BUS: within normal limits, Vagina: well rugated, no discharge, Cervix: no lesions or CMT  Rectovaginal - deferred.  Musculoskeletal - no gross deformities.  Neurological - normal strength, sensation, and mental status.    ASSESSMENT/PLAN:  (M54.50,  G89.29) Chronic left-sided low back pain without sciatica  (primary encounter diagnosis)  Comment: seeing PT  Plan: MR Lumbar Spine w/o Contrast        Will order MRI since has not been done yet. She has work forms and discussed that since not from the surgery, she would need a primary care doc to follow and fill them out. Discussed a few she has seen in the past.     Offered pain clinic for her c/s scar and possible injection, she declines.    (Z12.4) Pap smear for cervical cancer screening  Plan: Pap screen with HPV - recommended age 30 - 65         years        Discussed sending pap smear for HPV typing as well and that if both pap and HPV are negative, then can have q5 year pap smears.      JAIMIE CHANG MD

## 2022-04-12 LAB
BKR LAB AP GYN ADEQUACY: NORMAL
BKR LAB AP GYN INTERPRETATION: NORMAL
BKR LAB AP HPV REFLEX: NORMAL
BKR LAB AP LMP: NORMAL
BKR LAB AP PREVIOUS ABNORMAL: NORMAL
PATH REPORT.COMMENTS IMP SPEC: NORMAL
PATH REPORT.COMMENTS IMP SPEC: NORMAL
PATH REPORT.RELEVANT HX SPEC: NORMAL

## 2022-04-14 LAB
HUMAN PAPILLOMA VIRUS 16 DNA: NEGATIVE
HUMAN PAPILLOMA VIRUS 18 DNA: NEGATIVE
HUMAN PAPILLOMA VIRUS FINAL DIAGNOSIS: NORMAL
HUMAN PAPILLOMA VIRUS OTHER HR: NEGATIVE

## 2022-04-16 ENCOUNTER — ANCILLARY PROCEDURE (OUTPATIENT)
Dept: MRI IMAGING | Facility: CLINIC | Age: 32
End: 2022-04-16
Attending: OBSTETRICS & GYNECOLOGY
Payer: COMMERCIAL

## 2022-04-16 DIAGNOSIS — G89.29 CHRONIC LEFT-SIDED LOW BACK PAIN WITHOUT SCIATICA: ICD-10-CM

## 2022-04-16 DIAGNOSIS — M54.50 CHRONIC LEFT-SIDED LOW BACK PAIN WITHOUT SCIATICA: ICD-10-CM

## 2022-04-16 PROCEDURE — 72148 MRI LUMBAR SPINE W/O DYE: CPT | Mod: GC | Performed by: RADIOLOGY

## 2022-04-18 NOTE — PROGRESS NOTES
"  Assessment & Plan   Problem List Items Addressed This Visit     Sickle cell trait (H)    Bilateral low back pain with right-sided sciatica - Primary    Relevant Medications    Lidocaine (LIDOCARE) 4 % Patch    Other Relevant Orders    Spine Referral       Letter written for restrictions at work for the next month until she sees spine medicine; encouraged her to keep up with physical therapy; continue ibuprofen and lidocaine use.       20 minutes spent on the date of the encounter doing chart review, history and exam, documentation and further activities per the note       BMI:   Estimated body mass index is 31.93 kg/m  as calculated from the following:    Height as of 3/11/22: 1.549 m (5' 1\").    Weight as of this encounter: 76.7 kg (169 lb).       See Patient Instructions    No follow-ups on file.    LE[facility] Melgar CNP [facility] is a 31 year old who presents for the following health issues     History of Present Illness       Back Pain:  She presents for follow up of back pain. Patient's back pain is a chronic problem.  Location of back pain:  Right lower back and left lower back  Description of back pain: cramping, dull ache and sharp  Back pain spreads: right shoulder and right side of neck    Since patient first noticed back pain, pain is: gradually worsening  Does back pain interfere with her job:  Yes      Reason for visit:  Lower back pain from injury at work  Symptom onset:  More than a month  Symptoms include:  Chronic lower back pain  Symptom intensity:  Moderate  Symptom progression:  Worsening  Had these symptoms before:  Yes  Has tried/received treatment for these symptoms:  Yes  Previous treatment was successful:  No  What makes it worse:  Lifting,pulling,standing  What makes it better:  Pain reliever, sitting  ,lying down    She eats 2-3 servings of fruits and vegetables daily.She consumes 0 sweetened beverage(s) daily.She exercises with enough " effort to increase her heart rate 10 to 19 minutes per day.  She exercises with enough effort to increase her heart rate 5 days per week.   She is taking medications regularly.           Review of Systems   Constitutional, HEENT, cardiovascular, pulmonary, gi and gu systems are negative, except as otherwise noted.      Objective    /80   Pulse 92   Temp 98  F (36.7  C) (Temporal)   Wt 76.7 kg (169 lb)   LMP 04/10/2022   SpO2 100%   BMI 31.93 kg/m    Body mass index is 31.93 kg/m .  Physical Exam   GENERAL: healthy, alert and no distress  NECK: no adenopathy, no asymmetry, masses, or scars and thyroid normal to palpation  RESP: lungs clear to auscultation - no rales, rhonchi or wheezes  CV: regular rate and rhythm, normal S1 S2, no S3 or S4, no murmur, click or rub, no peripheral edema and peripheral pulses strong  ABDOMEN: soft, nontender, no hepatosplenomegaly, no masses and bowel sounds normal  Comprehensive back pain exam:  Tenderness of right SI joint, Pain limits the following motions: forward flexion and Lower extremity strength functional and equal on both sides    Office Visit on 04/08/2022   Component Date Value Ref Range Status     Interpretation 04/08/2022 Negative for Intraepithelial Lesion or Malignancy (NILM)    Final     Comment 04/08/2022    Final                    Value:This result contains rich text formatting which cannot be displayed here.     Specimen Adequacy 04/08/2022 Satisfactory for evaluation, endocervical/transformation zone component absent   Final     Clinical Information 04/08/2022    Final                    Value:This result contains rich text formatting which cannot be displayed here.     LMP/Menopause Date 04/08/2022    Final                    Value:This result contains rich text formatting which cannot be displayed here.     Reflex Testing 04/08/2022 Yes regardless of result   Final     Previous Abnormal? 04/08/2022    Final                    Value:This result  contains rich text formatting which cannot be displayed here.     Performing Labs 04/08/2022    Final                    Value:This result contains rich text formatting which cannot be displayed here.     Other HR HPV 04/08/2022 Negative  Negative Final     HPV16 DNA 04/08/2022 Negative  Negative Final     HPV18 DNA 04/08/2022 Negative  Negative Final     FINAL DIAGNOSIS 04/08/2022    Final                    Value:This result contains rich text formatting which cannot be displayed here.

## 2022-04-19 ENCOUNTER — OFFICE VISIT (OUTPATIENT)
Dept: FAMILY MEDICINE | Facility: CLINIC | Age: 32
End: 2022-04-19
Payer: COMMERCIAL

## 2022-04-19 VITALS
HEART RATE: 92 BPM | WEIGHT: 169 LBS | SYSTOLIC BLOOD PRESSURE: 118 MMHG | TEMPERATURE: 98 F | DIASTOLIC BLOOD PRESSURE: 80 MMHG | BODY MASS INDEX: 31.93 KG/M2 | OXYGEN SATURATION: 100 %

## 2022-04-19 DIAGNOSIS — M54.41 CHRONIC BILATERAL LOW BACK PAIN WITH RIGHT-SIDED SCIATICA: Primary | ICD-10-CM

## 2022-04-19 DIAGNOSIS — D57.3 SICKLE CELL TRAIT (H): ICD-10-CM

## 2022-04-19 DIAGNOSIS — G89.29 CHRONIC BILATERAL LOW BACK PAIN WITH RIGHT-SIDED SCIATICA: Primary | ICD-10-CM

## 2022-04-19 PROCEDURE — 99213 OFFICE O/P EST LOW 20 MIN: CPT | Performed by: NURSE PRACTITIONER

## 2022-04-19 RX ORDER — LIDOCAINE 4 G/G
1 PATCH TOPICAL EVERY 24 HOURS
Qty: 10 PATCH | Refills: 3 | Status: SHIPPED | OUTPATIENT
Start: 2022-04-19 | End: 2022-05-17

## 2022-04-19 NOTE — LETTER
April 19, 2022      Hilaria Jansen  [address]  [place] [address]        To Whom It May Concern:    Hilaria Jansen  was seen on 4/19/2022.  She returned to work on 2/22/22. She has continued to have severe low back pain. I recommend that for the next month, until 5/24/2022, she be limited to lifting 5 pounds throughout the day, and alternate between sitting and standing every 30 minutes throughout her 8 hour shift. She has been referred to a specialist as well.         Sincerely,        LEONA Melgar CNP

## 2022-04-19 NOTE — LETTER
April 19, 2022      Hilaria Jansen  [address]  [place] [address]        To Whom It May Concern:    Hilaria Jansen  was seen on 4/19/2022. I recommend that due to her current injury she be limited to lifting less than 5 pounds up to 8 hours per day; and alternate her position during the day between sitting and standing every 30 minutes. Please see attached document.       Sincerely,        LEONA Melgar CNP

## 2022-05-02 ENCOUNTER — OFFICE VISIT (OUTPATIENT)
Dept: NEUROSURGERY | Facility: CLINIC | Age: 32
End: 2022-05-02
Attending: NURSE PRACTITIONER
Payer: COMMERCIAL

## 2022-05-02 VITALS
SYSTOLIC BLOOD PRESSURE: 119 MMHG | HEIGHT: 61 IN | OXYGEN SATURATION: 100 % | DIASTOLIC BLOOD PRESSURE: 79 MMHG | HEART RATE: 91 BPM | WEIGHT: 169 LBS | BODY MASS INDEX: 31.91 KG/M2 | RESPIRATION RATE: 16 BRPM

## 2022-05-02 DIAGNOSIS — G89.29 CHRONIC BILATERAL LOW BACK PAIN WITH RIGHT-SIDED SCIATICA: ICD-10-CM

## 2022-05-02 DIAGNOSIS — M54.41 CHRONIC BILATERAL LOW BACK PAIN WITH RIGHT-SIDED SCIATICA: ICD-10-CM

## 2022-05-02 PROCEDURE — 99203 OFFICE O/P NEW LOW 30 MIN: CPT | Performed by: NURSE PRACTITIONER

## 2022-05-02 ASSESSMENT — PAIN SCALES - GENERAL: PAINLEVEL: EXTREME PAIN (8)

## 2022-05-02 NOTE — PATIENT INSTRUCTIONS
-Continue physical therapy.  -Consider SI joint injection. Please contact our office if you would like to have the injection.  -Please contact our clinic with questions or concerns at [phone].

## 2022-05-02 NOTE — LETTER
2022         RE: Hilaria Jansen  [address]  [address]  [place] [address]        Dear Colleague,    Thank you for referring your patient, Hilaria Jansen, to the [facility]. Please see a copy of my visit note below.    [facility] Neurosurgery  Neurosurgery Clinic Visit      CC: back and leg pain    Primary care Provider: Clotilde Beltran    Reason For Visit:   I was asked by Екатерина Page CNP to consult on the patient for chronic bilateral low back pain with right-sided sciatica.    HPI: Hilaria Jansen is a 32 year old female seen and examined with the assistance from a phone . She has a 2 year history of low back pain who presents for worsening of symptoms. She describes right-sided low back pain that radiates to the right posterior thigh. She denies paresthesias and overt weakness. She states symptoms have improved with PT. She has not had any injections. She did have a lumbar MRI. She has not been told the results of that yet.     Pain right now:  7-8    Past Medical History:   Diagnosis Date     Anemia      Hidradenitis suppurativa      Infertility, female      Neurological disorder      Sickle cell anemia (H)      Varicella        Past Medical History reviewed with patient during visit.    Past Surgical History:   Procedure Laterality Date      SECTION N/A 2019    Procedure:  SECTION;  Surgeon: Wendi Coppola MD;  [facility] L[facility]      SECTION N/A 12/15/2021    Procedure:  SECTION;  Surgeon: Brooke Capps MD;  Location: [facility] L+[facility]     HEAD & NECK SURGERY       SURGICAL PATHOLOGY EXAM Left 09/10/2015    left paratracheal neck mass removed, benign     Past Surgical History reviewed with patient during visit.    Current Outpatient Medications   Medication     acetaminophen (TYLENOL) 325 MG tablet     cyclobenzaprine (FLEXERIL) 5 MG tablet     ibuprofen (ADVIL/MOTRIN) 600 MG tablet  "    Lidocaine (LIDOCARE) 4 % Patch     Misc. Devices (BREAST PUMP) MISC     Misc. Devices (BREAST PUMP) MISC     norethindrone (MICRONOR) 0.35 MG tablet     No current facility-administered medications for this visit.       No Known Allergies    Social History     Socioeconomic History     Marital status: Single     Spouse name: None     Number of children: 0     Years of education: None     Highest education level: None   Occupational History     Occupation: cleaning person    Tobacco Use     Smoking status: Never Smoker     Smokeless tobacco: Never Used   Substance and Sexual Activity     Alcohol use: No     Drug use: No     Sexual activity: Yes     Partners: Male     Birth control/protection: None     Comment: trying to conceive   Other Topics Concern     Parent/sibling w/ CABG, MI or angioplasty before 65F 55M? No     Social Determinants of Health     Intimate Partner Violence: Not At Risk     Fear of Current or Ex-Partner: No     Emotionally Abused: No     Physically Abused: No     Sexually Abused: No       Family History   Problem Relation Age of Onset     Breast Cancer Paternal Aunt          ROS: 10 point ROS neg other than the symptoms noted above in the HPI.    Vital Signs:   /79   Pulse 91   Resp 16   Ht 5' 1\" (1.549 m)   Wt 169 lb (76.7 kg)   LMP 04/10/2022   SpO2 100%   BMI 31.93 kg/m        Examination:  Constitutional:  Alert, well nourished, NAD.  Memory: recent and remote memory   HEENT: Normocephalic, atraumatic.   Pulm:  Without shortness of breath   CV:  No pitting edema of BLE.      Neurological:  Awake  Alert  Oriented x 3  Speech clear  Tongue midline    Motor exam:   Hip Flexor:                Right: 5/5  Left:  5/5  Hip Adductor:             Right:  5/5  Left:  5/5  Hip Abductor:             Right:  5/5  Left:  5/5  Gastroc Soleus:        Right:  5/5  Left:  5/5  Tib/Ant:                      Right:  5/5  Left:  5/5  EHL:                          Right:  5/5  Left:  " 5/5     Sensation normal to bilateral upper and lower extremities  Muscle tone to bilateral upper and lower extremities   Gait: Able to stand from a seated position. Normal non-antalgic, non-myelopathic gait.  Able to heel/toe walk without loss of balance    Lumbar examination reveals no tenderness of the spine or paraspinous muscles.  Hip height is symmetrical. Positive right>left SI joint tenderness. Negative sciatic notch or greater trochanteric tenderness to palpation bilaterally.  Straight leg raise is negative bilaterally.      Imaging:   Lumbar MRI 4/16/2022  Impression: Mild degenerative changes in the lumbar spine without any high-grade neural foraminal narrowing or spinal canal stenosis.    Assessment/Plan:   Chronic bilateral low back pain with right-sided sciatica.     Suspect this is SI joint dysfunction due to benign MRI and positive Ahmet finger test on the right. Would recommend continued PT and right SIJ injection at this time. She would like to continue with PT and will contact our office if she would like to pursue the injection. She verbalized understanding and agreement.    Patient Instructions   -Continue physical therapy.  -Consider SI joint injection. Please contact our office if you would like to have the injection.  -Please contact our clinic with questions or concerns at [phone].      Regina Savage CNP  [facility] Neurosurgery  [address] [place] [address]  Tel [phone]  Fax [phone]        Again, thank you for allowing me to participate in the care of your patient.        Sincerely,        Regina Savage NP

## 2022-05-02 NOTE — PROGRESS NOTES
[facility] Neurosurgery  Neurosurgery Clinic Visit      CC: back and leg pain    Primary care Provider: Clotilde Beltran    Reason For Visit:   I was asked by Екатерина Page CNP to consult on the patient for chronic bilateral low back pain with right-sided sciatica.    HPI: Hilaria Jansen is a 32 year old female seen and examined with the assistance from a phone . She has a 2 year history of low back pain who presents for worsening of symptoms. She describes right-sided low back pain that radiates to the right posterior thigh. She denies paresthesias and overt weakness. She states symptoms have improved with PT. She has not had any injections. She did have a lumbar MRI. She has not been told the results of that yet.     Pain right now:  7-8    Past Medical History:   Diagnosis Date     Anemia      Hidradenitis suppurativa      Infertility, female      Neurological disorder      Sickle cell anemia (H)      Varicella        Past Medical History reviewed with patient during visit.    Past Surgical History:   Procedure Laterality Date      SECTION N/A 2019    Procedure:  SECTION;  Surgeon: Wendi Coppola MD;  [facility]      SECTION N/A 12/15/2021    Procedure:  SECTION;  Surgeon: Brooke Capps MD;  [facility]     HEAD & NECK SURGERY       SURGICAL PATHOLOGY EXAM Left 09/10/2015    left paratracheal neck mass removed, benign     Past Surgical History reviewed with patient during visit.    Current Outpatient Medications   Medication     acetaminophen (TYLENOL) 325 MG tablet     cyclobenzaprine (FLEXERIL) 5 MG tablet     ibuprofen (ADVIL/MOTRIN) 600 MG tablet     Lidocaine (LIDOCARE) 4 % Patch     Misc. Devices (BREAST PUMP) MISC     Misc. Devices (BREAST PUMP) MISC     norethindrone (MICRONOR) 0.35 MG tablet     No current facility-administered medications for this visit.       No Known Allergies    Social History     Socioeconomic  "History     Marital status: Single     Spouse name: None     Number of children: 0     Years of education: None     Highest education level: None   Occupational History     Occupation: cleaning person    Tobacco Use     Smoking status: Never Smoker     Smokeless tobacco: Never Used   Substance and Sexual Activity     Alcohol use: No     Drug use: No     Sexual activity: Yes     Partners: Male     Birth control/protection: None     Comment: trying to conceive   Other Topics Concern     Parent/sibling w/ CABG, MI or angioplasty before 65F 55M? No     Social Determinants of Health     Intimate Partner Violence: Not At Risk     Fear of Current or Ex-Partner: No     Emotionally Abused: No     Physically Abused: No     Sexually Abused: No       Family History   Problem Relation Age of Onset     Breast Cancer Paternal Aunt          ROS: 10 point ROS neg other than the symptoms noted above in the HPI.    Vital Signs:   /79   Pulse 91   Resp 16   Ht 5' 1\" (1.549 m)   Wt 169 lb (76.7 kg)   LMP 04/10/2022   SpO2 100%   BMI 31.93 kg/m        Examination:  Constitutional:  Alert, well nourished, NAD.  Memory: recent and remote memory   HEENT: Normocephalic, atraumatic.   Pulm:  Without shortness of breath   CV:  No pitting edema of BLE.      Neurological:  Awake  Alert  Oriented x 3  Speech clear  Tongue midline    Motor exam:   Hip Flexor:                Right: 5/5  Left:  5/5  Hip Adductor:             Right:  5/5  Left:  5/5  Hip Abductor:             Right:  5/5  Left:  5/5  Gastroc Soleus:        Right:  5/5  Left:  5/5  Tib/Ant:                      Right:  5/5  Left:  5/5  EHL:                          Right:  5/5  Left:  5/5     Sensation normal to bilateral upper and lower extremities  Muscle tone to bilateral upper and lower extremities   Gait: Able to stand from a seated position. Normal non-antalgic, non-myelopathic gait.  Able to heel/toe walk without loss of balance    Lumbar examination reveals no " tenderness of the spine or paraspinous muscles.  Hip height is symmetrical. Positive right>left SI joint tenderness. Negative sciatic notch or greater trochanteric tenderness to palpation bilaterally.  Straight leg raise is negative bilaterally.      Imaging:   Lumbar MRI 4/16/2022  Impression: Mild degenerative changes in the lumbar spine without any high-grade neural foraminal narrowing or spinal canal stenosis.    Assessment/Plan:   Chronic bilateral low back pain with right-sided sciatica.     Suspect this is SI joint dysfunction due to benign MRI and positive Ahmet finger test on the right. Would recommend continued PT and right SIJ injection at this time. She would like to continue with PT and will contact our office if she would like to pursue the injection. She verbalized understanding and agreement.    Patient Instructions   -Continue physical therapy.  -Consider SI joint injection. Please contact our office if you would like to have the injection.  -Please contact our clinic with questions or concerns at [phone].      Regina Savage, [facility] Neurosurgery  [address] [place] [address]  Tel [phone]  Fax [phone]

## 2022-05-04 ENCOUNTER — E-VISIT (OUTPATIENT)
Dept: OBGYN | Facility: CLINIC | Age: 32
End: 2022-05-04
Payer: COMMERCIAL

## 2022-05-04 ENCOUNTER — OFFICE VISIT (OUTPATIENT)
Dept: URGENT CARE | Facility: URGENT CARE | Age: 32
End: 2022-05-04
Payer: COMMERCIAL

## 2022-05-04 VITALS
SYSTOLIC BLOOD PRESSURE: 121 MMHG | OXYGEN SATURATION: 100 % | TEMPERATURE: 99.3 F | DIASTOLIC BLOOD PRESSURE: 81 MMHG | HEART RATE: 88 BPM

## 2022-05-04 DIAGNOSIS — R35.0 URINARY FREQUENCY: Primary | ICD-10-CM

## 2022-05-04 DIAGNOSIS — Z32.01 PREGNANCY TEST POSITIVE: Primary | ICD-10-CM

## 2022-05-04 DIAGNOSIS — R10.2 PELVIC PAIN IN FEMALE: ICD-10-CM

## 2022-05-04 DIAGNOSIS — Z33.1 PREGNANT STATE, INCIDENTAL: ICD-10-CM

## 2022-05-04 LAB
ALBUMIN UR-MCNC: NEGATIVE MG/DL
APPEARANCE UR: CLEAR
BILIRUB UR QL STRIP: NEGATIVE
COLOR UR AUTO: YELLOW
GLUCOSE UR STRIP-MCNC: NEGATIVE MG/DL
HCG UR QL: POSITIVE
HGB UR QL STRIP: NEGATIVE
KETONES UR STRIP-MCNC: NEGATIVE MG/DL
LEUKOCYTE ESTERASE UR QL STRIP: NEGATIVE
NITRATE UR QL: NEGATIVE
PH UR STRIP: 6 [PH] (ref 5–7)
SP GR UR STRIP: 1.02 (ref 1–1.03)
UROBILINOGEN UR STRIP-ACNC: 0.2 E.U./DL

## 2022-05-04 PROCEDURE — 81003 URINALYSIS AUTO W/O SCOPE: CPT

## 2022-05-04 PROCEDURE — 81025 URINE PREGNANCY TEST: CPT | Performed by: PHYSICIAN ASSISTANT

## 2022-05-04 PROCEDURE — 99213 OFFICE O/P EST LOW 20 MIN: CPT | Performed by: PHYSICIAN ASSISTANT

## 2022-05-04 PROCEDURE — 99207 PR NO CHARGE LOS: CPT | Performed by: OBSTETRICS & GYNECOLOGY

## 2022-05-04 NOTE — PROGRESS NOTES
Urinary frequency  - UA Macro with Reflex to Micro and Culture - lab collect    Pelvic pain in female  - HCG Qual, Urine ([id number])  - Ob/Gyn Referral; Future    Pregnant state, incidental  - Ob/Gyn Referral; Future    Patient was advised to return to clinic if symptoms do not improve in the amount of time specified in the AVS or if symptoms worsen. Patient educated on red flag symptoms and asked to go directly to the ED if symptoms present themselves.     Daniel Webster PA-C  [facility] URGENT [facility]ARE    Subjective   32 year old who presents to clinic today for the following health issues:    Urgent Care       HPI     Pain History:  When did you first notice your pain? - Acute Pain   Where in your body do you have pain? Abdominal/Flank Pain  Onset/Duration: 1 week  Description:   Character: Sharp  Location: suprapubic region  Radiation: Back  Intensity: moderate  Progression of Symptoms:  same  Accompanying Signs & Symptoms:  Fever/Chills: YES  Gas/Bloating: no  Nausea: YES  Vomitting: no  Diarrhea: no  Constipation: no  Dysuria or Hematuria: No but patient has been going to the bathroom more recently  History:   Trauma: no  Previous similar pain: no  Previous tests done: none  Precipitating factors:   Does the pain change with:     Food: no    Bowel Movement: no    Urination: no   Other factors:  no  Therapies tried and outcome: None  Patient's last menstrual period was 04/10/2022.     Patient states that her last period was April 4th and was only 2 days.      Review of Systems   Review of Systems   See HPI     Objective    Temp: 99.3  F (37.4  C) Temp src: Oral BP: 121/81 Pulse: 88     SpO2: 100 %       Physical Exam   Physical Exam  Constitutional:       General: She is not in acute distress.     Appearance: Normal appearance. She is normal weight. She is not ill-appearing, toxic-appearing or diaphoretic.   HENT:      Head: Normocephalic and atraumatic.   Cardiovascular:      Rate and Rhythm: Normal rate  and regular rhythm.      Pulses: Normal pulses.      Heart sounds: Normal heart sounds. No murmur heard.    No friction rub. No gallop.   Pulmonary:      Effort: Pulmonary effort is normal. No respiratory distress.      Breath sounds: Normal breath sounds. No stridor. No wheezing, rhonchi or rales.   Chest:      Chest wall: No tenderness.   Abdominal:      General: Abdomen is flat. Bowel sounds are normal. There is no distension.      Palpations: Abdomen is soft. There is no mass.      Tenderness: There is no abdominal tenderness. There is no right CVA tenderness, left CVA tenderness, guarding or rebound.      Hernia: No hernia is present.   Neurological:      General: No focal deficit present.      Mental Status: She is alert and oriented to person, place, and time. Mental status is at baseline.      Gait: Gait normal.   Psychiatric:         Mood and Affect: Mood normal.         Behavior: Behavior normal.         Thought Content: Thought content normal.         Judgment: Judgment normal.          Results for orders placed or performed in visit on 05/04/22 (from the past 24 hour(s))   UA Macro with Reflex to Micro and Culture - lab collect    Specimen: Urine, Midstream   Result Value Ref Range    Color Urine Yellow Colorless, Straw, Light Yellow, Yellow    Appearance Urine Clear Clear    Glucose Urine Negative Negative mg/dL    Bilirubin Urine Negative Negative    Ketones Urine Negative Negative mg/dL    Specific Gravity Urine 1.020 1.003 - 1.035    Blood Urine Negative Negative    pH Urine 6.0 5.0 - 7.0    Protein Albumin Urine Negative Negative mg/dL    Urobilinogen Urine 0.2 0.2, 1.0 E.U./dL    Nitrite Urine Negative Negative    Leukocyte Esterase Urine Negative Negative    Narrative    Microscopic not indicated   HCG Qual, Urine ([id number])   Result Value Ref Range    hCG Urine Qualitative Positive (A) Negative

## 2022-05-05 ENCOUNTER — LAB (OUTPATIENT)
Dept: LAB | Facility: CLINIC | Age: 32
End: 2022-05-05
Payer: COMMERCIAL

## 2022-05-05 DIAGNOSIS — Z32.01 PREGNANCY TEST POSITIVE: ICD-10-CM

## 2022-05-05 LAB — B-HCG SERPL-ACNC: ABNORMAL IU/L (ref 0–5)

## 2022-05-05 PROCEDURE — 84702 CHORIONIC GONADOTROPIN TEST: CPT

## 2022-05-05 PROCEDURE — 36415 COLL VENOUS BLD VENIPUNCTURE: CPT

## 2022-05-10 ENCOUNTER — ANCILLARY PROCEDURE (OUTPATIENT)
Dept: ULTRASOUND IMAGING | Facility: CLINIC | Age: 32
End: 2022-05-10
Attending: OBSTETRICS & GYNECOLOGY
Payer: COMMERCIAL

## 2022-05-10 DIAGNOSIS — Z32.01 PREGNANCY TEST POSITIVE: ICD-10-CM

## 2022-05-10 PROCEDURE — 76817 TRANSVAGINAL US OBSTETRIC: CPT | Performed by: OBSTETRICS & GYNECOLOGY

## 2022-05-12 ENCOUNTER — VIRTUAL VISIT (OUTPATIENT)
Dept: FAMILY MEDICINE | Facility: CLINIC | Age: 32
End: 2022-05-12
Payer: COMMERCIAL

## 2022-05-12 DIAGNOSIS — M53.3 SACROILIAC JOINT DYSFUNCTION: ICD-10-CM

## 2022-05-12 DIAGNOSIS — M54.41 CHRONIC BILATERAL LOW BACK PAIN WITH RIGHT-SIDED SCIATICA: Primary | ICD-10-CM

## 2022-05-12 DIAGNOSIS — O30.009 TWIN PREGNANCY, ANTEPARTUM, UNSPECIFIED MULTIPLE GESTATION TYPE: ICD-10-CM

## 2022-05-12 DIAGNOSIS — G89.29 CHRONIC BILATERAL LOW BACK PAIN WITH RIGHT-SIDED SCIATICA: Primary | ICD-10-CM

## 2022-05-12 PROCEDURE — 99213 OFFICE O/P EST LOW 20 MIN: CPT | Mod: 95 | Performed by: NURSE PRACTITIONER

## 2022-05-12 NOTE — PROGRESS NOTES
Hilaria is a 32 year old who is being evaluated via a billable video visit.      How would you like to obtain your AVS? MyChart  If the video visit is dropped, the invitation should be resent by: cell  Will anyone else be joining your video visit?     Video Start Time: 3 pm    Assessment & Plan   Problem List Items Addressed This Visit     Bilateral low back pain with right-sided sciatica - Primary      Other Visit Diagnoses     Twin pregnancy, antepartum, unspecified multiple gestation type        Sacroiliac joint dysfunction           Continue work restrictions per letter  30 minutes spent on the date of the encounter doing chart review, history and exam, documentation and further activities per the note       See Patient Instructions    No follow-ups on file.    LEONA Melgar CNP  [facility]    Subjective   Hilaria is a 32 year old who presents for the following health issues     HPI     Hilaria has a 2 year history of right-sided low back pain that radiates to the right posterior thigh. She denies paresthesias and overt weakness.   She states symptoms have improved with PT. She has not had any injections. She did have a lumbar MRI. Currently she is working at a [employer] and notices that her symptoms worsen with prolonged sitting or standing. She notices relief when she is able to change positions frequently. She currently is restricted to lift under 5 pounds at work during the day, which has been manageable for her and is working well.She has also recently found out that she is pregnant with twins and due in December.     Review of Systems   Constitutional, HEENT, cardiovascular, pulmonary, gi and gu systems are negative, except as otherwise noted.      Objective           Vitals:  No vitals were obtained today due to virtual visit.    Physical Exam   GENERAL: Healthy, alert and no distress  EYES: Eyes grossly normal to inspection.  No discharge or erythema, or obvious  scleral/conjunctival abnormalities.  RESP: No audible wheeze, cough, or visible cyanosis.  No visible retractions or increased work of breathing.    SKIN: Visible skin clear. No significant rash, abnormal pigmentation or lesions.  NEURO: Cranial nerves grossly intact.  Mentation and speech appropriate for age.  PSYCH: Mentation appears normal, affect normal/bright, judgement and insight intact, normal speech and appearance well-groomed.            Video-Visit Details    Type of service:  Video Visit    Video End Time:3:30 pm    Originating Location (pt. Location): Home    Distant Location (provider location):  [facility]     Platform used for Video Visit: [employer]

## 2022-05-12 NOTE — LETTER
May 12, 2022      Hilaria Jansen  [address]  [place] [address]        To Whom It May Concern:    Hilaria Jansen  was seen on 5/12/2022.  She currently restricted to lifting no more than 5 pounds throughout the day, and to get up from sitting every 30 minutes. These restrictions will likely be in place for the next 7 months.       Sincerely,        LEONA Melgar CNP

## 2022-05-17 ENCOUNTER — PRENATAL OFFICE VISIT (OUTPATIENT)
Dept: NURSING | Facility: CLINIC | Age: 32
End: 2022-05-17
Payer: COMMERCIAL

## 2022-05-17 ENCOUNTER — TELEPHONE (OUTPATIENT)
Dept: OBGYN | Facility: CLINIC | Age: 32
End: 2022-05-17

## 2022-05-17 VITALS — HEIGHT: 60 IN | BODY MASS INDEX: 33.01 KG/M2

## 2022-05-17 DIAGNOSIS — Z34.90 ENCOUNTER FOR SUPERVISION OF NORMAL PREGNANCY: Primary | ICD-10-CM

## 2022-05-17 DIAGNOSIS — O30.031 MONOCHORIONIC DIAMNIOTIC TWIN PREGNANCY IN FIRST TRIMESTER: Primary | ICD-10-CM

## 2022-05-17 DIAGNOSIS — O21.9 NAUSEA/VOMITING IN PREGNANCY: ICD-10-CM

## 2022-05-17 DIAGNOSIS — Z23 NEED FOR TDAP VACCINATION: ICD-10-CM

## 2022-05-17 PROCEDURE — 99207 PR NO CHARGE NURSE ONLY: CPT

## 2022-05-17 NOTE — PROGRESS NOTES
Important Information for Provider:     New ob nurse intake by phone, sixth pregnancy. History of 2 C sections. Patient had ultrasound performed 5/10/2022 , viable twin pregnancy. NOB with Dr Gannon 5/17/2022. Patient is requesting vitamins, B6, Unisom to be sent to the updated pharmacy. She is requesting a letter for work( Dr Capps gave her one the previous pregnancy)    Patient supplied answers from flow sheet for:  Prenatal OB Questionnaire.  Past Medical History  Have you ever recieved care for your mental health? : No  Have you ever been in a major accident or suffered serious trauma?: No  Within the last year, has anyone hit, slapped, kicked or otherwise hurt you?: No  In the last year, has anyone forced you to have sex when you didn't want to?: No    Past Medical History 2   Have you ever received a blood transfusion?: No  Would you accept a blood transfusion if was medically recommended?: Yes  Does anyone in your home smoke?: No   Is your blood type Rh negative?: No  Have you ever ?: (!) Yes  Have you been hospitalized for a nonsurgical reason excluding normal delivery?: No  Have you ever had an abnormal pap smear?: No    Past Medical History (Continued)  Do you have a history of abnormalities of the uterus?: No  Did your mother take DANYEL or any other hormones when she was pregnant with you?: No  Do you have any other problems we have not asked about which you feel may be important to this pregnancy?: No                       Prenatal OB Questionnaire      Allergies as of 5/17/2022:    Allergies as of 05/17/2022     (No Known Allergies)       Current medications are:  No current outpatient medications on file.         Early ultrasound screening tool:    Does patient have irregular periods?  No  Did patient use hormonal birth control in the three months prior to positive urine pregnancy test? No  Is the patient breastfeeding?  No  Is the patient 10 weeks or greater at time of education visit?   Yes

## 2022-05-18 ENCOUNTER — PRENATAL OFFICE VISIT (OUTPATIENT)
Dept: OBGYN | Facility: CLINIC | Age: 32
End: 2022-05-18
Payer: COMMERCIAL

## 2022-05-18 VITALS
OXYGEN SATURATION: 98 % | SYSTOLIC BLOOD PRESSURE: 112 MMHG | HEIGHT: 60 IN | HEART RATE: 90 BPM | WEIGHT: 174 LBS | DIASTOLIC BLOOD PRESSURE: 72 MMHG | BODY MASS INDEX: 34.16 KG/M2

## 2022-05-18 DIAGNOSIS — O30.031 MONOCHORIONIC DIAMNIOTIC TWIN GESTATION IN FIRST TRIMESTER: Primary | ICD-10-CM

## 2022-05-18 LAB
ABO/RH(D): NORMAL
ALBUMIN UR-MCNC: NEGATIVE MG/DL
ANTIBODY SCREEN: NEGATIVE
APPEARANCE UR: CLEAR
BILIRUB UR QL STRIP: NEGATIVE
COLOR UR AUTO: YELLOW
ERYTHROCYTE [DISTWIDTH] IN BLOOD BY AUTOMATED COUNT: 13.8 % (ref 10–15)
GLUCOSE UR STRIP-MCNC: NEGATIVE MG/DL
HBV SURFACE AG SERPL QL IA: NONREACTIVE
HCT VFR BLD AUTO: 32.5 % (ref 35–47)
HCV AB SERPL QL IA: NONREACTIVE
HGB BLD-MCNC: 10.9 G/DL (ref 11.7–15.7)
HGB UR QL STRIP: NEGATIVE
HIV 1+2 AB+HIV1 P24 AG SERPL QL IA: NONREACTIVE
KETONES UR STRIP-MCNC: NEGATIVE MG/DL
LEUKOCYTE ESTERASE UR QL STRIP: NEGATIVE
MCH RBC QN AUTO: 27.6 PG (ref 26.5–33)
MCHC RBC AUTO-ENTMCNC: 33.5 G/DL (ref 31.5–36.5)
MCV RBC AUTO: 82 FL (ref 78–100)
NITRATE UR QL: NEGATIVE
PH UR STRIP: 6.5 [PH] (ref 5–7)
PLATELET # BLD AUTO: 225 10E3/UL (ref 150–450)
RBC # BLD AUTO: 3.95 10E6/UL (ref 3.8–5.2)
SP GR UR STRIP: 1.01 (ref 1–1.03)
SPECIMEN EXPIRATION DATE: NORMAL
T PALLIDUM AB SER QL: NONREACTIVE
UROBILINOGEN UR STRIP-ACNC: 0.2 E.U./DL
WBC # BLD AUTO: 6.8 10E3/UL (ref 4–11)

## 2022-05-18 PROCEDURE — 86900 BLOOD TYPING SEROLOGIC ABO: CPT | Performed by: OBSTETRICS & GYNECOLOGY

## 2022-05-18 PROCEDURE — 86780 TREPONEMA PALLIDUM: CPT | Performed by: OBSTETRICS & GYNECOLOGY

## 2022-05-18 PROCEDURE — 86901 BLOOD TYPING SEROLOGIC RH(D): CPT | Performed by: OBSTETRICS & GYNECOLOGY

## 2022-05-18 PROCEDURE — 81003 URINALYSIS AUTO W/O SCOPE: CPT | Performed by: OBSTETRICS & GYNECOLOGY

## 2022-05-18 PROCEDURE — 86803 HEPATITIS C AB TEST: CPT | Performed by: OBSTETRICS & GYNECOLOGY

## 2022-05-18 PROCEDURE — 99207 PR FIRST OB VISIT: CPT | Performed by: OBSTETRICS & GYNECOLOGY

## 2022-05-18 PROCEDURE — 86850 RBC ANTIBODY SCREEN: CPT | Performed by: OBSTETRICS & GYNECOLOGY

## 2022-05-18 PROCEDURE — 87086 URINE CULTURE/COLONY COUNT: CPT | Performed by: OBSTETRICS & GYNECOLOGY

## 2022-05-18 PROCEDURE — 85027 COMPLETE CBC AUTOMATED: CPT | Performed by: OBSTETRICS & GYNECOLOGY

## 2022-05-18 PROCEDURE — 87389 HIV-1 AG W/HIV-1&-2 AB AG IA: CPT | Performed by: OBSTETRICS & GYNECOLOGY

## 2022-05-18 PROCEDURE — 36415 COLL VENOUS BLD VENIPUNCTURE: CPT | Performed by: OBSTETRICS & GYNECOLOGY

## 2022-05-18 PROCEDURE — 87340 HEPATITIS B SURFACE AG IA: CPT | Performed by: OBSTETRICS & GYNECOLOGY

## 2022-05-18 PROCEDURE — 86762 RUBELLA ANTIBODY: CPT | Performed by: OBSTETRICS & GYNECOLOGY

## 2022-05-18 RX ORDER — PRENATAL VIT/IRON FUM/FOLIC AC 27MG-0.8MG
1 TABLET ORAL DAILY
Qty: 90 TABLET | Refills: 3 | Status: SHIPPED | OUTPATIENT
Start: 2022-05-18 | End: 2022-06-24

## 2022-05-18 RX ORDER — PYRIDOXINE HCL (VITAMIN B6) 25 MG
25 TABLET ORAL 4 TIMES DAILY
Qty: 120 TABLET | Refills: 3 | Status: SHIPPED | OUTPATIENT
Start: 2022-05-18 | End: 2022-06-24

## 2022-05-18 NOTE — LETTER
[place] [id number] [place] [id number] [phone]      May 18, 2022      Hilaria Jansen  [address] [place] [id number]              To Whom It May Concern:    Hilaria Jansen is being seen in our clinic for prenatal care.  Her Estimated Date of Delivery: Dec 5, 2022.  She has a high risk pregnancy and       Sincerely,    {RD LETTER PROVIDER:[id number]}

## 2022-05-18 NOTE — LETTER
September 13, 2022      Hilaria Jansen  [address]  [facility] [id number]        To Whom It May Concern:    Hilaria Jansen was seen is a patient under my care for her high risk pregnancy.  She would like to adjust her working hours, 12:00 pm to 8:30 pm daily due to this high risk pregnancy.        Sincerely,        Lucia Rascon MD

## 2022-05-18 NOTE — PROGRESS NOTES
OB - New OB History and Physical  Date of visit: 2022  Chief Complaint: To establish prenatal care    HPI: Karolina Jansen is a 32 year old  at 10w5d as dated by LMP consistent with 10w US. Estimated Date of Delivery: Dec 9, 2022  Since becoming pregnant, patient reports feeling very tired.      Ultrasound:   5/10/22 - Viable monochorionic diamniotic twin intrauterine pregnancy with growth at 10w 1d (+/- 7-10 days) which is consistent with established dating, Estimated Date of Delivery: 2022   Simple cyst noted on right ovary measuring 2.2cm.    Obstetric history:     OB History    Para Term  AB Living   6 2 2 0 3 2   SAB IAB Ectopic Multiple Live Births   1 0 0 0 2      # Outcome Date GA Lbr Ignacio/2nd Weight Sex Delivery Anes PTL Lv   6 Current            5 Term 12/15/21 39w0d  3.32 kg (7 lb 5.1 oz) F CS-LTranv Spinal  JESSE      Name: Tracy      Apgar1: 8  Apgar5: 9   4 AB 02/15/21 9w0d             Birth Comments: no D&C   3 Term 19 37w6d  3.4 kg (7 lb 7.9 oz) M CS-LTranv Nitrous, EPI N JESSE      Complications: Fetal Intolerance      Name: SHAWN,MALE-KAROLINA      Apgar1: 8  Apgar5: 9   2 AB 2017     IAB         Birth Comments: took pill   1 2016     SAB         Birth Comments: chemical pregnancy     Patient denies history of gestational hypertension, pre-eclampsia, gestational diabetes,  labor.    Gynecologic History:   Patient's last menstrual period was 2022.   STI history: none  Last Pap:   History of abnormal pap: no       Allergy: Patient has no known allergies.  Patient denies food, latex or environmental allergies.     Current Medications:  Current Outpatient Medications   Medication     doxylamine (UNISOM) 25 MG TABS tablet     Prenatal Vit-Fe Fumarate-FA (PRENATAL MULTIVITAMIN W/IRON) 27-0.8 MG tablet     pyridOXINE (VITAMIN B6) 25 MG tablet     No current facility-administered medications for this visit.       Past Medical History:  Past  Medical History:   Diagnosis Date     Anemia      Hidradenitis suppurativa      Infertility, female      Neurological disorder      Sickle cell anemia (H)      Varicella        Past Surgical History:  Past Surgical History:   Procedure Laterality Date      SECTION N/A 2019    Procedure:  SECTION;  Surgeon: Wendi Coppola MD;  Location: [facility]      SECTION N/A 12/15/2021    Procedure:  SECTION;  Surgeon: Brooke Capps MD;  Location: [facility]     HEAD & NECK SURGERY       SURGICAL PATHOLOGY EXAM Left 09/10/2015    left paratracheal neck mass removed, benign       Social History:  Patient lives with  and kids   Works as  at the post [employer].   Denies current tobacco, alcohol or recreational drug use.  She feels safe in her relationship. Patient denies history of sexual, physical or mental abuse.     Family History:  Family History   Problem Relation Age of Onset     Breast Cancer Paternal Aunt        Review of Systems  Gen: +fatigue. no change in weight, no fever, no chills  CV: no palpitations, no chest pain, no hypertension, no syncope  Resp: no shortness of breath, no cough, no wheezing, no asthma  GI: mild nausea,  no diarrhea, no constipation, no bloating, no GERD  :  no vaginal discharge, no dysuria, no abnormal bleeding, no pelvic pain   Endo: no thyroid problems, no cold/heat intolerance, no acne, no hirsutism, no diabetes  Heme: no easy bruising or bleeding, no history of DVT/PE/CVA  Neuro: no headaches, no seizures, no strokes, no focal deficits      Physical Exam:  Vitals:    22 1035   BP: 112/72   Pulse: 90   SpO2: 98%   Weight: 78.9 kg (174 lb)   Height: 1.524 m (5')     Body mass index is 33.98 kg/m .  Gen: alert, oriented, no distress,  pleasant, appears stated age, well groomed  Neck: supple, trachea midline, no thyromegaly, no lymphadenopathy  HEENT: head normocephalic, atraumatic, normal oropharynx without erythema or  exudates  CV: normal heart sounds, regular rate and rhythm, no murmurs  Resp: good inspiratory effort, lungs clear to ascultation bilaterally, no wheezes or rhonchi  Abd: soft, nontender, nondistended, normoactive bowel sounds,  no masses or organomegaly, no hernias, well healed pfannenstiel scars  : normal external genitalia with lesions or erythema; normal, well supported urethra, normal Bartholins, normal Skenes; normal pink rugated vaginal mucosa, no lesions or abnormal discharge; long gay speculum inserted without difficulty; normal appearing cervix without lesions, bleeding or discharge. Bimanual exam shows 12week sized ante verted uterus, mobile, no fundal tenderness, no CMT, no adnexal masses or tenderness. Cervix long and closed  Extr: warm, well perfused, nontender, no edema  Psych: affect bright, cooperative, responds appropriately    BSUS: twin pregnancy, thin membrane, active fetuses, +fetal cardiac motion x2    Assessment:  Hilaria Jansen is a 32 year old  at 11w2d presenting to establish prenatal care.    Problem List:   Monochorionic diamniotic twins  H/o CS x2  Short interpregnancy interval (last baby 12/15/21, this GEOFF Dec 9, 2022 )    Plan:  1. Mono-di twins: recommend MFM consult and discussed ultrasound to monitor for TTTS  2. Plan delivery by repeat CS.  3. Reviewed routine prenatal care. Discussed MD call schedule as well as role of residents and med students both in clinic and hospital.   4. Pap: up to date  5. Fetal anomaly screening: referral to genetic counseling  6. Routine Prenatal Care: the patient will return to clinic in 4 weeks and prn    Lucia Gannon MD

## 2022-05-18 NOTE — LETTER
[facility]  [address] [place] [id number]  [phone]      9/6/2022      Hilaria Jansen  [address] [place] [id number]        To Whom It May Concern:    Hilaria Jansen is being seen in our clinic for prenatal care.  She would like to adjust her working hours, 12:00 pm to 8:30 pm daily. She has a high risk pregnancy.       Sincerely,          Lucia Gannon MD

## 2022-05-19 LAB
BACTERIA UR CULT: NORMAL
RUBV IGG SERPL QL IA: 4.58 INDEX
RUBV IGG SERPL QL IA: POSITIVE

## 2022-05-20 ENCOUNTER — TELEPHONE (OUTPATIENT)
Dept: OBGYN | Facility: CLINIC | Age: 32
End: 2022-05-20
Payer: COMMERCIAL

## 2022-05-20 ENCOUNTER — TRANSCRIBE ORDERS (OUTPATIENT)
Dept: MATERNAL FETAL MEDICINE | Facility: CLINIC | Age: 32
End: 2022-05-20
Payer: COMMERCIAL

## 2022-05-20 DIAGNOSIS — O99.011 ANEMIA AFFECTING PREGNANCY IN FIRST TRIMESTER: Primary | ICD-10-CM

## 2022-05-20 DIAGNOSIS — O26.90 PREGNANCY RELATED CONDITION, ANTEPARTUM: Primary | ICD-10-CM

## 2022-05-20 RX ORDER — LANOLIN ALCOHOL/MO/W.PET/CERES
1000 CREAM (GRAM) TOPICAL DAILY
Qty: 90 TABLET | Refills: 1 | Status: SHIPPED | OUTPATIENT
Start: 2022-05-20 | End: 2023-02-22

## 2022-05-20 RX ORDER — MULTIVIT WITH MINERALS/LUTEIN
250 TABLET ORAL DAILY
Qty: 90 TABLET | Refills: 1 | Status: SHIPPED | OUTPATIENT
Start: 2022-05-20 | End: 2023-02-22

## 2022-05-20 RX ORDER — FERROUS SULFATE 325(65) MG
325 TABLET ORAL
Qty: 90 TABLET | Refills: 1 | Status: SHIPPED | OUTPATIENT
Start: 2022-05-20 | End: 2022-07-26

## 2022-05-20 NOTE — LETTER
May 20, 2022      Hilaria Jansen  [address]  [facility] [address]        To Whom It May Concern:    Hilaria Jansen is currently a patient.  Please excuse her from work beginning 05/20/22 to 05/22/22, ok to return to work on Monday 05/23/22.      Sincerely,        JAIMIE CHNAG MD

## 2022-05-20 NOTE — TELEPHONE ENCOUNTER
Patient called regarding medication, rx was not sent for supplements with lab follow-up. Sent per what was written.     Patient additionally asked for a letter to be written d/t her dizziness and twin pregnancy for the next 3 days with an ok to return on Monday.   Kaitlynn OWEN RN

## 2022-05-20 NOTE — TELEPHONE ENCOUNTER
Thank you for sending the rx. I never know if patients just want to buy them OTC or if they want an rx.    Okay for a letter for work.  Thanks,  Lucia Gannon MD

## 2022-06-16 ENCOUNTER — TELEPHONE (OUTPATIENT)
Dept: OBGYN | Facility: CLINIC | Age: 32
End: 2022-06-16
Payer: COMMERCIAL

## 2022-06-16 NOTE — TELEPHONE ENCOUNTER
Patient is calling about having severe abdominal pains and she is wondering if she can get seen earlier. She has an appt on 6/24. Please call pt or let  know if she can be fit in.

## 2022-06-16 NOTE — TELEPHONE ENCOUNTER
Sami  attained [id number]  Will route to the provider to review and advise.     Patient contacted and is reporting abdominal pain. Pain comes and goes. The pain goes away with tylenol.   Mostly in the afternoon and nights. Pain is sharp and will last for like 2 hours, without tylenol will last longer.   Mostly happens on the lower right side but sometimes will occur on both sides. Denied any urinary or vaginal symptoms.   Kaitlynn OWEN RN

## 2022-06-17 NOTE — TELEPHONE ENCOUNTER
It sounds like bowel issues if in the evenings with cramping.     Can try taking simethicone 80 mg daily and colace 100 mg BID and see if helps with cramping. I would bet it does.    Otherwise could try and see her later this afternoon maybe?  Brooke Capps MD

## 2022-06-17 NOTE — TELEPHONE ENCOUNTER
Telephone call with [place] . Discussed  recommendations to start simethicone and colace and update us after the weekend if still experiencing pain. Patient would like to be seen today, she was added to on-call provider's schedule.

## 2022-06-22 ENCOUNTER — PRE VISIT (OUTPATIENT)
Dept: MATERNAL FETAL MEDICINE | Facility: CLINIC | Age: 32
End: 2022-06-22

## 2022-06-24 ENCOUNTER — PRENATAL OFFICE VISIT (OUTPATIENT)
Dept: OBGYN | Facility: CLINIC | Age: 32
End: 2022-06-24
Payer: COMMERCIAL

## 2022-06-24 VITALS
WEIGHT: 181 LBS | DIASTOLIC BLOOD PRESSURE: 72 MMHG | OXYGEN SATURATION: 99 % | SYSTOLIC BLOOD PRESSURE: 113 MMHG | HEART RATE: 96 BPM | BODY MASS INDEX: 35.35 KG/M2

## 2022-06-24 DIAGNOSIS — O30.031 MONOCHORIONIC DIAMNIOTIC TWIN PREGNANCY IN FIRST TRIMESTER: ICD-10-CM

## 2022-06-24 PROCEDURE — 99207 PR PRENATAL VISIT: CPT | Performed by: OBSTETRICS & GYNECOLOGY

## 2022-06-24 RX ORDER — AMOXICILLIN 250 MG
1-2 CAPSULE ORAL DAILY PRN
Qty: 60 TABLET | Refills: 4 | Status: ON HOLD | OUTPATIENT
Start: 2022-06-24 | End: 2022-10-16

## 2022-06-24 RX ORDER — PRENATAL VIT/IRON FUM/FOLIC AC 27MG-0.8MG
1 TABLET ORAL DAILY
Qty: 90 TABLET | Refills: 3 | Status: SHIPPED | OUTPATIENT
Start: 2022-06-24 | End: 2023-06-06

## 2022-06-24 NOTE — PROGRESS NOTES
Was planning to go back home this month but cannot due to pregnancy. Tired and some cramping. Daughter is 6 months old. Can sleep in afternoons anytime. Discussed sounds normal with toddler, baby and now pregnant with twins. Reassured. Discussed option of tubal ligation at time of repeat c/s. Discussed my sabbatical. She wants me to do her c/s when I return but also understands if TTS then date would change. Has u/s scheduled for next week. Senna and PT referral done for her back pain. RTC 4 weeks. BE

## 2022-06-29 ENCOUNTER — LAB (OUTPATIENT)
Dept: LAB | Facility: CLINIC | Age: 32
End: 2022-06-29
Attending: OBSTETRICS & GYNECOLOGY
Payer: COMMERCIAL

## 2022-06-29 ENCOUNTER — OFFICE VISIT (OUTPATIENT)
Dept: MATERNAL FETAL MEDICINE | Facility: CLINIC | Age: 32
End: 2022-06-29
Attending: OBSTETRICS & GYNECOLOGY
Payer: COMMERCIAL

## 2022-06-29 ENCOUNTER — HOSPITAL ENCOUNTER (OUTPATIENT)
Dept: ULTRASOUND IMAGING | Facility: CLINIC | Age: 32
Discharge: HOME | End: 2022-06-29
Attending: OBSTETRICS & GYNECOLOGY
Payer: COMMERCIAL

## 2022-06-29 DIAGNOSIS — O30.032 MONOCHORIONIC DIAMNIOTIC TWIN GESTATION IN SECOND TRIMESTER: Primary | ICD-10-CM

## 2022-06-29 DIAGNOSIS — O26.90 PREGNANCY RELATED CONDITION, ANTEPARTUM: ICD-10-CM

## 2022-06-29 DIAGNOSIS — O30.032 MONOCHORIONIC DIAMNIOTIC TWIN GESTATION IN SECOND TRIMESTER: ICD-10-CM

## 2022-06-29 DIAGNOSIS — Z36.0 ENCOUNTER FOR ANTENATAL SCREENING FOR CHROMOSOMAL ANOMALIES: Primary | ICD-10-CM

## 2022-06-29 DIAGNOSIS — Z36.0 ENCOUNTER FOR ANTENATAL SCREENING FOR CHROMOSOMAL ANOMALIES: ICD-10-CM

## 2022-06-29 PROCEDURE — 76820 UMBILICAL ARTERY ECHO: CPT | Mod: 26 | Performed by: OBSTETRICS & GYNECOLOGY

## 2022-06-29 PROCEDURE — 76810 OB US >/= 14 WKS ADDL FETUS: CPT | Mod: 26 | Performed by: OBSTETRICS & GYNECOLOGY

## 2022-06-29 PROCEDURE — 76821 MIDDLE CEREBRAL ARTERY ECHO: CPT

## 2022-06-29 PROCEDURE — 76821 MIDDLE CEREBRAL ARTERY ECHO: CPT | Mod: 26 | Performed by: OBSTETRICS & GYNECOLOGY

## 2022-06-29 PROCEDURE — 36415 COLL VENOUS BLD VENIPUNCTURE: CPT

## 2022-06-29 PROCEDURE — 76810 OB US >/= 14 WKS ADDL FETUS: CPT

## 2022-06-29 PROCEDURE — 76805 OB US >/= 14 WKS SNGL FETUS: CPT | Mod: 26 | Performed by: OBSTETRICS & GYNECOLOGY

## 2022-06-29 PROCEDURE — 99241 PR OFFICE CONSULTATION,LEVEL I: CPT | Mod: 25 | Performed by: OBSTETRICS & GYNECOLOGY

## 2022-06-29 NOTE — PROGRESS NOTES
MFM consultation    Please see full imaging report from [facility] program under imaging tab.    Thank-you for referring your patient for a comprehensive ultrasound due to monochorionic diamniotic twin pregnancy. She has had two prior C/S, including the last one in 2021, 6 months ago. She also has sickle cell trait.     I spent 20 minutes with Hilaria during today's consult, with > 50% of this time spent in face to face counseling and consultation on mono-di twin gestation.  She is seen at the request of Dr. Gannon to discuss mono-di twin gestation.     We discussed the results of the ultrasound with the patient.  We discussed the complications of twin pregnancies in general. Specifically we reviewed the maternal risks including hyperemesis, hypertensive disorders of pregnancy, gestational diabetes,  delivery, and postpartum hemorrhage. We also discussed the fetal risks including an increased risk of  birth and subsequent effects of prematurity and fetal growth restriction (FGR). We would recommend she take a low dose aspirin to reduce the risk of preeclampsia which she is currently taking.     **Please note - she is not yet taking aspirin and we did not write down the dose or prescribe it today so this should be done at her next OB visit.     We also discussed the risks unique to monochorionic twin pregnancies. We discussed the risk of congenital anomalies including congenital heart diease, twin-twin transfusion syndrome, twin anemia polycythemia sequence, and selective growth restriction. We discussed that, in general, for monochorionic twins fetal demise of one twin has a high risk severe cerebral injury (24-34%) or demise in the co-twin (15%).     She was given handouts on TTTS and mono-di twins in both English and Kazakh today.     We discussed frequent ultrasound monitoring starting at 16 weeks every two weeks to look for signs of monochorionic complications.  We discussed serial  assessment of fetal growth every four weeks and weekly  testing at 32 weeks.  We discussed a comprehensive anatomic ultrasound around 20 weeks and a fetal echocardiogram around 22-24 weeks.  We discussed that the goal for delivery is 36-37 weeks in the absence of complications requiring earlier delivery.        I discussed the findings on today's ultrasound with the patient. We discussed that on today's ultrasound there is no evidence of twin-twin transfusion syndrome or selective FGR.  I reviewed the limitations of ultrasound both in detecting aneuploidy and structural abnormalities.  Ultrasound can routinely detect 80-90% of structural abnormalities. We discussed the availability of amniocentesis for the precise diagnosis of chromosomal abnormalities including the associated procedure-related risk of pregnancy loss of approximately 1/400. The patient has opted to have NIPT screening drawn today.     Fetal echocardiogram will be scheduled with pediatric cardiology in the next ~ 6 weeks due to the association of mono-chorionicity with congenital heart disease.  MFM follow up is scheduled in two and four weeks.   She will be seen weekly for BPP at 32 weeks, and delivery is indicated between 34-37 weeks, goal of 36-37 weeks if no complications arise.     Return to primary provider for continued prenatal care.    If you have questions regarding today's evaluation or if we can be of further service, please contact the [facility].    Venecia Bowen MD  Maternal Fetal Medicine

## 2022-06-30 PROBLEM — O30.039 MONOCHORIONIC DIAMNIOTIC TWIN PREGNANCY: Status: ACTIVE | Noted: 2022-06-30

## 2022-06-30 NOTE — PROGRESS NOTES
[facility]  Genetic Counseling Consult    Patient: Hilaria Jansen YOB: 1990   Date of Service: 22      Hilaria Jansen was seen at [facility] for genetic consultation to discuss the options for routine screening for fetal chromosome abnormalities in the context of monochorionic-diamniotic twins.       Impression/Plan:   1.  Hilaria had a blood draw for NIPT (Panorama test through [email or web address]).  Results are expected within 5-10 days, and will be available in EPIC.  We will contact her to discuss the results, and a copy will be forwarded to the office of the referring OB provider. Hilaria provided verbal permission for detailed results to be left on her voicemail including the predicted fetal sex. She is aware of the anatomic sex of the twins (female). She requests that the results be left in English.    2. We reviewed the option of maternal serum AFP to screen for open neural tube defects today. However, today the fetal spines and ventral walls both appeared typical. MSAFP level in the context of a level II ultrasound is low. Hilaria declined MSAFP today.    Pregnancy History:   /Parity:    Age at Delivery: 32 year old  GEOFF: 2022, by Last Menstrual Period  Gestational Age: 16w6d    No significant complications or exposures were reported in the current pregnancy.    Hilaria s pregnancy history is significant for:  o SAB , no testing  o IAB 2017  o 2019 full-term male via  section. Alive and well.  o  SAB, no testing  o  full-term female via  section. Alive and well.    Medical History:   Hilaria is a known sickle cell carrier. This was discussed in her previous genetic counseling visit during her pregnancy with her daughter. Hilaria's partner, Lux, had a hemoglobin electrophoresis which was normal (he is not a carrier of sickle cell disease). Of note, the couple's son has also been identified  "to be a carrier of sickle cell disease.        Family History:   A three-generation pedigree was previously obtained by [facility], and is scanned under the  Media  tab. This was updated today.    The following significant findings were previously reported by Hilaria:    Hilaria's partner, Lux is 33 and healthy.    Lux's father passed away at age 49 due to kidney failure. He is unsure of any further details or if an underlying cause was identified. In the absence of further information, risk assessment is challenging. Lux was encouraged to share this family history information with his primary care provider.     Hilaria's paternal aunt reportedly passed away in her 50's due to breast cancer. No other family history of cancer was reported. Most cancer seen in families occurs sporadically, but about 5-10% may be due to an underlying genetic etiology. Hilaria was encouraged to share this family history information with her primary care provider to ensure appropriate screening.     Hilaria's paternal half brother reportedly passed away due to sickle cell disease. Hilaria is a known carrier for sickle cell trait. Sickle cell disease is a genetic condition that affects the shape of red blood cells and has significant health complications. Sickle cell disease is an autosomal recessive condition caused by genetic variants in both copies of the HBB gene. Carriers, commonly said to have \"sickle cell trait\", have a genetic variant in one copy of their HBB gene and rarely have symptoms, expect in extreme conditions. There are also other variants of the HBB gene that can combine with a sickle cell trait to cause various hemoglobinopathy conditions. Lux reported that he was tested back in [place] and believes he was not found to be a carrier, however does not have any record of this testing and is interested in pursuing a hemoglobin evaluation today. We reviewed at minimum a 50% chance for the couple's children to be carriers for " sickle cell disease and they were encouraged to share this family history information with their pediatrician.       Hilaria reports no significant updates today.    Otherwise, the reported family history is negative for multiple miscarriages, stillbirths, birth defects, intellectual disability, known genetic conditions, and consanguinity.       Carrier Screening:   Expanded carrier screening for mutations in a large panel of genes associated with autosomal recessive conditions including cystic fibrosis, thalassemias, hearing loss, spinal muscular atrophy, and others, is now available. We also reviewed that expanded carrier screening can assess for common X-linked recessive disorders such as Fragile X syndrome.    We discussed that expanded carrier screening is designed to identify carrier status for conditions that are primarily childhood or adolescent onset. Expanded carrier screening does not evaluate for adult-onset conditions such as hereditary cancer syndromes, dementia/ Alzheimer's disease, or cardiovascular disease risk factors. Additionally, expanded carrier screening is not comprehensive for all known genetic diseases or inherited conditions. It will not intentionally screen for autosomal dominant conditions.      Hilaria is a known carrier of sickle cell disease. She declines expanded carrier screening today.       Risk Assessment for Chromosome Conditions:   We explained that the risk for fetal chromosome abnormalities increases with maternal age. We discussed specific features of common chromosome abnormalities, including Down syndrome, trisomy 13, trisomy 18, and sex chromosome conditions.      - At age 32 at midtrimester, the risk to have a baby with Down syndrome is 1 in 508.     - At age 32 at midtrimester, the risk to have a baby with any chromosome abnormality is 1 in 254.        Testing Options:   We discussed the following options:   Non-invasive Prenatal Testing (NIPT)    Maternal plasma cell-free DNA  testing; first trimester ultrasound with nuchal translucency and nasal bone assessment is recommended, when appropriate    Screens for fetal trisomy 21, trisomy 13, trisomy 18, and sex chromosome aneuploidy    Cannot screen for open neural tube defects; maternal serum AFP after 15 weeks is recommended    We discussed the difficulties of aneuploidy screening for twin pregnancies. Twin pregnancies are described by the presence or absence of sharing a a placenta or amniotic sac. Monochorionic-monoamniotic twins share a placenta and amniotic sac. Monochorionic-diamniotic twins share a placenta but have separate sacs. In most cases, with only rare exceptions, monochorionic twins are monozygotic (identical), which means one zygote split to produce twins. Lastly, dichorionic-diamniotic twins have separate placentas and separate amniotic sacs and have an estimated 20% chance of being monozygotic. Hilaria's twins are monochorionic twins and therefore monozygotic.     Less validation data is available for twins and some insurance plans will not cover this screening in twin pregnancies. Additionally, NIPT for twin pregnancies does not allow for assessment of all sex chromosome differences. NIPT run on a SNP platform (MonoSphere) can screen for monosomy X if the twins are identified to be monozygotic. However, the SNP-based NIPT platform has a ~10% failure rate for twins.      Quad screen:     Maternal plasma AFP, hCG, estriol, and inhibin measurement between 15-24 weeks gestation    Provides risk assessment for fetal Down syndrome, trisomy 18, and neural tube defects     Genetic Amniocentesis    Invasive procedure typically performed in the second trimester by which amniotic fluid is obtained for the purpose of chromosome analysis and/or other prenatal genetic analysis    Diagnostic results; >99% sensitivity for fetal chromosome abnormalities    AFAFP measurement tests for open neural tube defects     Comprehensive  (Level II) ultrasound: Detailed ultrasound performed between 18-22 weeks gestation to screen for major birth defects and markers for aneuploidy.      We reviewed the benefits and limitations of this testing.  Screening tests provide a risk assessment specific to the pregnancy for certain fetal chromosome abnormalities, but cannot definitively diagnose or exclude a fetal chromosome abnormality.  Follow-up genetic counseling and consideration of diagnostic testing is recommended with any abnormal screening result.     Diagnostic tests carry inherent risks- including risk of miscarriage- that require careful consideration.  These tests can detect fetal chromosome abnormalities with greater than 99% certainty.  Results can be compromised by maternal cell contamination or mosaicism, and are limited by the resolution of cytogenetic G-banding technology.  There is no screening nor diagnostic test that can detect all forms of birth defects or mental disability.     It was a pleasure to be involved with Hilaria s care. Face-to-face time of the meeting was 25 minutes.    Gia Mohamud MS, [facility]  Licensed Genetic Counselor  [facility]  Maternal Fetal Medicine  [email or web address]  [phone]

## 2022-07-06 ENCOUNTER — TELEPHONE (OUTPATIENT)
Dept: MATERNAL FETAL MEDICINE | Facility: CLINIC | Age: 32
End: 2022-07-06

## 2022-07-06 LAB — SCANNED LAB RESULT: NORMAL

## 2022-07-06 NOTE — TELEPHONE ENCOUNTER
July 6, 2022    I spoke with Hilaria regarding her NIPT results from [facility].     Results indicate NO ANEUPLOIDY DETECTED for chromosomes 21, 18, 13. The chance for monosomy X is also reduced.     This puts her current pregnancy at low risk for Down syndrome, trisomy 18, trisomy 13 and monosomy X. This test is reported to have the following sensitivities: Down syndrome- 99.0%, trisomy 18- 94.1%, and trisomy 13- >99%. Although these results are reassuring, this does not replace a standard chromosome analysis from a chorionic villus sampling or amniocentesis.     MSAFP is the appropriate second trimester screening test for open neural tube defects; the maternal quad screen is not recommended. Hilaria previously stated that she would prefer to have her AFP drawn by her OB provider.     Her results are available in her Epic chart for her primary OB to review.     Gia Mohamud MS, [facility]  Licensed Genetic Counselor  [facility]  Maternal Fetal Medicine  [email or web address]  [phone]

## 2022-07-13 ENCOUNTER — HOSPITAL ENCOUNTER (OUTPATIENT)
Dept: ULTRASOUND IMAGING | Facility: CLINIC | Age: 32
Discharge: HOME | End: 2022-07-13
Attending: OBSTETRICS & GYNECOLOGY
Payer: COMMERCIAL

## 2022-07-13 ENCOUNTER — OFFICE VISIT (OUTPATIENT)
Dept: MATERNAL FETAL MEDICINE | Facility: CLINIC | Age: 32
End: 2022-07-13
Attending: OBSTETRICS & GYNECOLOGY
Payer: COMMERCIAL

## 2022-07-13 DIAGNOSIS — O30.032 MONOCHORIONIC DIAMNIOTIC TWIN GESTATION IN SECOND TRIMESTER: Primary | ICD-10-CM

## 2022-07-13 DIAGNOSIS — O30.032 MONOCHORIONIC DIAMNIOTIC TWIN GESTATION IN SECOND TRIMESTER: ICD-10-CM

## 2022-07-13 PROCEDURE — 76820 UMBILICAL ARTERY ECHO: CPT | Mod: 26 | Performed by: OBSTETRICS & GYNECOLOGY

## 2022-07-13 PROCEDURE — 76821 MIDDLE CEREBRAL ARTERY ECHO: CPT | Mod: 26 | Performed by: OBSTETRICS & GYNECOLOGY

## 2022-07-13 PROCEDURE — 76816 OB US FOLLOW-UP PER FETUS: CPT | Mod: 26 | Performed by: OBSTETRICS & GYNECOLOGY

## 2022-07-13 PROCEDURE — 76821 MIDDLE CEREBRAL ARTERY ECHO: CPT

## 2022-07-13 PROCEDURE — 76816 OB US FOLLOW-UP PER FETUS: CPT | Mod: 59

## 2022-07-13 NOTE — PROGRESS NOTES
"Please see \"Imaging\" tab under \"Chart Review\" for details of today's US at the [facility].    Gilbert Yo MD  Maternal-Fetal Medicine    "

## 2022-07-26 ENCOUNTER — PRENATAL OFFICE VISIT (OUTPATIENT)
Dept: OBGYN | Facility: CLINIC | Age: 32
End: 2022-07-26
Payer: COMMERCIAL

## 2022-07-26 VITALS
HEART RATE: 89 BPM | WEIGHT: 187.5 LBS | SYSTOLIC BLOOD PRESSURE: 111 MMHG | BODY MASS INDEX: 36.62 KG/M2 | DIASTOLIC BLOOD PRESSURE: 79 MMHG | OXYGEN SATURATION: 98 %

## 2022-07-26 DIAGNOSIS — O30.031 MONOCHORIONIC DIAMNIOTIC TWIN PREGNANCY IN FIRST TRIMESTER: Primary | ICD-10-CM

## 2022-07-26 DIAGNOSIS — O99.011 ANEMIA AFFECTING PREGNANCY IN FIRST TRIMESTER: ICD-10-CM

## 2022-07-26 DIAGNOSIS — A63.0 CONDYLOMA ACUMINATUM: ICD-10-CM

## 2022-07-26 PROCEDURE — 99207 PR PRENATAL VISIT: CPT | Performed by: OBSTETRICS & GYNECOLOGY

## 2022-07-26 RX ORDER — ASPIRIN 81 MG/1
81 TABLET, CHEWABLE ORAL DAILY
Qty: 90 TABLET | Refills: 3 | Status: ON HOLD | OUTPATIENT
Start: 2022-07-26 | End: 2022-10-15

## 2022-07-26 RX ORDER — PRENATAL VIT/IRON FUM/FOLIC AC 27MG-0.8MG
1 TABLET ORAL DAILY
Qty: 90 TABLET | Refills: 3 | Status: ON HOLD | OUTPATIENT
Start: 2022-07-26 | End: 2022-10-16

## 2022-07-26 RX ORDER — ACETAMINOPHEN 500 MG
500-1000 TABLET ORAL EVERY 6 HOURS PRN
Qty: 100 TABLET | Refills: 3 | Status: ON HOLD | OUTPATIENT
Start: 2022-07-26 | End: 2022-10-16

## 2022-07-26 RX ORDER — IMIQUIMOD 12.5 MG/.25G
CREAM TOPICAL
Qty: 12 PACKET | Refills: 3 | Status: SHIPPED | OUTPATIENT
Start: 2022-07-26 | End: 2023-02-22

## 2022-07-26 RX ORDER — FERROUS SULFATE 325(65) MG
325 TABLET ORAL
Qty: 90 TABLET | Refills: 1 | Status: ON HOLD | OUTPATIENT
Start: 2022-07-26 | End: 2022-10-16

## 2022-07-26 NOTE — LETTER
[facility]  [address] [place] [id number]  [phone]      July 26, 2022      Hilaria Jansen  [address] [address] [place] [id number]              To Whom It May Concern:    Hilaria Jansen is being seen in our clinic for prenatal care.  She has a high risk twin pregnancy. She will need to take off work from August 7-29th due to her pregnancy and  issues.       Sincerely,          Brooke Capps MD

## 2022-07-26 NOTE — PROGRESS NOTES
Doing okay but very tired with the kids at home and her mother is leaving. Will need time off work and letter done today. Hopes to change her hours as well to 12-8:30 pm. Has small wart back on her buttock so aldara cream done. Refill of PNV, baby ASA, tylenol and Fe done today.  Has u/s tomorrow and every 2 weeks to check for TTTS. Discussed RTC 4 weeks and GCT. She plans repeat c/s 36-37 wks if all is going well and wanted to make sure I am back from [facility]. Discussed yes, but also may need earlier delivery depending on course of pregnancy. Having 2 girls this time. BE

## 2022-07-26 NOTE — LETTER
[facility]  [address] [place] [id number]  [phone]      July 26, 2022      Hilaria Jansen  [address] [place] [id number]              To Whom It May Concern:    Hilaria Jansen is being seen in our clinic for prenatal care.  She would like to adjust her working hours, 12:00 pm to 8:30 pm daily. She has a high risk pregnancy.       Sincerely,          Brooke Capps MD

## 2022-07-26 NOTE — LETTER
[place] [address] [place] [id number]  [phone]      July 26, 2022      Hilaria Jansen  [address] [place] [address] [place] [id number]              To Whom It May Concern:    Hilaria Jansen is being seen in our clinic for prenatal care.  Her Estimated Date of Delivery: Dec 9, 2022.  Patient's last menstrual period was 03/04/2022..  She will need to take off work from August 7-29th due to her pregnancy and  issues.       Sincerely,          Brooke Capps MD

## 2022-07-27 ENCOUNTER — HOSPITAL ENCOUNTER (OUTPATIENT)
Dept: ULTRASOUND IMAGING | Facility: CLINIC | Age: 32
Discharge: HOME | End: 2022-07-27
Attending: OBSTETRICS & GYNECOLOGY
Payer: COMMERCIAL

## 2022-07-27 ENCOUNTER — OFFICE VISIT (OUTPATIENT)
Dept: MATERNAL FETAL MEDICINE | Facility: CLINIC | Age: 32
End: 2022-07-27
Attending: OBSTETRICS & GYNECOLOGY
Payer: COMMERCIAL

## 2022-07-27 DIAGNOSIS — O26.90 PREGNANCY RELATED CONDITION, ANTEPARTUM: ICD-10-CM

## 2022-07-27 DIAGNOSIS — O30.032 MONOCHORIONIC DIAMNIOTIC TWIN GESTATION IN SECOND TRIMESTER: Primary | ICD-10-CM

## 2022-07-27 PROCEDURE — 76820 UMBILICAL ARTERY ECHO: CPT | Mod: 59

## 2022-07-27 PROCEDURE — 76812 OB US DETAILED ADDL FETUS: CPT | Mod: 26 | Performed by: OBSTETRICS & GYNECOLOGY

## 2022-07-27 PROCEDURE — 76811 OB US DETAILED SNGL FETUS: CPT

## 2022-07-27 PROCEDURE — 76811 OB US DETAILED SNGL FETUS: CPT | Mod: 26 | Performed by: OBSTETRICS & GYNECOLOGY

## 2022-07-27 PROCEDURE — 76820 UMBILICAL ARTERY ECHO: CPT | Mod: 26 | Performed by: OBSTETRICS & GYNECOLOGY

## 2022-07-27 PROCEDURE — 76821 MIDDLE CEREBRAL ARTERY ECHO: CPT | Mod: 26 | Performed by: OBSTETRICS & GYNECOLOGY

## 2022-08-03 ENCOUNTER — HOSPITAL ENCOUNTER (OUTPATIENT)
Dept: ULTRASOUND IMAGING | Facility: CLINIC | Age: 32
Discharge: HOME | End: 2022-08-03
Attending: OBSTETRICS & GYNECOLOGY
Payer: COMMERCIAL

## 2022-08-03 ENCOUNTER — OFFICE VISIT (OUTPATIENT)
Dept: MATERNAL FETAL MEDICINE | Facility: CLINIC | Age: 32
End: 2022-08-03
Attending: OBSTETRICS & GYNECOLOGY
Payer: COMMERCIAL

## 2022-08-03 DIAGNOSIS — O30.032 MONOCHORIONIC DIAMNIOTIC TWIN GESTATION IN SECOND TRIMESTER: ICD-10-CM

## 2022-08-03 DIAGNOSIS — O30.032 MONOCHORIONIC DIAMNIOTIC TWIN GESTATION IN SECOND TRIMESTER: Primary | ICD-10-CM

## 2022-08-03 DIAGNOSIS — O40.2XX1: ICD-10-CM

## 2022-08-03 PROCEDURE — 76816 OB US FOLLOW-UP PER FETUS: CPT | Mod: 26 | Performed by: OBSTETRICS & GYNECOLOGY

## 2022-08-03 PROCEDURE — 76820 UMBILICAL ARTERY ECHO: CPT | Mod: 26 | Performed by: OBSTETRICS & GYNECOLOGY

## 2022-08-03 PROCEDURE — 76816 OB US FOLLOW-UP PER FETUS: CPT

## 2022-08-03 PROCEDURE — 76820 UMBILICAL ARTERY ECHO: CPT | Mod: 59

## 2022-08-03 PROCEDURE — 76821 MIDDLE CEREBRAL ARTERY ECHO: CPT | Mod: 26 | Performed by: OBSTETRICS & GYNECOLOGY

## 2022-08-03 NOTE — PROGRESS NOTES
Please see the imaging tab for details of the ultrasound performed today.    Nahed Deshpande MD  Specialist in Maternal-Fetal Medicine

## 2022-08-10 ENCOUNTER — HOSPITAL ENCOUNTER (OUTPATIENT)
Dept: CARDIOLOGY | Facility: CLINIC | Age: 32
Discharge: HOME | End: 2022-08-10
Attending: OBSTETRICS & GYNECOLOGY
Payer: COMMERCIAL

## 2022-08-10 ENCOUNTER — OFFICE VISIT (OUTPATIENT)
Dept: CARDIOLOGY | Facility: CLINIC | Age: 32
End: 2022-08-10

## 2022-08-10 ENCOUNTER — HOSPITAL ENCOUNTER (OUTPATIENT)
Dept: ULTRASOUND IMAGING | Facility: CLINIC | Age: 32
Discharge: HOME | End: 2022-08-10
Attending: OBSTETRICS & GYNECOLOGY
Payer: COMMERCIAL

## 2022-08-10 ENCOUNTER — OFFICE VISIT (OUTPATIENT)
Dept: MATERNAL FETAL MEDICINE | Facility: CLINIC | Age: 32
End: 2022-08-10
Attending: OBSTETRICS & GYNECOLOGY
Payer: COMMERCIAL

## 2022-08-10 DIAGNOSIS — O35.BXX0 FETAL CARDIAC DISEASE AFFECTING PREGNANCY, SINGLE OR UNSPECIFIED FETUS: Primary | ICD-10-CM

## 2022-08-10 DIAGNOSIS — O30.032 MONOCHORIONIC DIAMNIOTIC TWIN GESTATION IN SECOND TRIMESTER: ICD-10-CM

## 2022-08-10 DIAGNOSIS — O30.032 MONOCHORIONIC DIAMNIOTIC TWIN GESTATION IN SECOND TRIMESTER: Primary | ICD-10-CM

## 2022-08-10 PROCEDURE — 76820 UMBILICAL ARTERY ECHO: CPT | Mod: 26 | Performed by: OBSTETRICS & GYNECOLOGY

## 2022-08-10 PROCEDURE — 93325 DOPPLER ECHO COLOR FLOW MAPG: CPT

## 2022-08-10 PROCEDURE — 76816 OB US FOLLOW-UP PER FETUS: CPT | Mod: 26 | Performed by: OBSTETRICS & GYNECOLOGY

## 2022-08-10 PROCEDURE — 76820 UMBILICAL ARTERY ECHO: CPT | Mod: 59

## 2022-08-10 PROCEDURE — 76825 ECHO EXAM OF FETAL HEART: CPT | Mod: 26 | Performed by: PEDIATRICS

## 2022-08-10 PROCEDURE — 93325 DOPPLER ECHO COLOR FLOW MAPG: CPT | Mod: 26 | Performed by: PEDIATRICS

## 2022-08-10 PROCEDURE — 99203 OFFICE O/P NEW LOW 30 MIN: CPT | Mod: 25 | Performed by: PEDIATRICS

## 2022-08-10 PROCEDURE — 76827 ECHO EXAM OF FETAL HEART: CPT | Mod: 26 | Performed by: PEDIATRICS

## 2022-08-10 PROCEDURE — 76816 OB US FOLLOW-UP PER FETUS: CPT

## 2022-08-10 PROCEDURE — 76821 MIDDLE CEREBRAL ARTERY ECHO: CPT | Mod: 26 | Performed by: OBSTETRICS & GYNECOLOGY

## 2022-08-10 PROCEDURE — 93325 DOPPLER ECHO COLOR FLOW MAPG: CPT | Mod: XU

## 2022-08-10 NOTE — PROGRESS NOTES
"Please see \"Imaging\" tab under \"Chart Review\" for details of today's US.    Mary Faith, DO    "

## 2022-08-10 NOTE — PROGRESS NOTES
[facility]   Heart Center Fetal Consult Note    Patient:  Hilaria Jansen MRN:  [id number]   YOB: 1990 Age:  32 year old   Date of Visit:  8/10/2022 PCP:  Clotilde Beltran APRN CNP     Dear Doctor,     I had the pleasure of seeing Hilaria Jansen at the [facility] on 8/10/2022 in fetal cardiology consultation for fetal echocardiogram results. She presented today accompanied by herself. As you know, she is a 32 year old  at 22w5d who presented for fetal echocardiogram today because of monochorionic, diamniotic twin gestation.    I performed and interpreted the fetal echocardiogram today, which demonstrated:   Fetus 1. Normal fetal cardiac anatomy. Normal fetal intracardiac connections. Normal right and left ventricular size and function. No hydrops.    Fetus 2. Normal fetal cardiac anatomy. Normal fetal intracardiac connections. Normal right and left ventricular size and function. No hydrops.    I reviewed the echo findings today with Hilaria Jansen. She is aware that the study was within normal limits with no major cardiac abnormalities. She is aware of the general limitations of fetal echocardiography. No additional fetal echocardiograms are recommended. No  cardiac follow-up is required.     Thank you for allowing me to participate in Hilaria's care. Please do not hesitate to contact me with questions or concerns.    This visit was separate from the performance and interpretation of the ultrasound. The majority of the time (>50%) was spent in counseling and coordination of care. I spent approximately 20 minutes in face-to-face time reviewing the above considerations.    Kayla Martino M.D.  Pediatric Cardiology  [address], 5th floor, [place] [address]  Phone [phone]  Fax [phone]

## 2022-08-17 ENCOUNTER — HOSPITAL ENCOUNTER (OUTPATIENT)
Dept: ULTRASOUND IMAGING | Facility: CLINIC | Age: 32
Discharge: HOME | End: 2022-08-17
Attending: OBSTETRICS & GYNECOLOGY
Payer: COMMERCIAL

## 2022-08-17 ENCOUNTER — OFFICE VISIT (OUTPATIENT)
Dept: MATERNAL FETAL MEDICINE | Facility: CLINIC | Age: 32
End: 2022-08-17
Attending: OBSTETRICS & GYNECOLOGY
Payer: COMMERCIAL

## 2022-08-17 DIAGNOSIS — O30.032 MONOCHORIONIC DIAMNIOTIC TWIN GESTATION IN SECOND TRIMESTER: ICD-10-CM

## 2022-08-17 DIAGNOSIS — O40.2XX1: ICD-10-CM

## 2022-08-17 DIAGNOSIS — O30.032 MONOCHORIONIC DIAMNIOTIC TWIN GESTATION IN SECOND TRIMESTER: Primary | ICD-10-CM

## 2022-08-17 PROCEDURE — 76821 MIDDLE CEREBRAL ARTERY ECHO: CPT

## 2022-08-17 PROCEDURE — 76816 OB US FOLLOW-UP PER FETUS: CPT

## 2022-08-17 PROCEDURE — 76821 MIDDLE CEREBRAL ARTERY ECHO: CPT | Mod: 26 | Performed by: OBSTETRICS & GYNECOLOGY

## 2022-08-17 PROCEDURE — 76816 OB US FOLLOW-UP PER FETUS: CPT | Mod: 26 | Performed by: OBSTETRICS & GYNECOLOGY

## 2022-08-17 PROCEDURE — 76820 UMBILICAL ARTERY ECHO: CPT | Mod: 26 | Performed by: OBSTETRICS & GYNECOLOGY

## 2022-08-17 NOTE — PROGRESS NOTES
"Please see \"Imaging\" tab under \"Chart Review\" for details of today's visit.    Reagan Taveras    "

## 2022-08-18 ENCOUNTER — LAB (OUTPATIENT)
Dept: LAB | Facility: CLINIC | Age: 32
End: 2022-08-18
Payer: COMMERCIAL

## 2022-08-18 ENCOUNTER — PRENATAL OFFICE VISIT (OUTPATIENT)
Dept: OBGYN | Facility: CLINIC | Age: 32
End: 2022-08-18

## 2022-08-18 VITALS
BODY MASS INDEX: 37.11 KG/M2 | HEART RATE: 86 BPM | OXYGEN SATURATION: 100 % | WEIGHT: 190 LBS | SYSTOLIC BLOOD PRESSURE: 107 MMHG | DIASTOLIC BLOOD PRESSURE: 69 MMHG

## 2022-08-18 DIAGNOSIS — O99.019 ANEMIA DURING PREGNANCY: ICD-10-CM

## 2022-08-18 DIAGNOSIS — O30.031 MONOCHORIONIC DIAMNIOTIC TWIN PREGNANCY IN FIRST TRIMESTER: ICD-10-CM

## 2022-08-18 DIAGNOSIS — O30.032 MONOCHORIONIC DIAMNIOTIC TWIN GESTATION IN SECOND TRIMESTER: Primary | ICD-10-CM

## 2022-08-18 LAB
ERYTHROCYTE [DISTWIDTH] IN BLOOD BY AUTOMATED COUNT: 14.9 % (ref 10–15)
FERRITIN SERPL-MCNC: 13 NG/ML (ref 12–150)
GLUCOSE 1H P 50 G GLC PO SERPL-MCNC: 133 MG/DL (ref 70–129)
HCT VFR BLD AUTO: 32.4 % (ref 35–47)
HGB BLD-MCNC: 10.6 G/DL (ref 11.7–15.7)
HGB BLD-MCNC: 10.9 G/DL (ref 11.7–15.7)
HOLD SPECIMEN: NORMAL
IRON SATN MFR SERPL: 25 % (ref 15–46)
IRON SERPL-MCNC: 106 UG/DL (ref 35–180)
MCH RBC QN AUTO: 27.1 PG (ref 26.5–33)
MCHC RBC AUTO-ENTMCNC: 32.7 G/DL (ref 31.5–36.5)
MCV RBC AUTO: 83 FL (ref 78–100)
PLATELET # BLD AUTO: 212 10E3/UL (ref 150–450)
RBC # BLD AUTO: 3.91 10E6/UL (ref 3.8–5.2)
TIBC SERPL-MCNC: 425 UG/DL (ref 240–430)
WBC # BLD AUTO: 6.9 10E3/UL (ref 4–11)

## 2022-08-18 PROCEDURE — 99207 PR PRENATAL VISIT: CPT | Performed by: OBSTETRICS & GYNECOLOGY

## 2022-08-18 PROCEDURE — 83550 IRON BINDING TEST: CPT

## 2022-08-18 PROCEDURE — 82950 GLUCOSE TEST: CPT

## 2022-08-18 PROCEDURE — 82728 ASSAY OF FERRITIN: CPT

## 2022-08-18 PROCEDURE — 85027 COMPLETE CBC AUTOMATED: CPT

## 2022-08-18 PROCEDURE — 36415 COLL VENOUS BLD VENIPUNCTURE: CPT

## 2022-08-18 RX ORDER — METHYLPREDNISOLONE SODIUM SUCCINATE 125 MG/2ML
125 INJECTION, POWDER, LYOPHILIZED, FOR SOLUTION INTRAMUSCULAR; INTRAVENOUS
Status: CANCELLED
Start: 2022-08-19

## 2022-08-18 RX ORDER — EPINEPHRINE 1 MG/ML
0.3 INJECTION, SOLUTION, CONCENTRATE INTRAVENOUS EVERY 5 MIN PRN
Status: CANCELLED | OUTPATIENT
Start: 2022-08-19

## 2022-08-18 RX ORDER — HEPARIN SODIUM,PORCINE 10 UNIT/ML
5 VIAL (ML) INTRAVENOUS
Status: CANCELLED | OUTPATIENT
Start: 2022-08-19

## 2022-08-18 RX ORDER — HEPARIN SODIUM (PORCINE) LOCK FLUSH IV SOLN 100 UNIT/ML 100 UNIT/ML
5 SOLUTION INTRAVENOUS
Status: CANCELLED | OUTPATIENT
Start: 2022-08-19

## 2022-08-18 RX ORDER — DIPHENHYDRAMINE HYDROCHLORIDE 50 MG/ML
50 INJECTION INTRAMUSCULAR; INTRAVENOUS
Status: CANCELLED
Start: 2022-08-19

## 2022-08-18 RX ORDER — ALBUTEROL SULFATE 0.83 MG/ML
2.5 SOLUTION RESPIRATORY (INHALATION)
Status: CANCELLED | OUTPATIENT
Start: 2022-08-19

## 2022-08-18 RX ORDER — ALBUTEROL SULFATE 90 UG/1
1-2 AEROSOL, METERED RESPIRATORY (INHALATION)
Status: CANCELLED
Start: 2022-08-19

## 2022-08-18 RX ORDER — MEPERIDINE HYDROCHLORIDE 25 MG/ML
25 INJECTION INTRAMUSCULAR; INTRAVENOUS; SUBCUTANEOUS EVERY 30 MIN PRN
Status: CANCELLED | OUTPATIENT
Start: 2022-08-19

## 2022-08-18 NOTE — PROGRESS NOTES
23w6d  Doing well. Active fetal movement x2. No contractions, no cramping.   Feeling tired. Already taking PO iron. Hgb today 10.6 and ferritin 13. Recommend IV iron, ordered  1h gtt 133, recommend 3h test, scheduled.   US yesterday - fetus 1 with poly, cephalic, EFW 57%, normal dopplers. Fetus 2 with normal fluid, cephalic, EFW 22%with increased resistance on umb artery doppler.   Plan weekly MFM US.  Plan growth US in 3 weeks.   RTC 4 weeks, sooner PRN.  Lucia Gannon MD

## 2022-08-23 ENCOUNTER — LAB (OUTPATIENT)
Dept: LAB | Facility: CLINIC | Age: 32
End: 2022-08-23
Payer: COMMERCIAL

## 2022-08-23 DIAGNOSIS — O26.90 PREGNANCY RELATED CONDITION, ANTEPARTUM: Primary | ICD-10-CM

## 2022-08-23 LAB
GESTATIONAL GTT 1 HR POST DOSE: 125 MG/DL (ref 60–179)
GESTATIONAL GTT 2 HR POST DOSE: 132 MG/DL (ref 60–154)
GESTATIONAL GTT 3 HR POST DOSE: 100 MG/DL (ref 60–139)
GLUCOSE P FAST SERPL-MCNC: 82 MG/DL (ref 60–94)

## 2022-08-23 PROCEDURE — 36415 COLL VENOUS BLD VENIPUNCTURE: CPT

## 2022-08-23 PROCEDURE — 82951 GLUCOSE TOLERANCE TEST (GTT): CPT

## 2022-08-23 PROCEDURE — 82952 GTT-ADDED SAMPLES: CPT

## 2022-08-24 ENCOUNTER — HOSPITAL ENCOUNTER (OUTPATIENT)
Dept: ULTRASOUND IMAGING | Facility: CLINIC | Age: 32
Discharge: HOME | End: 2022-08-24
Attending: OBSTETRICS & GYNECOLOGY
Payer: COMMERCIAL

## 2022-08-24 ENCOUNTER — OFFICE VISIT (OUTPATIENT)
Dept: MATERNAL FETAL MEDICINE | Facility: CLINIC | Age: 32
End: 2022-08-24
Attending: OBSTETRICS & GYNECOLOGY
Payer: COMMERCIAL

## 2022-08-24 DIAGNOSIS — O30.032 MONOCHORIONIC DIAMNIOTIC TWIN GESTATION IN SECOND TRIMESTER: Primary | ICD-10-CM

## 2022-08-24 DIAGNOSIS — O30.032 MONOCHORIONIC DIAMNIOTIC TWIN GESTATION IN SECOND TRIMESTER: ICD-10-CM

## 2022-08-24 PROCEDURE — 76816 OB US FOLLOW-UP PER FETUS: CPT

## 2022-08-24 PROCEDURE — 76820 UMBILICAL ARTERY ECHO: CPT | Mod: 26 | Performed by: OBSTETRICS & GYNECOLOGY

## 2022-08-24 PROCEDURE — 76820 UMBILICAL ARTERY ECHO: CPT | Mod: 59

## 2022-08-24 PROCEDURE — 76816 OB US FOLLOW-UP PER FETUS: CPT | Mod: 26 | Performed by: OBSTETRICS & GYNECOLOGY

## 2022-08-24 PROCEDURE — 76821 MIDDLE CEREBRAL ARTERY ECHO: CPT | Mod: 26 | Performed by: OBSTETRICS & GYNECOLOGY

## 2022-08-24 NOTE — PROGRESS NOTES
"Please see \"Imaging\" tab under \"Chart Review\" for details of today's US at the [facility].    Gilbert Yo MD  Maternal-Fetal Medicine    "

## 2022-08-26 ENCOUNTER — HOSPITAL ENCOUNTER (OUTPATIENT)
Dept: ULTRASOUND IMAGING | Facility: CLINIC | Age: 32
Discharge: HOME | End: 2022-08-26
Attending: OBSTETRICS & GYNECOLOGY
Payer: COMMERCIAL

## 2022-08-26 ENCOUNTER — OFFICE VISIT (OUTPATIENT)
Dept: MATERNAL FETAL MEDICINE | Facility: CLINIC | Age: 32
End: 2022-08-26
Attending: OBSTETRICS & GYNECOLOGY
Payer: COMMERCIAL

## 2022-08-26 DIAGNOSIS — O40.2XX1: ICD-10-CM

## 2022-08-26 DIAGNOSIS — O30.032 MONOCHORIONIC DIAMNIOTIC TWIN GESTATION IN SECOND TRIMESTER: Primary | ICD-10-CM

## 2022-08-26 DIAGNOSIS — O30.032 MONOCHORIONIC DIAMNIOTIC TWIN GESTATION IN SECOND TRIMESTER: ICD-10-CM

## 2022-08-26 PROCEDURE — 76816 OB US FOLLOW-UP PER FETUS: CPT | Mod: 26 | Performed by: OBSTETRICS & GYNECOLOGY

## 2022-08-26 PROCEDURE — 76820 UMBILICAL ARTERY ECHO: CPT | Mod: 26 | Performed by: OBSTETRICS & GYNECOLOGY

## 2022-08-26 PROCEDURE — 76821 MIDDLE CEREBRAL ARTERY ECHO: CPT

## 2022-08-26 PROCEDURE — 76821 MIDDLE CEREBRAL ARTERY ECHO: CPT | Mod: 26 | Performed by: OBSTETRICS & GYNECOLOGY

## 2022-08-26 PROCEDURE — 76816 OB US FOLLOW-UP PER FETUS: CPT

## 2022-08-26 NOTE — PROGRESS NOTES
Please refer to ultrasound report under 'Imaging' Studies of 'Chart Review' tabs.    Sacha Payne M.D.

## 2022-08-30 ENCOUNTER — HOSPITAL ENCOUNTER (OUTPATIENT)
Dept: ULTRASOUND IMAGING | Facility: CLINIC | Age: 32
Discharge: HOME | End: 2022-08-30
Attending: OBSTETRICS & GYNECOLOGY
Payer: COMMERCIAL

## 2022-08-30 ENCOUNTER — OFFICE VISIT (OUTPATIENT)
Dept: MATERNAL FETAL MEDICINE | Facility: CLINIC | Age: 32
End: 2022-08-30
Attending: OBSTETRICS & GYNECOLOGY
Payer: COMMERCIAL

## 2022-08-30 DIAGNOSIS — O40.3XX1 POLYHYDRAMNIOS IN THIRD TRIMESTER, FETUS 1: ICD-10-CM

## 2022-08-30 DIAGNOSIS — O30.032 MONOCHORIONIC DIAMNIOTIC TWIN GESTATION IN SECOND TRIMESTER: Primary | ICD-10-CM

## 2022-08-30 DIAGNOSIS — O30.032 MONOCHORIONIC DIAMNIOTIC TWIN GESTATION IN SECOND TRIMESTER: ICD-10-CM

## 2022-08-30 PROCEDURE — 76821 MIDDLE CEREBRAL ARTERY ECHO: CPT | Mod: 26 | Performed by: OBSTETRICS & GYNECOLOGY

## 2022-08-30 PROCEDURE — 76816 OB US FOLLOW-UP PER FETUS: CPT

## 2022-08-30 PROCEDURE — 76820 UMBILICAL ARTERY ECHO: CPT | Mod: 59

## 2022-08-30 PROCEDURE — 76816 OB US FOLLOW-UP PER FETUS: CPT | Mod: 26 | Performed by: OBSTETRICS & GYNECOLOGY

## 2022-08-30 PROCEDURE — 76820 UMBILICAL ARTERY ECHO: CPT | Mod: 26 | Performed by: OBSTETRICS & GYNECOLOGY

## 2022-09-01 ENCOUNTER — ANCILLARY PROCEDURE (OUTPATIENT)
Dept: ULTRASOUND IMAGING | Facility: HOSPITAL | Age: 32
End: 2022-09-01
Attending: OBSTETRICS & GYNECOLOGY
Payer: COMMERCIAL

## 2022-09-01 ENCOUNTER — OFFICE VISIT (OUTPATIENT)
Dept: MATERNAL FETAL MEDICINE | Facility: HOSPITAL | Age: 32
End: 2022-09-01
Attending: OBSTETRICS & GYNECOLOGY
Payer: COMMERCIAL

## 2022-09-01 DIAGNOSIS — O30.032 MONOCHORIONIC DIAMNIOTIC TWIN GESTATION IN SECOND TRIMESTER: ICD-10-CM

## 2022-09-01 DIAGNOSIS — O30.032 MONOCHORIONIC DIAMNIOTIC TWIN GESTATION IN SECOND TRIMESTER: Primary | ICD-10-CM

## 2022-09-01 PROCEDURE — 76821 MIDDLE CEREBRAL ARTERY ECHO: CPT | Mod: 26 | Performed by: OBSTETRICS & GYNECOLOGY

## 2022-09-01 PROCEDURE — 99207 PR NO CHARGE LOS: CPT | Performed by: OBSTETRICS & GYNECOLOGY

## 2022-09-01 PROCEDURE — 76816 OB US FOLLOW-UP PER FETUS: CPT | Mod: 26 | Performed by: OBSTETRICS & GYNECOLOGY

## 2022-09-01 PROCEDURE — 76820 UMBILICAL ARTERY ECHO: CPT | Mod: 26 | Performed by: OBSTETRICS & GYNECOLOGY

## 2022-09-01 PROCEDURE — 76820 UMBILICAL ARTERY ECHO: CPT | Mod: 59

## 2022-09-01 PROCEDURE — 76816 OB US FOLLOW-UP PER FETUS: CPT | Mod: 59

## 2022-09-01 NOTE — PROGRESS NOTES
"Please see \"Imaging\" tab under Chart Review for full details.    Neetu Langford MD  Maternal Fetal Medicine    "

## 2022-09-06 ENCOUNTER — OFFICE VISIT (OUTPATIENT)
Dept: MATERNAL FETAL MEDICINE | Facility: CLINIC | Age: 32
End: 2022-09-06
Attending: OBSTETRICS & GYNECOLOGY
Payer: COMMERCIAL

## 2022-09-06 ENCOUNTER — HOSPITAL ENCOUNTER (OUTPATIENT)
Dept: ULTRASOUND IMAGING | Facility: CLINIC | Age: 32
Discharge: HOME | End: 2022-09-06
Attending: OBSTETRICS & GYNECOLOGY
Payer: COMMERCIAL

## 2022-09-06 ENCOUNTER — PRE VISIT (OUTPATIENT)
Dept: MATERNAL FETAL MEDICINE | Facility: CLINIC | Age: 32
End: 2022-09-06

## 2022-09-06 DIAGNOSIS — O30.032 MONOCHORIONIC DIAMNIOTIC TWIN GESTATION IN SECOND TRIMESTER: Primary | ICD-10-CM

## 2022-09-06 DIAGNOSIS — O30.032 MONOCHORIONIC DIAMNIOTIC TWIN GESTATION IN SECOND TRIMESTER: ICD-10-CM

## 2022-09-06 PROCEDURE — 76820 UMBILICAL ARTERY ECHO: CPT | Mod: 59

## 2022-09-06 PROCEDURE — 76816 OB US FOLLOW-UP PER FETUS: CPT | Mod: 59

## 2022-09-06 PROCEDURE — 76816 OB US FOLLOW-UP PER FETUS: CPT | Mod: 26 | Performed by: OBSTETRICS & GYNECOLOGY

## 2022-09-06 PROCEDURE — 76820 UMBILICAL ARTERY ECHO: CPT | Mod: 26 | Performed by: OBSTETRICS & GYNECOLOGY

## 2022-09-06 PROCEDURE — 76821 MIDDLE CEREBRAL ARTERY ECHO: CPT | Mod: 26 | Performed by: OBSTETRICS & GYNECOLOGY

## 2022-09-06 NOTE — PROGRESS NOTES
The patient was seen for an ultrasound in the Maternal-Fetal Medicine Center at the [facility] today.  For a detailed report of the ultrasound examination, please see the ultrasound report which can be found under the imaging tab.    Elisha Ayala MD  , OB/GYN  Maternal-Fetal Medicine  [phone] (Pager)

## 2022-09-06 NOTE — LETTER
9/6/2022  JustynaHilaria   1990      To Whom It May Concern:    Hilaria was seen for a doctor's appointment today, please excuse her from work due to a medical need.    Sincerely,          Dr. Elisha Ayala

## 2022-09-08 ENCOUNTER — OFFICE VISIT (OUTPATIENT)
Dept: MATERNAL FETAL MEDICINE | Facility: CLINIC | Age: 32
End: 2022-09-08
Attending: OBSTETRICS & GYNECOLOGY
Payer: COMMERCIAL

## 2022-09-08 ENCOUNTER — HOSPITAL ENCOUNTER (OUTPATIENT)
Dept: ULTRASOUND IMAGING | Facility: CLINIC | Age: 32
Discharge: HOME | End: 2022-09-08
Attending: OBSTETRICS & GYNECOLOGY
Payer: COMMERCIAL

## 2022-09-08 DIAGNOSIS — O30.032 MONOCHORIONIC DIAMNIOTIC TWIN GESTATION IN SECOND TRIMESTER: ICD-10-CM

## 2022-09-08 DIAGNOSIS — O30.032 MONOCHORIONIC DIAMNIOTIC TWIN GESTATION IN SECOND TRIMESTER: Primary | ICD-10-CM

## 2022-09-08 PROCEDURE — 76816 OB US FOLLOW-UP PER FETUS: CPT | Mod: 26 | Performed by: OBSTETRICS & GYNECOLOGY

## 2022-09-08 PROCEDURE — 76820 UMBILICAL ARTERY ECHO: CPT | Mod: 26 | Performed by: OBSTETRICS & GYNECOLOGY

## 2022-09-08 PROCEDURE — 76816 OB US FOLLOW-UP PER FETUS: CPT

## 2022-09-08 PROCEDURE — 76821 MIDDLE CEREBRAL ARTERY ECHO: CPT | Mod: 26 | Performed by: OBSTETRICS & GYNECOLOGY

## 2022-09-08 PROCEDURE — 76821 MIDDLE CEREBRAL ARTERY ECHO: CPT

## 2022-09-11 ENCOUNTER — HEALTH MAINTENANCE LETTER (OUTPATIENT)
Age: 32
End: 2022-09-11

## 2022-09-12 ENCOUNTER — HOSPITAL ENCOUNTER (OUTPATIENT)
Dept: ULTRASOUND IMAGING | Facility: CLINIC | Age: 32
Discharge: HOME | End: 2022-09-12
Attending: OBSTETRICS & GYNECOLOGY
Payer: COMMERCIAL

## 2022-09-12 ENCOUNTER — OFFICE VISIT (OUTPATIENT)
Dept: MATERNAL FETAL MEDICINE | Facility: CLINIC | Age: 32
End: 2022-09-12
Attending: OBSTETRICS & GYNECOLOGY
Payer: COMMERCIAL

## 2022-09-12 DIAGNOSIS — O30.032 MONOCHORIONIC DIAMNIOTIC TWIN GESTATION IN SECOND TRIMESTER: Primary | ICD-10-CM

## 2022-09-12 DIAGNOSIS — O30.032 MONOCHORIONIC DIAMNIOTIC TWIN GESTATION IN SECOND TRIMESTER: ICD-10-CM

## 2022-09-12 PROCEDURE — 76816 OB US FOLLOW-UP PER FETUS: CPT | Mod: 26 | Performed by: OBSTETRICS & GYNECOLOGY

## 2022-09-12 PROCEDURE — 76816 OB US FOLLOW-UP PER FETUS: CPT

## 2022-09-12 PROCEDURE — 76821 MIDDLE CEREBRAL ARTERY ECHO: CPT | Mod: 26 | Performed by: OBSTETRICS & GYNECOLOGY

## 2022-09-12 PROCEDURE — 76821 MIDDLE CEREBRAL ARTERY ECHO: CPT

## 2022-09-12 PROCEDURE — 76820 UMBILICAL ARTERY ECHO: CPT | Mod: 26 | Performed by: OBSTETRICS & GYNECOLOGY

## 2022-09-12 NOTE — PROGRESS NOTES
27w4d  Getting more uncomfortable.  Declines maternity support belt.  No contractions, vaginal bleeding or leakage of fluid.  +FM x 2  Normal GTT.   Elevated dopplers for both babies on MFM US yesterday.  Has close follow-up scheduled.  Tdap and flu today.  RTC 4w, already scheduled.  Lucia Rascon MD

## 2022-09-12 NOTE — PROGRESS NOTES
The patient was seen for an ultrasound in the Maternal-Fetal Medicine Center at the [facility] today.  For a detailed report of the ultrasound examination, please see the ultrasound report which can be found under the imaging tab.    Elisha Ayala MD  , OB/GYN  Maternal-Fetal Medicine  [phone] (Pager)

## 2022-09-13 ENCOUNTER — PRENATAL OFFICE VISIT (OUTPATIENT)
Dept: OBGYN | Facility: CLINIC | Age: 32
End: 2022-09-13
Payer: COMMERCIAL

## 2022-09-13 VITALS
BODY MASS INDEX: 37.69 KG/M2 | OXYGEN SATURATION: 98 % | HEART RATE: 109 BPM | DIASTOLIC BLOOD PRESSURE: 76 MMHG | SYSTOLIC BLOOD PRESSURE: 126 MMHG | WEIGHT: 193 LBS

## 2022-09-13 DIAGNOSIS — Z23 NEED FOR TDAP VACCINATION: ICD-10-CM

## 2022-09-13 DIAGNOSIS — Z23 NEED FOR PROPHYLACTIC VACCINATION AND INOCULATION AGAINST INFLUENZA: ICD-10-CM

## 2022-09-13 DIAGNOSIS — O30.032 MONOCHORIONIC DIAMNIOTIC TWIN GESTATION IN SECOND TRIMESTER: Primary | ICD-10-CM

## 2022-09-13 PROCEDURE — 90472 IMMUNIZATION ADMIN EACH ADD: CPT | Performed by: OBSTETRICS & GYNECOLOGY

## 2022-09-13 PROCEDURE — 90471 IMMUNIZATION ADMIN: CPT | Performed by: OBSTETRICS & GYNECOLOGY

## 2022-09-13 PROCEDURE — 90715 TDAP VACCINE 7 YRS/> IM: CPT | Performed by: OBSTETRICS & GYNECOLOGY

## 2022-09-13 PROCEDURE — 99207 PR PRENATAL VISIT: CPT | Performed by: OBSTETRICS & GYNECOLOGY

## 2022-09-13 PROCEDURE — 90686 IIV4 VACC NO PRSV 0.5 ML IM: CPT | Performed by: OBSTETRICS & GYNECOLOGY

## 2022-09-16 ENCOUNTER — HOSPITAL ENCOUNTER (OUTPATIENT)
Dept: ULTRASOUND IMAGING | Facility: CLINIC | Age: 32
Discharge: HOME | End: 2022-09-16
Attending: OBSTETRICS & GYNECOLOGY
Payer: COMMERCIAL

## 2022-09-16 ENCOUNTER — OFFICE VISIT (OUTPATIENT)
Dept: MATERNAL FETAL MEDICINE | Facility: CLINIC | Age: 32
End: 2022-09-16
Attending: OBSTETRICS & GYNECOLOGY
Payer: COMMERCIAL

## 2022-09-16 DIAGNOSIS — O30.032 MONOCHORIONIC DIAMNIOTIC TWIN GESTATION IN SECOND TRIMESTER: ICD-10-CM

## 2022-09-16 DIAGNOSIS — O30.032 MONOCHORIONIC DIAMNIOTIC TWIN GESTATION IN SECOND TRIMESTER: Primary | ICD-10-CM

## 2022-09-16 PROCEDURE — 76816 OB US FOLLOW-UP PER FETUS: CPT | Mod: 59

## 2022-09-16 PROCEDURE — 76820 UMBILICAL ARTERY ECHO: CPT | Mod: 26 | Performed by: OBSTETRICS & GYNECOLOGY

## 2022-09-16 PROCEDURE — 76821 MIDDLE CEREBRAL ARTERY ECHO: CPT | Mod: 26 | Performed by: OBSTETRICS & GYNECOLOGY

## 2022-09-16 PROCEDURE — 76816 OB US FOLLOW-UP PER FETUS: CPT | Mod: 26 | Performed by: OBSTETRICS & GYNECOLOGY

## 2022-09-16 PROCEDURE — 76821 MIDDLE CEREBRAL ARTERY ECHO: CPT

## 2022-09-20 ENCOUNTER — HOSPITAL ENCOUNTER (OUTPATIENT)
Dept: ULTRASOUND IMAGING | Facility: CLINIC | Age: 32
Discharge: HOME | End: 2022-09-20
Attending: OBSTETRICS & GYNECOLOGY
Payer: COMMERCIAL

## 2022-09-20 ENCOUNTER — OFFICE VISIT (OUTPATIENT)
Dept: MATERNAL FETAL MEDICINE | Facility: CLINIC | Age: 32
End: 2022-09-20
Attending: OBSTETRICS & GYNECOLOGY
Payer: COMMERCIAL

## 2022-09-20 DIAGNOSIS — O30.033 MONOCHORIONIC DIAMNIOTIC TWIN GESTATION IN THIRD TRIMESTER: Primary | ICD-10-CM

## 2022-09-20 DIAGNOSIS — O30.032 MONOCHORIONIC DIAMNIOTIC TWIN GESTATION IN SECOND TRIMESTER: ICD-10-CM

## 2022-09-20 PROCEDURE — 76820 UMBILICAL ARTERY ECHO: CPT | Mod: 59

## 2022-09-20 PROCEDURE — 76821 MIDDLE CEREBRAL ARTERY ECHO: CPT | Mod: 26 | Performed by: OBSTETRICS & GYNECOLOGY

## 2022-09-20 PROCEDURE — 76816 OB US FOLLOW-UP PER FETUS: CPT

## 2022-09-20 PROCEDURE — 76820 UMBILICAL ARTERY ECHO: CPT | Mod: 26 | Performed by: OBSTETRICS & GYNECOLOGY

## 2022-09-20 PROCEDURE — 76816 OB US FOLLOW-UP PER FETUS: CPT | Mod: 26 | Performed by: OBSTETRICS & GYNECOLOGY

## 2022-09-21 ENCOUNTER — OFFICE VISIT (OUTPATIENT)
Dept: MATERNAL FETAL MEDICINE | Facility: CLINIC | Age: 32
End: 2022-09-21
Attending: OBSTETRICS & GYNECOLOGY
Payer: COMMERCIAL

## 2022-09-21 ENCOUNTER — HOSPITAL ENCOUNTER (OUTPATIENT)
Dept: ULTRASOUND IMAGING | Facility: CLINIC | Age: 32
Discharge: HOME | End: 2022-09-21
Attending: OBSTETRICS & GYNECOLOGY
Payer: COMMERCIAL

## 2022-09-21 DIAGNOSIS — O30.033 MONOCHORIONIC DIAMNIOTIC TWIN GESTATION IN THIRD TRIMESTER: Primary | ICD-10-CM

## 2022-09-21 DIAGNOSIS — O30.033 MONOCHORIONIC DIAMNIOTIC TWIN GESTATION IN THIRD TRIMESTER: ICD-10-CM

## 2022-09-21 PROCEDURE — 76821 MIDDLE CEREBRAL ARTERY ECHO: CPT | Mod: 26 | Performed by: STUDENT IN AN ORGANIZED HEALTH CARE EDUCATION/TRAINING PROGRAM

## 2022-09-21 PROCEDURE — 76820 UMBILICAL ARTERY ECHO: CPT | Mod: 26 | Performed by: STUDENT IN AN ORGANIZED HEALTH CARE EDUCATION/TRAINING PROGRAM

## 2022-09-21 PROCEDURE — 76820 UMBILICAL ARTERY ECHO: CPT | Mod: 59

## 2022-09-21 PROCEDURE — 76816 OB US FOLLOW-UP PER FETUS: CPT | Mod: 59

## 2022-09-21 PROCEDURE — 76816 OB US FOLLOW-UP PER FETUS: CPT | Mod: 26 | Performed by: STUDENT IN AN ORGANIZED HEALTH CARE EDUCATION/TRAINING PROGRAM

## 2022-09-21 NOTE — PROGRESS NOTES
Please see the full imaging report from the [employer] program under the imaging tab.    Rachel Chaudhry MD  Maternal Fetal Medicine

## 2022-09-23 ENCOUNTER — HOSPITAL ENCOUNTER (OUTPATIENT)
Dept: ULTRASOUND IMAGING | Facility: CLINIC | Age: 32
Discharge: HOME | End: 2022-09-23
Attending: OBSTETRICS & GYNECOLOGY
Payer: COMMERCIAL

## 2022-09-23 ENCOUNTER — OFFICE VISIT (OUTPATIENT)
Dept: MATERNAL FETAL MEDICINE | Facility: CLINIC | Age: 32
End: 2022-09-23
Attending: OBSTETRICS & GYNECOLOGY
Payer: COMMERCIAL

## 2022-09-23 DIAGNOSIS — O30.032 MONOCHORIONIC DIAMNIOTIC TWIN GESTATION IN SECOND TRIMESTER: ICD-10-CM

## 2022-09-23 DIAGNOSIS — O30.033 MONOCHORIONIC DIAMNIOTIC TWIN GESTATION IN THIRD TRIMESTER: Primary | ICD-10-CM

## 2022-09-23 PROCEDURE — 76820 UMBILICAL ARTERY ECHO: CPT | Mod: 26 | Performed by: OBSTETRICS & GYNECOLOGY

## 2022-09-23 PROCEDURE — 76816 OB US FOLLOW-UP PER FETUS: CPT | Mod: 26 | Performed by: OBSTETRICS & GYNECOLOGY

## 2022-09-23 PROCEDURE — 76821 MIDDLE CEREBRAL ARTERY ECHO: CPT

## 2022-09-23 PROCEDURE — 76816 OB US FOLLOW-UP PER FETUS: CPT | Mod: 59

## 2022-09-23 PROCEDURE — 76821 MIDDLE CEREBRAL ARTERY ECHO: CPT | Mod: 26 | Performed by: OBSTETRICS & GYNECOLOGY

## 2022-09-27 ENCOUNTER — HOSPITAL ENCOUNTER (OUTPATIENT)
Dept: ULTRASOUND IMAGING | Facility: CLINIC | Age: 32
Discharge: HOME | End: 2022-09-27
Attending: OBSTETRICS & GYNECOLOGY
Payer: COMMERCIAL

## 2022-09-27 ENCOUNTER — OFFICE VISIT (OUTPATIENT)
Dept: MATERNAL FETAL MEDICINE | Facility: CLINIC | Age: 32
End: 2022-09-27
Attending: OBSTETRICS & GYNECOLOGY
Payer: COMMERCIAL

## 2022-09-27 VITALS — DIASTOLIC BLOOD PRESSURE: 73 MMHG | HEART RATE: 89 BPM | SYSTOLIC BLOOD PRESSURE: 116 MMHG

## 2022-09-27 DIAGNOSIS — O30.033 MONOCHORIONIC DIAMNIOTIC TWIN GESTATION IN THIRD TRIMESTER: Primary | ICD-10-CM

## 2022-09-27 DIAGNOSIS — O36.5990 PREGNANCY AFFECTED BY FETAL GROWTH RESTRICTION: ICD-10-CM

## 2022-09-27 DIAGNOSIS — O30.032 MONOCHORIONIC DIAMNIOTIC TWIN GESTATION IN SECOND TRIMESTER: ICD-10-CM

## 2022-09-27 PROCEDURE — 59025 FETAL NON-STRESS TEST: CPT | Mod: 26 | Performed by: OBSTETRICS & GYNECOLOGY

## 2022-09-27 PROCEDURE — 76816 OB US FOLLOW-UP PER FETUS: CPT | Mod: 59

## 2022-09-27 PROCEDURE — 76820 UMBILICAL ARTERY ECHO: CPT | Mod: 26 | Performed by: OBSTETRICS & GYNECOLOGY

## 2022-09-27 PROCEDURE — 76816 OB US FOLLOW-UP PER FETUS: CPT | Mod: 26 | Performed by: OBSTETRICS & GYNECOLOGY

## 2022-09-27 PROCEDURE — 76821 MIDDLE CEREBRAL ARTERY ECHO: CPT | Mod: 26 | Performed by: OBSTETRICS & GYNECOLOGY

## 2022-09-27 PROCEDURE — 99213 OFFICE O/P EST LOW 20 MIN: CPT | Mod: 25 | Performed by: OBSTETRICS & GYNECOLOGY

## 2022-09-27 PROCEDURE — 59025 FETAL NON-STRESS TEST: CPT

## 2022-09-27 PROCEDURE — 76821 MIDDLE CEREBRAL ARTERY ECHO: CPT

## 2022-09-27 PROCEDURE — 59025 FETAL NON-STRESS TEST: CPT | Mod: 59

## 2022-09-27 NOTE — PROGRESS NOTES
The patient was seen for an ultrasound in the Maternal-Fetal Medicine Center at the [facility] today.  For a detailed report of the ultrasound examination, please see the ultrasound report which can be found under the imaging tab.    Elisha Ayala MD  , OB/GYN  Maternal-Fetal Medicine  [phone] (Pager)

## 2022-09-28 ENCOUNTER — TELEPHONE (OUTPATIENT)
Dept: OBGYN | Facility: CLINIC | Age: 32
End: 2022-09-28

## 2022-09-28 NOTE — TELEPHONE ENCOUNTER
----- Message from Lucia Gannon MD sent at 9/28/2022  7:58 AM CDT -----  Please call the patient and help her schedule an appointment in the next 1-2 weeks.  Thanks,  Lucia Gannon MD

## 2022-09-30 ENCOUNTER — HOSPITAL ENCOUNTER (OUTPATIENT)
Dept: ULTRASOUND IMAGING | Facility: CLINIC | Age: 32
Discharge: HOME | End: 2022-09-30
Attending: OBSTETRICS & GYNECOLOGY
Payer: COMMERCIAL

## 2022-09-30 ENCOUNTER — OFFICE VISIT (OUTPATIENT)
Dept: MATERNAL FETAL MEDICINE | Facility: CLINIC | Age: 32
End: 2022-09-30
Attending: OBSTETRICS & GYNECOLOGY
Payer: COMMERCIAL

## 2022-09-30 DIAGNOSIS — O30.032 MONOCHORIONIC DIAMNIOTIC TWIN GESTATION IN SECOND TRIMESTER: ICD-10-CM

## 2022-09-30 DIAGNOSIS — O30.033 MONOCHORIONIC DIAMNIOTIC TWIN GESTATION IN THIRD TRIMESTER: Primary | ICD-10-CM

## 2022-09-30 PROCEDURE — 76816 OB US FOLLOW-UP PER FETUS: CPT | Mod: 26 | Performed by: OBSTETRICS & GYNECOLOGY

## 2022-09-30 PROCEDURE — 76821 MIDDLE CEREBRAL ARTERY ECHO: CPT

## 2022-09-30 PROCEDURE — 76820 UMBILICAL ARTERY ECHO: CPT | Mod: 26 | Performed by: OBSTETRICS & GYNECOLOGY

## 2022-09-30 PROCEDURE — 76816 OB US FOLLOW-UP PER FETUS: CPT

## 2022-09-30 PROCEDURE — 76821 MIDDLE CEREBRAL ARTERY ECHO: CPT | Mod: 26 | Performed by: OBSTETRICS & GYNECOLOGY

## 2022-09-30 NOTE — PROGRESS NOTES
"Please see \"Imaging\" tab under \"Chart Review\" for details of today's US at the [facility].    Gilbert Yo MD  Maternal-Fetal Medicine    "

## 2022-10-03 ENCOUNTER — PRENATAL OFFICE VISIT (OUTPATIENT)
Dept: OBGYN | Facility: CLINIC | Age: 32
End: 2022-10-03
Payer: COMMERCIAL

## 2022-10-03 VITALS
DIASTOLIC BLOOD PRESSURE: 77 MMHG | BODY MASS INDEX: 38.67 KG/M2 | SYSTOLIC BLOOD PRESSURE: 126 MMHG | HEART RATE: 119 BPM | WEIGHT: 198 LBS | TEMPERATURE: 97.3 F

## 2022-10-03 DIAGNOSIS — O30.033 MONOCHORIONIC DIAMNIOTIC TWIN GESTATION IN THIRD TRIMESTER: Primary | ICD-10-CM

## 2022-10-03 DIAGNOSIS — O36.5990 FETAL GROWTH RESTRICTION ANTEPARTUM: ICD-10-CM

## 2022-10-03 PROCEDURE — 99207 PR PRENATAL VISIT: CPT | Performed by: OBSTETRICS & GYNECOLOGY

## 2022-10-03 NOTE — PROGRESS NOTES
Doing well.   Occ headaches.   Feels fetal movement, but can't tell which twin movement comes from   BPP tomorrow.   Plans IUD for birth control.   Will be needing help after babies are born, would like a letter to help her sister get a visa.     Growth US 9/27 showed EFW at 9th/10th percentiles.   Has been having abnormal dopplers in twin 2, now present in twin 1 as well.   Has weekly BPP/NST scheduled.   Reviewed plan that at 9/23 ultrasound, based on growth, she may need to get betamethasone for fetal lung maturity and proceed with delivery. Otherwise plan for 34 weeks.   Answered questions about delivery at 32-34 weeks.   RTC weekly.

## 2022-10-04 ENCOUNTER — HOSPITAL ENCOUNTER (OUTPATIENT)
Dept: ULTRASOUND IMAGING | Facility: CLINIC | Age: 32
Discharge: HOME | End: 2022-10-04
Attending: OBSTETRICS & GYNECOLOGY
Payer: COMMERCIAL

## 2022-10-04 ENCOUNTER — OFFICE VISIT (OUTPATIENT)
Dept: MATERNAL FETAL MEDICINE | Facility: CLINIC | Age: 32
End: 2022-10-04
Attending: OBSTETRICS & GYNECOLOGY
Payer: COMMERCIAL

## 2022-10-04 DIAGNOSIS — O30.033 MONOCHORIONIC DIAMNIOTIC TWIN GESTATION IN THIRD TRIMESTER: ICD-10-CM

## 2022-10-04 DIAGNOSIS — O36.5990 PREGNANCY AFFECTED BY FETAL GROWTH RESTRICTION: Primary | ICD-10-CM

## 2022-10-04 DIAGNOSIS — O30.032 MONOCHORIONIC DIAMNIOTIC TWIN GESTATION IN SECOND TRIMESTER: ICD-10-CM

## 2022-10-04 PROCEDURE — 59025 FETAL NON-STRESS TEST: CPT | Mod: 26 | Performed by: OBSTETRICS & GYNECOLOGY

## 2022-10-04 PROCEDURE — 76820 UMBILICAL ARTERY ECHO: CPT | Mod: 26 | Performed by: OBSTETRICS & GYNECOLOGY

## 2022-10-04 PROCEDURE — 59025 FETAL NON-STRESS TEST: CPT | Mod: 59

## 2022-10-04 PROCEDURE — 76815 OB US LIMITED FETUS(S): CPT | Mod: 26 | Performed by: OBSTETRICS & GYNECOLOGY

## 2022-10-04 PROCEDURE — 59025 FETAL NON-STRESS TEST: CPT

## 2022-10-04 PROCEDURE — 76815 OB US LIMITED FETUS(S): CPT

## 2022-10-04 PROCEDURE — 76820 UMBILICAL ARTERY ECHO: CPT | Mod: 59

## 2022-10-05 ENCOUNTER — OFFICE VISIT (OUTPATIENT)
Dept: MATERNAL FETAL MEDICINE | Facility: CLINIC | Age: 32
End: 2022-10-05
Attending: OBSTETRICS & GYNECOLOGY
Payer: COMMERCIAL

## 2022-10-05 DIAGNOSIS — O30.033 MONOCHORIONIC DIAMNIOTIC TWIN GESTATION IN THIRD TRIMESTER: Primary | ICD-10-CM

## 2022-10-05 DIAGNOSIS — O30.033 MONOCHORIONIC DIAMNIOTIC TWIN GESTATION IN THIRD TRIMESTER: ICD-10-CM

## 2022-10-05 DIAGNOSIS — O36.5990 PREGNANCY AFFECTED BY FETAL GROWTH RESTRICTION: ICD-10-CM

## 2022-10-05 PROCEDURE — 96372 THER/PROPH/DIAG INJ SC/IM: CPT | Performed by: STUDENT IN AN ORGANIZED HEALTH CARE EDUCATION/TRAINING PROGRAM

## 2022-10-05 PROCEDURE — 99212 OFFICE O/P EST SF 10 MIN: CPT | Performed by: STUDENT IN AN ORGANIZED HEALTH CARE EDUCATION/TRAINING PROGRAM

## 2022-10-05 PROCEDURE — 250N000011 HC RX IP 250 OP 636: Performed by: STUDENT IN AN ORGANIZED HEALTH CARE EDUCATION/TRAINING PROGRAM

## 2022-10-05 PROCEDURE — 99207 PR NO BILLABLE SERVICE THIS VISIT: CPT | Performed by: STUDENT IN AN ORGANIZED HEALTH CARE EDUCATION/TRAINING PROGRAM

## 2022-10-05 RX ORDER — BETAMETHASONE SODIUM PHOSPHATE AND BETAMETHASONE ACETATE 3; 3 MG/ML; MG/ML
12 INJECTION, SUSPENSION INTRA-ARTICULAR; INTRALESIONAL; INTRAMUSCULAR; SOFT TISSUE ONCE
Status: COMPLETED | OUTPATIENT
Start: 2022-10-05 | End: 2022-10-05

## 2022-10-05 RX ORDER — BETAMETHASONE SODIUM PHOSPHATE AND BETAMETHASONE ACETATE 3; 3 MG/ML; MG/ML
12 INJECTION, SUSPENSION INTRA-ARTICULAR; INTRALESIONAL; INTRAMUSCULAR; SOFT TISSUE ONCE
Status: DISCONTINUED | OUTPATIENT
Start: 2022-10-05 | End: 2022-10-05 | Stop reason: CLARIF

## 2022-10-05 RX ADMIN — BETAMETHASONE SODIUM PHOSPHATE AND BETAMETHASONE ACETATE 12 MG: 3; 3 INJECTION, SUSPENSION INTRA-ARTICULAR; INTRALESIONAL; INTRAMUSCULAR at 10:08

## 2022-10-05 NOTE — NURSING NOTE
Betamethasone injection visit, see MAR.    Education Sheet reviewed with patient. Encouraged to call primary care provider with any side effects. Patient ambulatory and stable following injection with c/o mild burning at injection site. Medication given in right ventrogluteal.

## 2022-10-05 NOTE — PROGRESS NOTES
Maternal fetal Medicine OB Follow up visit.     Hilaria Jansen  : 1990  MRN: [id number]    CC: OB Follow-up    Subjective:  Hilaria Jansen is a 32 year old  at 30w5d presenting for routine OB follow-up.     She has a known mono-di twin gestation, which has been followed closely via ultrasound. On  Yesterday's examination, fetus 2 was noted to have oscillating dopplers and progression to intermittent AEDF.  Given this she presented today for betamethasone administration.     Objective:  LMP 2022     Gen: NAD, A&Ox3  CV: Regular rate  Pulm: Normal respiratory effort  Abdominal: Gravid, non-tender  Ext: no edema, non-tender    NST:  FHT:    A: , moderate variability, accels present, no decels   B: BL 140s, moderate variability, accels accels, no decels  [place]: 1 contraction per 50 minutes    Total time: 53 minutes    Assessment/Plan:  32 year old  at 30w5d, here for follow OB visit.    Mono-Di twin gestation:  - Chorionicity determined in early pregnancy.  - Ultrasound trend/evaluation as follows:   Elevated UARs for Twin 1 since 20 weeks, elevated for both since 25 weeks   Isolated MCA > 1.5 with delta of > 0.5 on  - normal since   New FGR for Twin 1 (EFW 9th%) on , Twin 2 is at the 10th%   Fetus 2 with oscillating dopplers and now progression to intermittent AEDF since 10/4.  - NST reactive and reassuring x 2 today.  - S/p betamethasone administration x 1. Repeat dose with NST scheduled for tomorrow.  - Repeat doppler assessment this Friday.  - Delivery recommended at 32-34 weeks gestation.    RTC tomorrow    Rachel Chaudhry MD  Maternal Fetal Medicine    At the end of our discussion Ms. Jansen voiced understanding of the plan of care and stated all of her questions had been answered. Thank you for the opportunity to participate in the care of your patient. Please do not hesitate to contact us if you may have any questions or concerns.     I spent a total of 15  minutes on the date of this encounter including preparing to see the patient (reviewing medical records/tests), in direct face-to-face contact with the patient during her visit with the majority (>50%) spent counseling and discussing the plan of care, documenting the visit in the electronic medical record, and communicating with other health care professionals and/or care coordination.

## 2022-10-05 NOTE — LETTER
10/5/2022  Justyna, Hilaria   1990      To Whom It May Concern:    Due to complications in pregnancy there is a likely chance that above patient Hilaria will require early delivery.    Sincerely,      Dr. Rachel Chaudhry

## 2022-10-05 NOTE — LETTER
10/5/2022  Jorgerosa, Hilaria   1990      To Whom It May Concern:    Please excuse the above patient Hilaria from work during the morning hours of 10/4/22, 10/5/22, 10/6/22 and 10/7/22 due to complications in her pregnancy requiring frequent clinic visits.    Also, there is a chance that due to the complications in this pregnancy that she will need early delivery.     Sincerely,      Dr. Rachel Chaudhry

## 2022-10-05 NOTE — NURSING NOTE
NST Performed due to mono/di twin pregnancy.   reviewed efm tracing. See NST/BPP Doc Flowsheet tab.    Hilaria here for NST and betamethasone administration. Dr. Chaudhry spoke with patient with [place]  via IPad reviewing ultrasound findings from 10/4 and discussion about betamethasone. Patient declines any allergies and is agreeable to plan from MD.  ID used today was #[id number].

## 2022-10-06 ENCOUNTER — ALLIED HEALTH/NURSE VISIT (OUTPATIENT)
Dept: MATERNAL FETAL MEDICINE | Facility: CLINIC | Age: 32
End: 2022-10-06
Attending: OBSTETRICS & GYNECOLOGY
Payer: COMMERCIAL

## 2022-10-06 DIAGNOSIS — O36.5990 PREGNANCY AFFECTED BY FETAL GROWTH RESTRICTION: ICD-10-CM

## 2022-10-06 DIAGNOSIS — O30.033 MONOCHORIONIC DIAMNIOTIC TWIN GESTATION IN THIRD TRIMESTER: ICD-10-CM

## 2022-10-06 DIAGNOSIS — O30.033 MONOCHORIONIC DIAMNIOTIC TWIN GESTATION IN THIRD TRIMESTER: Primary | ICD-10-CM

## 2022-10-06 PROCEDURE — 250N000011 HC RX IP 250 OP 636: Performed by: OBSTETRICS & GYNECOLOGY

## 2022-10-06 PROCEDURE — 59025 FETAL NON-STRESS TEST: CPT

## 2022-10-06 PROCEDURE — 59025 FETAL NON-STRESS TEST: CPT | Mod: 59

## 2022-10-06 PROCEDURE — 96372 THER/PROPH/DIAG INJ SC/IM: CPT | Performed by: OBSTETRICS & GYNECOLOGY

## 2022-10-06 RX ORDER — BETAMETHASONE SODIUM PHOSPHATE AND BETAMETHASONE ACETATE 3; 3 MG/ML; MG/ML
12 INJECTION, SUSPENSION INTRA-ARTICULAR; INTRALESIONAL; INTRAMUSCULAR; SOFT TISSUE ONCE
Status: DISCONTINUED | OUTPATIENT
Start: 2022-10-06 | End: 2022-10-06 | Stop reason: CLARIF

## 2022-10-06 RX ORDER — BETAMETHASONE SODIUM PHOSPHATE AND BETAMETHASONE ACETATE 3; 3 MG/ML; MG/ML
12 INJECTION, SUSPENSION INTRA-ARTICULAR; INTRALESIONAL; INTRAMUSCULAR; SOFT TISSUE ONCE
Status: COMPLETED | OUTPATIENT
Start: 2022-10-06 | End: 2022-10-06

## 2022-10-06 RX ADMIN — BETAMETHASONE SODIUM PHOSPHATE AND BETAMETHASONE ACETATE 12 MG: 3; 3 INJECTION, SUSPENSION INTRA-ARTICULAR; INTRALESIONAL; INTRAMUSCULAR at 09:59

## 2022-10-06 NOTE — NURSING NOTE
The following medication was given:     MEDICATION: Betamethasone  ROUTE: IM  SITE: Ventrogluteal - Left  DOSE: 12mg

## 2022-10-06 NOTE — NURSING NOTE
NST Performed due to mono/di twins and FGR of twin A.   reviewed efm tracing. See NST/BPP Doc Flowsheet tab.

## 2022-10-06 NOTE — PROGRESS NOTES
"Please see \"Imaging\" tab under \"Chart Review\" for details of today's ultrasound.    Luis Champagne M.D.  Specialist in Maternal-Fetal Medicine     "

## 2022-10-07 ENCOUNTER — OFFICE VISIT (OUTPATIENT)
Dept: MATERNAL FETAL MEDICINE | Facility: CLINIC | Age: 32
End: 2022-10-07
Attending: OBSTETRICS & GYNECOLOGY
Payer: COMMERCIAL

## 2022-10-07 ENCOUNTER — HOSPITAL ENCOUNTER (OUTPATIENT)
Dept: ULTRASOUND IMAGING | Facility: CLINIC | Age: 32
Discharge: HOME | End: 2022-10-07
Attending: OBSTETRICS & GYNECOLOGY
Payer: COMMERCIAL

## 2022-10-07 DIAGNOSIS — O30.033 MONOCHORIONIC DIAMNIOTIC TWIN GESTATION IN THIRD TRIMESTER: ICD-10-CM

## 2022-10-07 DIAGNOSIS — O36.5990 PREGNANCY AFFECTED BY FETAL GROWTH RESTRICTION: ICD-10-CM

## 2022-10-07 DIAGNOSIS — O30.032 MONOCHORIONIC DIAMNIOTIC TWIN GESTATION IN SECOND TRIMESTER: ICD-10-CM

## 2022-10-07 PROCEDURE — 59025 FETAL NON-STRESS TEST: CPT

## 2022-10-07 PROCEDURE — 59025 FETAL NON-STRESS TEST: CPT | Mod: 26 | Performed by: OBSTETRICS & GYNECOLOGY

## 2022-10-07 PROCEDURE — 76820 UMBILICAL ARTERY ECHO: CPT | Mod: 59

## 2022-10-07 PROCEDURE — 59025 FETAL NON-STRESS TEST: CPT | Mod: 59

## 2022-10-07 PROCEDURE — 76821 MIDDLE CEREBRAL ARTERY ECHO: CPT | Mod: 26 | Performed by: OBSTETRICS & GYNECOLOGY

## 2022-10-07 PROCEDURE — 76820 UMBILICAL ARTERY ECHO: CPT | Mod: 26 | Performed by: OBSTETRICS & GYNECOLOGY

## 2022-10-07 PROCEDURE — 76816 OB US FOLLOW-UP PER FETUS: CPT

## 2022-10-07 PROCEDURE — 76816 OB US FOLLOW-UP PER FETUS: CPT | Mod: 26 | Performed by: OBSTETRICS & GYNECOLOGY

## 2022-10-07 NOTE — LETTER
10/7/2022  Alphonse Janseni   1990      To Whom It May Concern:    Please excuse above patient for late arrival to work on 10/7/22 due to complications in her medical care.     Sincerely,    Vera Maynard RN

## 2022-10-07 NOTE — NURSING NOTE
Hilaria here for [facility] ultrasound and NST. Patient reports that she felt less movement from babies yesterday. While patient on NST today instructed to count movements and Hilaria reported 36 movements in the first 30 minutes of monitoring. Patient was given a fetal kick count sheet and it was explained to her. Advised her to call or go in with change in movement or if unable to meet the minimum kick counts or with any concerns. Hilaria verbalized understanding. A note was provided to her today for late arrival to work. Dr. Langford updated on above and agreeable, plan for patient to be seen as planned at [facility] next Tuesday 10/11/22.

## 2022-10-08 ENCOUNTER — HOSPITAL ENCOUNTER (OUTPATIENT)
Facility: CLINIC | Age: 32
Discharge: HOME | End: 2022-10-08
Attending: OBSTETRICS & GYNECOLOGY | Admitting: OBSTETRICS & GYNECOLOGY
Payer: COMMERCIAL

## 2022-10-08 ENCOUNTER — NURSE TRIAGE (OUTPATIENT)
Dept: NURSING | Facility: CLINIC | Age: 32
End: 2022-10-08

## 2022-10-08 VITALS — TEMPERATURE: 98.7 F | SYSTOLIC BLOOD PRESSURE: 123 MMHG | RESPIRATION RATE: 16 BRPM | DIASTOLIC BLOOD PRESSURE: 66 MMHG

## 2022-10-08 PROCEDURE — 59025 FETAL NON-STRESS TEST: CPT | Mod: 59

## 2022-10-08 PROCEDURE — 59025 FETAL NON-STRESS TEST: CPT

## 2022-10-08 PROCEDURE — 59025 FETAL NON-STRESS TEST: CPT | Mod: 26 | Performed by: OBSTETRICS & GYNECOLOGY

## 2022-10-08 PROCEDURE — G0463 HOSPITAL OUTPT CLINIC VISIT: HCPCS | Mod: 25

## 2022-10-08 PROCEDURE — 99213 OFFICE O/P EST LOW 20 MIN: CPT | Mod: 25 | Performed by: OBSTETRICS & GYNECOLOGY

## 2022-10-08 RX ORDER — LIDOCAINE 40 MG/G
CREAM TOPICAL
Status: DISCONTINUED | OUTPATIENT
Start: 2022-10-08 | End: 2022-10-08 | Stop reason: HOSPADM

## 2022-10-08 NOTE — PROGRESS NOTES
L&D Triage Assessment Note     HPI: Karolina Jansen is a 32 year old  at 31w1d by 9w4d US, here for decreased fetal movement. Patient reports feeling decreased movement of both Twin A and Twin B since yesterday afternoon that has not improved. Performed kick count yesterday and this morning which was 2 kicks/hour. Had NST and US yesterday. NST reactive and reassuring for both twins. Otherwise feeling well without complaints. Denies chest pain, shortness of breath, nausea/vomiting, urinary or bowel complaints. Ate less than normal yesterday but still 3 meals, has not yet eaten anything today. Had some temo corbin contractions after BMZ which have resolved, denies vaginal bleeding and loss of fluid.      On 10/5 US, twin 2 was noted to have oscillating dopplers and progression to intermittent AEDF. Received BMZ x2 (10/5, 10/6).      Pregnancy notable for:  - Mono/di twins   - FGR x2 (9%tile, 10%tile)  - Sickle cell trait   - H/o CS x2           Lab Results   Component Value Date     ABO B 2021     RH Pos 2021     AS Negative 2022     HEPBANG Nonreactive 2022     CHPCRT Negative 2021     GCPCRT Negative 2021     HGB 10.9 (L) 2022     HGB 10.6 (L) 2022         GBS Status:         Lab Results   Component Value Date     GBS Negative 2019               OBHX:                   OB History    Para Term  AB Living   6 2 2 0 3 2   SAB IAB Ectopic Multiple Live Births      1 0 0 0 2          # Outcome Date GA Lbr Ignacio/2nd Weight Sex Delivery Anes PTL Lv   6 Current                     5 Term 12/15/21 39w0d   3.32 kg (7 lb 5.1 oz) F CS-LTranv Spinal   JESSE      Name: Ulnette      Apgar1: 8  Apgar5: 9   4 AB 02/15/21 9w0d                    Birth Comments: no D&C   3 Term 19 37w6d   3.4 kg (7 lb 7.9 oz) M CS-LTranv Nitrous, EPI N JESSE      Complications: Fetal Intolerance      Name: TAFFAME,MALE-KAROLINA      Apgar1: 8  Apgar5: 9   2 AB 2017          IAB            Birth Comments: took pill   1 2016         SAB            Birth Comments: chemical pregnancy         MedicalHX:  Past Medical History        Past Medical History:   Diagnosis Date     Anemia       Hidradenitis suppurativa       Infertility, female       Neurological disorder       Sickle cell anemia (H)       Varicella              SurgicalHX:  Past Surgical History         Past Surgical History:   Procedure Laterality Date      SECTION N/A 2019     Procedure:  SECTION;  Surgeon: Wendi Coppola MD;  [facility]: [facility] L[facility]      SECTION N/A 12/15/2021     Procedure:  SECTION;  Surgeon: Brooke Capps MD;  Location: [facility] L+[facility]     HEAD & NECK SURGERY         SURGICAL PATHOLOGY EXAM Left 09/10/2015     left paratracheal neck mass removed, benign            Medications:  No current facility-administered medications on file prior to encounter.  acetaminophen (TYLENOL) 500 MG tablet, Take 1-2 tablets (500-1,000 mg) by mouth every 6 hours as needed for mild pain  aspirin (ASA) 81 MG chewable tablet, Take 1 tablet (81 mg) by mouth daily  cyanocobalamin (VITAMIN B-12) 1000 MCG tablet, Take 1 tablet (1,000 mcg) by mouth daily  ferrous sulfate (FEROSUL) 325 (65 Fe) MG tablet, Take 1 tablet (325 mg) by mouth daily (with breakfast)  imiquimod (ALDARA) 5 % external cream, Apply a small sized amount to warts or molluscum three times weekly at bedtime.   Wash off after 8 hours.   May use for up to 16 weeks.  Prenatal Vit-Fe Fumarate-FA (PRENATAL MULTIVITAMIN W/IRON) 27-0.8 MG tablet, Take 1 tablet by mouth daily  Prenatal Vit-Fe Fumarate-FA (PRENATAL MULTIVITAMIN W/IRON) 27-0.8 MG tablet, Take 1 tablet by mouth daily  senna-docusate (SENOKOT-S/PERICOLACE) 8.6-50 MG tablet, Take 1-2 tablets by mouth daily as needed for constipation  vitamin C (ASCORBIC ACID) 250 MG tablet, Take 1 tablet (250 mg) by mouth daily        Allergies:  No Known Allergies     FamilyHX:  Family  History           Family History   Problem Relation Age of Onset     Breast Cancer Paternal Aunt              SocialHX:  Social History            Socioeconomic History     Marital status: Single     Number of children: 0   Occupational History     Occupation: cleaning person    Tobacco Use     Smoking status: Never Smoker     Smokeless tobacco: Never Used   Vaping Use     Vaping Use: Never used   Substance and Sexual Activity     Alcohol use: No     Drug use: No     Sexual activity: Yes       Partners: Male       Birth control/protection: None       Comment: trying to conceive   Other Topics Concern     Parent/sibling w/ CABG, MI or angioplasty before 65F 55M? No      Social Determinants of Health          Intimate Partner Violence: Not At Risk     Fear of Current or Ex-Partner: No     Emotionally Abused: No     Physically Abused: No     Sexually Abused: No         ROS: 10-point ROS negative except as in HPI.     Physical Exam:  Patient Vitals for the past 24 hrs:   BP Temp Temp src Resp   10/08/22 0918 123/66 98.7  F (37.1  C) Oral 16   ]  GEN: resting comfortably in bed, NAD   CV: Regular rate, well perfused  PULM: CTAB, on room air, no increased work of breathing  ABD: soft, gravid, non-tender, non-distended  EXT: Trace pedal edema, non tender to palpation  CVX: None palpated   Presentation: Cephalic/Cephalic by 30w1d US  EFW: 9 %tile/ 10%tile      NST:  FHT (twin a): baseline 140, mod variability, 10x10 accels present, no decels  FHT (twin b): baseline 145, mod variability, 10x10 accels present, no decels  TOCO: 0 ctx in ten minutes     A/P: 32 year old  at 31w1d by 9w4d US, here for decreased fetal movement. Pregnancy notable for mono/di twins, FGR x2, sickle cell train, h/o CS x2.      Decreased Fetal Movement  Now feeling normal fetal movements.  - NST reactive and reassuring  - Discussed continued kick counts at home if continues to notice decreased fetal movement     Mono/Di Twins   FGR x2   -  Following with [facility] 2x weekly      H/o CS x2  - Planning for RCS between 32-34 weeks    PNC  - Rh pos, GBS unknown  - GCT: 133; GTT wnl     Dispo: Home with continued close monitoring     Patient discussed with Dr. Martina Fernandez MD  OBGYN PGY-1  9:08 AM 2022      Physician Attestation   I personally examined and evaluated this patient.  I discussed the patient with the resident/fellow and care team, and agree with the assessment and plan of care as documented in the note of 10/08/22.      I personally reviewed vital signs and imaging.    Howard findings: Hilaria Jansen is a 32 year old  mono-di twins at 31w1d by 9w4d us who is admitted for complaint of decreased fetal movements.  Now feeling normal fetal movement after eating crackers and drinking juice.  NST reactive and reassuring for Twin A and Twin B.  No decels.  Discharge to home.  Reviewed return precautions.  Clinic visit next 10/10/22.  Martina Mahajan MD  Date of Service (when I saw the patient): 10/08/22

## 2022-10-08 NOTE — PROGRESS NOTES
Data: [facility] at 0[facility].   Reason for maternal/fetal assessment per patient is Decreased Fetal Movement  . Patient is a . Prenatal record reviewed.      OB History    Para Term  AB Living   6 2 2 0 3 2   SAB IAB Ectopic Multiple Live Births   1 0 0 0 2      # Outcome Date GA Lbr Ignacio/2nd Weight Sex Delivery Anes PTL Lv   6 Current            5 Term 12/15/21 39w0d  3.32 kg (7 lb 5.1 oz) F CS-LTranv Spinal  JESSE      Name: Uliella      Apgar1: 8  Apgar5: 9   4 AB 02/15/21 9w0d             Birth Comments: no D&C   3 Term 19 37w6d  3.4 kg (7 lb 7.9 oz) M CS-LTranv Nitrous, EPI N JESSE      Complications: Fetal Intolerance      Name: SHAWN,MALE-KAROLINA      Apgar1: 8  Apgar5: 9   2 AB 2017     IAB         Birth Comments: took pill   1 2016     SAB         Birth Comments: chemical pregnancy      Medical History:   Past Medical History:   Diagnosis Date     Anemia      Hidradenitis suppurativa      Infertility, female      Neurological disorder      Sickle cell anemia (H)      Varicella    Gestational Age 31w1d. VSS. Cervix: not examined.  Fetal movement present. Patient denies cramping, backache, vaginal discharge, pelvic pressure, UTI symptoms, GI problems, bloody show, vaginal bleeding, edema, headache, visual disturbances, epigastric or URQ pain, abdominal pain, rupture of membranes.   Action: Verbal consent for EFM. Triage assessment completed. EFM applied for NST, reactive X2. Uterine assessment WDL. Fetal assessment: Presumed adequate fetal oxygenation documented (see flow record). Patient education pamphlets given on fetal movement counts and WNL. Patient instructed to report change in fetal movement, vaginal leaking of fluid or bleeding, abdominal pain, or any concerns related to the pregnancy to her nurse/physician.   Response: Dr. Mahajan and Dr. Yoo informed of patient arrival and complaints. Plan per provider is discharge home. Patient verbalized  understanding of education and verbalized agreement with plan. Discharged ambulatory at 1035  .

## 2022-10-08 NOTE — TELEPHONE ENCOUNTER
OB Triage Call      Is patient's OB/Midwife with the [facility] or [facility]? [facility]- Proceed with triage     Reason for call: decreased fetal movement.    Assessment:  Only felt one kick for each baby over night.     Plan: L and D now for eval    Patient plans to deliver at [facility]    Patient's primary OB Provider is Dr. Brooke Capps.      Per protocol recommendations Patient to be evaluated in L&D. Patient's primary OB is [facility].  Labor and delivery at [facility] ([phone]) notified of patient's pending arrival.  Report given to Wendi sanchez RN.      Is patient's delivering hospital on divert? No      31w1d    Estimated Date of Delivery: Dec 9, 2022        OB History    Para Term  AB Living   6 2 2 0 3 2   SAB IAB Ectopic Multiple Live Births   1 0 0 0 2      # Outcome Date GA Lbr Ignacio/2nd Weight Sex Delivery Anes PTL Lv   6 Current            5 Term 12/15/21 39w0d  3.32 kg (7 lb 5.1 oz) F CS-LTranv Spinal  JESSE      Name: Uliella      Apgar1: 8  Apgar5: 9   4 AB 02/15/21 9w0d             Birth Comments: no D&C   3 Term 19 37w6d  3.4 kg (7 lb 7.9 oz) M CS-LTranv Nitrous, EPI N JESSE      Complications: Fetal Intolerance      Name: SHAWN,MALE-KAROLINA      Apgar1: 8  Apgar5: 9   2 AB 2017     IAB         Birth Comments: took pill   1 SAB      SAB         Birth Comments: chemical pregnancy       Lab Results   Component Value Date    GBS Negative 2019          Caryn Joseph, RN 10/08/22 7:31 AM  [facility] Nurse Advisor    Reason for Disposition    [1] Pregnant 23 or more weeks AND [2] baby moving less today by kick count  (e.g., kick count < 5 in 1 hour or < 10 in 2 hours)    Additional Information    Negative: Sounds like a life-threatening emergency to the triager    Negative: Injury to abdomen    Negative: [1] Pregnant > 36 weeks AND [2] having contractions or other symptoms of labor    Negative: [1] Pregnant <  37 weeks AND [2] having contractions or other symptoms of labor    Negative: [1] Pregnant > 20 weeks AND [2] abdominal pain    Negative: [1] Pregnant > 20 weeks AND [2] vaginal bleeding or spotting    Negative: New blurred vision or vision changes    Negative: [1] SEVERE headache AND [2] not relieved with acetaminophen (e.g., Tylenol)    Negative: Leakage of fluid from vagina    Protocols used: PREGNANCY - DECREASED FETAL MOVEMENT-A-AH

## 2022-10-08 NOTE — H&P
L&D Triage Assessment Note    HPI: Karolina Jansen is a 32 year old  at 31w1d by 9w4d US, here for decreased fetal movement. Patient reports feeling decreased movement of both Twin A and Twin B since yesterday afternoon that has not improved. Performed kick count yesterday and this morning which was 2 kicks/hour. Had NST and US yesterday. NST reactive and reassuring for both twins. Otherwise feeling well without complaints. Denies chest pain, shortness of breath, nausea/vomiting, urinary or bowel complaints. Ate less than normal yesterday but still 3 meals, has not yet eaten anything today. Had some temo corbin contractions after BMZ which have resolved, denies vaginal bleeding and loss of fluid.     On 10/5 US, twin 2 was noted to have oscillating dopplers and progression to intermittent AEDF. Received BMZ x2 (10/5, 10/6).     Pregnancy notable for:  - Mono/di twins   - FGR x2 (9%tile, 10%tile)  - Sickle cell trait   - H/o CS x2    Lab Results   Component Value Date    ABO B 2021    RH Pos 2021    AS Negative 2022    HEPBANG Nonreactive 2022    CHPCRT Negative 2021    GCPCRT Negative 2021    HGB 10.9 (L) 2022    HGB 10.6 (L) 2022       GBS Status:   Lab Results   Component Value Date    GBS Negative 2019             OBHX:  OB History    Para Term  AB Living   6 2 2 0 3 2   SAB IAB Ectopic Multiple Live Births   1 0 0 0 2      # Outcome Date GA Lbr Ignacio/2nd Weight Sex Delivery Anes PTL Lv   6 Current            5 Term 12/15/21 39w0d  3.32 kg (7 lb 5.1 oz) F CS-LTranv Spinal  JESSE      Name: Ulnette      Apgar1: 8  Apgar5: 9   4 AB 02/15/21 9w0d             Birth Comments: no D&C   3 Term 19 37w6d  3.4 kg (7 lb 7.9 oz) M CS-LTranv Nitrous, EPI N JESSE      Complications: Fetal Intolerance      Name: TAFFAME,MALE-KAROLINA      Apgar1: 8  Apgar5: 9   2 AB 2017     IAB         Birth Comments: took pill   1 2016     SAB         Birth  Comments: chemical pregnancy       MedicalHX:  Past Medical History:   Diagnosis Date     Anemia      Hidradenitis suppurativa      Infertility, female      Neurological disorder      Sickle cell anemia (H)      Varicella        SurgicalHX:  Past Surgical History:   Procedure Laterality Date      SECTION N/A 2019    Procedure:  SECTION;  Surgeon: Wendi Coppola MD;  [facility]      SECTION N/A 12/15/2021    Procedure:  SECTION;  Surgeon: Brooke Capps MD;  Location: [facility] L[facility]     HEAD & NECK SURGERY       SURGICAL PATHOLOGY EXAM Left 09/10/2015    left paratracheal neck mass removed, benign       Medications:  No current facility-administered medications on file prior to encounter.  acetaminophen (TYLENOL) 500 MG tablet, Take 1-2 tablets (500-1,000 mg) by mouth every 6 hours as needed for mild pain  aspirin (ASA) 81 MG chewable tablet, Take 1 tablet (81 mg) by mouth daily  cyanocobalamin (VITAMIN B-12) 1000 MCG tablet, Take 1 tablet (1,000 mcg) by mouth daily  ferrous sulfate (FEROSUL) 325 (65 Fe) MG tablet, Take 1 tablet (325 mg) by mouth daily (with breakfast)  imiquimod (ALDARA) 5 % external cream, Apply a small sized amount to warts or molluscum three times weekly at bedtime.   Wash off after 8 hours.   May use for up to 16 weeks.  Prenatal Vit-Fe Fumarate-FA (PRENATAL MULTIVITAMIN W/IRON) 27-0.8 MG tablet, Take 1 tablet by mouth daily  Prenatal Vit-Fe Fumarate-FA (PRENATAL MULTIVITAMIN W/IRON) 27-0.8 MG tablet, Take 1 tablet by mouth daily  senna-docusate (SENOKOT-S/PERICOLACE) 8.6-50 MG tablet, Take 1-2 tablets by mouth daily as needed for constipation  vitamin C (ASCORBIC ACID) 250 MG tablet, Take 1 tablet (250 mg) by mouth daily      Allergies:  No Known Allergies    FamilyHX:    Family History   Problem Relation Age of Onset     Breast Cancer Paternal Aunt        SocialHX:  Social History     Socioeconomic History     Marital status: Single      Number of children: 0   Occupational History     Occupation: cleaning person    Tobacco Use     Smoking status: Never Smoker     Smokeless tobacco: Never Used   Vaping Use     Vaping Use: Never used   Substance and Sexual Activity     Alcohol use: No     Drug use: No     Sexual activity: Yes     Partners: Male     Birth control/protection: None     Comment: trying to conceive   Other Topics Concern     Parent/sibling w/ CABG, MI or angioplasty before 65F 55M? No     Social Determinants of Health     Intimate Partner Violence: Not At Risk     Fear of Current or Ex-Partner: No     Emotionally Abused: No     Physically Abused: No     Sexually Abused: No       ROS: 10-point ROS negative except as in HPI.    Physical Exam:  There were no vitals filed for this visit.  GEN: resting comfortably in bed, NAD   CV: Regular rate, well perfused  PULM: CTAB, on room air, no increased work of breathing  ABD: soft, gravid, non-tender, non-distended  EXT: Trace pedal edema, non tender to palpation  CVX: None palpated   Presentation: Cephalic/Cephalic by 30w1d US  EFW: 9 %tile/ 10%tile     NST:  FHT (twin a): baseline 140, mod variability, 10x10 accels present, no decels  FHT (twin b): baseline 145, mod variability, 10x10 accels present, no decels  TOCO: 0 ctx in ten minutes    A/P: 32 year old  at 31w1d by 9w4d US, here for decreased fetal movement. Pregnancy notable for mono/di twins, FGR x2, sickle cell train, h/o CS x2.     # Decreased Fetal Movement  - NST reactive and reassuring  - Discussed continued kick counts at home if continues to notice decreased fetal movement    # Mono/Di Twins  # FGR x2   - Following with [facility] US 2x weekly     # H/o CS x2  - Planning for RCS between 32-34 weeks    # PNC  - Rh pos, GBS unknown  - GCT: 133; GTT wnl    Dispo: Home with continued close monitoring    Patient discussed with Dr. Keyshawn Fernandez MD  OBGYN PGY-1  9:08 AM 2022

## 2022-10-08 NOTE — DISCHARGE INSTRUCTIONS
Discharge Instruction for Undelivered Patients      You were seen for: Fetal Assessment  We Consulted: Dr. Yoo and Dr. Mahajan  You had (Test or Medicine):fetal non stress test     Diet:   Drink 8 to 12 glasses of liquids (milk, juice, water) every day.  You may eat meals and snacks.     Activity:  Call your doctor or nurse midwife if your baby is moving less than usual.     Call your provider if you notice:  Swelling in your face or increased swelling in your hands or legs.  Headaches that are not relieved by Tylenol (acetaminophen).  Changes in your vision (blurring: seeing spots or stars.)  Nausea (sick to your stomach) and vomiting (throwing up).   Weight gain of 5 pounds or more per week.  Heartburn that doesn't go away.  Signs of bladder infection: pain when you urinate (use the toilet), need to go more often and more urgently.  The bag of gottlieb (rupture of membranes) breaks, or you notice leaking in your underwear.  Bright red blood in your underwear.  Abdominal (lower belly) or stomach pain.  For first baby: Contractions (tightening) less than 5 minutes apart for one hour or more.  Second (plus) baby: Contractions (tightening) less than 10 minutes apart and getting stronger.  *If less than 34 weeks: Contractions (tightening) more than 6 times in one hour.  Increase or change in vaginal discharge (note the color and amount)      Follow-up:  As scheduled in the clinic

## 2022-10-10 ENCOUNTER — PRENATAL OFFICE VISIT (OUTPATIENT)
Dept: OBGYN | Facility: CLINIC | Age: 32
End: 2022-10-10
Payer: COMMERCIAL

## 2022-10-10 VITALS
HEART RATE: 102 BPM | WEIGHT: 198 LBS | OXYGEN SATURATION: 98 % | SYSTOLIC BLOOD PRESSURE: 125 MMHG | BODY MASS INDEX: 38.67 KG/M2 | DIASTOLIC BLOOD PRESSURE: 73 MMHG

## 2022-10-10 DIAGNOSIS — O30.033 MONOCHORIONIC DIAMNIOTIC TWIN GESTATION IN THIRD TRIMESTER: Primary | ICD-10-CM

## 2022-10-10 PROCEDURE — 99207 PR PRENATAL VISIT: CPT | Performed by: OBSTETRICS & GYNECOLOGY

## 2022-10-10 NOTE — PROGRESS NOTES
Return OB visit    Subjective:  Patient reports active fetal movement, no vaginal bleeding or leaking fluid. She denies contractions. She was seen in L&D due to decreased FM on 10/8 but fetal tracing was reassuring and babies have since been moving normally. She is not yet scheduled for delivery and based on [place]-di twins with FGR x2 and elevated UA dopplers [facility] has recommended delivery at 32-34 weeks gestation. Patient is planning on repeat CS      Objective:  /73   Pulse 102   Wt 89.8 kg (198 lb)   LMP 2022   SpO2 98%   BMI 38.67 kg/m     See OB flow sheet    Assessment and Plan    Hilaria Jansen is a 32 year old  at 31w3d here for AMY visit, pregnancy complicated mono-di twins, FGR x2 and elevated UA dopplers as well as sickle cell trait and hx of two prior CS     This visit:  -We reviewed timing of delivery and discussed that [facility] recommended delivery between 32-34 weeks gestation. She is wondering when we would recommend delivery and I discussed that it is a balance between the risk of prematurity vs the risk of stillbirth of one or both twins. We reviewed that while babies at 32 weeks typically do well in the long run, they typically have a longer NICU course and a high risk of complications compared to babies born at 34 weeks gestation. On the other hand, the risk of pushing to 34 weeks would be the risk of stillbirth of one or both twins and while this risk is low with reassuring  testing, it is not zero.   -Upon completion of this discussion she we agreed on delivery closer to 32 weeks unless her repeat testing this week shows normalization of her UA dopplers or [facility] recommends delaying until closer to 34 weeks.   -Case request sent to schedule CS for 10/14 with DONNIE TEJADAZ given 10/5 and 10/6 so will not schedule for rescue course     Dispo: plan for scheduled CS on 10/14   Mansi Andres MD

## 2022-10-11 ENCOUNTER — TELEPHONE (OUTPATIENT)
Dept: OBGYN | Facility: CLINIC | Age: 32
End: 2022-10-11

## 2022-10-11 ENCOUNTER — OFFICE VISIT (OUTPATIENT)
Dept: MATERNAL FETAL MEDICINE | Facility: CLINIC | Age: 32
End: 2022-10-11
Attending: OBSTETRICS & GYNECOLOGY
Payer: COMMERCIAL

## 2022-10-11 ENCOUNTER — HOSPITAL ENCOUNTER (OUTPATIENT)
Dept: ULTRASOUND IMAGING | Facility: CLINIC | Age: 32
Discharge: HOME | End: 2022-10-11
Attending: OBSTETRICS & GYNECOLOGY
Payer: COMMERCIAL

## 2022-10-11 DIAGNOSIS — O30.033 MONOCHORIONIC DIAMNIOTIC TWIN GESTATION IN THIRD TRIMESTER: ICD-10-CM

## 2022-10-11 DIAGNOSIS — O36.5990 PREGNANCY AFFECTED BY FETAL GROWTH RESTRICTION: ICD-10-CM

## 2022-10-11 DIAGNOSIS — Z01.818 PRE-OP EXAM: Primary | ICD-10-CM

## 2022-10-11 LAB
ABO/RH(D): NORMAL
ANTIBODY SCREEN: NEGATIVE
SPECIMEN EXPIRATION DATE: NORMAL

## 2022-10-11 PROCEDURE — 76815 OB US LIMITED FETUS(S): CPT

## 2022-10-11 PROCEDURE — 59025 FETAL NON-STRESS TEST: CPT | Mod: 26 | Performed by: OBSTETRICS & GYNECOLOGY

## 2022-10-11 PROCEDURE — 76815 OB US LIMITED FETUS(S): CPT | Mod: 26 | Performed by: OBSTETRICS & GYNECOLOGY

## 2022-10-11 PROCEDURE — 59025 FETAL NON-STRESS TEST: CPT

## 2022-10-11 PROCEDURE — 76820 UMBILICAL ARTERY ECHO: CPT | Mod: 26 | Performed by: OBSTETRICS & GYNECOLOGY

## 2022-10-11 PROCEDURE — 59025 FETAL NON-STRESS TEST: CPT | Mod: 59

## 2022-10-11 PROCEDURE — 76820 UMBILICAL ARTERY ECHO: CPT | Mod: 59

## 2022-10-11 NOTE — TELEPHONE ENCOUNTER
Type of surgery: ob  Location of surgery: [facility] [place], [place] OR  Date and time of surgery: 10/14/22 8:30AM  Surgeon: Jagdeep  Pre-Op Appt Date: surgeon  Post-Op Appt Date: 11/25/22   Packet sent out: will  at upcoming lab appt  Pre-cert/Authorization completed:  Not Applicable  Date: 10/11/22     YASIR Cox  She/her/hers  [facility] Scheduler

## 2022-10-11 NOTE — TELEPHONE ENCOUNTER
Called patient to confirm c/s scheduling for this Friday 10/14/22 at 8:30AM with TERRELL.     Patient states she got a very basic note from her MFM MD this morning but needs more details for her employer.     Her employer needs note stating:    Why c/s was moved up   The hospital name   How long she will stay in the hospital   The required medical recovery time (8 wks)      Patient coming in for pre-op labs tomorrow 10/12/22 and would like to  letter at that time.     Routing to RD Triage Pod B for letter.       Brooke Joyner/her/hers  [facility] Scheduler

## 2022-10-11 NOTE — NURSING NOTE
NST Performed due to FGR twin A.  Dr. Langford reviewed efm tracing. See NST/BPP Doc Flowsheet tab.

## 2022-10-11 NOTE — LETTER
10/11/2022      Hilaria Jansen is scheduled for delivery of her twin pregnancy on Friday, 10/14/2022.  Please call with questions.      Thank You-          Brooke Romeo RNC

## 2022-10-11 NOTE — TELEPHONE ENCOUNTER
Unable to find forms in office or provider's basket. MyChart message to patient.   Veronica Chicas RN

## 2022-10-11 NOTE — TELEPHONE ENCOUNTER
Pt was told by KD that her paperwork could be sent in to her work today as she needs to let them know about her  on 10/14/2022. Pt states that a copy of her paperwork is already in clinic and needs it filed out ASAP

## 2022-10-11 NOTE — LETTER
2022      Hilaria Jansen  [address]  [place] [address]        To Whom It May Concern:    Hilaria Jansen is seen in our clinic for prenatal care. She is scheduled for a  delivery on 10/14 due to a high-risk pregnancy with twins. Her expected period of hospitalization is 10/14 - 10/17 barring any complications that increase her length of stay. She is entitled to an 8-week recovery period following her delivery.     Sincerely,        Mansi Andres MD    aa

## 2022-10-12 ENCOUNTER — LAB (OUTPATIENT)
Dept: LAB | Facility: CLINIC | Age: 32
End: 2022-10-12
Attending: OBSTETRICS & GYNECOLOGY
Payer: COMMERCIAL

## 2022-10-12 DIAGNOSIS — Z01.818 PRE-OP EXAM: ICD-10-CM

## 2022-10-12 DIAGNOSIS — O30.033 MONOCHORIONIC DIAMNIOTIC TWIN GESTATION IN THIRD TRIMESTER: ICD-10-CM

## 2022-10-12 LAB
ERYTHROCYTE [DISTWIDTH] IN BLOOD BY AUTOMATED COUNT: 16.5 % (ref 10–15)
HCT VFR BLD AUTO: 35.6 % (ref 35–47)
HGB BLD-MCNC: 11.8 G/DL (ref 11.7–15.7)
MCH RBC QN AUTO: 26.7 PG (ref 26.5–33)
MCHC RBC AUTO-ENTMCNC: 33.1 G/DL (ref 31.5–36.5)
MCV RBC AUTO: 81 FL (ref 78–100)
PLATELET # BLD AUTO: 238 10E3/UL (ref 150–450)
RBC # BLD AUTO: 4.42 10E6/UL (ref 3.8–5.2)
SARS-COV-2 RNA RESP QL NAA+PROBE: NEGATIVE
T PALLIDUM AB SER QL: NONREACTIVE
WBC # BLD AUTO: 8.6 10E3/UL (ref 4–11)

## 2022-10-12 PROCEDURE — U0003 INFECTIOUS AGENT DETECTION BY NUCLEIC ACID (DNA OR RNA); SEVERE ACUTE RESPIRATORY SYNDROME CORONAVIRUS 2 (SARS-COV-2) (CORONAVIRUS DISEASE [COVID-19]), AMPLIFIED PROBE TECHNIQUE, MAKING USE OF HIGH THROUGHPUT TECHNOLOGIES AS DESCRIBED BY CMS-2020-01-R: HCPCS

## 2022-10-12 PROCEDURE — 36415 COLL VENOUS BLD VENIPUNCTURE: CPT

## 2022-10-12 PROCEDURE — 86901 BLOOD TYPING SEROLOGIC RH(D): CPT

## 2022-10-12 PROCEDURE — 85027 COMPLETE CBC AUTOMATED: CPT

## 2022-10-12 PROCEDURE — U0005 INFEC AGEN DETEC AMPLI PROBE: HCPCS

## 2022-10-12 PROCEDURE — 86850 RBC ANTIBODY SCREEN: CPT

## 2022-10-12 PROCEDURE — 86900 BLOOD TYPING SEROLOGIC ABO: CPT

## 2022-10-12 PROCEDURE — 86780 TREPONEMA PALLIDUM: CPT

## 2022-10-12 RX ORDER — OXYTOCIN 10 [USP'U]/ML
10 INJECTION, SOLUTION INTRAMUSCULAR; INTRAVENOUS
Status: CANCELLED | OUTPATIENT
Start: 2022-10-14

## 2022-10-12 RX ORDER — OXYTOCIN/0.9 % SODIUM CHLORIDE 30/500 ML
100-340 PLASTIC BAG, INJECTION (ML) INTRAVENOUS CONTINUOUS PRN
Status: CANCELLED | OUTPATIENT
Start: 2022-10-14

## 2022-10-13 ENCOUNTER — ANESTHESIA EVENT (OUTPATIENT)
Dept: OBGYN | Facility: CLINIC | Age: 32
End: 2022-10-13
Payer: COMMERCIAL

## 2022-10-14 ENCOUNTER — HOSPITAL ENCOUNTER (INPATIENT)
Facility: CLINIC | Age: 32
LOS: 2 days | Discharge: HOME HEALTH | End: 2022-10-16
Attending: OBSTETRICS & GYNECOLOGY | Admitting: OBSTETRICS & GYNECOLOGY
Payer: COMMERCIAL

## 2022-10-14 ENCOUNTER — ANESTHESIA (OUTPATIENT)
Dept: OBGYN | Facility: CLINIC | Age: 32
End: 2022-10-14
Payer: COMMERCIAL

## 2022-10-14 DIAGNOSIS — O30.031 MONOCHORIONIC DIAMNIOTIC TWIN PREGNANCY IN FIRST TRIMESTER: ICD-10-CM

## 2022-10-14 DIAGNOSIS — O99.011 ANEMIA AFFECTING PREGNANCY IN FIRST TRIMESTER: ICD-10-CM

## 2022-10-14 DIAGNOSIS — O30.033 MONOCHORIONIC DIAMNIOTIC TWIN GESTATION IN THIRD TRIMESTER: ICD-10-CM

## 2022-10-14 DIAGNOSIS — G89.18 ACUTE POST-OPERATIVE PAIN: ICD-10-CM

## 2022-10-14 PROCEDURE — 999N000141 HC STATISTIC PRE-PROCEDURE NURSING ASSESSMENT: Performed by: OBSTETRICS & GYNECOLOGY

## 2022-10-14 PROCEDURE — 272N000001 HC OR GENERAL SUPPLY STERILE: Performed by: OBSTETRICS & GYNECOLOGY

## 2022-10-14 PROCEDURE — 88307 TISSUE EXAM BY PATHOLOGIST: CPT | Mod: TC

## 2022-10-14 PROCEDURE — 710N000010 HC RECOVERY PHASE 1, LEVEL 2, PER MIN: Performed by: OBSTETRICS & GYNECOLOGY

## 2022-10-14 PROCEDURE — 370N000017 HC ANESTHESIA TECHNICAL FEE, PER MIN: Performed by: OBSTETRICS & GYNECOLOGY

## 2022-10-14 PROCEDURE — 258N000003 HC RX IP 258 OP 636: Performed by: OBSTETRICS & GYNECOLOGY

## 2022-10-14 PROCEDURE — 250N000011 HC RX IP 250 OP 636

## 2022-10-14 PROCEDURE — 271N000001 HC OR GENERAL SUPPLY NON-STERILE: Performed by: OBSTETRICS & GYNECOLOGY

## 2022-10-14 PROCEDURE — 59510 CESAREAN DELIVERY: CPT | Mod: 22 | Performed by: OBSTETRICS & GYNECOLOGY

## 2022-10-14 PROCEDURE — 120N000002 HC R&B MED SURG/OB UMMC

## 2022-10-14 PROCEDURE — 0HB7XZZ EXCISION OF ABDOMEN SKIN, EXTERNAL APPROACH: ICD-10-PCS | Performed by: OBSTETRICS & GYNECOLOGY

## 2022-10-14 PROCEDURE — 250N000011 HC RX IP 250 OP 636: Performed by: OBSTETRICS & GYNECOLOGY

## 2022-10-14 PROCEDURE — 258N000003 HC RX IP 258 OP 636: Performed by: STUDENT IN AN ORGANIZED HEALTH CARE EDUCATION/TRAINING PROGRAM

## 2022-10-14 PROCEDURE — 250N000009 HC RX 250: Performed by: OBSTETRICS & GYNECOLOGY

## 2022-10-14 PROCEDURE — 250N000011 HC RX IP 250 OP 636: Performed by: ANESTHESIOLOGY

## 2022-10-14 PROCEDURE — 258N000003 HC RX IP 258 OP 636

## 2022-10-14 PROCEDURE — 88307 TISSUE EXAM BY PATHOLOGIST: CPT | Mod: 26 | Performed by: PATHOLOGY

## 2022-10-14 PROCEDURE — 360N000076 HC SURGERY LEVEL 3, PER MIN: Performed by: OBSTETRICS & GYNECOLOGY

## 2022-10-14 PROCEDURE — C9290 INJ, BUPIVACAINE LIPOSOME: HCPCS | Performed by: ANESTHESIOLOGY

## 2022-10-14 PROCEDURE — 99232 SBSQ HOSP IP/OBS MODERATE 35: CPT

## 2022-10-14 PROCEDURE — 250N000013 HC RX MED GY IP 250 OP 250 PS 637: Performed by: OBSTETRICS & GYNECOLOGY

## 2022-10-14 RX ORDER — HYDROCORTISONE 25 MG/G
CREAM TOPICAL 3 TIMES DAILY PRN
Status: DISCONTINUED | OUTPATIENT
Start: 2022-10-14 | End: 2022-10-16 | Stop reason: HOSPADM

## 2022-10-14 RX ORDER — AMOXICILLIN 250 MG
1 CAPSULE ORAL 2 TIMES DAILY
Status: DISCONTINUED | OUTPATIENT
Start: 2022-10-14 | End: 2022-10-16 | Stop reason: HOSPADM

## 2022-10-14 RX ORDER — PROCHLORPERAZINE 25 MG
25 SUPPOSITORY, RECTAL RECTAL EVERY 12 HOURS PRN
Status: DISCONTINUED | OUTPATIENT
Start: 2022-10-14 | End: 2022-10-16 | Stop reason: HOSPADM

## 2022-10-14 RX ORDER — DIPHENHYDRAMINE HCL 25 MG
25 CAPSULE ORAL EVERY 6 HOURS PRN
Status: DISCONTINUED | OUTPATIENT
Start: 2022-10-14 | End: 2022-10-16 | Stop reason: HOSPADM

## 2022-10-14 RX ORDER — BUPIVACAINE HYDROCHLORIDE 7.5 MG/ML
INJECTION, SOLUTION INTRASPINAL
Status: COMPLETED | OUTPATIENT
Start: 2022-10-14 | End: 2022-10-14

## 2022-10-14 RX ORDER — LABETALOL HYDROCHLORIDE 5 MG/ML
10 INJECTION, SOLUTION INTRAVENOUS
Status: DISCONTINUED | OUTPATIENT
Start: 2022-10-14 | End: 2022-10-14 | Stop reason: HOSPADM

## 2022-10-14 RX ORDER — CARBOPROST TROMETHAMINE 250 UG/ML
250 INJECTION, SOLUTION INTRAMUSCULAR
Status: DISCONTINUED | OUTPATIENT
Start: 2022-10-14 | End: 2022-10-16 | Stop reason: HOSPADM

## 2022-10-14 RX ORDER — TRANEXAMIC ACID 10 MG/ML
1 INJECTION, SOLUTION INTRAVENOUS EVERY 30 MIN PRN
Status: DISCONTINUED | OUTPATIENT
Start: 2022-10-14 | End: 2022-10-16 | Stop reason: HOSPADM

## 2022-10-14 RX ORDER — CARBOPROST TROMETHAMINE 250 UG/ML
250 INJECTION, SOLUTION INTRAMUSCULAR
Status: DISCONTINUED | OUTPATIENT
Start: 2022-10-14 | End: 2022-10-14 | Stop reason: HOSPADM

## 2022-10-14 RX ORDER — ONDANSETRON 4 MG/1
4 TABLET, ORALLY DISINTEGRATING ORAL EVERY 6 HOURS PRN
Status: DISCONTINUED | OUTPATIENT
Start: 2022-10-14 | End: 2022-10-16 | Stop reason: HOSPADM

## 2022-10-14 RX ORDER — CEFAZOLIN SODIUM/WATER 2 G/20 ML
2 SYRINGE (ML) INTRAVENOUS SEE ADMIN INSTRUCTIONS
Status: DISCONTINUED | OUTPATIENT
Start: 2022-10-14 | End: 2022-10-14 | Stop reason: HOSPADM

## 2022-10-14 RX ORDER — FENTANYL CITRATE 50 UG/ML
25-50 INJECTION, SOLUTION INTRAMUSCULAR; INTRAVENOUS EVERY 5 MIN PRN
Status: DISCONTINUED | OUTPATIENT
Start: 2022-10-14 | End: 2022-10-14 | Stop reason: HOSPADM

## 2022-10-14 RX ORDER — FENTANYL CITRATE 50 UG/ML
INJECTION, SOLUTION INTRAMUSCULAR; INTRAVENOUS
Status: COMPLETED | OUTPATIENT
Start: 2022-10-14 | End: 2022-10-14

## 2022-10-14 RX ORDER — TRANEXAMIC ACID 10 MG/ML
1 INJECTION, SOLUTION INTRAVENOUS EVERY 30 MIN PRN
Status: DISCONTINUED | OUTPATIENT
Start: 2022-10-14 | End: 2022-10-14 | Stop reason: HOSPADM

## 2022-10-14 RX ORDER — LIDOCAINE 40 MG/G
CREAM TOPICAL
Status: DISCONTINUED | OUTPATIENT
Start: 2022-10-14 | End: 2022-10-14 | Stop reason: HOSPADM

## 2022-10-14 RX ORDER — OXYTOCIN 10 [USP'U]/ML
10 INJECTION, SOLUTION INTRAMUSCULAR; INTRAVENOUS
Status: DISCONTINUED | OUTPATIENT
Start: 2022-10-14 | End: 2022-10-14 | Stop reason: HOSPADM

## 2022-10-14 RX ORDER — IBUPROFEN 800 MG/1
800 TABLET, FILM COATED ORAL EVERY 6 HOURS
Status: DISCONTINUED | OUTPATIENT
Start: 2022-10-15 | End: 2022-10-16 | Stop reason: HOSPADM

## 2022-10-14 RX ORDER — ACETAMINOPHEN 325 MG/1
975 TABLET ORAL EVERY 6 HOURS
Status: DISCONTINUED | OUTPATIENT
Start: 2022-10-14 | End: 2022-10-16 | Stop reason: HOSPADM

## 2022-10-14 RX ORDER — OXYTOCIN/0.9 % SODIUM CHLORIDE 30/500 ML
340 PLASTIC BAG, INJECTION (ML) INTRAVENOUS CONTINUOUS PRN
Status: COMPLETED | OUTPATIENT
Start: 2022-10-14 | End: 2022-10-14

## 2022-10-14 RX ORDER — SODIUM CHLORIDE, SODIUM LACTATE, POTASSIUM CHLORIDE, CALCIUM CHLORIDE 600; 310; 30; 20 MG/100ML; MG/100ML; MG/100ML; MG/100ML
INJECTION, SOLUTION INTRAVENOUS CONTINUOUS
Status: DISCONTINUED | OUTPATIENT
Start: 2022-10-14 | End: 2022-10-14 | Stop reason: HOSPADM

## 2022-10-14 RX ORDER — OXYCODONE HYDROCHLORIDE 5 MG/1
5 TABLET ORAL EVERY 4 HOURS PRN
Status: DISCONTINUED | OUTPATIENT
Start: 2022-10-14 | End: 2022-10-16 | Stop reason: HOSPADM

## 2022-10-14 RX ORDER — DIPHENHYDRAMINE HYDROCHLORIDE 50 MG/ML
25 INJECTION INTRAMUSCULAR; INTRAVENOUS EVERY 6 HOURS PRN
Status: DISCONTINUED | OUTPATIENT
Start: 2022-10-14 | End: 2022-10-16 | Stop reason: HOSPADM

## 2022-10-14 RX ORDER — CITRIC ACID/SODIUM CITRATE 334-500MG
30 SOLUTION, ORAL ORAL
Status: COMPLETED | OUTPATIENT
Start: 2022-10-14 | End: 2022-10-14

## 2022-10-14 RX ORDER — OXYTOCIN 10 [USP'U]/ML
10 INJECTION, SOLUTION INTRAMUSCULAR; INTRAVENOUS
Status: DISCONTINUED | OUTPATIENT
Start: 2022-10-14 | End: 2022-10-16 | Stop reason: HOSPADM

## 2022-10-14 RX ORDER — HYDRALAZINE HYDROCHLORIDE 20 MG/ML
2.5-5 INJECTION INTRAMUSCULAR; INTRAVENOUS EVERY 10 MIN PRN
Status: DISCONTINUED | OUTPATIENT
Start: 2022-10-14 | End: 2022-10-14 | Stop reason: HOSPADM

## 2022-10-14 RX ORDER — ONDANSETRON 2 MG/ML
4 INJECTION INTRAMUSCULAR; INTRAVENOUS EVERY 30 MIN PRN
Status: DISCONTINUED | OUTPATIENT
Start: 2022-10-14 | End: 2022-10-14 | Stop reason: HOSPADM

## 2022-10-14 RX ORDER — NALOXONE HYDROCHLORIDE 0.4 MG/ML
0.2 INJECTION, SOLUTION INTRAMUSCULAR; INTRAVENOUS; SUBCUTANEOUS
Status: DISCONTINUED | OUTPATIENT
Start: 2022-10-14 | End: 2022-10-16 | Stop reason: HOSPADM

## 2022-10-14 RX ORDER — LIDOCAINE 40 MG/G
CREAM TOPICAL
Status: DISCONTINUED | OUTPATIENT
Start: 2022-10-14 | End: 2022-10-16 | Stop reason: HOSPADM

## 2022-10-14 RX ORDER — KETOROLAC TROMETHAMINE 30 MG/ML
30 INJECTION, SOLUTION INTRAMUSCULAR; INTRAVENOUS EVERY 6 HOURS
Status: COMPLETED | OUTPATIENT
Start: 2022-10-14 | End: 2022-10-15

## 2022-10-14 RX ORDER — DEXTROSE, SODIUM CHLORIDE, SODIUM LACTATE, POTASSIUM CHLORIDE, AND CALCIUM CHLORIDE 5; .6; .31; .03; .02 G/100ML; G/100ML; G/100ML; G/100ML; G/100ML
INJECTION, SOLUTION INTRAVENOUS CONTINUOUS
Status: DISCONTINUED | OUTPATIENT
Start: 2022-10-14 | End: 2022-10-16 | Stop reason: HOSPADM

## 2022-10-14 RX ORDER — METHYLERGONOVINE MALEATE 0.2 MG/ML
200 INJECTION INTRAVENOUS
Status: DISCONTINUED | OUTPATIENT
Start: 2022-10-14 | End: 2022-10-14 | Stop reason: HOSPADM

## 2022-10-14 RX ORDER — PROCHLORPERAZINE MALEATE 10 MG
10 TABLET ORAL EVERY 6 HOURS PRN
Status: DISCONTINUED | OUTPATIENT
Start: 2022-10-14 | End: 2022-10-16 | Stop reason: HOSPADM

## 2022-10-14 RX ORDER — SIMETHICONE 80 MG
80 TABLET,CHEWABLE ORAL 4 TIMES DAILY PRN
Status: DISCONTINUED | OUTPATIENT
Start: 2022-10-14 | End: 2022-10-16 | Stop reason: HOSPADM

## 2022-10-14 RX ORDER — MISOPROSTOL 200 UG/1
400 TABLET ORAL
Status: DISCONTINUED | OUTPATIENT
Start: 2022-10-14 | End: 2022-10-14 | Stop reason: HOSPADM

## 2022-10-14 RX ORDER — NALOXONE HYDROCHLORIDE 0.4 MG/ML
0.4 INJECTION, SOLUTION INTRAMUSCULAR; INTRAVENOUS; SUBCUTANEOUS
Status: DISCONTINUED | OUTPATIENT
Start: 2022-10-14 | End: 2022-10-16 | Stop reason: HOSPADM

## 2022-10-14 RX ORDER — MISOPROSTOL 200 UG/1
800 TABLET ORAL
Status: DISCONTINUED | OUTPATIENT
Start: 2022-10-14 | End: 2022-10-14 | Stop reason: HOSPADM

## 2022-10-14 RX ORDER — MISOPROSTOL 200 UG/1
400 TABLET ORAL
Status: DISCONTINUED | OUTPATIENT
Start: 2022-10-14 | End: 2022-10-16 | Stop reason: HOSPADM

## 2022-10-14 RX ORDER — METHYLERGONOVINE MALEATE 0.2 MG/ML
200 INJECTION INTRAVENOUS
Status: DISCONTINUED | OUTPATIENT
Start: 2022-10-14 | End: 2022-10-16 | Stop reason: HOSPADM

## 2022-10-14 RX ORDER — METOCLOPRAMIDE HYDROCHLORIDE 5 MG/ML
10 INJECTION INTRAMUSCULAR; INTRAVENOUS EVERY 6 HOURS PRN
Status: DISCONTINUED | OUTPATIENT
Start: 2022-10-14 | End: 2022-10-16 | Stop reason: HOSPADM

## 2022-10-14 RX ORDER — ONDANSETRON 2 MG/ML
4 INJECTION INTRAMUSCULAR; INTRAVENOUS EVERY 6 HOURS PRN
Status: DISCONTINUED | OUTPATIENT
Start: 2022-10-14 | End: 2022-10-16 | Stop reason: HOSPADM

## 2022-10-14 RX ORDER — ACETAMINOPHEN 325 MG/1
975 TABLET ORAL ONCE
Status: COMPLETED | OUTPATIENT
Start: 2022-10-14 | End: 2022-10-14

## 2022-10-14 RX ORDER — OXYCODONE HYDROCHLORIDE 5 MG/1
5-10 TABLET ORAL EVERY 4 HOURS PRN
Status: DISCONTINUED | OUTPATIENT
Start: 2022-10-14 | End: 2022-10-16 | Stop reason: HOSPADM

## 2022-10-14 RX ORDER — BISACODYL 10 MG
10 SUPPOSITORY, RECTAL RECTAL DAILY PRN
Status: DISCONTINUED | OUTPATIENT
Start: 2022-10-16 | End: 2022-10-16 | Stop reason: HOSPADM

## 2022-10-14 RX ORDER — MISOPROSTOL 200 UG/1
800 TABLET ORAL
Status: DISCONTINUED | OUTPATIENT
Start: 2022-10-14 | End: 2022-10-16 | Stop reason: HOSPADM

## 2022-10-14 RX ORDER — ONDANSETRON 2 MG/ML
INJECTION INTRAMUSCULAR; INTRAVENOUS PRN
Status: DISCONTINUED | OUTPATIENT
Start: 2022-10-14 | End: 2022-10-14

## 2022-10-14 RX ORDER — MORPHINE SULFATE 1 MG/ML
INJECTION, SOLUTION EPIDURAL; INTRATHECAL; INTRAVENOUS
Status: COMPLETED | OUTPATIENT
Start: 2022-10-14 | End: 2022-10-14

## 2022-10-14 RX ORDER — AMOXICILLIN 250 MG
2 CAPSULE ORAL 2 TIMES DAILY
Status: DISCONTINUED | OUTPATIENT
Start: 2022-10-14 | End: 2022-10-16 | Stop reason: HOSPADM

## 2022-10-14 RX ORDER — HYDROMORPHONE HCL IN WATER/PF 6 MG/30 ML
.2-.4 PATIENT CONTROLLED ANALGESIA SYRINGE INTRAVENOUS EVERY 5 MIN PRN
Status: DISCONTINUED | OUTPATIENT
Start: 2022-10-14 | End: 2022-10-14 | Stop reason: HOSPADM

## 2022-10-14 RX ORDER — ONDANSETRON 4 MG/1
4 TABLET, ORALLY DISINTEGRATING ORAL EVERY 30 MIN PRN
Status: DISCONTINUED | OUTPATIENT
Start: 2022-10-14 | End: 2022-10-14 | Stop reason: HOSPADM

## 2022-10-14 RX ORDER — MODIFIED LANOLIN
OINTMENT (GRAM) TOPICAL
Status: DISCONTINUED | OUTPATIENT
Start: 2022-10-14 | End: 2022-10-16 | Stop reason: HOSPADM

## 2022-10-14 RX ORDER — OXYTOCIN/0.9 % SODIUM CHLORIDE 30/500 ML
340 PLASTIC BAG, INJECTION (ML) INTRAVENOUS CONTINUOUS PRN
Status: DISCONTINUED | OUTPATIENT
Start: 2022-10-14 | End: 2022-10-16 | Stop reason: HOSPADM

## 2022-10-14 RX ORDER — METOCLOPRAMIDE 10 MG/1
10 TABLET ORAL EVERY 6 HOURS PRN
Status: DISCONTINUED | OUTPATIENT
Start: 2022-10-14 | End: 2022-10-16 | Stop reason: HOSPADM

## 2022-10-14 RX ORDER — CEFAZOLIN SODIUM/WATER 2 G/20 ML
2 SYRINGE (ML) INTRAVENOUS
Status: COMPLETED | OUTPATIENT
Start: 2022-10-14 | End: 2022-10-14

## 2022-10-14 RX ADMIN — SODIUM CHLORIDE, POTASSIUM CHLORIDE, SODIUM LACTATE AND CALCIUM CHLORIDE: 600; 310; 30; 20 INJECTION, SOLUTION INTRAVENOUS at 12:14

## 2022-10-14 RX ADMIN — BUPIVACAINE 20 ML: 13.3 INJECTION, SUSPENSION, LIPOSOMAL INFILTRATION at 10:50

## 2022-10-14 RX ADMIN — ACETAMINOPHEN 975 MG: 325 TABLET, FILM COATED ORAL at 23:29

## 2022-10-14 RX ADMIN — SODIUM CHLORIDE, POTASSIUM CHLORIDE, SODIUM LACTATE AND CALCIUM CHLORIDE: 600; 310; 30; 20 INJECTION, SOLUTION INTRAVENOUS at 10:20

## 2022-10-14 RX ADMIN — PROCHLORPERAZINE EDISYLATE 10 MG: 5 INJECTION INTRAMUSCULAR; INTRAVENOUS at 13:47

## 2022-10-14 RX ADMIN — ACETAMINOPHEN 975 MG: 325 TABLET, FILM COATED ORAL at 07:44

## 2022-10-14 RX ADMIN — FENTANYL CITRATE 15 MCG: 50 INJECTION, SOLUTION INTRAMUSCULAR; INTRAVENOUS at 08:54

## 2022-10-14 RX ADMIN — Medication 2 G: at 08:52

## 2022-10-14 RX ADMIN — KETOROLAC TROMETHAMINE 30 MG: 30 INJECTION, SOLUTION INTRAMUSCULAR at 12:28

## 2022-10-14 RX ADMIN — BUPIVACAINE HYDROCHLORIDE IN DEXTROSE 1.6 ML: 7.5 INJECTION, SOLUTION SUBARACHNOID at 08:54

## 2022-10-14 RX ADMIN — SENNOSIDES AND DOCUSATE SODIUM 2 TABLET: 50; 8.6 TABLET ORAL at 20:21

## 2022-10-14 RX ADMIN — ACETAMINOPHEN 975 MG: 325 TABLET, FILM COATED ORAL at 17:18

## 2022-10-14 RX ADMIN — MORPHINE SULFATE 0.15 MG: 1 INJECTION EPIDURAL; INTRATHECAL; INTRAVENOUS at 08:54

## 2022-10-14 RX ADMIN — KETOROLAC TROMETHAMINE 30 MG: 30 INJECTION, SOLUTION INTRAMUSCULAR at 20:20

## 2022-10-14 RX ADMIN — PHENYLEPHRINE HYDROCHLORIDE 150 MCG: 10 INJECTION INTRAVENOUS at 09:48

## 2022-10-14 RX ADMIN — SODIUM CHLORIDE, POTASSIUM CHLORIDE, SODIUM LACTATE AND CALCIUM CHLORIDE: 600; 310; 30; 20 INJECTION, SOLUTION INTRAVENOUS at 07:45

## 2022-10-14 RX ADMIN — SODIUM CITRATE AND CITRIC ACID MONOHYDRATE 30 ML: 500; 334 SOLUTION ORAL at 08:36

## 2022-10-14 RX ADMIN — ONDANSETRON 4 MG: 2 INJECTION INTRAMUSCULAR; INTRAVENOUS at 09:50

## 2022-10-14 RX ADMIN — PHENYLEPHRINE HYDROCHLORIDE 100 MCG/MIN: 10 INJECTION INTRAVENOUS at 09:05

## 2022-10-14 RX ADMIN — SODIUM CHLORIDE, POTASSIUM CHLORIDE, SODIUM LACTATE AND CALCIUM CHLORIDE: 600; 310; 30; 20 INJECTION, SOLUTION INTRAVENOUS at 08:49

## 2022-10-14 RX ADMIN — OXYTOCIN-SODIUM CHLORIDE 0.9% IV SOLN 30 UNIT/500ML 300 ML/HR: 30-0.9/5 SOLUTION at 09:40

## 2022-10-14 RX ADMIN — SODIUM CHLORIDE, SODIUM LACTATE, POTASSIUM CHLORIDE, CALCIUM CHLORIDE AND DEXTROSE MONOHYDRATE: 5; 600; 310; 30; 20 INJECTION, SOLUTION INTRAVENOUS at 17:19

## 2022-10-14 ASSESSMENT — ACTIVITIES OF DAILY LIVING (ADL)
ADLS_ACUITY_SCORE: 18
ADLS_ACUITY_SCORE: 35
ADLS_ACUITY_SCORE: 18

## 2022-10-14 NOTE — H&P
[facility]  OBGYN History and Physical       Name: Hilaria Jansen MRN# [id number]   YOB: 1990 Age: 32 year old      Date of Admission: 10/14/2022    Primary care provider: Clotilde Beltran      Assessment and Plan:     Hilaria Jansen is a 32 year old  at 32w0d by LMP c/w 3/4/22 and 5/10/22 US who presents today for scheduled  section. Pregnancy notable for multifetal gestation (mono-di twins), fetal growth restriction X2, sickle cell trait, anemia, and elevated UA dopplers.     # Scheduled Repeat  Section  # Monochorionic Diamniotic Twin gestation  Indicated due to multifetal gestation (mono-di twins). Prior (s) for breech fetal presentation. Previous op notes reported PPH. No other complications identified.. Will plan for a Pfannenstiel skin incision with low transverse hysterotomy  - Labs: CBC, T&S, RPR   - Pre-op Hgb 11.8 (10/12/22), Plts 238 (10/12/22)  - Placenta: anterior, thin dividing membrane  - Anesthesia: Spinal   - 2g Ancef   - Diet: NPO  - PPx: SCDs  - Consent: Discussed risks and benefits of procedure, including but not limited to bleeding, infection, injury to surrounding organs, injury to infant, and the potential need for another surgery should some injury go unrecognized or patient were to have continued bleeding. Patient had time to ask questions and expressed understanding of procedure and associated risks. Agreed to blood transfusion if necessary. Consent signed.    # PNC  - Rh positive, Rubella 22,  GBS not collected  - Other prenatal labs wnl  - flu: none this year, Tdap vaccines 22  - Pap last normal 22  - Contraception: IUD at 6w post partum visit  - Feeding: breastfeeding     # FWB:   Twin A: Cat I Reactive, reassuring   Twin B: Cat I reactive, reassuring  - Continuous Fetal Monitoring  - NICU for delivery     Patient discussed with Dr. Lucia Yeh  MS3  [facility]     Resident/Fellow Attestation   I, Nuno Williamson, was present with the medical student who participated in the service and in the documentation of the note.  I have verified the history and personally performed the physical exam and medical decision making.  I agree with the assessment and plan of care as documented in the note.  I have reviewed the note and made changes as necessary.      Nuno Williamson DO, MS  Obstetrics, Gynecology & Women's Health   Resident, PGY-2  10/14/2022 9:03 AM           Physician Attestation   I personally examined and evaluated this patient.  I discussed the patient with the resident/fellow and care team, and agree with the assessment and plan of care as documented in the note of 10/14/22.      I personally reviewed vital signs, medications, labs and exam.    Key findings: 32 year old   at 32w0d here for repeat  section for mono di twins with fetal growth restriction and abnormal dopplers. H/o CS x2 with short inter-pregnancy interval. Last CS 12/15/2021. Discussed indication for  section. Reviewed risks, benefits, alternatives and recovery of  section. Risks include but are not limited to infection that may need antibiotics to treat, injury to other organs such as but not limited to bladder, ureters, intestines, injury to baby, risk of excessive blood loss, potential need for blood transfusion and potential need for hysterectomy. Patient consents to transfusion of blood products if indicated. Discussed risks of anesthesia and DVT. Verbal and written consent obtained.     Lucia Gannon MD  Date of Service (when I saw the patient): 10/14/22       HPI:     Hilaria Jansen is a 32 year old  at 32w0d by LMP c/w 3/4/22 and 5/10/22 US who presents today for scheduled  section.    She reports good fetal movement. Denies LOF, vaginal bleeding, or contractions. She denies fever, chills,  SOB, chest pain, palpitations, N/V, LE swelling/tenderness.  No concerns for headache, vision changes, RUQ or epigastric pain      Pregnancy notable for:   - Monochorionic Diamniotic Twin Gestation  - FGR X2  - elevated UA dopplers  - sickle cell trait    Her previous  delivery was notable for postpartum hemorrhage. Denies history of  pre-eclampsia. No history of asthma or high blood pressure.    Patient has been NPO since last night    OB History:    OB History    Para Term  AB Living   6 2 2 0 3 2   SAB IAB Ectopic Multiple Live Births   1 0 0 0 2      # Outcome Date GA Lbr Ignacio/2nd Weight Sex Delivery Anes PTL Lv   6 Current            5 Term 12/15/21 39w0d  3.32 kg (7 lb 5.1 oz) F CS-LTranv Spinal  JESSE      Name: Tracy      Apgar1: 8  Apgar5: 9   4 AB 02/15/21 9w0d             Birth Comments: no D&C   3 Term 19 37w6d  3.4 kg (7 lb 7.9 oz) M CS-LTranv Nitrous, EPI N JESSE      Complications: Fetal Intolerance      Name: SHAWN,MALE-KAROLINA      Apgar1: 8  Apgar5: 9   2 AB 2017     IAB         Birth Comments: took pill   1 SAB      SAB         Birth Comments: chemical pregnancy        Prenatal Lab Results:  Lab Results   Component Value Date    ABO B 2021    RH Pos 2021    AS Negative 10/12/2022    HEPBANG Nonreactive 2022    CHPCRT Negative 2021    GCPCRT Negative 2021    HGB 11.8 10/12/2022       GBS Status:   Lab Results   Component Value Date    GBS Negative 2019                Past Medical History:     Past Medical History:   Diagnosis Date     Anemia      Hidradenitis suppurativa      Infertility, female      Neurological disorder      Sickle cell anemia (H)      Varicella              Past Surgical History:     Past Surgical History:   Procedure Laterality Date      SECTION N/A 2019    Procedure:  SECTION;  Surgeon: Wendi Coppola MD;  Location: [facility] L+[facility]      SECTION N/A 12/15/2021    Procedure:   SECTION;  Surgeon: Brooke Capps MD;  Location: [facility]     HEAD & NECK SURGERY       SURGICAL PATHOLOGY EXAM Left 09/10/2015    left paratracheal neck mass removed, benign             Social History:     Social History     Tobacco Use     Smoking status: Never     Smokeless tobacco: Never   Substance Use Topics     Alcohol use: No             Family History:     Family History   Problem Relation Age of Onset     Breast Cancer Paternal Aunt              Immunizations:     Immunization History   Administered Date(s) Administered     Flu, Unspecified 2014     Influenza (IIV3) PF 2014     Influenza Vaccine IM > 6 months Valent IIV4 (Alfuria,Fluzone) 2018, 10/06/2021, 2022     TDAP Vaccine (Adacel) 2019     Tdap (Adacel,Boostrix) 10/06/2021, 2022            Allergies:   No Known Allergies          Medications:     Medications Prior to Admission   Medication Sig Dispense Refill Last Dose     acetaminophen (TYLENOL) 500 MG tablet Take 1-2 tablets (500-1,000 mg) by mouth every 6 hours as needed for mild pain 100 tablet 3 Past Week at 1415     aspirin (ASA) 81 MG chewable tablet Take 1 tablet (81 mg) by mouth daily 90 tablet 3 10/11/2022 at 59535     cyanocobalamin (VITAMIN B-12) 1000 MCG tablet Take 1 tablet (1,000 mcg) by mouth daily 90 tablet 1 10/11/2022 at 2130     ferrous sulfate (FEROSUL) 325 (65 Fe) MG tablet Take 1 tablet (325 mg) by mouth daily (with breakfast) 90 tablet 1 10/11/2022     imiquimod (ALDARA) 5 % external cream Apply a small sized amount to warts or molluscum three times weekly at bedtime.   Wash off after 8 hours.   May use for up to 16 weeks. 12 packet 3      Prenatal Vit-Fe Fumarate-FA (PRENATAL MULTIVITAMIN W/IRON) 27-0.8 MG tablet Take 1 tablet by mouth daily 90 tablet 3  at 2130     Prenatal Vit-Fe Fumarate-FA (PRENATAL MULTIVITAMIN W/IRON) 27-0.8 MG tablet Take 1 tablet by mouth daily 90 tablet 3 10/11/2022     senna-docusate  (SENOKOT-S/PERICOLACE) 8.6-50 MG tablet Take 1-2 tablets by mouth daily as needed for constipation 60 tablet 4      vitamin C (ASCORBIC ACID) 250 MG tablet Take 1 tablet (250 mg) by mouth daily 90 tablet 1              Review of Systems & Physical Exam:     The Review of Systems is negative other than noted in the HPI      /76   Temp 97.7  F (36.5  C) (Oral)   Resp 18   LMP 03/04/2022   Breastfeeding No   Gen: awake, alert, oriented, not in apparent distress  CV: RRR, nl S1/S2, no murmurs/clicks/gallops  Lungs: CTAB, non-labored breathing  Abd: Gravid, non-tender, non-distended  Ext: no peripheral extremity edema; no clonus    Presentation: Twin A breech, head to maternal right, Twin B breech head to maternal right by BSUS.    FHT:  Monitoring External  FHT:   Twin A: Baseline 145 bpm; moderate variability; positive accels present; no decelerations  Twin B:  Baseline 150 bpm; moderate variability; positive accels present; no decelerations  TOCO 0 contractions in 10 minutes         Data:     Results for orders placed or performed during the hospital encounter of 10/14/22   ABO/Rh type and screen *Canceled*     Status: None ()    Narrative    The following orders were created for panel order ABO/Rh type and screen.  Procedure                               Abnormality         Status                     ---------                               -----------         ------                       Please view results for these tests on the individual orders.

## 2022-10-14 NOTE — ANESTHESIA POSTPROCEDURE EVALUATION
Patient: Hilaria Jansen    Procedure: Procedure(s):   SECTION       Anesthesia Type:  Spinal    Note:  Disposition: Outpatient   Postop Pain Control: Uneventful            Sign Out: Well controlled pain   PONV: No   Neuro/Psych: Uneventful            Sign Out: Acceptable/Baseline neuro status   Airway/Respiratory: Uneventful            Sign Out: Acceptable/Baseline resp. status   CV/Hemodynamics: Uneventful            Sign Out: Acceptable CV status   Other NRE: NONE   DID A NON-ROUTINE EVENT OCCUR? No           Last vitals:  Vitals Value Taken Time   /74 10/14/22 1130   Temp 36.4  C (97.5  F) 10/14/22 1049   Pulse 94 10/14/22 1137   Resp 20 10/14/22 1115   SpO2 97 % 10/14/22 1137   Vitals shown include unvalidated device data.    Electronically Signed By: Mac Valdez DO  2022  11:38 AM

## 2022-10-14 NOTE — ANESTHESIA PROCEDURE NOTES
"Intrathecal injection Procedure Note    Pre-Procedure   Staff -        Anesthesiologist:  Mac Valdez DO       Resident/Fellow: Semaj Melo MD       Performed By: resident       Location: OR       Procedure Start/Stop Times: 10/14/2022 8:54 AM       Pre-Anesthestic Checklist: patient identified, IV checked, risks and benefits discussed, informed consent, monitors and equipment checked, pre-op evaluation, at physician/surgeon's request and post-op pain management  Timeout:       Correct Patient: Yes        Correct Procedure: Yes        Correct Site: Yes        Correct Position: Yes   Procedure Documentation  Procedure: intrathecal injection       Patient Position: sitting       Skin prep: Chloraprep       Insertion Site: L3-4. (midline approach).       Needle Gauge: 25.        Needle Length (Inches): 3.5        Spinal Needle Type: Pencan       Introducer used       Introducer: 20 G    Assessment/Narrative         Paresthesias: Yes.       CSF fluid: clear.    Medication(s) Administered   0.75% Hyperbaric Bupivacaine (Intrathecal) - Intrathecal   1.6 mL - 10/14/2022 8:54:00 AM  Fentanyl PF (Intrathecal) - Intrathecal   15 mcg - 10/14/2022 8:54:00 AM  Morphine PF 1 mg/mL (Intrathecal) - Intrathecal   0.15 mg - 10/14/2022 8:54:00 AM  Medication Administration Time: 10/14/2022 8:54 AM      FOR [facility] ([facility]) ONLY:   Pain Team Contact information: please page the Pain Team Via [address]. Search \"Pain\". During daytime hours, please page the attending first. At night please page the resident first.    "

## 2022-10-14 NOTE — PLAN OF CARE
Scheduled AM C/S Admit Note  Hilaria Jansen  MRN: [id number]  Gestational Age: 32w0d      Hilaria Jansen presents for scheduled  section for repeat.  Patient denies contractions, bleeding or LOF.  NPO [id number].    Past Medical History:   Diagnosis Date    Anemia     Hidradenitis suppurativa     Infertility, female     Neurological disorder     Sickle cell anemia (H)     Varicella      Past Surgical History:   Procedure Laterality Date     SECTION N/A 2019    Procedure:  SECTION;  Surgeon: Wendi Coppola MD;  [facility]: [facility] L+[facility]     SECTION N/A 12/15/2021    Procedure:  SECTION;  Surgeon: Brooke Capps MD;  Location: [facility] L+D    HEAD & NECK SURGERY      SURGICAL PATHOLOGY EXAM Left 09/10/2015    left paratracheal neck mass removed, benign        Dr Williamson notified of patient's arrival and condition.    FHT: A: 145 and B:150  NST: Appropriate for Gestational Age .    Plan:  - section at 0830.

## 2022-10-14 NOTE — PROGRESS NOTES
Lindquist removed at 1700, patient still sleepy and dizzy. Iv fluid started. Tylenol given for pain. Will continue to monitor patient.

## 2022-10-14 NOTE — PLAN OF CARE
Goal Outcome Evaluation:    Plan of Care Reviewed With: patient      Data: Hilaria Jansen transferred to [id number] via cart at 1300. Babies in NICU.  Action: Receiving unit notified of transfer: Yes. Patient and family notified of room change. Report given to Ranjith at 1205. Belongings sent to receiving unit. Accompanied by Registered Nurse. Oriented patient to surroundings. Call light within reach. ID bands double-checked with receiving RN.  Response: Patient tolerated transfer and is stable.

## 2022-10-14 NOTE — PLAN OF CARE
Goal Outcome Evaluation:       Data: VSS and postpartum checks WNL. Patient had emesis on arrival on floor. Iv fluid infusing. Fundus at 1 cm below U and firm  without massage.  lochia scant and  no blood clots. Dressing clean,dry and intact. Kaur in as patient is very sleepy and has not gotten up yet.   Action: Patient pain tolerable.  Compazine given with relief. Encouraged patient to pump  every 2 - 3 hours. Patient education done( education record). Pulse oximeter on.   Response: Tolerating clear liquid for now.   Plan: Continue with the plan of cares and will remove kaur later.

## 2022-10-14 NOTE — OP NOTE
[facility]  Full Operative Progress Note     Patient: Hilaria Jansen     Surgery Date:  10/14/2022    Surgeon:  Lucia Gannon MD    Assistants:  Nuno Williamson DO, MS PGY-2      Pre-op Diagnosis:    - Intrauterine pregnancy at 32w0d  - Monochorionic diamniotic Twin Gestation  - Fetal Growth Restriction, Twin A, Twin B  - Elevated UA dopplers  - Sickle Cell Trait    Post-op Diagnosis:    - Same   - Vigorous female infant twin A  - Vigorous female infant twin B    Procedure: Repeat low-transverse  section with double layer uterine closure via pfannenstiel incision, scar revision    Anesthesia: Spinal  QBL: 468 mL   IVF: 1400 mL crystalloid  UOP: 100 mL clear urine at the end of the case  Drains: Lindquist Catheter   Specimens:   ID Type Source Tests Collected by Time Destination   A :  Placenta Placenta SEE PROVIDERS ORDERS Lucia Gannon MD 10/14/2022 10:04 AM    B :  Tissue Umbilical Cord OR DOCUMENTATION ONLY Lucia Gannon MD 10/14/2022 10:05 AM      Complications: None apparent    Indications:   Hilaria Jansen is a 32 year old  at 32w0d admitted for scheduled repeat CS in the setting of Mono-di twin gestation with fetal growth restriction and recent elevated UA dopplers. The risks, benefits, and alternatives of  section were discussed with the patient including bleeding, infection, injury to surrounding structures (nerves, blood vessels, uterus, cervix, tubes, ovaries, bladder, bowel, rarely baby), and she agreed to proceed. Written consent obtained.     Findings:   1. Moderate rectofascial adhesions. Minimal intraabdominal adhesions  2. Clear amniotic fluid both twins  3. Twin A: Vigorous female infant in vertex presentation. Apgars 7 at 1 minute & 9 at 5 minutes. Weight pending.  4. Twin B: Vigorous female infant in vertex presentation. Apgars 7 at 1 minute & 9 at 5 minutes. Weight pending.  5. Cord Gas - Twin A: Arterial  cord pH 7.18, base deficit -7.5. Venous cord pH 7.25, base deficit -2.6.  6. Cord Gas - Twin B: Arterial cord pH 7.24, base deficit n/a. Venous cord pH 7.21, base deficit -3.4.  7. Uterus notable for uterine window in lower uterine segment. Would recommend delivery between 37-39 weeks via repeat CS for future pregnancies. Otherwise normal appearing uterus , fallopian tubes, and ovaries.     Procedure Details:   The patient was brought to the OR, where adequate spinal anesthesia was administered.  She was placed in the dorsal supine position with a slight leftward tilt. She was prepped and draped in the usual sterile fashion. A surgical time out was performed. Ancef given for antibiotic prophylaxis. Patients previous pfannenstiel incision was identified and outlined with marking pen. Scalpel was used to incise around incision and previous hypertrophied scar was excised with scalpel and electrocautery. The incision was then carried down to the underlying fascia with sharp and blunt dissection. The fascia was incised in the midline, and the incision was extended laterally with the [place] scissors. The superior aspect of the fascia was grasped with the Kocher clamps and dissected off of the underlying rectus muscles with blunt and sharp dissection. Attention was then turned to the inferior aspect of the fascia, which was similarly dissected off of the underlying rectus muscles. The rectus muscles were  in the midline, and the peritoneum was entered bluntly, and the opening was extended with digital pressure and electrosurgery. The bladder blade was placed. The vesicouterine peritoneum was incised in the midline, and the incision was extended laterally with the Metzenbaum scissors. A bladder flap was created digitally and the bladder blade was replaced.    A transverse hysterotomy was made with the scalpel in the lower uterine segment, and the incision was extended with digital pressure. Twin A was identified and  noted to be in the vertex position. Amniotomy was performed with clear fluid and Twin A was delivered atraumatically. The shoulders delivered easily.  No nuchal cord was noted. The cord was doubly clamped with straight and cut after 60 seconds, and the infant was handed off to the awaiting NICU staff.     Twin B was then identified and noted to be in the vertex position. Amniotomy was performed and Twin B was delivered atraumatically. The shoulders delivered easily. No nuchal cord was noted. The cord was doubly clamped and cut after 60 seconds, and Twin B was handed off to the awaiting NICU staff    A segment of both umbilical cords were cut and collected. Both placentas were delivered with gentle traction on the umbilical cords and uterine massage. The uterus was exteriorized and cleared of all clots and debris. Uterine tone was noted to be firm with pitocin given through the running IV and uterine massage.  The hysterotomy was closed with a running locked suture of 0 Vicryl.  The hysterotomy was then imbricated using an 0 Vicryl suture. The hysterotomy was noted to be hemostatic. The posterior cul-de-sac was cleared of all clots and debris. The uterus was returned to the abdomen. The pericolic gutters were cleared of all clots and debris. The hysterotomy was reexamined and noted to be hemostatic. The fascia and rectus muscles were examined and areas of oozing were controlled with electrocautery.  The fascia was closed with a running 0 Vicryl suture. The subcutaneous tissue was irrigated and areas of oozing were controlled with electrocautery. The subcutaneous tissue was more than 2 cm in thickness, and was therefore closed with 2-0 Plain gut. The skin was closed with 4-0 Monocryl and covered with overlying steri-strips and sterile dressing.    All sponge, needle, and instrument counts were correct. The patient tolerated the procedure well, and was transferred to recovery in stable condition.     Dr. Gannon was  present and scrubbed for the entire procedure.     Nuno Williamson DO, MS  Obstetrics, Gynecology & Women's Health   Resident, PGY-2  10/14/2022 11:00 AM      ATTESTATION:   I was scrubbed and present for the entire procedure. I agree with the above note which I have edited to reflect my findings.   Lucia Gannon MD

## 2022-10-14 NOTE — PLAN OF CARE
Goal Outcome Evaluation:    Plan of Care Reviewed With: patient      OR to PACU Transfer Note  Data: Pt to OB PACU via cart. PIV infusing without complications, kaur with clear yellow urine to gravity, temp 97.5, vss, pt does not complain of pain and/or nausea.   Interventions: IV to pump, monitors and alarms on, SCD on.  Response: stable.  Plan: Patient instructed to notify RN for pain or nausea, routine post op cares, initiate breastfeeding/pumping as soon as patient/infant able.      no events, +flatus/BM    abd soft inc intact            Objective:    MEDICATIONS  (STANDING):  amLODIPine   Tablet 5 milliGRAM(s) Oral daily  cholecalciferol 400 Unit(s) Oral daily  dextrose 5% + sodium chloride 0.45% with potassium chloride 20 mEq/L 1000 milliLiter(s) (50 mL/Hr) IV Continuous <Continuous>  dextrose 5%. 1000 milliLiter(s) (50 mL/Hr) IV Continuous <Continuous>  dextrose 50% Injectable 12.5 Gram(s) IV Push once  dextrose 50% Injectable 25 Gram(s) IV Push once  dextrose 50% Injectable 25 Gram(s) IV Push once  heparin  Injectable 5000 Unit(s) SubCutaneous every 8 hours  influenza   Vaccine 0.5 milliLiter(s) IntraMuscular once  insulin lispro (HumaLOG) corrective regimen sliding scale   SubCutaneous three times a day before meals  losartan 100 milliGRAM(s) Oral daily  pantoprazole    Tablet 40 milliGRAM(s) Oral before breakfast  polyethylene glycol 3350 17 Gram(s) Oral daily  psyllium Powder 1 Packet(s) Oral daily    MEDICATIONS  (PRN):  artificial  tears Solution 1 Drop(s) Both EYES three times a day PRN Dry Eyes  dextrose 40% Gel 15 Gram(s) Oral once PRN Blood Glucose LESS THAN 70 milliGRAM(s)/deciliter  glucagon  Injectable 1 milliGRAM(s) IntraMuscular once PRN Glucose LESS THAN 70 milligrams/deciliter  morphine  - Injectable 2 milliGRAM(s) IV Push every 4 hours PRN Moderate Pain (4 - 6)      Vital Signs Last 24 Hrs  T(C): 37.1 (06 Oct 2019 05:17), Max: 37.1 (06 Oct 2019 05:17)  T(F): 98.7 (06 Oct 2019 05:17), Max: 98.7 (06 Oct 2019 05:17)  HR: 86 (06 Oct 2019 05:17) (86 - 104)  BP: 163/70 (06 Oct 2019 05:17) (133/60 - 168/77)  BP(mean): --  RR: 17 (06 Oct 2019 05:17) (16 - 18)  SpO2: 100% (06 Oct 2019 05:17) (95% - 100%)    I&O's Detail    05 Oct 2019 07:01  -  06 Oct 2019 07:00  --------------------------------------------------------  IN:    dextrose 5% + sodium chloride 0.45% with potassium chloride 20 mEq/L: 900 mL    IV PiggyBack: 300 mL    Oral Fluid: 720 mL  Total IN: 1920 mL    OUT:    Indwelling Catheter - Urethral: 1125 mL  Total OUT: 1125 mL    Total NET: 795 mL          Daily     Daily     LABS:                        11.0   6.62  )-----------( 203      ( 06 Oct 2019 06:46 )             35.7     10-06    130<L>  |  101  |  5<L>  ----------------------------<  106<H>  3.7   |  20<L>  |  0.47<L>    Ca    9.2      06 Oct 2019 06:46  Phos  2.5     10-06  Mg     1.9     10-06            RADIOLOGY & ADDITIONAL STUDIES: Pt. seen and examined this Am by the surgical team on rounds. She had no acute [id number] o/n.     no events, +flatus/BM    abd soft inc intact            Objective:    MEDICATIONS  (STANDING):  amLODIPine   Tablet 5 milliGRAM(s) Oral daily  cholecalciferol 400 Unit(s) Oral daily  dextrose 5% + sodium chloride 0.45% with potassium chloride 20 mEq/L 1000 milliLiter(s) (50 mL/Hr) IV Continuous <Continuous>  dextrose 5%. 1000 milliLiter(s) (50 mL/Hr) IV Continuous <Continuous>  dextrose 50% Injectable 12.5 Gram(s) IV Push once  dextrose 50% Injectable 25 Gram(s) IV Push once  dextrose 50% Injectable 25 Gram(s) IV Push once  heparin  Injectable 5000 Unit(s) SubCutaneous every 8 hours  influenza   Vaccine 0.5 milliLiter(s) IntraMuscular once  insulin lispro (HumaLOG) corrective regimen sliding scale   SubCutaneous three times a day before meals  losartan 100 milliGRAM(s) Oral daily  pantoprazole    Tablet 40 milliGRAM(s) Oral before breakfast  polyethylene glycol 3350 17 Gram(s) Oral daily  psyllium Powder 1 Packet(s) Oral daily    MEDICATIONS  (PRN):  artificial  tears Solution 1 Drop(s) Both EYES three times a day PRN Dry Eyes  dextrose 40% Gel 15 Gram(s) Oral once PRN Blood Glucose LESS THAN 70 milliGRAM(s)/deciliter  glucagon  Injectable 1 milliGRAM(s) IntraMuscular once PRN Glucose LESS THAN 70 milligrams/deciliter  morphine  - Injectable 2 milliGRAM(s) IV Push every 4 hours PRN Moderate Pain (4 - 6)      Vital Signs Last 24 Hrs  T(C): 37.1 (06 Oct 2019 05:17), Max: 37.1 (06 Oct 2019 05:17)  T(F): 98.7 (06 Oct 2019 05:17), Max: 98.7 (06 Oct 2019 05:17)  HR: 86 (06 Oct 2019 05:17) (86 - 104)  BP: 163/70 (06 Oct 2019 05:17) (133/60 - 168/77)  BP(mean): --  RR: 17 (06 Oct 2019 05:17) (16 - 18)  SpO2: 100% (06 Oct 2019 05:17) (95% - 100%)    I&O's Detail    05 Oct 2019 07:01  -  06 Oct 2019 07:00  --------------------------------------------------------  IN:    dextrose 5% + sodium chloride 0.45% with potassium chloride 20 mEq/L: 900 mL    IV PiggyBack: 300 mL    Oral Fluid: 720 mL  Total IN: 1920 mL    OUT:    Indwelling Catheter - Urethral: 1125 mL  Total OUT: 1125 mL    Total NET: 795 mL          Daily     Daily     LABS:                        11.0   6.62  )-----------( 203      ( 06 Oct 2019 06:46 )             35.7     10-06    130<L>  |  101  |  5<L>  ----------------------------<  106<H>  3.7   |  20<L>  |  0.47<L>    Ca    9.2      06 Oct 2019 06:46  Phos  2.5     10-06  Mg     1.9     10-06            RADIOLOGY & ADDITIONAL STUDIES: Pt. seen and examined this Am by the surgical team on rounds. She had no acute complaints o/n. She is tolerating her diet yesterday and passed gas multiple times. No BM o/n. Denies N/V/D or recurrence of prolapse.    Objective:    MEDICATIONS  (STANDING):  amLODIPine   Tablet 5 milliGRAM(s) Oral daily  cholecalciferol 400 Unit(s) Oral daily  dextrose 5% + sodium chloride 0.45% with potassium chloride 20 mEq/L 1000 milliLiter(s) (50 mL/Hr) IV Continuous <Continuous>  dextrose 5%. 1000 milliLiter(s) (50 mL/Hr) IV Continuous <Continuous>  dextrose 50% Injectable 12.5 Gram(s) IV Push once  dextrose 50% Injectable 25 Gram(s) IV Push once  dextrose 50% Injectable 25 Gram(s) IV Push once  heparin  Injectable 5000 Unit(s) SubCutaneous every 8 hours  influenza   Vaccine 0.5 milliLiter(s) IntraMuscular once  insulin lispro (HumaLOG) corrective regimen sliding scale   SubCutaneous three times a day before meals  losartan 100 milliGRAM(s) Oral daily  pantoprazole    Tablet 40 milliGRAM(s) Oral before breakfast  polyethylene glycol 3350 17 Gram(s) Oral daily  psyllium Powder 1 Packet(s) Oral daily    MEDICATIONS  (PRN):  artificial  tears Solution 1 Drop(s) Both EYES three times a day PRN Dry Eyes  dextrose 40% Gel 15 Gram(s) Oral once PRN Blood Glucose LESS THAN 70 milliGRAM(s)/deciliter  glucagon  Injectable 1 milliGRAM(s) IntraMuscular once PRN Glucose LESS THAN 70 milligrams/deciliter  morphine  - Injectable 2 milliGRAM(s) IV Push every 4 hours PRN Moderate Pain (4 - 6)      Vital Signs Last 24 Hrs  T(C): 37.1 (06 Oct 2019 05:17), Max: 37.1 (06 Oct 2019 05:17)  T(F): 98.7 (06 Oct 2019 05:17), Max: 98.7 (06 Oct 2019 05:17)  HR: 86 (06 Oct 2019 05:17) (86 - 104)  BP: 163/70 (06 Oct 2019 05:17) (133/60 - 168/77)  BP(mean): --  RR: 17 (06 Oct 2019 05:17) (16 - 18)  SpO2: 100% (06 Oct 2019 05:17) (95% - 100%)    I&O's Detail    05 Oct 2019 07:01  -  06 Oct 2019 07:00  --------------------------------------------------------  IN:    dextrose 5% + sodium chloride 0.45% with potassium chloride 20 mEq/L: 900 mL    IV PiggyBack: 300 mL    Oral Fluid: 720 mL  Total IN: 1920 mL    OUT:    Indwelling Catheter - Urethral: 1125 mL  Total OUT: 1125 mL    Total NET: 795 mL          Daily     Daily     LABS:                        11.0   6.62  )-----------( 203      ( 06 Oct 2019 06:46 )             35.7     10-06    130<L>  |  101  |  5<L>  ----------------------------<  106<H>  3.7   |  20<L>  |  0.47<L>    Ca    9.2      06 Oct 2019 06:46  Phos  2.5     10-06  Mg     1.9     10-06      Exam:  Gen: NAD, resting in bed, alert and responding appropriately,   Resp: Airway patent, non-labored respirations  Abd: Soft, ND, NTTP x 4 quadrants, no rebound or guarding. Surgical incision c/d/i, non-erythematous. Dressing changed with gauze and paper tape.  : Luong  Rectum: Benign, no prolapse  Ext: No edema, WWP  Neuro: AAOx3, no focal deficits

## 2022-10-14 NOTE — CONSULTS
Procedure Note     Neonatology Antepartum Counseling Consult:  I was asked to provide antepartum counseling for Hilaria Jansen at the request of Lucia Gannon* secondary to  delivery. Ms. Hilaria Jansen is currently 32 weeks/ 0 days gestation and has a history significant for: mono-di twins   Patient Active Problem List   Diagnosis     Abnormal CT scan, neck     Gastroesophageal reflux disease without esophagitis     Sinusitis     Sickle cell trait (H)     Dry eyes, bilateral     Allergic conjunctivitis of both eyes     Muscle tension dysphonia     Bilateral low back pain with right-sided sciatica     Bilateral low back pain with left-sided sciatica     Need for Tdap vaccination     Monochorionic diamniotic twin pregnancy     Anemia during pregnancy      Betamethasone was administered on 10/5 and 10/6.   Ms. Hilaria Jansen, was counseled on the expected hospital course, potential risks, and outcomes associated with an infant born at this gestation. The counseling included: morbidity, mortality, initial delivery room stabilization, respiratory course, lung development, hyperbilirubinemia, hemodynamic support, infection, nutrition, growth and development, and long term outcomes.  I also explained the basic four criteria for discharge: that the baby had to be free of apnea; able to maintain their body temperature; able to feed by bottle or breast well enough to; attain an adequate pattern of weight gain and growth.  The patient had no remaining questions but was encouraged to contact the NICU via their caregivers should any arise.  Please feel free to call and thank you for involving the NICU team in the care of your patient.      Floor Time (min): 5  Face to Face Time (min): 20  Total Time (minutes): 25  More than 50% of my time was spent in direct, face to face, antepartum counseling with the above patient.    Molly Milligan, LEONA CNP on 10/14/2022 at 9:17 AM

## 2022-10-14 NOTE — ANESTHESIA PROCEDURE NOTES
TAP Procedure Note    Pre-Procedure   Staff -        Anesthesiologist:  Mac Valdez DO       Resident/Fellow: Semaj Melo MD       Performed By: resident       Location: OR       Procedure Start/Stop Times: 10/14/2022 10:44 AM       Pre-Anesthestic Checklist: patient identified, IV checked, site marked, risks and benefits discussed, informed consent, monitors and equipment checked, pre-op evaluation, at physician/surgeon's request and post-op pain management  Timeout:       Correct Patient: Yes        Correct Procedure: Yes        Correct Site: Yes        Correct Position: Yes        Correct Laterality: Yes        Site Marked: Yes  Procedure Documentation  Procedure: TAP       Diagnosis: POST-OP PAIN; PREGNANCY       Laterality: bilateral       Patient Position: supine       Skin prep: Chloraprep       Insertion Site: T9-10.       Needle Type: short bevel       Needle Gauge: 17.        Needle Length (Inches): 3.13                 Ultrasound guided       1. Ultrasound was used to identify targeted nerve, plexus, vascular marker, or fascial plane and place a needle adjacent to it in real-time.       2. Ultrasound was used to visualize the spread of anesthetic in close proximity to the above referenced structure.       3. A permanent image is entered into the patient's record.       4. The visualized anatomic structures appeared normal.       5. There were no apparent abnormal pathologic findings.    Assessment/Narrative         The placement was negative for: blood aspirated, painful injection and site bleeding       Paresthesias: No.       Insertion/Infusion Method: Single Shot       Complications: none    Medication(s) Administered   Bupivacaine liposome (Exparel) 1.3% LA inj susp (Infiltration) - Infiltration   20 mL - 10/14/2022 10:50:00 AM  Medication Administration Time: 10/14/2022 10:44 AM      FOR [facility] ([facility] [place]) ONLY:   Pain Team Contact information: please page the Pain Team Via [employer]. Search  "\"Pain\". During daytime hours, please page the attending first. At night please page the resident first.    "

## 2022-10-14 NOTE — PROGRESS NOTES
Patient arrived to [facility] unit via zoom cart at 1300,with belongings, accompanied by spouse/ significant other, with infant in NICU. Received report from Bev and checked bands. Unit and room orientation started. Call light within arms reach; no concerns present at this time. Continue with plan of care.

## 2022-10-14 NOTE — ANESTHESIA CARE TRANSFER NOTE
Patient: Hilaria Jansen    Procedure: Procedure(s):   SECTION       Diagnosis: Monochorionic diamniotic twin gestation in third trimester [O30.033]  Diagnosis Additional Information: No value filed.    Anesthesia Type:   Spinal     Note:    Oropharynx: spontaneously breathing  Level of Consciousness: awake  Oxygen Supplementation: room air    Independent Airway: airway patency satisfactory and stable  Dentition: dentition unchanged  Vital Signs Stable: post-procedure vital signs reviewed and stable  Report to RN Given: handoff report given  Patient transferred to: PACU    Handoff Report: Identifed the Patient, Identified the Reponsible Provider, Reviewed the pertinent medical history, Discussed the surgical course, Reviewed Intra-OP anesthesia mangement and issues during anesthesia, Set expectations for post-procedure period and Allowed opportunity for questions and acknowledgement of understanding      Vitals:  Vitals Value Taken Time   /78 10/14/22 1049   Temp     Pulse 93 10/14/22 1051   Resp     SpO2 96 % 10/14/22 1051   Vitals shown include unvalidated device data.    Electronically Signed By: Semaj Melo MD  2022  10:52 AM

## 2022-10-14 NOTE — ANESTHESIA PREPROCEDURE EVALUATION
Anesthesia Pre-Procedure Evaluation    Patient: Hilaria Jansen   MRN: [id number] : 1990        Procedure : Procedure(s):   SECTION          Past Medical History:   Diagnosis Date     Anemia      Hidradenitis suppurativa      Infertility, female      Neurological disorder      Sickle cell anemia (H)      Varicella       Past Surgical History:   Procedure Laterality Date      SECTION N/A 2019    Procedure:  SECTION;  Surgeon: Wendi Coppola MD;  Location: [facility] L+D      SECTION N/A 12/15/2021    Procedure:  SECTION;  Surgeon: Brooke Capps MD;  Location: [facility] L+D     HEAD & NECK SURGERY       SURGICAL PATHOLOGY EXAM Left 09/10/2015    left paratracheal neck mass removed, benign      No Known Allergies   Social History     Tobacco Use     Smoking status: Never     Smokeless tobacco: Never   Substance Use Topics     Alcohol use: No      Wt Readings from Last 1 Encounters:   10/10/22 89.8 kg (198 lb)        Anesthesia Evaluation   Pt has had prior anesthetic. Type: Regional.        ROS/MED HX  ENT/Pulmonary:       Neurologic: Comment: Chronic low back pain       Cardiovascular:       METS/Exercise Tolerance:     Hematologic: Comments: Sickle cell trait    (+) anemia,     Musculoskeletal: Comment: Muscle tension dysphonia      GI/Hepatic:     (+) GERD,     Renal/Genitourinary:       Endo:       Psychiatric/Substance Use:       Infectious Disease:       Malignancy:       Other: Comment: Monochorionic diamniotic twin pregnancy     (+) Possibly pregnant, , previous , twin IUP,         Physical Exam    Airway        Mallampati: III   TM distance: > 3 FB   Neck ROM: full   Mouth opening: > 3 cm    Respiratory Devices and Support         Dental  no notable dental history         Cardiovascular   cardiovascular exam normal          Pulmonary   pulmonary exam normal                OUTSIDE LABS:  CBC:   Lab Results   Component Value Date    WBC 8.6  10/12/2022    WBC 6.9 08/18/2022    HGB 11.8 10/12/2022    HGB 10.9 (L) 08/18/2022    HGB 10.6 (L) 08/18/2022    HCT 35.6 10/12/2022    HCT 32.4 (L) 08/18/2022     10/12/2022     08/18/2022     BMP:   Lab Results   Component Value Date     03/09/2022     04/13/2021    POTASSIUM 3.8 03/09/2022    POTASSIUM 3.8 04/13/2021    CHLORIDE 107 03/09/2022    CHLORIDE 107 04/13/2021    CO2 29 03/09/2022    CO2 28 04/13/2021    BUN 7 03/09/2022    BUN 11 04/13/2021    CR 0.83 03/09/2022    CR 0.82 04/13/2021    GLC 97 03/09/2022    GLC 93 04/13/2021     COAGS: No results found for: PTT, INR, FIBR  POC:   Lab Results   Component Value Date    HCG Positive (A) 05/04/2022    HCGS Negative 03/09/2022     HEPATIC:   Lab Results   Component Value Date    ALBUMIN 4.1 03/09/2022    PROTTOTAL 8.6 03/09/2022    ALT 24 03/09/2022    AST 17 03/09/2022    ALKPHOS 68 03/09/2022    BILITOTAL 0.5 03/09/2022     OTHER:   Lab Results   Component Value Date    A1C 5.0 05/12/2021    JESSICA 9.5 03/09/2022    LIPASE 216 03/09/2022    AMYLASE 91 04/13/2021    TSH 1.39 08/13/2019    SED 11 03/08/2020       Anesthesia Plan    ASA Status:  2      Anesthesia Type: Spinal.              Consents            Postoperative Care            Comments:                Semaj Melo MD

## 2022-10-14 NOTE — PLAN OF CARE
Goal Outcome Evaluation:    Plan of Care Reviewed With: patient      Transfer to OR & C/S Delivery Note  Patient to OR at 0849 via walking.   Delivered viable Female mono/di twins via   section by Dr. Gannon. Infant A 0937, Infant B 0939. Infants to warmer, stimulated and dried by NICU.  .    Infant A: placenta labeled, 1 cord clamp used on placenta to signify baby a, umbilical cord segment labeled with 1 cord clamp.  Infant B: placenta labeled with , umbilical cord segment identified by 2 cord clamps on infant b's umbilical cord segment.    Infants to NICU. Brought to mother briefly before transfer to NICU.

## 2022-10-14 NOTE — DISCHARGE SUMMARY
[facility] Discharge Summary    Patient: Hilaria Jansen    YOB: 1990   MRN# [id number]           Date of Admission:  10/14/2022  Date of Discharge::  10/16/22  Admitting Physician:  Lucia Gannon MD  Discharge Physician:  Lucia Gannon MD             Admission Diagnoses:   - Intrauterine pregnancy at 32w0d  - Monochorionic Diamniotic Twin Gestation  - Fetal Growth Restriction x2  - Elevated UA dopplers  - Sickle Cell Trait          Discharge Diagnosis:   - Same, delivered   - Lower uterine segment window  - Acute blood loss anemia from surgery, expected and asymptomatic         Procedures:     Procedure(s): Repeat low transverse  section with double layer closure via Pfannenstiel skin incision  TAP block                Medications Prior to Admission:     Medications Prior to Admission   Medication Sig Dispense Refill Last Dose     cyanocobalamin (VITAMIN B-12) 1000 MCG tablet Take 1 tablet (1,000 mcg) by mouth daily 90 tablet 1 10/11/2022 at 2130     imiquimod (ALDARA) 5 % external cream Apply a small sized amount to warts or molluscum three times weekly at bedtime.   Wash off after 8 hours.   May use for up to 16 weeks. 12 packet 3      Prenatal Vit-Fe Fumarate-FA (PRENATAL MULTIVITAMIN W/IRON) 27-0.8 MG tablet Take 1 tablet by mouth daily 90 tablet 3 10/11/2022     vitamin C (ASCORBIC ACID) 250 MG tablet Take 1 tablet (250 mg) by mouth daily 90 tablet 1      [DISCONTINUED] acetaminophen (TYLENOL) 500 MG tablet Take 1-2 tablets (500-1,000 mg) by mouth every 6 hours as needed for mild pain 100 tablet 3 Past Week at 1415     [DISCONTINUED] aspirin (ASA) 81 MG chewable tablet Take 1 tablet (81 mg) by mouth daily 90 tablet 3 10/11/2022 at 58098     [DISCONTINUED] ferrous sulfate (FEROSUL) 325 (65 Fe) MG tablet Take 1 tablet (325 mg) by mouth daily (with breakfast) 90 tablet 1 10/11/2022     [DISCONTINUED] Prenatal  Vit-Fe Fumarate-FA (PRENATAL MULTIVITAMIN W/IRON) 27-0.8 MG tablet Take 1 tablet by mouth daily 90 tablet 3  at 2130     [DISCONTINUED] senna-docusate (SENOKOT-S/PERICOLACE) 8.6-50 MG tablet Take 1-2 tablets by mouth daily as needed for constipation 60 tablet 4              Discharge Medications:        Review of your medicines      START taking      Dose / Directions   ibuprofen 600 MG tablet  Commonly known as: ADVIL/MOTRIN  Used for: Postpartum care following  delivery      Dose: 600 mg  Take 1 tablet (600 mg) by mouth every 6 hours as needed for moderate pain Start after delivery  Quantity: 60 tablet  Refills: 0     oxyCODONE 5 MG tablet  Commonly known as: ROXICODONE  Used for: Acute post-operative pain      Dose: 5 mg  Take 1 tablet (5 mg) by mouth every 6 hours as needed for moderate to severe pain  Quantity: 5 tablet  Refills: 0        CONTINUE these medicines which have NOT CHANGED      Dose / Directions   acetaminophen 500 MG tablet  Commonly known as: TYLENOL  Used for: Monochorionic diamniotic twin pregnancy in first trimester      Dose: 500-1,000 mg  Take 1-2 tablets (500-1,000 mg) by mouth every 6 hours as needed for mild pain  Quantity: 100 tablet  Refills: 3     cyanocobalamin 1000 MCG tablet  Commonly known as: VITAMIN B-12  Used for: Anemia affecting pregnancy in first trimester      Dose: 1,000 mcg  Take 1 tablet (1,000 mcg) by mouth daily  Quantity: 90 tablet  Refills: 1     ferrous sulfate 325 (65 Fe) MG tablet  Commonly known as: FEROSUL  Used for: Anemia affecting pregnancy in first trimester      Dose: 325 mg  Take 1 tablet (325 mg) by mouth daily (with breakfast)  Quantity: 90 tablet  Refills: 1     imiquimod 5 % external cream  Commonly known as: ALDARA  Used for: Condyloma acuminatum      Apply a small sized amount to warts or molluscum three times weekly at bedtime.   Wash off after 8 hours.   May use for up to 16 weeks.  Quantity: 12 packet  Refills: 3     * prenatal multivitamin  w/iron 27-0.8 MG tablet  Used for: Monochorionic diamniotic twin pregnancy in first trimester      Dose: 1 tablet  Take 1 tablet by mouth daily  Quantity: 90 tablet  Refills: 3     * prenatal multivitamin w/iron 27-0.8 MG tablet  Used for: Monochorionic diamniotic twin pregnancy in first trimester      Dose: 1 tablet  Take 1 tablet by mouth daily  Quantity: 90 tablet  Refills: 3     senna-docusate 8.6-50 MG tablet  Commonly known as: SENOKOT-S/PERICOLACE  Used for: Monochorionic diamniotic twin pregnancy in first trimester      Dose: 1-2 tablet  Take 1-2 tablets by mouth daily as needed for constipation  Quantity: 60 tablet  Refills: 1     vitamin C 250 MG tablet  Commonly known as: ASCORBIC ACID  Used for: Anemia affecting pregnancy in first trimester      Dose: 250 mg  Take 1 tablet (250 mg) by mouth daily  Quantity: 90 tablet  Refills: 1         * This list has 2 medication(s) that are the same as other medications prescribed for you. Read the directions carefully, and ask your doctor or other care provider to review them with you.            STOP taking    aspirin 81 MG chewable tablet  Commonly known as: ASA              Where to get your medicines      These medications were sent to [facility] [address]  [address] [address]    Phone: [phone]     acetaminophen 500 MG tablet    ferrous sulfate 325 (65 Fe) MG tablet    ibuprofen 600 MG tablet    oxyCODONE 5 MG tablet    prenatal multivitamin w/iron 27-0.8 MG tablet    senna-docusate 8.6-50 MG tablet               Consultations:   - Anesthesia  - Lactation   - NICU          Brief Admission History:   Ms. Hilaria Jansen is a 32 year old now P4 who initially presented at 32w0d for scheduled repeat  in the setting of mono-di twins with elevated UA dopplers.       Intraoperative course   The procedure was uncomplicated.   mL.  See operative report for details.     Findings:    1. Moderate rectofascial adhesions. Minimal intraabdominal adhesions  2. Clear amniotic fluid both twins  3. Twin A: Vigorous female infant in vertex presentation. Apgars 7 at 1 minute & 9 at 5 minutes. Weight pending.  4. Twin B: Vigorous female infant in vertex presentation. Apgars 7 at 1 minute & 9 at 5 minutes. Weight pending.  5. Cord Gas - Twin A: Arterial cord pH 7.18, base deficit -7.5. Venous cord pH 7.25, base deficit -2.6.  6. Cord Gas - Twin B: Arterial cord pH 7.24, base deficit n/a. Venous cord pH 7.21, base deficit -3.4.  7. Uterus notable for uterine window in lower uterine segment. Would recommend delivery between 37-39 weeks via repeat CS for future pregnancies. Otherwise normal appearing uterus , fallopian tubes, and ovaries.        Postpartum Course   The patient's hospital course was unremarkable.  She recovered as anticipated and experienced no post-operative complications. On discharge, her pain was well controlled. Vaginal bleeding is similar to peak menstrual flow.  Voiding without difficulty.  Ambulating well and tolerating a normal diet.  No fever or significant wound drainage. Planning to pump while infants are in NICU. Babies are stable in NICU. She was discharged on post-partum day #2.    Post-partum hemoglobin:   Hemoglobin   Date Value Ref Range Status   10/15/2022 10.5 (L) 11.7 - 15.7 g/dL Final   05/12/2021 11.4 (L) 11.7 - 15.7 g/dL Final             Discharge Instructions and Follow-Up:     Discharge diet: Regular   Discharge activity: No lifting greater than 20 lbs, pushing, pulling, or other strenuous activity for 6 weeks. Pelvic rest for 6 weeks including no sexual intercourse, tampons, or douching. No driving until you can slam on the breaks without pain or while on narcotic pain medications.    Discharge follow-up: Follow up with primary OB for  routine postpartum visit in 6 weeks.   Wound care: Keep incision clean and dry           Discharge Disposition:     Discharged to  home      Nikunj Hernandez MD  OB/GYN PGY-1  10/16/2022 10:08 AM     Physician Attestation   I saw and evaluated this patient prior to discharge.  I discussed the patient with the resident/fellow and agree with plan of care as documented in the note.      I personally reviewed vital signs, medications, labs and exam.    I personally spent 20 minutes on discharge activities.    Lucia Gannon MD  Date of Service (when I saw the patient): 10/16/22

## 2022-10-15 PROBLEM — O30.033 MONOCHORIONIC DIAMNIOTIC TWIN GESTATION IN THIRD TRIMESTER: Status: ACTIVE | Noted: 2022-10-15

## 2022-10-15 LAB — HGB BLD-MCNC: 10.5 G/DL (ref 11.7–15.7)

## 2022-10-15 PROCEDURE — 250N000013 HC RX MED GY IP 250 OP 250 PS 637: Performed by: OBSTETRICS & GYNECOLOGY

## 2022-10-15 PROCEDURE — 120N000002 HC R&B MED SURG/OB UMMC

## 2022-10-15 PROCEDURE — 250N000011 HC RX IP 250 OP 636: Performed by: OBSTETRICS & GYNECOLOGY

## 2022-10-15 PROCEDURE — 36415 COLL VENOUS BLD VENIPUNCTURE: CPT | Performed by: OBSTETRICS & GYNECOLOGY

## 2022-10-15 PROCEDURE — 85018 HEMOGLOBIN: CPT | Performed by: OBSTETRICS & GYNECOLOGY

## 2022-10-15 RX ORDER — IBUPROFEN 600 MG/1
600 TABLET, FILM COATED ORAL EVERY 6 HOURS PRN
Qty: 60 TABLET | Refills: 0 | Status: SHIPPED | OUTPATIENT
Start: 2022-10-15 | End: 2023-02-22

## 2022-10-15 RX ORDER — OXYCODONE HYDROCHLORIDE 5 MG/1
5 TABLET ORAL EVERY 6 HOURS PRN
Qty: 3 TABLET | Refills: 0 | Status: CANCELLED | OUTPATIENT
Start: 2022-10-15 | End: 2022-10-18

## 2022-10-15 RX ADMIN — IBUPROFEN 800 MG: 800 TABLET, FILM COATED ORAL at 20:51

## 2022-10-15 RX ADMIN — ACETAMINOPHEN 975 MG: 325 TABLET, FILM COATED ORAL at 17:41

## 2022-10-15 RX ADMIN — OXYCODONE HYDROCHLORIDE 5 MG: 5 TABLET ORAL at 21:44

## 2022-10-15 RX ADMIN — KETOROLAC TROMETHAMINE 30 MG: 30 INJECTION, SOLUTION INTRAMUSCULAR at 02:35

## 2022-10-15 RX ADMIN — IBUPROFEN 800 MG: 800 TABLET, FILM COATED ORAL at 15:17

## 2022-10-15 RX ADMIN — ACETAMINOPHEN 975 MG: 325 TABLET, FILM COATED ORAL at 23:52

## 2022-10-15 RX ADMIN — ACETAMINOPHEN 975 MG: 325 TABLET, FILM COATED ORAL at 11:21

## 2022-10-15 RX ADMIN — IBUPROFEN 800 MG: 800 TABLET, FILM COATED ORAL at 09:13

## 2022-10-15 RX ADMIN — ACETAMINOPHEN 975 MG: 325 TABLET, FILM COATED ORAL at 05:28

## 2022-10-15 RX ADMIN — SENNOSIDES AND DOCUSATE SODIUM 2 TABLET: 50; 8.6 TABLET ORAL at 07:49

## 2022-10-15 RX ADMIN — SENNOSIDES AND DOCUSATE SODIUM 2 TABLET: 50; 8.6 TABLET ORAL at 20:51

## 2022-10-15 RX ADMIN — DIPHENHYDRAMINE HYDROCHLORIDE 25 MG: 25 CAPSULE ORAL at 07:49

## 2022-10-15 ASSESSMENT — ACTIVITIES OF DAILY LIVING (ADL)
ADLS_ACUITY_SCORE: 18
ADLS_ACUITY_SCORE: 18
ADLS_ACUITY_SCORE: 21
ADLS_ACUITY_SCORE: 18
ADLS_ACUITY_SCORE: 21
ADLS_ACUITY_SCORE: 18
ADLS_ACUITY_SCORE: 21
ADLS_ACUITY_SCORE: 21

## 2022-10-15 NOTE — LACTATION NOTE
"This note was copied from a baby's chart.  D:  I met with Hilaria; Bj and Hillary are babies #3 and 4.  She nursed her 1st for 20 months, and her 2nd for 2 months (stopped due to pregnancy).  She is normally in good health, takes no medications, and has no history of breast/chest surgery or trauma.  She has already started to pump.   I:  I gave her a folder of introductory materials and went over pumping guidelines.  I reviewed physiology of colostrum and milk production, pumping guidelines, and I gave her a log and encouraged her to use it.   I explained how to access the videos \"Hand Expression\" and \"Maximizing Milk Production\"; as well as other helpful books and websites.   We discussed hands-on pumping techniques and usefulness of a hands-free pumping bra.  We discussed skin to skin holding and how to reach your breastfeeding goals.  We talked about birth control and other medications during breastfeeding.  She verbalized understanding via teachback.  I described the process for switching out her pump at discharge.  A:  Mom has information she needs to initiate her supply.   P:  Will continue to provide lactation support.    Debby Boone, RNC, IBCLC          "

## 2022-10-15 NOTE — PROGRESS NOTES
Post-partum progress note     Hilaria Jansen is a(n) 32 year old  on post partum day #1 s/p repeat  section due to mono-di twins at 32 weeks.  She reports lochia is normal, tolerating a regular diet, ambulating well, adequate pain control with oral pain medication, and voiding without difficulty.  She has no complaints.  She is pumping for babies in the NICU and concerned about lack of milk supply.          Physical Exam:  Looks well.    Patient Vitals for the past 24 hrs:   BP Temp Temp src Pulse Resp SpO2   10/15/22 0744 111/67 98.3  F (36.8  C) Oral 85 16 --   10/15/22 0403 117/76 98.2  F (36.8  C) Oral 86 16 --   10/14/22 2329 -- -- -- -- -- 99 %   10/14/22 2327 115/79 98.3  F (36.8  C) Oral 82 -- --   10/14/22 2041 -- 99  F (37.2  C) Oral -- -- --   10/14/22 2004 108/80 -- -- 84 -- 98 %   10/14/22 1904 114/78 -- -- 83 -- 98 %   10/14/22 1804 121/81 -- -- 102 16 98 %   10/14/22 1704 122/84 -- -- 85 16 99 %   10/14/22 1645 111/76 -- -- 83 16 99 %   10/14/22 1620 111/76 97.6  F (36.4  C) -- 94 18 98 %   10/14/22 1509 96/73 -- -- 82 16 96 %   10/14/22 1504 -- 97.9  F (36.6  C) Oral -- -- --   10/14/22 1439 97/67 -- -- 89 16 94 %   10/14/22 1409 98/72 -- -- 85 16 97 %   10/14/22 1354 101/66 -- -- 91 16 99 %   10/14/22 1339 103/75 -- -- 87 16 98 %   10/14/22 1328 104/78 97.4  F (36.3  C) Oral 83 16 98 %   10/14/22 1316 112/75 -- -- 81 18 98 %   10/14/22 1215 103/68 97.4  F (36.3  C) Oral 92 20 98 %   10/14/22 1200 130/73 -- -- 93 -- 98 %   10/14/22 1145 107/76 -- -- 87 -- 96 %   10/14/22 1130 115/74 -- -- 89 -- 95 %       Temp (48hrs), Av.9  F (36.6  C), Min:97.4  F (36.3  C), Max:99  F (37.2  C)    Physical exam deferred as patient was sitting up in the chair expressing breast mild, per RN exams fundal checks have been with normal and dressing is clean and intact    Labs:   Lab Results   Component Value Date    ABORH B POS 10/12/2022     Recent Labs   Lab 10/12/22  1029   HGB 11.8         Assessment/Plan:    Hilaria Jansen is a 32 year old  who is POD#1 s/p LTCS   1. AFVSS  2. Rh+/RI   3. Pain: well controlled with po medicaiton  4. PPBC: not discussed today   5. Routine post-partum cares  -Encourage ambulation  -Lactation support PRN  -AM hemoglobin not yet drawn but pre-op hemoglobin 11.8 and patient hemodynamically stable     Dispo: anticipate discharge to home on POD#2-3     10/15/2022 11:15 AM Mansi Andres MD

## 2022-10-15 NOTE — PLAN OF CARE
Goal Outcome Evaluation:  Data: VSS and postpartum checks WNL. Patient eating and drinking normally. Patient able to empty bladder independently and up ambulating. Patient performing self care. Fundus at 1 cm below U and firm  without massage.  lochia scant and  no blood clots. Dressing clean, dry and intact.   Action: Patient taking Tylenol and ibuprofen for pain and pain tolerable. Encouraged patient to pump  feed every 2 - 3 hours to encourage milk production. Patient education done( education record).   Response: Patient participating in self care. Visiting infants in NICU. Spouse currently not at bedside.   Plan: Continue with the plan of cares.

## 2022-10-15 NOTE — PROGRESS NOTES
Patient took shower and dressing removed. Steri straps intact. No sign of infection noted. Voiding well  and had a bowel movement today. No complain of itching after administering Benadryl.  Will continue to monitor patient.

## 2022-10-15 NOTE — PLAN OF CARE
Problem: Postpartum ( Delivery)  Goal: Hemostasis  Outcome: Progressing  Goal: Fluid and Electrolyte Balance  Outcome: Progressing  Goal: Absence of Infection Signs and Symptoms  Outcome: Progressing  Goal: Nausea and Vomiting Relief  Outcome: Progressing     Assessment complete and WDL. VSS. Pt taking toradol and tylenol for pain this shift. Pt is voiding WDL, up ad ivelisse in room, moving very well. Pt would like to start pumping for infants in NICU this morning. Fundus is firm, midline and bleeding is scant.     Pt spouse is at bedside and involved in cares for spouse. Continue POC.

## 2022-10-16 VITALS
RESPIRATION RATE: 16 BRPM | OXYGEN SATURATION: 99 % | HEART RATE: 98 BPM | TEMPERATURE: 99.3 F | SYSTOLIC BLOOD PRESSURE: 123 MMHG | DIASTOLIC BLOOD PRESSURE: 84 MMHG

## 2022-10-16 PROCEDURE — 250N000013 HC RX MED GY IP 250 OP 250 PS 637: Performed by: OBSTETRICS & GYNECOLOGY

## 2022-10-16 RX ORDER — FERROUS SULFATE 325(65) MG
325 TABLET ORAL
Qty: 90 TABLET | Refills: 1 | Status: SHIPPED | OUTPATIENT
Start: 2022-10-16 | End: 2023-06-06

## 2022-10-16 RX ORDER — AMOXICILLIN 250 MG
1-2 CAPSULE ORAL DAILY PRN
Qty: 60 TABLET | Refills: 1 | Status: SHIPPED | OUTPATIENT
Start: 2022-10-16 | End: 2023-02-22

## 2022-10-16 RX ORDER — OXYCODONE HYDROCHLORIDE 5 MG/1
5 TABLET ORAL EVERY 6 HOURS PRN
Qty: 5 TABLET | Refills: 0 | Status: SHIPPED | OUTPATIENT
Start: 2022-10-16 | End: 2023-02-22

## 2022-10-16 RX ORDER — ACETAMINOPHEN 500 MG
500-1000 TABLET ORAL EVERY 6 HOURS PRN
Qty: 100 TABLET | Refills: 3 | Status: SHIPPED | OUTPATIENT
Start: 2022-10-16 | End: 2023-02-22

## 2022-10-16 RX ORDER — PRENATAL VIT/IRON FUM/FOLIC AC 27MG-0.8MG
1 TABLET ORAL DAILY
Qty: 90 TABLET | Refills: 3 | Status: SHIPPED | OUTPATIENT
Start: 2022-10-16 | End: 2023-02-22

## 2022-10-16 RX ADMIN — IBUPROFEN 800 MG: 800 TABLET, FILM COATED ORAL at 03:21

## 2022-10-16 RX ADMIN — SENNOSIDES AND DOCUSATE SODIUM 2 TABLET: 50; 8.6 TABLET ORAL at 07:36

## 2022-10-16 RX ADMIN — IBUPROFEN 800 MG: 800 TABLET, FILM COATED ORAL at 09:19

## 2022-10-16 RX ADMIN — ACETAMINOPHEN 975 MG: 325 TABLET, FILM COATED ORAL at 05:50

## 2022-10-16 RX ADMIN — ACETAMINOPHEN 975 MG: 325 TABLET, FILM COATED ORAL at 11:49

## 2022-10-16 ASSESSMENT — ACTIVITIES OF DAILY LIVING (ADL)
ADLS_ACUITY_SCORE: 18

## 2022-10-16 NOTE — PLAN OF CARE
Goal Outcome Evaluation:  Discharge instructions read and explained to patient, all questions answered. Medications and dressing supplies given to patient. Breast pump given and sheet signed.  Patient discharged to home with all belongings.

## 2022-10-16 NOTE — PROGRESS NOTES
Post Partum Progress Note  PPD#2    Subjective:  She is resting comfortably in bed this morning. Pain is improving and well controlled on current medication regimen. She is tolerating PO intake. Lochia present and appropriate.  She is voiding without difficulty. She has passed flatus and has had a BM. She is ambulating without dizziness or difficulty.  Plans to pump as babies are in NICU. She is interested in seeing social work to talk through logistics of having infants in NICU. She would like to go home today.    Objective:  Vitals:    10/15/22 0403 10/15/22 0744 10/15/22 1605 10/15/22 2053   BP: 117/76 111/67 114/77 101/75   BP Location: Left arm   Left arm   Patient Position: Semi-Crane's   Semi-Crane's   Cuff Size: Adult Regular Adult Regular Adult Regular Adult Regular   Pulse: 86 85 89 99   Resp: 16 16 16 16   Temp: 98.2  F (36.8  C) 98.3  F (36.8  C) 98.1  F (36.7  C) 98.3  F (36.8  C)   TempSrc: Oral Oral Oral Oral   SpO2:           General: NAD. A&Ox3.  CV: Regular rate, well perfused.   Pulm: Normal respiratory effort.  Abd: Soft, non-tender, non-distended. Fundus is firm and below the umbilicus.    Incision: incision is clean, dry, intact, covered in steri strips. No erythema, appropriate tenderness.   Ext: No lower extremity edema bilaterally. No calf tenderness.    Assessment/Plan:  Hilaria Jansen is a 32 year old  female who is POD#2 s/p RLTCS for mo/di twins at 32 weeks for FGR with abnormal dopplers. Doing well in early postop period.    Routine PP care  - Encourage routine post-operative goals including ambulation and incentive spirometry  - PNC: Rh pos. Rubella immune. No intervention indicated.  - Pain: controlled on oral medications  - Heme: Sickle cell trait. Hgb 11.8 >  > 10.5.  Asymptomatic. Acute blood loss anemia from surgery, expected.   - GI: continue anti-emetics and stool softeners as needed.  - : Voiding spontaneously .  - Infants: Stable in NICU  - Feeding:  Plans on breastfeeding.  - BC: Plans on IUD @6wk    Discharge to home today    Nikunj Hernandez MD  OB/GYN PGY-1  10/16/2022 7:21 AM       Physician Attestation   I personally examined and evaluated this patient.  I discussed the patient with the resident/fellow and care team, and agree with the assessment and plan of care as documented in the note of 10/16/22.      I personally reviewed vital signs, medications, labs and exam.    Key findings: 32 year old  on POD 2 s/p RLTCS, doing well. Meeting discharge goals. Infants in NICU for prematurity, doing well. Will give breast pump for discharge. Reviewed discharge instructions including activity restrictions, pelvic rest and follow up plan. Reviewed signs of excessive bleeding, infection, postpartum pre-eclampsia, mastitis, DVT and postpartum depression - instructed patient to call if concerned about any of these or other concerns. Patient to follow up in 6 weeks for routine postpartum visit, planning IUD for contraception. All questions answered.    Lucia Gannon MD  Date of Service (when I saw the patient): 10/16/22

## 2022-10-16 NOTE — PLAN OF CARE
Problem: Postpartum ( Delivery)  Goal: Successful Maternal Role Transition  Outcome: Progressing  Goal: Hemostasis  Outcome: Progressing  Goal: Effective Bowel Elimination  Outcome: Progressing  Goal: Fluid and Electrolyte Balance  Outcome: Progressing  Goal: Effective Urinary Elimination  Outcome: Progressing     Assessment complete and WDL. VSS. Pt voiding WDL and reports BM this shift. Pt up ad ivelisse, moving very well but feeling more pain this shift. Pt taking scheduled tylenol and ibuprofen and took one PRN oxycodone this shift. Abdominal binder in place. IV removed this shift. EDS turned in, score 0. Fundus firm, midline, bleeding scant.     Continue POC, plan for discharge home today.    Items to be completed - Birth certificates, pump for home

## 2022-10-16 NOTE — PLAN OF CARE
Goal Outcome Evaluation:  Data: VSS and postpartum checks WNL. Patient eating and drinking normally. Patient able to empty bladder independently and up ambulating. Patient performing self care. Fundus at 1 cm below and firm  without massage.  lochia scant and  no blood clots. Incision with steri strips. No sign of  infection noted.   Action: Patient taking Ibuprofen and Tylenol for pain and pain tolerable. Encouraged patient to pump  feed every 2 - 3 hours. Patient education done( education record).   Response: Patient participating in infant's care. Positive attachment with infant observed. Mother in law and  spouse present at bedside and attentive to patient.   Plan: Continue with the plan of cares.

## 2022-10-16 NOTE — PROVIDER NOTIFICATION
10/15/22 2239   Provider Notification   Provider Name/Title Dropps   Method of Notification Electronic Page   Request Evaluate-Remote   Notification Reason Other  (FYI only, pt would like to discharge home tomorrow morning if possible.)

## 2022-10-18 ENCOUNTER — PATIENT OUTREACH (OUTPATIENT)
Dept: CARE COORDINATION | Facility: CLINIC | Age: 32
End: 2022-10-18

## 2022-10-18 RX ORDER — IBUPROFEN 600 MG/1
600 TABLET, FILM COATED ORAL EVERY 6 HOURS PRN
Qty: 60 TABLET | Refills: 0 | Status: CANCELLED | OUTPATIENT
Start: 2022-10-18

## 2022-10-18 NOTE — PROGRESS NOTES
[facility] Resource Center Contact  [facility]/Voicemail     Clinical Data: Transitional Care Management Outreach     Outreach attempted x 2.  Left message on patient's voicemail, providing [facility]'s 24/7 scheduling and nurse triage phone number [phone] ([phone]) for questions/concerns and/or to schedule an appt with an [facility] provider, if they do not have a PCP.      Plan:  [facility] will do no further outreaches at this time.       Domonique Cartagena  Community Health Worker  [facility], [facility]  [phone]      *Connected Care Resource Team does NOT follow patient ongoing. Referrals are identified based on internal discharge reports and the outreach is to ensure patient has an understanding of their discharge instructions.

## 2022-10-19 ENCOUNTER — TELEPHONE (OUTPATIENT)
Dept: OBGYN | Facility: CLINIC | Age: 32
End: 2022-10-19

## 2022-10-19 NOTE — TELEPHONE ENCOUNTER
----- Message from Zahira Jeronimo sent at 10/19/2022  7:47 AM CDT -----  Regarding: FW: Plan of Care    ----- Message -----  From: Lucia Gannon MD  Sent: 10/18/2022   4:40 PM CDT  To: Zahira Jeronimo  Subject: RE: Plan of Care                                 Yes   Thanks   Lucia Gannon MD   ----- Message -----  From: Zahira Jeronimo  Sent: 10/18/2022   2:17 PM CDT  To: Lucia Gannon MD  Subject: Plan of Care                                     Good afternoon,     Pt already had baby. Would it be ok for my manager to delete the orders?     Thank you,    Zahira Jeronimo     [facility]   [phone]

## 2022-10-21 LAB
PATH REPORT.COMMENTS IMP SPEC: NORMAL
PATH REPORT.COMMENTS IMP SPEC: NORMAL
PATH REPORT.FINAL DX SPEC: NORMAL
PATH REPORT.GROSS SPEC: NORMAL
PATH REPORT.MICROSCOPIC SPEC OTHER STN: NORMAL
PATH REPORT.RELEVANT HX SPEC: NORMAL
PHOTO IMAGE: NORMAL

## 2022-11-06 ENCOUNTER — LACTATION ENCOUNTER (OUTPATIENT)
Age: 32
End: 2022-11-06

## 2022-11-06 NOTE — LACTATION NOTE
"This note was copied from a baby's chart.  LACTATION DISCHARGE INSTRUCTIONS      Congratulations on your approaching discharge day!  Our goal is to help you have all the information, skills and equipment you need to help you meet your lactation goals at home.  The following handouts will give you information on:      [facility] handout on recommendations for storing and preparing human milk at home    A feeding and diaper log, with how many times a day your baby should eat, as well as how many wet and soiled diapers per day    Other discharge information  o Medications (including birth control)    Lactation support  o Outpatient (in-person and virtual) lactation resources  o Telephone and online support        [facility] HANDOUT ON STORING AND PREPARING HUMAN MILK AT HOME      Please see attached handout     https://www.cdc.gov/breastfeeding/recommendations/handling_breastmilk.htm          FEEDING LOG: BABY'S FIRST WEEK, SECOND WEEK AND BEYOND      Please see attached feeding logs    Goal is to eat at least 8 times in 24 hours    Goal is to have at least 6 wet diapers in 24 hours    Talk to your provider about goal for soiled diapers.  Each baby is different depending on age and what they are eating        OTHER DISCHARGE INFORMATION    Medications:     Per the \"Academy of Breastfeeding Medicine\", mothers of babies in the NICU are \"discouraged\" to use hormonal birth control \"as it may decrease milk supply especially in the early postpartum period\".      Some women also find decongestants and antihistamines may impact supply.      Always get a second opinion from a lactation consultant if told to \"pump and dump\" when starting a new medication, having a procedure or you are ill; most of the time things are compatible.    OUTPATIENT LACTATION SUPPORT    [place] Lactation Resources:   LEONA Goldman, CNM, IBCLC  Tuesday:  [facility],  8:30 - 5:00,  [phone]  Wednesday:  [facility], 7th floor, 8:30 " "- 4:00, [phone]  Thursday:  [facility], [facility], 8:30 - 4:00,  [phone]    Breastfeeding [facility] at [facility]  [phone]   Breastfeeding Connection at [facility]   [phone]  [facility]    [phone]  [facility]      [phone]  [facility]      [phone]  [facility]      [phone]    [facility]       [phone]  [facility]       [phone]      Other Lactation Help:    BabyCafes (www.babycafeusa.org):    [facility]/ [place] (Tues/Wed)     o [phone]    [facility]emia Baby Weigh In (various times and locations)    o www.[employer] ++HAS VIRTUAL SUPPORT++     Enlightened Mama   o www.ipatter.com [phone]    Everyday Miracles         o [email or web address].everyday-[email or web address]/    [facility]     o [phone] ++HAS VIRTUAL SUPPORT++     Marlen Overton DO, MPH, ABOIM, IBCLC  o Integrative Family Medicine Physician/Breastfeeding Medicine/ Home visits  o [email or web address]  [phone]    [facility] \"Well Fed\" postpartum group ([facility])   o [phone]     [facility] ([place])     o [email or web address]/    Chocolate Milk Club:    o http://www.JIT SolairevesselsmidwiferSociocast.com/chocolate-milk-club/    DIVA Moms (Dynamic Involved Valued  Moms)     [phone]    Telephone and Online Support      [facility] ++HAS VIRTUAL SUPPORT++ (call for eligibility information)   [phone]      [employer]   ++HAS VIRTUAL SUPPORT++  www.llli.org  3-443-6-LA-LECHE ([phone])      Tea-- up to date lactation information  o [email or web address].[email or web address]      International Breastfeeding [place] ([place])  o Http://ibconline.ca/      The InfantRisk Call Center is available to answer questions about the use of medications during pregnancy and " while breastfeeding  o [phone]  www.infantrisk.com       Office on Women's Health National Breastfeeding Help Line  o 8am to 5pm, English and Nicaraguan [phone] option 1    o https://www.womenshealth.gov/breastfeeding/ Pfnv0Ulrj Elsy (free on iPABA English elsy store or Google Play)      LactMed Elsy (free on Delivery Club elsy store or Google Play) LactMed is available online at https://toxnet.nlm.nih.gov/newtoxnet/lactmed.htm and is now available on your mobile device. The free LactMed Elsy for iPhone/TradeCloud.nl Touch and Android can be downloaded at http://toxnet.nlm.nih.gov/help/lactmedapp.htm.    Juana Nogueira RNC, IBCLC/ Neetu Medina RN, IBCLC/ Debby Boone RNC, IBCLC

## 2022-11-23 RX ORDER — FERROUS SULFATE 324(65)MG
TABLET, DELAYED RELEASE (ENTERIC COATED) ORAL
COMMUNITY
Start: 2022-10-16 | End: 2023-02-22

## 2022-11-23 RX ORDER — LIDOCAINE 50 MG/G
PATCH TOPICAL
COMMUNITY
Start: 2022-04-19 | End: 2023-02-22

## 2022-11-25 ENCOUNTER — PRENATAL OFFICE VISIT (OUTPATIENT)
Dept: OBGYN | Facility: CLINIC | Age: 32
End: 2022-11-25
Payer: COMMERCIAL

## 2022-11-25 VITALS
BODY MASS INDEX: 34.51 KG/M2 | WEIGHT: 176.7 LBS | SYSTOLIC BLOOD PRESSURE: 141 MMHG | HEART RATE: 86 BPM | OXYGEN SATURATION: 96 % | DIASTOLIC BLOOD PRESSURE: 91 MMHG

## 2022-11-25 DIAGNOSIS — Z30.430 ENCOUNTER FOR INSERTION OF INTRAUTERINE CONTRACEPTIVE DEVICE: Primary | ICD-10-CM

## 2022-11-25 LAB — HCG UR QL: NEGATIVE

## 2022-11-25 PROCEDURE — 81025 URINE PREGNANCY TEST: CPT | Performed by: OBSTETRICS & GYNECOLOGY

## 2022-11-25 PROCEDURE — 99207 PR POST PARTUM EXAM: CPT | Performed by: OBSTETRICS & GYNECOLOGY

## 2022-11-25 PROCEDURE — 58300 INSERT INTRAUTERINE DEVICE: CPT | Performed by: OBSTETRICS & GYNECOLOGY

## 2022-11-25 NOTE — PATIENT INSTRUCTIONS
IUD information:     Benefits: The IUD can be 97-99% effective when carefully following directions regarding use. It can be more effective if used with additional contraception. IUD containing progestin may decrease menstrual flow and menstrual cramping.     Risks/Side Effects: include but are not limited to spotting, bleeding, hemorrhage, or anemia: cramping or pain: partial or complete expulsion of device; lost IUD strings; uterine or cervical perforation; embedding of IUD in the uterine wall; increased risk of pelvic inflammatory disease. Women who become pregnant with an IUD in place are at a higher risk for ectopic pregnancy should a pregnancy occur with an IUD in situ. There is a higher rate of miscarriage when pregnancy occurs with IUD in place.    Warning signs: Please call clinic if you have abnormal spotting or heavy bleeding, abdominal pain, dyspareunia, fever, chills, flu like symptoms, or unable to locate strings of IUD, or strings are longer or shorter than expected.    You are encouraged to use condoms for prevention of STD. You may also need back up contraception for 7 days with your IUD. You may use pain medications (ibuprofen) as needed for mild to moderate pain. Please follow-up in clinic in 4-6 weeks for IUD string check if unable to find strings or as directed by provider.    IUD information:     Benefits: The IUD can be 97-99% effective when carefully following directions regarding use. It can be more effective if used with additional contraception. IUD containing progestin may decrease menstrual flow and menstrual cramping.     Risks/Side Effects: include but are not limited to spotting, bleeding, hemorrhage, or anemia: cramping or pain: partial or complete expulsion of device; lost IUD strings; uterine or cervical perforation; embedding of IUD in the uterine wall; increased risk of pelvic inflammatory disease. Women who become pregnant with an IUD in place are at a higher risk for ectopic  pregnancy should a pregnancy occur with an IUD in situ. There is a higher rate of miscarriage when pregnancy occurs with IUD in place.    Warning signs: Please call clinic if you have abnormal spotting or heavy bleeding, abdominal pain, dyspareunia, fever, chills, flu like symptoms, or unable to locate strings of IUD, or strings are longer or shorter than expected.    You are encouraged to use condoms for prevention of STD. You may also need back up contraception for 7 days with your IUD. You may use pain medications (ibuprofen) as needed for mild to moderate pain. Please follow-up in clinic in 4-6 weeks for IUD string check if unable to find strings or as directed by provider.

## 2022-11-25 NOTE — PROGRESS NOTES
IUD Insertion:  CONSULT:    Is a pregnancy test required: Yes.  Was it positive or negative?  Negative  Was a consent obtained?  Yes    Subjective: Hilaria Jansen is a 32 year old  presents for IUD and desires Kyleena type IUD.    Patient has been given the opportunity to ask questions about all forms of birth control, including all options appropriate for Hilaria Jansen. Discussed that no method of birth control, except abstinence is 100% effective against pregnancy or sexually transmitted infection.     Hilaria Jansen understands she may have the IUD removed at any time. IUD should be removed by a health care provider.    The entire insertion procedure was reviewed with the patient, including care after placement.    Patient's last menstrual period was 2022. Last sexual activity: prior to delivery. No allergy to betadine or shellfish. Patient declines STD screening  HCG Qual Urine   Date Value Ref Range Status   2021 Positive (A) NEG^Negative Final     Comment:     This test is for screening purposes.  Results should be interpreted along with   the clinical picture.  Confirmation testing is available if warranted by   ordering [id number], HCG Quantitative Pregnancy.       hCG Urine Qualitative   Date Value Ref Range Status   2022 Negative Negative Final     Comment:     This test is for screening purposes.  Results should be interpreted along with the clinical picture.  Confirmation testing is available if warranted by ordering [id number], HCG Quantitative Pregnancy.         BP (!) 141/91   Pulse 86   Wt 80.2 kg (176 lb 11.2 oz)   LMP 2022   SpO2 96%   Breastfeeding Yes   BMI 34.51 kg/m      Pelvic Exam:   EG/BUS: normal genital architecture without lesions, erythema or abnormal secretions.   Vagina: moist, pink, rugae with physiologic discharge and secretions  Cervix: normal no lesions and pink, moist, closed, without lesion or CMT  Uterus: anteposition, mobile, no  pain  Adnexa: within normal limits and no masses, nodularity, tenderness    PROCEDURE NOTE: -- IUD Insertion    Reason for Insertion: contraception    Premedicated with ibuprofen.  Under sterile technique, cervix was visualized with speculum and prepped with Betadine solution swab x 3. Tenaculum was placed for stability. The uterus was gently straightened and sounded to 8.5 cm. Kyleena IUD prepared for placement, and IUD inserted according to 's instructions without difficulty or significant resitance, and deployed at the fundus. The strings were visualized but when attempting to cut them the IUD pulled out. New Kyleena was then inserted and deployed. Strings cut to 2cm. Tenaculum was removed and hemostasis noted. Speculum removed.  Patient tolerated procedure well.    NDC: [id number] LOT#: [id number] EXP: 08-     EBL: minimal    Complications: none    ASSESSMENT:     ICD-10-CM    1. Encounter for insertion of intrauterine contraceptive device  Z30.430 HCG Qual, Urine ([id number])     HCG Qual, Urine ([id number])     levonorgestrel (KYLEENA) 19.5 MG IUD     levonorgestrel (KYLEENA) 19.5 MG IUD 19.5 mg     INSERTION INTRAUTERINE DEVICE           PLAN:    Given 's handouts, including when to have Kyleena IUD removed, list of danger s/sx, side effects and follow up recommended. Encouraged condom use for prevention of STD. Back up contraception advised for 7 days if progestin method. Advised to call for any fever, for prolonged or severe pain or bleeding, abnormal vaginal discharge, or unable to palpate strings. She was advised to use pain medications (ibuprofen) as needed for mild to moderate pain. Advised to follow-up in clinic in 4-6 weeks for IUD string check if unable to find strings or as directed by provider. IUD should be removed in 5 years, sooner PRN (11/25/2027)    Lucia Gannon MD

## 2022-11-26 NOTE — PROGRESS NOTES
OB GYN CLINIC VISIT  2022    CC: postpartum visit    SUBJECTIVE:  Karolina Jansen is a 32 year old female  here for a postpartum visit.  She had a  Section  on 10/14/22 delivering mono di twins at 32w due to growth restriction and elevated dopplers. Babies are doing well.     PHQ-9 SCORE 2019   PHQ-9 Total Score 2 10     JOE-7 SCORE 2022   Total Score 11     OB History    Para Term  AB Living   6 3 2 1 3 4   SAB IAB Ectopic Multiple Live Births   1 0 0 1 4      # Outcome Date GA Lbr Ignacio/2nd Weight Sex Delivery Anes PTL Lv   6A  10/14/22 32w0d   F CS-LTranv   JESSE      Name: SHAWNFEMALE-A KAROLINA      Apgar1: 7  Apgar5: 9   6B  10/14/22 32w0d   F CS-LTranv Spinal  JESSE      Name: SHAWN,FEMALE-B KAROLINA      Apgar1: 7  Apgar5: 9   5 Term 12/15/21 39w0d  3.32 kg (7 lb 5.1 oz) F CS-LTranv Spinal  JESSE      Name: Uliella      Apgar1: 8  Apgar5: 9   4 AB 02/15/21 9w0d             Birth Comments: no D&C   3 Term 19 37w6d  3.4 kg (7 lb 7.9 oz) M CS-LTranv Nitrous, EPI N JESSE      Complications: Fetal Intolerance      Name: SHAWNMALE-KAROLINA      Apgar1: 8  Apgar5: 9   2 AB 2017     IAB         Birth Comments: took pill   1 SAB      SAB         Birth Comments: chemical pregnancy        delivery complications:  No  breast feeding:  Yes and supplementing  bladder problems:  No  bowel problems/hemorrhoids:  No  episiotomy/laceration/incision healed? Yes  vaginal flow:  None  [place]:  No  contraception:  IUD  emotional adjustment:  doing well, happy and tired  back to work:  no    Current Outpatient Medications   Medication     acetaminophen (TYLENOL) 500 MG tablet     ferrous sulfate (FEROSUL) 325 (65 Fe) MG tablet     Ferrous Sulfate 324 (65 Fe) MG TBEC     ibuprofen (ADVIL/MOTRIN) 600 MG tablet     levonorgestrel (KYLEENA) 19.5 MG IUD     Prenatal Vit-Fe Fumarate-FA (PRENATAL MULTIVITAMIN W/IRON) 27-0.8 MG tablet     Prenatal Vit-Fe Fumarate-FA  (PRENATAL MULTIVITAMIN W/IRON) 27-0.8 MG tablet     senna-docusate (SENOKOT-S/PERICOLACE) 8.6-50 MG tablet     cyanocobalamin (VITAMIN B-12) 1000 MCG tablet     imiquimod (ALDARA) 5 % external cream     lidocaine (LIDODERM) 5 % patch     oxyCODONE (ROXICODONE) 5 MG tablet     vitamin C (ASCORBIC ACID) 250 MG tablet     No current facility-administered medications for this visit.       OBJECTIVE:  Blood pressure (!) 141/91, pulse 86, weight 80.2 kg (176 lb 11.2 oz), last menstrual period 2022, SpO2 96 %, currently breastfeeding.   General - pleasant female in no acute distress.  Breast - no nodularity, asymmetry or nipple discharge bilaterally.  Abdomen - soft, nontender, nondistended, no hepatosplenomegaly.  INcision - well approximated, no signs of infection  Pelvic - EG: normal adult female, BUS: within normal limits, Vagina: well rugated, no discharge, Cervix: normal, no lesions or CMT, Uterus: firm, normal sized and nontender, Adnexae: no masses or tenderness.  Rectovaginal - deferred.    ASSESSMENT:  32 year old  with normal postpartum exam    PLAN:  1. Encounter for insertion of intrauterine contraceptive device    - HCG Qual, Urine ([id number]); Future  - HCG Qual, Urine ([id number])  - levonorgestrel (KYLEENA) 19.5 MG IUD; 1 each (19.5 mg) by Intrauterine route once  - levonorgestrel (KYLEENA) 19.5 MG IUD 19.5 mg  - INSERTION INTRAUTERINE DEVICE    2. Routine postpartum follow-up      Discussed kegel exercises   May resume normal activities without restrictions  Pap smear was not  done today    The patient will use IUD for birth control. Full counseling was provided, and all questions answered. Compliance is strongly emphasized.  Return to clinic in one year for an annual, when due for a pap smear or PRN.    Lucia Gannon MD

## 2022-12-08 NOTE — PROGRESS NOTES
"Discharge Note    Progress reporting period is from 3-14-22 to Apr 6, 2022.      Hilaria failed to follow up and current status is unknown.  Please see information below for last relevant information on current status.  Patient seen for 4 visits.    SUBJECTIVE  Pain is now intermittent right LB 0-7/10, still no LE pain.  Intermittent right abdominal area discomfort, no longer \"burning\" but pain is not decreasing.  Doing REIL 2x/day.  Using roll when sitting at home, not at work. Recheck with ob/gyn on 4-8-22.    .  Current pain level is 7/10 (right LB).     Previous pain level was   (5-10/10).   Changes in function:  Yes (See Goal flowsheet attached for changes in current functional level)  Adverse reaction to treatment or activity: None    OBJECTIVE  Fair sitting posture.  Lumbar extension AROM 50% with pain, bilateral SG min limited, pain right LB with left SG. Tender to light touch right LB, unable to tolerate > gr I PA lumbar mobs      ASSESSMENT/PLAN  Diagnosis: LBP and bilateral LE pain    Patient did not return for further visits so problems/goal status is unknown.    Recommendations:  Patient did not return for follow-up visits as expected.  Will discharge physical therapy chart at this time.          "

## 2023-02-08 NOTE — PLAN OF CARE
Data: Vital signs within normal limits. Postpartum checks within normal limits - see flow record. Patient eating and drinking normally. Patient able to empty bladder independently and is up ambulating. No apparent signs of infection. Incision healing well. Patient performing self cares and is able to care for infant.  Action: Patient medicated during the shift for pain. See MAR. Patient reassessed within 1 hour after each medication and pain was improved - patient stated she was comfortable. Discharge education and instructions done.See flow record.  Response: Positive attachment behaviors observed with infant. Significant other present.   Plan: Discharge to home today with baby at 1330  
 Stable and she's  been up to the bathroom with stand by assist.  Her pain is comfortably manageable with current pain regimen, given oxycodone and claimed the pain control is better. Encouraged to increase fluid intake and output has been adequate. Will continue with plan of care.  
[id number]:  VSS and postpartum assessments WDL.  Up ad ivelisse with steady gait.  Independent with cares.  Bonding well with infant.  Breastfeeding on cue with assist from resource RN.  Pain managed with tylenol, ibuprofen, oxycodone and simethicone per MAR.  Many family members present and supportive.  Will continue with postpartum cares and education per plan of care.   
Data: Patient presented to [facility] at 1010.   Reason for maternal/fetal assessment per patient is Decreased Fetal Movement  .  Patient is a . Prenatal record reviewed.      Gestational Age 37w4d. VSS. Fetal movement present. Patient denies cramping, backache, vaginal discharge, pelvic pressure, UTI symptoms, GI problems, bloody show, vaginal bleeding, edema, headache, visual disturbances, epigastric or URQ pain, abdominal pain, rupture of membranes. Support persons, FOB present.  Action: Verbal consent for EFM. Triage assessment completed. EFM applied for FHR. Uterine assessment for uterine activity . Fetal assessment: Presumed adequate fetal oxygenation documented (see flow record).   Response: Dr. Rascon informed of patient arrival, FHR, uterine activity, pt report of decreased movement. Plan per provider is to proceed with IOL. Patient verbalized agreement with plan. Patient transferred to room 479 ambulatory, oriented to room and call light.      
Data: Patient presented to [facility] at 1040.   Reason for maternal/fetal assessment per patient is Decreased Fetal Movement  .  Patient is a . Prenatal record reviewed.      OB History    Para Term  AB Living   3 0 0 0 2 0   SAB TAB Ectopic Multiple Live Births   1 0 0 0 0      # Outcome Date GA Lbr Ignacio/2nd Weight Sex Delivery Anes PTL Lv   3 Current            2 AB 2017     TAB         Birth Comments: took pill   1 2016     SAB         Birth Comments: chemical pregnancy   . Medical history:   Past Medical History:   Diagnosis Date    Hidradenitis suppurativa     NO ACTIVE PROBLEMS    . Gestational Age 37w4d. VSS. Fetal movement present. Patient presents to [facility] for her scheduled tour but is complaining of decreased fetal movement since Monday.Patient denies cramping, backache, vaginal discharge, pelvic pressure, UTI symptoms, GI problems, bloody show, vaginal bleeding, edema, headache, visual disturbances, epigastric or URQ pain, abdominal pain, rupture of membranes. Support persons spouse present.  Action: Verbal consent for EFM. Triage assessment completed. EFM applied for decreased fetal movement. Uterine assessment irregular contractions that patient denies feeling. Fetal assessment: Presumed adequate fetal oxygenation documented (see flow record).   Response: Dr. Rascon informed of arrival and has seen patient. Plan per provider is have patient have some lunch and reassess. Patient verbalized agreement with plan.  Report given to MATT Sharpe RN.    IV hydration for decreased fetal movement.  IV start time 1150    
Data: Patient transferred to postpartum unit at 0500 and band check was completed with L&D nurse (Catrachita) before transfer. Vital signs within normal limits. Postpartum checks within normal limits - see flow record. Patient eating and drinking normally. Patient has kaur catheter in and catheter care was completed. Patient is able to turn independently in bed and now able to bend knees. No apparent signs of infection. Incision is covered and clean, dry, and intact. Patient performing self cares and is able to care for infant.  Action: Patient medicated during the shift for pain. See MAR. Patient reassessed within 1 hour after each medication and pain was improved - patient stated she was comfortable. Patient education done about orientation to room, treatment plan,  screens, breastfeeding, and postpartum depression. See flow record.  Response: Positive attachment behaviors observed with infant. Support person present.   Plan: Will continue plan of care.  
Labor Shift Note  Data: Contractions every 1.5 to 5 minutes and irregular, palpate moderate and strong. Fetal assessment baseline 145-150 with moderate and minimal variability. No accels present. No decelerations at this time.  Vitals:    19 0448 19 0454 19 0500 19 0515   BP: 107/61 110/65 111/55 108/67   Resp:       Temp:       TempSrc:       SpO2: 98%  98% 98%   . I/O this shift:  In: -   Out: 900 [Urine:900].  Signs and symptoms of infection absent. Signs and symptoms of pre-eclampsia: absent.  Support person is present.  Interventions: Continue uterine/fetal assessment. Cook catheter in place. Vital Signs per order set.  Medicated for Epidural.  Plan: Anticipate . Provide labor/coping assistance as needed by patient and support person.  Observe for and notify care provider of indications of progressing labor, need for pain medications,  or signs of fetal/maternal compromise.    
Pain is adequately controlled, moving well in the room and passing gas. Still tachy and Dr Travis is aware. Encouraged to drink more fluid. Declined to shower today but encouraged to remove dressing off.  Breastfeeding with moderate assist. Breast massage and pumping encouraged, did once. Will continue with plan of care.  
Patient VSS and afebrile. Patient resting comfortably with epidural, denies any pain or change in condition. 's, uterine activity q 2-7 per toco. Plan per provider to assess pt at 1445. Pt. Notified when to call out to RN, verbalized understanding and agreed to plan. Will proceed with ongoing assessment.   
Patient VSS and afebrile. Patient settled in room 479 to initiate IOL. Pt. Reports no increase in FM, moderate variability + accels noted on EFM, occasional uterine activity noted, see flowsheet for more detail. Patient denies any new sxs, pain or change in condition. Patient notified when to call out to RN, verbalized understanding and agreed to plan.   
Patient here for induction of labor at 37w4d related to non-reassuring fetal heart tracing. VSS, afebrile.  Patient receiving vaginal misoprostol Q 4 hours.  Patient now feeling contractions as painful.  Walter Q 1-4 min.  Patient repositioned and fluid bolus given for possible late decelerations. Normal baseline, moderate to minimal variability with accelerations present. Will continue to monitor.   
Procedural care complete and report given to Nancy FOSTER RN at 0435. Baby bands double checked with RN pt to be transferred to [facility].   
Pt stable this shift with postpartum checks. Pt is breastfeeding with a shield and taught how to know it is a good latch. Pt is having good pain relief with pain med plan. Pt ambulating in room and tolerating fluids. Will continue to monitor for changes and assist as needed.   
Pt vitals stable, HR tachy, denied any symptoms. Up to bathroom independently, voiding well without difficulty. Dressing to incision area is CDI. Pain well managed with Tylenol, Oxycodone and Toradol/Ibuprofen. Pt needs a lot of assistance with breast feeding, nipples are slightly flat encourage to use nipple shield but pt not comfortable. Infant having difficulty maintaining a latch. Mom able to hand expressed and feed infant a couple of times with a spoon. Encouraged frequent breast feeding. Will continue with plan of care.  
Referral made to [facility] for first time breastfeeding.   
Home

## 2023-02-22 ENCOUNTER — OFFICE VISIT (OUTPATIENT)
Dept: OBGYN | Facility: CLINIC | Age: 33
End: 2023-02-22
Payer: COMMERCIAL

## 2023-02-22 VITALS — DIASTOLIC BLOOD PRESSURE: 80 MMHG | WEIGHT: 179 LBS | BODY MASS INDEX: 34.96 KG/M2 | SYSTOLIC BLOOD PRESSURE: 124 MMHG

## 2023-02-22 DIAGNOSIS — R14.0 ABDOMINAL BLOATING: Primary | ICD-10-CM

## 2023-02-22 DIAGNOSIS — Z30.431 IUD CHECK UP: ICD-10-CM

## 2023-02-22 LAB — HCG IFA URINE: NEGATIVE

## 2023-02-22 PROCEDURE — 84703 CHORIONIC GONADOTROPIN ASSAY: CPT | Performed by: OBSTETRICS & GYNECOLOGY

## 2023-02-22 PROCEDURE — 99212 OFFICE O/P EST SF 10 MIN: CPT | Performed by: OBSTETRICS & GYNECOLOGY

## 2023-02-22 NOTE — PROGRESS NOTES
Assessment & Plan     Abdominal bloating  - Her sx's are c/w midluteal phase sx's.  This should resolve once her menses occurs.  Preg r/o'd.  - HCG IFA Urine POCT  - If bloating persists after menses then Pt to call and will schedule pelvic U/S.    IUD check up  - IUD strings seen at [facility] os      Review of external notes as documented elsewhere in note         BMI:   Estimated body mass index is 34.96 kg/m  as calculated from the following:    Height as of 5/18/22: 1.524 m (5').    Weight as of this encounter: 81.2 kg (179 lb).           No follow-ups on file.    Leon Sarmiento MD  [facility] KATY Manrique is a 32 year old, presenting for the following health issues:  IUD (Faitgue and bloating, has IUD since 11/2022)      Pt had Kyleena IUD placed at [facility] 11/25/2022. She has not been breastfeeding for 2 months.  She has had resumption of menses for 3 cycles, with light VB in 12/2022, 1/2023, and LMP 2/3/2023.  She is now CD# 20.  She developed bloating a week or so ago and was worried. She also can't feel IUD strings.  No abd pain or vag discharge.             Review of Systems         Objective    /80 (BP Location: Right arm, Patient Position: Chair, Cuff Size: Adult Large)   Wt 81.2 kg (179 lb)   LMP 02/03/2023 (Exact Date)   Breastfeeding No   BMI 34.96 kg/m    Body mass index is 34.96 kg/m .  Physical Exam  Constitutional:       General: She is not in acute distress.     Appearance: Normal appearance. She is obese. She is not ill-appearing.   Neurological:      Mental Status: She is alert.     Pelvic- Exam chaperoned by nurse, External genitalia normal, Bartholin's glands normal, Jayton's glands normal, Urethral meatus normal, Urethra normal, Bladder normal, Vagina with normal rugae, no abnormal lesions, no abnormal discharge, Normal cervix without lesions or mucopus, no cervical motion tenderness, 2 IUD strings seen at os protruding 2-3 cm outside  os.

## 2023-02-22 NOTE — NURSING NOTE
Chief Complaint   Patient presents with     IUD     Faitgue and bloating, has IUD since 2022       Initial /80 (BP Location: Right arm, Patient Position: Chair, Cuff Size: Adult Large)   Wt 81.2 kg (179 lb)   LMP 2023 (Exact Date)   Breastfeeding No   BMI 34.96 kg/m   Estimated body mass index is 34.96 kg/m  as calculated from the following:    Height as of 22: 1.524 m (5').    Weight as of this encounter: 81.2 kg (179 lb).  BP completed using cuff size: regular    Questioned patient about current smoking habits.  Pt. has never smoked.          The following HM Due: NONE    Norah Faria CMA

## 2023-04-30 ENCOUNTER — HEALTH MAINTENANCE LETTER (OUTPATIENT)
Age: 33
End: 2023-04-30

## 2023-05-12 ENCOUNTER — OFFICE VISIT (OUTPATIENT)
Dept: FAMILY MEDICINE | Facility: CLINIC | Age: 33
End: 2023-05-12
Payer: COMMERCIAL

## 2023-05-12 VITALS
BODY MASS INDEX: 33.79 KG/M2 | WEIGHT: 179 LBS | SYSTOLIC BLOOD PRESSURE: 138 MMHG | TEMPERATURE: 97.4 F | DIASTOLIC BLOOD PRESSURE: 96 MMHG | RESPIRATION RATE: 14 BRPM | OXYGEN SATURATION: 100 % | HEART RATE: 84 BPM | HEIGHT: 61 IN

## 2023-05-12 DIAGNOSIS — R49.0 DYSPHONIA: Primary | ICD-10-CM

## 2023-05-12 DIAGNOSIS — R22.1 NECK MASS: ICD-10-CM

## 2023-05-12 DIAGNOSIS — K14.6 TONGUE PAIN: ICD-10-CM

## 2023-05-12 PROCEDURE — 99214 OFFICE O/P EST MOD 30 MIN: CPT | Performed by: FAMILY MEDICINE

## 2023-05-12 ASSESSMENT — ENCOUNTER SYMPTOMS
HEADACHES: 1
MYALGIAS: 0
FEVER: 0
EYE PAIN: 0
ABDOMINAL PAIN: 0
COUGH: 0
ARTHRALGIAS: 0
DYSURIA: 0
CONSTIPATION: 0
NAUSEA: 0
FREQUENCY: 0
PARESTHESIAS: 0
WEAKNESS: 0
DIZZINESS: 0
PALPITATIONS: 0
NERVOUS/ANXIOUS: 1
HEARTBURN: 0
JOINT SWELLING: 0
HEMATOCHEZIA: 0
CHILLS: 0
DIARRHEA: 0
SHORTNESS OF BREATH: 0
BREAST MASS: 0
HEMATURIA: 0
SORE THROAT: 1

## 2023-05-12 ASSESSMENT — PAIN SCALES - GENERAL: PAINLEVEL: NO PAIN (0)

## 2023-05-12 NOTE — PROGRESS NOTES
SUBJECTIVE:   CC: Hilaria is an 33 year old who presents for tongue and neck discomfort       5/12/2023     2:13 PM   Additional Questions   Roomed by eve   Accompanied by none     Healthy Habits:     Getting at least 3 servings of Calcium per day:  Yes    Bi-annual eye exam:  Yes    Dental care twice a year:  Yes    Sleep apnea or symptoms of sleep apnea:  Daytime drowsiness and Excessive snoring    Diet:  Regular (no restrictions)    Frequency of exercise:  2-3 days/week    Duration of exercise:  15-30 minutes    Taking medications regularly:  Yes    Medication side effects:  Not applicable    PHQ-2 Total Score: 0    Additional concerns today:  Yes    Pain in the middle of the tongue when eating spicy foods  Been since she was as child   Sour and spicy makes it worse   Sometimes pain in the abdomen     Pain in her throat  Had a surgery in 2015     Normal tongue pain         Today's PHQ-2 Score:       5/12/2023     2:13 PM   PHQ-2 ( 1999 [employer])   Q1: Little interest or pleasure in doing things 0   Q2: Feeling down, depressed or hopeless 0   PHQ-2 Score 0   Q1: Little interest or pleasure in doing things Not at all    Not at all   Q2: Feeling down, depressed or hopeless Not at all    Not at all   PHQ-2 Score 0    0         Social History     Tobacco Use     Smoking status: Never     Smokeless tobacco: Never   Vaping Use     Vaping status: Never Used   Substance Use Topics     Alcohol use: No           5/12/2023     2:13 PM   Alcohol Use   Prescreen: >3 drinks/day or >7 drinks/week? No     Reviewed orders with patient.  Reviewed health maintenance and updated orders accordingly - Yes      Breast Cancer Screening:    FHS-7:        View : No data to display.                  Pertinent mammograms are reviewed under the imaging tab.    History of abnormal Pap smear: NO - age 30-65 PAP every 5 years with negative HPV co-testing recommended      Latest Ref Rng & Units 4/8/2022     1:52 PM   PAP / HPV   PAP  Negative for  "Intraepithelial Lesion or Malignancy (NILM)     HPV 16 DNA Negative Negative     HPV 18 DNA Negative Negative     Other HR HPV Negative Negative       Reviewed and updated as needed this visit by clinical staff   Tobacco  Allergies  Meds              Reviewed and updated as needed this visit by Provider                   Review of Systems   Constitutional: Negative for chills and fever.   HENT: Positive for sore throat. Negative for congestion, ear pain and hearing loss.    Eyes: Negative for pain and visual disturbance.   Respiratory: Negative for cough and shortness of breath.    Cardiovascular: Negative for chest pain, palpitations and peripheral edema.   Gastrointestinal: Negative for abdominal pain, constipation, diarrhea, heartburn, hematochezia and nausea.   Breasts:  Negative for tenderness, breast mass and discharge.   Genitourinary: Negative for dysuria, frequency, genital sores, hematuria, pelvic pain, urgency, vaginal bleeding and vaginal discharge.   Musculoskeletal: Negative for arthralgias, joint swelling and myalgias.   Skin: Negative for rash.   Neurological: Positive for headaches. Negative for dizziness, weakness and paresthesias.   Psychiatric/Behavioral: Negative for mood changes. The patient is nervous/anxious.         OBJECTIVE:   BP (!) 138/96   Pulse 84   Temp 97.4  F (36.3  C) (Temporal)   Resp 14   Ht 1.55 m (5' 1.02\")   Wt 81.2 kg (179 lb)   LMP 04/22/2023   SpO2 100%   BMI 33.80 kg/m       Physical Exam  Constitutional:       General: She is not in acute distress.  HENT:      Mouth/Throat:      Mouth: Mucous membranes are moist.      Pharynx: Oropharynx is clear. No oropharyngeal exudate or posterior oropharyngeal erythema.   Eyes:      General: No scleral icterus.  Pulmonary:      Effort: No respiratory distress.   Musculoskeletal:      Cervical back: No rigidity or tenderness.   Lymphadenopathy:      Cervical: No cervical adenopathy.   Neurological:      Mental Status: She " is alert.   Psychiatric:         Mood and Affect: Mood normal.         Behavior: Behavior normal.               ASSESSMENT/PLAN:     1. Dysphonia  Will send to speech therapy   - Speech Therapy Referral; Future    2. Neck mass  feels to be fullness of left thyroid. has had a mass removed in 2015. had ct in 2019 for similar findings.  she is new to me and i cannot tell if it has changed.  has sore anterior neck around hyoid bone  - US Head Neck Soft Tissue; Future    3. Tongue pain  - Adult ENT  Referral; Future      Samir Erwin,   [facility]

## 2023-05-23 ENCOUNTER — THERAPY VISIT (OUTPATIENT)
Dept: SPEECH THERAPY | Facility: CLINIC | Age: 33
End: 2023-05-23
Attending: FAMILY MEDICINE
Payer: COMMERCIAL

## 2023-05-23 DIAGNOSIS — R49.0 DYSPHONIA: Primary | ICD-10-CM

## 2023-05-23 PROCEDURE — 92524 BEHAVRAL QUALIT ANALYS VOICE: CPT | Mod: GN | Performed by: STUDENT IN AN ORGANIZED HEALTH CARE EDUCATION/TRAINING PROGRAM

## 2023-05-23 PROCEDURE — 92507 TX SP LANG VOICE COMM INDIV: CPT | Mod: GN | Performed by: STUDENT IN AN ORGANIZED HEALTH CARE EDUCATION/TRAINING PROGRAM

## 2023-05-23 NOTE — PROGRESS NOTES
SPEECH LANGUAGE PATHOLOGY EVALUATION    See electronic medical record for Abuse and Falls Screening details.    Subjective      Presenting condition or subjective complaint: 32yo female with several year history of dysphonia and throat discomfort.  Pt reports that her voice always sounds low, hoarse, and raspy.  She constantly feels like there is something stuck in her throat that she has to clear.  She feels like she is clearing mucus when she clears her throat.  Voice quality and throat discomfort worsen the more she talks.  She can also sound worse in the mornings, although she never really sounds normal at any point during the day.  Foods such as tomato and yogurt can also make her symptoms worse.  She reports episodic reflux and is not currently taking medications for reflux.  She sometimes sounds a little better when lying down.  She denies dysphagia and dyspnea.  Please see chart for additional PMH.  Date of onset: 05/12/23 (order date)    Relevant medical history:  Please see chart  Dates & types of surgery: Hx removal of benign L paratracheal mass in 2015    Prior diagnostic imaging/testing results: Other Videostroboscopy completed by ENT/SLP at the [facility] on 12/31/2019.  Exam was significant for moderate-severe supraglottic hyperfunction and subtle bilateral swellings of the mid-membranous folds, R>L.   Prior therapy history for the same diagnosis, illness or injury: Yes Pt completed two sessions of skilled speech therapy for this problem with SLP Neetu Jett at the [facility] in 2019/2020.      Employment:    Yes    Patient goals for therapy: Improve voice quality as much as possible, reduce throat discomfort as much as possible    Pain assessment: See objective evaluation for additional pain details     Objective     COUGH/THROAT CLEAR  Cough/throat clear characteristics: not observed during evaluation     HYPERFUNCTION  Visible tension: neck, shoulders    Palpation of the thyrohyoid  region: firm musculature, reported tenderness of the thyrohyoid region, reported tenderness of the base of tongue musculature     VOICE PROFILE DURING CONVERSATION (1 min monologue & paragraph reading)  Voice quality: Consistent mild-moderate strain and intermittent mild roughness   Voice quality severity rating continuum: 5 - mild-moderate   Breath control: tense, shallow, poor respiratory/phonation coordination   Breath control severity rating continuum: 5 - mild-moderate  Voice Use/Effort: pinched/squeezed larynx, contraction of neck muscles, hard glottal attacks; Pt rates her current phonatory effort for speech as 6/10 (10 is maximum effort)   Voice Use/Effort severity rating continuum: 5 - mild-moderate  Fundamental frequency (Hz): 185 Hz (centered around F#3)  Pitch/Frequency Description: pitch breaks, only present with attempts at increased volume   Pitch/Frequency severity rating continuum: 6 - mild  Volume: WNL for conversational speech, reduced volume increase with strain and pitch breaks at attempts at loud volume, strain with whipser and soft volume phonation   Volume severity rating continuum: 5 - mild-moderate  Neuromuscular Control: WNL   Neuromuscular Control severity rating continuum: 7 - WNL  Resonance: laryngeal pharyngeal focus resonance   Resonance severity rating continuum: 6 - mild    CAPE-V Overall Severity: 32/100    ADDUCTION/ABDUCTION FUNCTION  Laryngeal diadokinetic speed: WNL  Laryngeal diadokinetic strength: strained   Laryngeal diadokinetic consistency: regular   Adduction/Abduction function scale: Age 11-65, norm per sec: 5+     VIBRATORY FUNCTION OF VOCAL FOLDS  Prolonged  ah  at mid pitch (sec): 5.2 seconds with consistent roughness and strain and no clear pitch  Vibratory Function of Vocal Folds Scale Norms: females 20 - 80 yrs: 10 - 22 secs   FUNCTIONAL LENGTHENERS/SHORTENERS (CT & TA muscles)  Pitch glides: upper pitches more dysphonic   Lowest pitch: F3  Highest pitch: Eb5 with  increased strain      Assessment & Plan   CLINICAL IMPRESSIONS   Medical Diagnosis: Muscle tension dysphonia    Treatment Diagnosis: Dysphonia   Impression/Assessment: Pt is a 33 year old female with voice and throat complaints. The following significant findings have been identified: impaired voicing of mild-moderate severity, characterized by roughness, strain, poor respiratory/phonatory coordination, hard glottal onsets, pitch breaks, reduced volume with attempts at loud phonation, increased dysphonia at higher pitches, and increased phonatory effort with tight laryngeal musculature and neck involvement during phonation. Based on previous laryngeal examination, her symptoms are likely consistent with muscle tension dysphonia.  Identified deficits interfere with their ability to communicate within the home or community as compared to previous level of function.    PLAN OF CARE  Treatment Interventions:  Voice    Prognosis to achieve stated therapy goals is good   Rehab potential is impacted by: current level of function, prior level of function, no recent laryngeal examination    Long Term Goals       Goal Identifier Generalization   Goal Description Patient will report a week of typical activities in which dysphonia and vocal effort do not exceed a level of 2 out of 10, 90% of the time, so that patient is able to meet her vocal demands at work and at home.   Target Date 08/21/23   Goal Identifier Voice quality   Goal Description In a 20-minute speech task, patient will demonstrate roughness and strain that do not exceed a level of 2 out of 10, 90% of the time by SLP judgment, so that patient is able to meet her voice quality demands.   Target Date 08/21/23   Goal Identifier Massage   Goal Description Patient will learn, demonstrate, and implement use of circumlaryngeal massage exercises independently 1-2x per day, in order to promote reduced laryngeal discomfort and tension.   Target Date 08/21/23            Frequency of Treatment: 1x/week  Duration of Treatment: 6 weeks with 1-2 monthly follow-ups     Recommended Referrals to Other Professionals: ENT for repeat laryngoscopy  Education Assessment:   Learner/Method: Patient;Listening;Reading;Demonstration;Pictures/Video;No Barriers to Learning  Education Comments: SLP provided education regarding evaluation findings and proposed POC.  Therapy initiated today.    Risks and benefits of evaluation/treatment have been explained.   Patient/Family/caregiver agrees with Plan of Care.     Evaluation Time:     Voice Minutes (21990): 20     Present: No: Pt refusing Faroese  today and participated in the evaluation in English     Signing Clinician: Cande Collins, SLP    MARKUS Loza ([employer]), M.A., CCC-SLP  Speech-Language Pathologist  [facility] Certificate of Vocology  [facility]  [phone]          [facility]                                                                                   OUTPATIENT SPEECH LANGUAGE PATHOLOGY      PLAN OF TREATMENT FOR OUTPATIENT REHABILITATION   Patient's Last Name, First Name, CARL.Hilaria Lopesjenniferjudith YOB: 1990   Provider's Name   [facility]   Medical Record No.  [id number]     Onset Date: 05/12/23 (order date) Start of Care Date: 05/23/23     Medical Diagnosis:  Muscle tension dysphonia      SLP Treatment Diagnosis: Dysphonia  Plan of Treatment  Frequency/Duration: 1x/week  / 6 weeks with 1-2 monthly follow-ups     Certification date from 05/23/23   To 08/21/23          See note for plan of treatment details and functional goals     Cande Collins, SLP                         I CERTIFY THE NEED FOR THESE SERVICES FURNISHED UNDER        THIS PLAN OF TREATMENT AND WHILE UNDER MY CARE     (Physician attestation of this document indicates review and certification of the therapy plan).                   Referring Provider:  Samir Erwin      Initial Assessment  See Epic Evaluation- 05/23/23

## 2023-06-02 ENCOUNTER — OFFICE VISIT (OUTPATIENT)
Dept: URGENT CARE | Facility: URGENT CARE | Age: 33
End: 2023-06-02
Payer: COMMERCIAL

## 2023-06-02 VITALS
TEMPERATURE: 98.4 F | DIASTOLIC BLOOD PRESSURE: 90 MMHG | BODY MASS INDEX: 33.8 KG/M2 | SYSTOLIC BLOOD PRESSURE: 134 MMHG | HEART RATE: 77 BPM | RESPIRATION RATE: 16 BRPM | WEIGHT: 179 LBS | OXYGEN SATURATION: 100 %

## 2023-06-02 DIAGNOSIS — H02.036: ICD-10-CM

## 2023-06-02 DIAGNOSIS — R03.0 ELEVATED BLOOD-PRESSURE READING WITHOUT DIAGNOSIS OF HYPERTENSION: Primary | ICD-10-CM

## 2023-06-02 DIAGNOSIS — H00.014 HORDEOLUM EXTERNUM OF LEFT UPPER EYELID: ICD-10-CM

## 2023-06-02 PROCEDURE — 99214 OFFICE O/P EST MOD 30 MIN: CPT | Performed by: PHYSICIAN ASSISTANT

## 2023-06-02 RX ORDER — ERYTHROMYCIN 5 MG/G
0.5 OINTMENT OPHTHALMIC 3 TIMES DAILY
Qty: 10.5 G | Refills: 0 | Status: SHIPPED | OUTPATIENT
Start: 2023-06-02 | End: 2023-06-09

## 2023-06-02 NOTE — PROGRESS NOTES
"  Assessment & Plan     Elevated blood-pressure reading without diagnosis of hypertension    Patient advised to follow up with PCP for recheck blood pressure   Information given to patient on diet modifications, including lowering salt in diet, decrease calories, weight loss and exercise.  Monitor blood pressure at home and if BP maintains over 140/90 then advise having a recheck with PCP in 2 weeks.       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, left    Patient has had problems with infections and eye irritation  She is scheduled for surgery with ophthalmologist    PROCEDURE  Eye lash was removed from eyelid as it was scraping against eye and causing irritation    Hordeolum externum of left upper eyelid    Warm moist compresses  Use ointment for eyelid irritation  - erythromycin (ROMYCIN) 5 MG/GM ophthalmic ointment; Place 0.5 inches Into the left eye 3 times daily for 7 days    Review of external notes as documented elsewhere in note       BMI:   Estimated body mass index is 33.8 kg/m  as calculated from the following:    Height as of 5/12/23: 1.55 m (5' 1.02\").    Weight as of this encounter: 81.2 kg (179 lb).       Follow up with ophthalmologist for surgery    No follow-ups on file.    Daniel Keen, [facility], PA-C  [facility] TRACY Manrique is a 33 year old, presenting for the following health issues:  Eye Problem (Left eye pain/eyelash pain-has surgery coming up on this eye)        5/12/2023     2:13 PM   Additional Questions   Roomed by loula   Accompanied by none     HPI   Review of Systems   Constitutional, HEENT, cardiovascular, pulmonary, gi and gu systems are negative, except as otherwise noted.      Objective    BP (!) 134/90   Pulse 77   Temp 98.4  F (36.9  C) (Tympanic)   Resp 16   Wt 81.2 kg (179 lb)   LMP 04/22/2023   SpO2 100%   BMI 33.80 kg/m    Body mass index is 33.8 kg/m .  Physical Exam   GENERAL: healthy, alert and no distress  EYES: Eyes grossly normal to inspection, PERRL " and conjunctivae and sclerae normal  HENT: ear canals and TM's normal, nose and mouth without ulcers or lesions  MS: no gross musculoskeletal defects noted, no edema  SKIN: Positive for left upper eyelid entropian eyelashes, skin is inflamed on eyelid with localized infection  NEURO: Normal strength and tone, mentation intact and speech normal  PSYCH: mentation appears normal, affect normal/bright

## 2023-06-06 ENCOUNTER — OFFICE VISIT (OUTPATIENT)
Dept: INTERNAL MEDICINE | Facility: CLINIC | Age: 33
End: 2023-06-06
Payer: COMMERCIAL

## 2023-06-06 VITALS
WEIGHT: 180.5 LBS | BODY MASS INDEX: 34.08 KG/M2 | HEART RATE: 93 BPM | RESPIRATION RATE: 14 BRPM | SYSTOLIC BLOOD PRESSURE: 126 MMHG | HEIGHT: 61 IN | OXYGEN SATURATION: 100 % | DIASTOLIC BLOOD PRESSURE: 80 MMHG | TEMPERATURE: 97.8 F

## 2023-06-06 DIAGNOSIS — D57.3 SICKLE CELL TRAIT (H): ICD-10-CM

## 2023-06-06 DIAGNOSIS — H02.009 ENTROPION AND TRICHIASIS OF EYELID, UNSPECIFIED LATERALITY: ICD-10-CM

## 2023-06-06 DIAGNOSIS — Z01.818 PREOP GENERAL PHYSICAL EXAM: Primary | ICD-10-CM

## 2023-06-06 PROCEDURE — 99214 OFFICE O/P EST MOD 30 MIN: CPT | Performed by: PHYSICIAN ASSISTANT

## 2023-06-06 NOTE — PROGRESS NOTES
[address] [address]  Phone: [phone]  Primary Provider: Clotilde Beltran  Pre-op Performing Provider:    LETITIA MCCORMICK,       PREOPERATIVE EVALUATION:  Today's date: 6/6/2023    Hilaria Jansen is a 33 year old female who presents for a preoperative evaluation.    Surgical Information:  Surgery/Procedure: Bilateral eyelids   Surgery Location: [facility]   Surgeon: Unknown   Surgery Date: 06/13/23  Time of Surgery: 9:30 am   Where patient plans to recover: At home with family  Fax number for surgical facility: [phone]    Assessment & Plan     The proposed surgical procedure is considered LOW risk.    Preop general physical exam      Entropion and trichiasis of eyelid, unspecified laterality      Sickle cell trait (H)                - No identified additional risk factors other than previously addressed    Antiplatelet or Anticoagulation Medication Instructions:   - Patient is on no antiplatelet or anticoagulation medications.    Additional Medication Instructions:  Patient is to take all scheduled medications on the day of surgery    RECOMMENDATION:  APPROVAL GIVEN to proceed with proposed procedure, without further diagnostic evaluation.            Subjective       HPI related to upcoming procedure: eyelid lift           6/6/2023     2:19 PM   Preop Questions   1. Have you ever had a heart attack or stroke? No   2. Have you ever had surgery on your heart or blood vessels, such as a stent placement, a coronary artery bypass, or surgery on an artery in your head, neck, heart, or legs? No   3. Do you have chest pain with activity? No   4. Do you have a history of  heart failure? No   5. Do you currently have a cold, bronchitis or symptoms of other infection? No   6. Do you have a cough, shortness of breath, or wheezing? No   7. Do you or anyone in your family have previous history of blood  clots? No   8. Do you or does anyone in your family have a serious bleeding problem such as prolonged bleeding following surgeries or cuts? No   9. Have you ever had problems with anemia or been told to take iron pills? YES - hx of anemia    10. Have you had any abnormal blood loss such as black, tarry or bloody stools, or abnormal vaginal bleeding? No   11. Have you ever had a blood transfusion? No   12. Are you willing to have a blood transfusion if it is medically needed before, during, or after your surgery? Yes   13. Have you or any of your relatives ever had problems with anesthesia? No   14. Do you have sleep apnea, excessive snoring or daytime drowsiness? No   15. Do you have any artifical heart valves or other implanted medical devices like a pacemaker, defibrillator, or continuous glucose monitor? No   16. Do you have artificial joints? No   17. Are you allergic to latex? No   18. Is there any chance that you may be pregnant? No     Health Care Directive:  Patient does not have a Health Care Directive or Living Will: Discussed advance care planning with patient; however, patient declined at this time.    Preoperative Review of :   reviewed - no record of controlled substances prescribed.      Status of Chronic Conditions:  See problem list for active medical problems.  Problems all longstanding and stable, except as noted/documented.  See ROS for pertinent symptoms related to these conditions.      Review of Systems  CONSTITUTIONAL: NEGATIVE for fever, chills, change in weight  ENT/MOUTH: NEGATIVE for ear, mouth and throat problems  RESP: NEGATIVE for significant cough or SOB  CV: NEGATIVE for chest pain, palpitations or peripheral edema  GI: NEGATIVE for nausea, abdominal pain, heartburn, or change in bowel habits  HEME/ALLERGY/IMMUNE: NEGATIVE for bleeding problems  PSYCHIATRIC: NEGATIVE for changes in mood or affect  ROS otherwise negative    Patient Active Problem List    Diagnosis Date Noted      Monochorionic diamniotic twin gestation in third trimester 10/15/2022     Priority: Medium     Anemia during pregnancy 08/18/2022     Priority: Medium     Monochorionic diamniotic twin pregnancy 06/30/2022     Priority: Medium     Dates by LMP c/w 10 wk u/s  (pregnant on OCP)    Anterior placenta, A maternal left  ECHO--      9/27 - FGR x2  In light of all of the above we discussed the following plan moving forward:  Continue twice weekly ultrasound surveillance  -Once per week will be TTTS/TAPS (no NST)  -Once per week will be limited UARs/NSTs  -Repeat growth in 3 weeks     We reviewed timing of delivery recommendations in the setting of MCDA twins complicated by fetal growth restriction and abnormal dopplers. We discussed 32-24 weeks or  sooner if clinically indicated. We reviewed a plan for a growth ultrasound in 3 weeks (32w4d) and if there is still FGR with abnormal dopplers moving forward with Betamethasone administration and delivery. She is amenable to this plan.       10/4  Given the oscillating Dopplers and progression to intermittent AEDF (was absent on 9/16/22) recommend Betamethasone administration in preparation for earlier delivery. Delivery advised at 32-34 weeks, but likely on the earlier end of that window if the UA Dopplers remain abnormal. Patient is able to return tomorrow for BMZ administration. Repeat Dopplers/evaluation on Friday as scheduled.          Need for Tdap vaccination 05/17/2022     Priority: Medium     Bilateral low back pain with right-sided sciatica 03/14/2022     Priority: Medium     Bilateral low back pain with left-sided sciatica 03/14/2022     Priority: Medium     Muscle tension dysphonia 01/01/2020     Priority: Medium     Dry eyes, bilateral 04/05/2019     Priority: Medium     Allergic conjunctivitis of both eyes 04/05/2019     Priority: Medium     Sickle cell trait (H) 12/05/2018     Priority: Medium     Abnormal CT scan, neck 09/09/2015     Priority: Medium      "Gastroesophageal reflux disease without esophagitis 06/15/2015     Priority: Medium     Sinusitis 2014     Priority: Medium      Past Medical History:   Diagnosis Date     Anemia      Hidradenitis suppurativa      Infertility, female      Neurological disorder      Sickle cell anemia (H)      Varicella      Past Surgical History:   Procedure Laterality Date      SECTION N/A 2019    Procedure:  SECTION;  Surgeon: Wendi Coppola MD;  Location: [facility]      SECTION N/A 12/15/2021    Procedure:  SECTION;  Surgeon: Brooke Capps MD;  Location: [facility]      SECTION N/A 10/14/2022    Procedure:  SECTION;  Surgeon: Lucia Gannon MD;  Location: [facility]     HEAD & NECK SURGERY       IR LYMPH NODE BIOPSY  2015     SURGICAL PATHOLOGY EXAM Left 09/10/2015    left paratracheal neck mass removed, benign     Current Outpatient Medications   Medication Sig Dispense Refill     erythromycin (ROMYCIN) 5 MG/GM ophthalmic ointment Place 0.5 inches Into the left eye 3 times daily for 7 days 10.5 g 0     levonorgestrel (KYLEENA) 19.5 MG IUD 1 each by Intrauterine route once         No Known Allergies     Social History     Tobacco Use     Smoking status: Never     Smokeless tobacco: Never   Vaping Use     Vaping status: Never Used   Substance Use Topics     Alcohol use: No       History   Drug Use No         Objective     /80   Pulse 93   Temp 97.8  F (36.6  C) (Temporal)   Resp 14   Ht 1.549 m (5' 1\")   Wt 81.9 kg (180 lb 8 oz)   LMP 2023 (Exact Date)   SpO2 100%   BMI 34.11 kg/m      Physical Exam  GENERAL APPEARANCE: healthy, alert and no distress  HENT: ear canals and TM's normal and nose and mouth without ulcers or lesions  RESP: lungs clear to auscultation - no rales, rhonchi or wheezes  CV: regular rate and rhythm, normal S1 S2, no S3 or S4 and no murmur, click or rub   MS: extremities normal- no gross deformities " noted  SKIN: no suspicious lesions or rashes  NEURO: Normal strength and tone, sensory exam grossly normal, mentation intact and speech normal    Recent Labs   Lab Test 10/15/22  1234 10/12/22  1029 08/18/22  0848 05/18/22  1125 03/09/22  1032   HGB 10.5* 11.8 10.6*  10.9*   < > 12.2   PLT  --  238 212   < > 280   NA  --   --   --   --  140   POTASSIUM  --   --   --   --  3.8   CR  --   --   --   --  0.83    < > = values in this interval not displayed.        Diagnostics:  No labs were ordered during this visit.   No EKG required for low risk surgery (cataract, skin procedure, breast biopsy, etc).    Revised Cardiac Risk Index (RCRI):  The patient has the following serious cardiovascular risks for perioperative complications:   - No serious cardiac risks = 0 points     RCRI Interpretation: 0 points: Class I (very low risk - 0.4% complication rate)           Signed Electronically by: Marlen Mix PA-C  Copy of this evaluation report is provided to requesting physician.

## 2023-06-06 NOTE — PATIENT INSTRUCTIONS
For informational purposes only. Not to replace the advice of your health care provider. Copyright   2003,  [facility]. All rights reserved. Clinically reviewed by Iman Novak MD. [employer] [id number] - REV .  Preparing for Your Surgery  Getting started  A nurse will call you to review your health history and instructions. They will give you an arrival time based on your scheduled surgery time. Please be ready to share:  Your doctor's clinic name and phone number  Your medical, surgical, and anesthesia history  A list of allergies and sensitivities  A list of medicines, including herbal treatments and over-the-counter drugs  Whether the patient has a legal guardian (ask how to send us the papers in advance)  Please tell us if you're pregnant--or if there's any chance you might be pregnant. Some surgeries may injure a fetus (unborn baby), so they require a pregnancy test. Surgeries that are safe for a fetus don't always need a test, and you can choose whether to have one.   If you have a child who's having surgery, please ask for a copy of Preparing for Your Child's Surgery.    Preparing for surgery  Within 10 to 30 days of surgery: Have a pre-op exam (sometimes called an H&P, or History and Physical). This can be done at a clinic or pre-operative center.  If you're having a , you may not need this exam. Talk to your care team.  At your pre-op exam, talk to your care team about all medicines you take. If you need to stop any medicines before surgery, ask when to start taking them again.  We do this for your safety. Many medicines can make you bleed too much during surgery. Some change how well surgery (anesthesia) drugs work.  Call your insurance company to let them know you're having surgery. (If you don't have insurance, call [phone].)  Call your clinic if there's any change in your health. This includes signs of a cold or flu (sore throat, runny nose, cough, rash, fever). It  also includes a scrape or scratch near the surgery site.  If you have questions on the day of surgery, call your hospital or surgery center.  Eating and drinking guidelines  For your safety: Unless your surgeon tells you otherwise, follow the guidelines below.  Eat and drink as usual until 8 hours before you arrive for surgery. After that, no food or milk.  Drink clear liquids until 2 hours before you arrive. These are liquids you can see through, like water, Gatorade, and Propel Water. They also include plain black coffee and tea (no cream or milk), candy, and breath mints. You can spit out gum when you arrive.  If you drink alcohol: Stop drinking it the night before surgery.  If your care team tells you to take medicine on the morning of surgery, it's okay to take it with a sip of water.  Preventing infection  Shower or bathe the night before and morning of your surgery. Follow the instructions your clinic gave you. (If no instructions, use regular soap.)  Don't shave or clip hair near your surgery site. We'll remove the hair if needed.  Don't smoke or vape the morning of surgery. You may chew nicotine gum up to 2 hours before surgery. A nicotine patch is okay.  Note: Some surgeries require you to completely quit smoking and nicotine. Check with your surgeon.  Your care team will make every effort to keep you safe from infection. We will:  Clean our hands often with soap and water (or an alcohol-based hand rub).  Clean the skin at your surgery site with a special soap that kills germs.  Give you a special gown to keep you warm. (Cold raises the risk of infection.)  Wear special hair covers, masks, gowns and gloves during surgery.  Give antibiotic medicine, if prescribed. Not all surgeries need antibiotics.  What to bring on the day of surgery  Photo ID and insurance card  Copy of your health care directive, if you have one  Glasses and hearing aids (bring cases)  You can't wear contacts during surgery  Inhaler and  eye drops, if you use them (tell us about these when you arrive)  CPAP machine or breathing device, if you use them  A few personal items, if spending the night  If you have . . .  A pacemaker, ICD (cardiac defibrillator) or other implant: Bring the ID card.  An implanted stimulator: Bring the remote control.  A legal guardian: Bring a copy of the certified (court-stamped) guardianship papers.  Please remove any jewelry, including body piercings. Leave jewelry and other valuables at home.  If you're going home the day of surgery  You must have a responsible adult drive you home. They should stay with you overnight as well.  If you don't have someone to stay with you, and you aren't safe to go home alone, we may keep you overnight. Insurance often won't pay for this.  After surgery  If it's hard to control your pain or you need more pain medicine, please call your surgeon's office.  Questions?   If you have any questions for your care team, list them here: _________________________________________________________________________________________________________________________________________________________________________ ____________________________________ ____________________________________ ____________________________________    How to Take Your Medication Before Surgery  - Take all of your medications before surgery as usual

## 2023-06-08 ENCOUNTER — THERAPY VISIT (OUTPATIENT)
Dept: SPEECH THERAPY | Facility: CLINIC | Age: 33
End: 2023-06-08
Payer: COMMERCIAL

## 2023-06-08 DIAGNOSIS — R49.0 DYSPHONIA: Primary | ICD-10-CM

## 2023-06-08 PROCEDURE — 92507 TX SP LANG VOICE COMM INDIV: CPT | Mod: GN | Performed by: STUDENT IN AN ORGANIZED HEALTH CARE EDUCATION/TRAINING PROGRAM

## 2023-07-07 ENCOUNTER — THERAPY VISIT (OUTPATIENT)
Dept: SPEECH THERAPY | Facility: CLINIC | Age: 33
End: 2023-07-07
Attending: FAMILY MEDICINE
Payer: COMMERCIAL

## 2023-07-07 DIAGNOSIS — R49.0 DYSPHONIA: Primary | ICD-10-CM

## 2023-07-07 PROCEDURE — 92507 TX SP LANG VOICE COMM INDIV: CPT | Mod: GN | Performed by: STUDENT IN AN ORGANIZED HEALTH CARE EDUCATION/TRAINING PROGRAM

## 2023-07-09 ENCOUNTER — OFFICE VISIT (OUTPATIENT)
Dept: URGENT CARE | Facility: URGENT CARE | Age: 33
End: 2023-07-09
Payer: COMMERCIAL

## 2023-07-09 VITALS
WEIGHT: 181 LBS | HEART RATE: 90 BPM | TEMPERATURE: 97.4 F | OXYGEN SATURATION: 100 % | RESPIRATION RATE: 16 BRPM | DIASTOLIC BLOOD PRESSURE: 101 MMHG | SYSTOLIC BLOOD PRESSURE: 156 MMHG | BODY MASS INDEX: 34.2 KG/M2

## 2023-07-09 DIAGNOSIS — R51.9 NONINTRACTABLE HEADACHE, UNSPECIFIED CHRONICITY PATTERN, UNSPECIFIED HEADACHE TYPE: ICD-10-CM

## 2023-07-09 DIAGNOSIS — K21.9 GASTROESOPHAGEAL REFLUX DISEASE, UNSPECIFIED WHETHER ESOPHAGITIS PRESENT: Primary | ICD-10-CM

## 2023-07-09 DIAGNOSIS — R03.0 ELEVATED BLOOD PRESSURE READING WITHOUT DIAGNOSIS OF HYPERTENSION: ICD-10-CM

## 2023-07-09 PROCEDURE — 99214 OFFICE O/P EST MOD 30 MIN: CPT | Performed by: FAMILY MEDICINE

## 2023-07-09 RX ORDER — ACETAMINOPHEN 500 MG
500-1000 TABLET ORAL EVERY 6 HOURS PRN
Qty: 30 TABLET | Refills: 0 | Status: SHIPPED | OUTPATIENT
Start: 2023-07-09 | End: 2023-07-14

## 2023-07-09 NOTE — PROGRESS NOTES
SUBJECTIVE: Hilaria Jansen is a 33 year old female presenting with a chief complaint of reoccurrence of GERD and headache into neck.  Onset of symptoms was day(s) ago.  Course of illness is waxing and waning.    Current and Associated symptoms: none  Treatment measures tried include None tried.  Predisposing factors include HX of Ha's and GERD, feel similar.    Past Medical History:   Diagnosis Date     Anemia      Hidradenitis suppurativa      Infertility, female      Neurological disorder      Sickle cell anemia (H)      Varicella      No Known Allergies  Social History     Tobacco Use     Smoking status: Never     Smokeless tobacco: Never   Substance Use Topics     Alcohol use: No       ROS:  SKIN: no rash  GI: no vomiting    OBJECTIVE:  BP (!) 156/101   Pulse 90   Temp 97.4  F (36.3  C) (Tympanic)   Resp 16   Wt 82.1 kg (181 lb)   LMP 05/22/2023 (Exact Date)   SpO2 100%   BMI 34.20 kg/m  GENERAL APPEARANCE: healthy, alert and no distress  EYES: EOMI,  PERRL, conjunctiva clear  HENT: ear canals and TM's normal.  Nose and mouth without ulcers, erythema or lesions  RESP: lungs clear to auscultation - no rales, rhonchi or wheezes  CV: regular rates and rhythm, normal S1 S2, no murmur noted  ABDOMEN:  soft, nontender, no HSM or masses and bowel sounds normal  NEURO: Normal strength and tone, sensory exam grossly normal,  normal speech and mentation  SKIN: no suspicious lesions or rashes      ICD-10-CM    1. Gastroesophageal reflux disease, unspecified whether esophagitis present  K21.9 omeprazole (PRILOSEC) 20 MG DR capsule      2. Nonintractable headache, unspecified chronicity pattern, unspecified headache type  R51.9 acetaminophen (TYLENOL) 500 MG tablet      3. Elevated blood pressure reading without diagnosis of hypertension  R03.0           Fluids/Rest, f/u if worse/not any better

## 2023-07-10 ENCOUNTER — OFFICE VISIT (OUTPATIENT)
Dept: FAMILY MEDICINE | Facility: CLINIC | Age: 33
End: 2023-07-10
Payer: COMMERCIAL

## 2023-07-10 VITALS
HEIGHT: 64 IN | SYSTOLIC BLOOD PRESSURE: 132 MMHG | OXYGEN SATURATION: 100 % | TEMPERATURE: 98.3 F | DIASTOLIC BLOOD PRESSURE: 80 MMHG | HEART RATE: 82 BPM | RESPIRATION RATE: 16 BRPM | WEIGHT: 178 LBS | BODY MASS INDEX: 30.39 KG/M2

## 2023-07-10 DIAGNOSIS — Z71.84 ENCOUNTER FOR COUNSELING FOR TRAVEL: Primary | ICD-10-CM

## 2023-07-10 DIAGNOSIS — Z23 NEED FOR IMMUNIZATION AGAINST TYPHOID: ICD-10-CM

## 2023-07-10 DIAGNOSIS — Z23 NEED FOR IMMUNIZATION AGAINST YELLOW FEVER: ICD-10-CM

## 2023-07-10 DIAGNOSIS — Z23 NEED FOR HEPATITIS A AND B VACCINATION: ICD-10-CM

## 2023-07-10 PROCEDURE — 90717 YELLOW FEVER VACCINE SUBQ: CPT | Performed by: PHYSICIAN ASSISTANT

## 2023-07-10 PROCEDURE — 90472 IMMUNIZATION ADMIN EACH ADD: CPT | Performed by: PHYSICIAN ASSISTANT

## 2023-07-10 PROCEDURE — 90471 IMMUNIZATION ADMIN: CPT | Performed by: PHYSICIAN ASSISTANT

## 2023-07-10 PROCEDURE — 90636 HEP A/HEP B VACC ADULT IM: CPT | Performed by: PHYSICIAN ASSISTANT

## 2023-07-10 PROCEDURE — 99401 PREV MED CNSL INDIV APPRX 15: CPT | Mod: 25 | Performed by: PHYSICIAN ASSISTANT

## 2023-07-10 PROCEDURE — 90691 TYPHOID VACCINE IM: CPT | Performed by: PHYSICIAN ASSISTANT

## 2023-07-10 RX ORDER — AZITHROMYCIN 500 MG/1
TABLET, FILM COATED ORAL
Qty: 6 TABLET | Refills: 0 | Status: SHIPPED | OUTPATIENT
Start: 2023-07-10 | End: 2023-07-10

## 2023-07-10 RX ORDER — AZITHROMYCIN 500 MG/1
TABLET, FILM COATED ORAL
Qty: 6 TABLET | Refills: 0 | Status: SHIPPED | OUTPATIENT
Start: 2023-07-10 | End: 2024-03-06

## 2023-07-10 RX ORDER — MEFLOQUINE HYDROCHLORIDE 250 MG/1
TABLET ORAL
Qty: 12 TABLET | Refills: 0 | Status: SHIPPED | OUTPATIENT
Start: 2023-07-10 | End: 2024-03-06

## 2023-07-10 RX ORDER — MEFLOQUINE HYDROCHLORIDE 250 MG/1
TABLET ORAL
Qty: 12 TABLET | Refills: 0 | Status: SHIPPED | OUTPATIENT
Start: 2023-07-10 | End: 2023-07-10

## 2023-07-10 NOTE — PROGRESS NOTES
Prior to immunization administration, verified patients identity using patient s name and date of birth. Please see Immunization Activity for additional information.     Screening Questionnaire for Adult Immunization    Are you sick today?   No   Do you have allergies to medications, food, a vaccine component or latex?   No   Have you ever had a serious reaction after receiving a vaccination?   No   Do you have a long-term health problem with heart, lung, kidney, or metabolic disease (e.g., diabetes), asthma, a blood disorder, no spleen, complement component deficiency, a cochlear implant, or a spinal fluid leak?  Are you on long-term aspirin therapy?   No   Do you have cancer, leukemia, HIV/AIDS, or any other immune system problem?   No   Do you have a parent, brother, or sister with an immune system problem?   No   In the past 3 months, have you taken medications that affect  your immune system, such as prednisone, other steroids, or anticancer drugs; drugs for the treatment of rheumatoid arthritis, Crohn s disease, or psoriasis; or have you had radiation treatments?   No   Have you had a seizure, or a brain or other nervous system problem?   No   During the past year, have you received a transfusion of blood or blood    products, or been given immune (gamma) globulin or antiviral drug?   No   For women: Are you pregnant or is there a chance you could become       pregnant during the next month?   No   Have you received any vaccinations in the past 4 weeks?   No

## 2023-07-10 NOTE — PATIENT INSTRUCTIONS
"See travel packet provided  Recommend ultrathon (mosquito repellant), pepto bismol and imodium  The food and drink choices you make while traveling can impact your likelihood of getting sick.   If you aren't sure if a food or drink is safe, the saying \" BOIL IT, COOK IT, PEEL IT, OR FORGET IT\" can help you decide whether it's okay to consume.   Also bring hand  and sun screen with you.  Safe Travels       Today July 10, 2023 you received the    Twinrix (Hepatitis A & B combo) Vaccine - Please return on 8/10/23 for your 2nd dose and 1/10/24 or later for your 3rd and final dose.    Yellow Fever (YF)    Typhoid - injectable. This vaccine is valid for two years. .    These appointments can be made as a NURSE ONLY visit.    **It is very important for the vaccinations to be given on the scheduled day(s), this helps ensure you receive the full effectiveness of the vaccine.**    Please call [facility] with any questions [phone]    Thank you for visiting [facility]    "

## 2023-07-10 NOTE — PROGRESS NOTES
SUBJECTIVE: Hilaria Jansen , a 33 year old  female, presents for counseling and information regarding upcoming travel to [facility]. Special medical concerns include: None. She anticipates the following unusual exposures: None.    Itinerary:  [facility]    Departure Date: 07/21/2023 Return date: 09/01/2023    Reason for travel (i.e. Business, pleasure): Pleasure    Visiting an urban or rural area?: Urban    Accommodations (i.e. hotel, hostel, friends, family, etc): Family, Hotel, Friends    Women - First day of your last period: 07/08/2023    IMMUNIZATION HISTORY  Have you received any vaccinations in the past 4 weeks?  No  Have you ever fainted from having your blood drawn or from an injection?  No  Have you ever had a fever reaction to vaccination?  No  Have you ever had any bad reaction or side effect from any vaccination?  No  Have you ever had hepatitis A or B vaccine? YES  Do you live (or work closely) with anyone who has AIDS, an AIDS-like condition, any other immune disorder or who is on chemotherapy for cancer?  No  Have you received any injection of immune globulin or any blood products during the past 12 months?  No    GENERAL MEDICAL HISTORY  Do you have a medical condition that warrants maintenance medication or physician follow-up?  YES  Do you have a medical condition that is stable now, but that may recur while traveling?  No  Has your spleen been removed?  No  Have you had an acute illness or a fever in the past 48 hours?  No  Are you pregnant, or might you become pregnant on this trip?  Any chance of pregnancy?  No  Are you breastfeeding?  No  Do you have HIV, AIDS, an AIDS-like condition, any other immune disorder, leukemia or cancer?  No  Do you have a severe combined immunodeficiency disease?  No  Have you had your thymus gland removed or history of problems with your thymus, such as myasthenia gravis, DiGeorge syndrome, or thymoma?  No    Do you have severe thrombocytopenia (low platelet count) or a  coagulation disorder?  No  Have you ever had a convulsion, seizure, epilepsy, neurologic condition or brain infection?  No  Do you have any stomach conditions?  No  Do you have a G6PD deficiency?  No  Do you have severe renal or kidney impairment?  No  Do you have a history of psychiatric problems?  No  Do you have a problem with strange dreams and/or nightmares?  No  Do you have insomnia?  No  Do you have problems with vaginitis?  No  Do you have psoriasis?  No  Are you prone to motion sickness?  No  Have you ever had headaches, nausea, vomiting, or breathing problems from altitude exposure?  No      Past Medical History:   Diagnosis Date     Anemia      Hidradenitis suppurativa      Infertility, female      Neurological disorder      Sickle cell anemia (H)      Varicella       Immunization History   Administered Date(s) Administered     Flu, Unspecified 11/22/2014     Influenza (IIV3) PF 11/22/2014     Influenza Vaccine >6 months (Alfuria,Fluzone) 12/18/2018, 10/06/2021, 09/13/2022     TDAP (Adacel,Boostrix) 10/06/2021, 09/13/2022     TDAP Vaccine (Adacel) 04/09/2019       Current Outpatient Medications   Medication Sig Dispense Refill     acetaminophen (TYLENOL) 500 MG tablet Take 1-2 tablets (500-1,000 mg) by mouth every 6 hours as needed for mild pain 30 tablet 0     levonorgestrel (KYLEENA) 19.5 MG IUD 1 each by Intrauterine route once       omeprazole (PRILOSEC) 20 MG DR capsule Take 1 capsule (20 mg) by mouth daily for 60 days 30 capsule 1     No Known Allergies     EXAM: deferred    Immunizations discussed include: Hepatitis A, Hepatitis B, Typhoid and Yellow Fever  Malaria prophylaxis recommended: mefloquine  Symptomatic treatment for traveler's diarrhea: bismuth subsalicylate, loperamide/diphenoxylate and azithromycin    ASSESSMENT/PLAN:    (Z71.84) Encounter for counseling for travel  (primary encounter diagnosis)    Comment: Yellow fever, twinrix, and typhoid vaccines today. Patient will return or  follow-up with PCP as needed. Prophylaxis given for Traveler's diarrhea and Malaria. All questions were answered.     Plan: Asymptomatic COVID-19 Virus (Coronavirus) by         PCR, azithromycin (ZITHROMAX) 500 MG tablet,         mefloquine (LARIAM) 250 MG tablet,         DISCONTINUED: mefloquine (LARIAM) 250 MG         tablet, DISCONTINUED: azithromycin (ZITHROMAX)         500 MG tablet            (Z23) Need for immunization against yellow fever  Comment:   Plan: YELLOW FEVER, LIVE SQ            (Z23) Need for hepatitis A and B vaccination  Comment:   Plan: HEP A & B (TWINRIX)            (Z23) Need for immunization against typhoid  Comment:   Plan: TYPHOID VACCINE, IM              I have reviewed general recommendations for safe travel   including: food/water precautions, insect avoidance, safe sex   practices given high prevalence of HIV and other STDs,   roadway safety. Educational materials and links to the CDC   Traveler's health website have been provided.    Total time 15 minutes, greater than 50 percent in counseling   and coordination of care.

## 2024-02-14 ENCOUNTER — OFFICE VISIT (OUTPATIENT)
Dept: URGENT CARE | Facility: URGENT CARE | Age: 34
End: 2024-02-14
Payer: COMMERCIAL

## 2024-02-14 ENCOUNTER — ANCILLARY PROCEDURE (OUTPATIENT)
Dept: GENERAL RADIOLOGY | Facility: CLINIC | Age: 34
End: 2024-02-14
Attending: PHYSICIAN ASSISTANT
Payer: COMMERCIAL

## 2024-02-14 VITALS
HEART RATE: 78 BPM | BODY MASS INDEX: 31.41 KG/M2 | DIASTOLIC BLOOD PRESSURE: 92 MMHG | WEIGHT: 183 LBS | OXYGEN SATURATION: 100 % | SYSTOLIC BLOOD PRESSURE: 138 MMHG | RESPIRATION RATE: 16 BRPM | TEMPERATURE: 98.2 F

## 2024-02-14 DIAGNOSIS — N92.6 MISSED PERIOD: ICD-10-CM

## 2024-02-14 DIAGNOSIS — M54.42 ACUTE BILATERAL LOW BACK PAIN WITH LEFT-SIDED SCIATICA: Primary | ICD-10-CM

## 2024-02-14 DIAGNOSIS — M54.42 ACUTE BILATERAL LOW BACK PAIN WITH LEFT-SIDED SCIATICA: ICD-10-CM

## 2024-02-14 LAB
ALBUMIN UR-MCNC: NEGATIVE MG/DL
APPEARANCE UR: CLEAR
BILIRUB UR QL STRIP: NEGATIVE
CLUE CELLS: ABNORMAL
COLOR UR AUTO: YELLOW
GLUCOSE UR STRIP-MCNC: NEGATIVE MG/DL
HCG UR QL: NEGATIVE
HGB UR QL STRIP: NEGATIVE
KETONES UR STRIP-MCNC: NEGATIVE MG/DL
LEUKOCYTE ESTERASE UR QL STRIP: NEGATIVE
NITRATE UR QL: NEGATIVE
PH UR STRIP: 6.5 [PH] (ref 5–7)
SP GR UR STRIP: 1.01 (ref 1–1.03)
TRICHOMONAS, WET PREP: ABNORMAL
UROBILINOGEN UR STRIP-ACNC: 0.2 E.U./DL
WBC # BLD AUTO: 7.4 10E3/UL (ref 4–11)
WBC'S/HIGH POWER FIELD, WET PREP: ABNORMAL
YEAST, WET PREP: ABNORMAL

## 2024-02-14 PROCEDURE — 36415 COLL VENOUS BLD VENIPUNCTURE: CPT | Performed by: PHYSICIAN ASSISTANT

## 2024-02-14 PROCEDURE — 81003 URINALYSIS AUTO W/O SCOPE: CPT

## 2024-02-14 PROCEDURE — 85048 AUTOMATED LEUKOCYTE COUNT: CPT | Performed by: PHYSICIAN ASSISTANT

## 2024-02-14 PROCEDURE — 87210 SMEAR WET MOUNT SALINE/INK: CPT | Performed by: PHYSICIAN ASSISTANT

## 2024-02-14 PROCEDURE — 72100 X-RAY EXAM L-S SPINE 2/3 VWS: CPT | Mod: TC | Performed by: RADIOLOGY

## 2024-02-14 PROCEDURE — 99215 OFFICE O/P EST HI 40 MIN: CPT | Performed by: PHYSICIAN ASSISTANT

## 2024-02-14 PROCEDURE — 81025 URINE PREGNANCY TEST: CPT | Performed by: PHYSICIAN ASSISTANT

## 2024-02-14 RX ORDER — CYCLOBENZAPRINE HCL 10 MG
10 TABLET ORAL AT BEDTIME
Qty: 14 TABLET | Refills: 0 | Status: SHIPPED | OUTPATIENT
Start: 2024-02-14 | End: 2024-02-28

## 2024-02-14 RX ORDER — METHYLPREDNISOLONE 4 MG
TABLET, DOSE PACK ORAL
Qty: 21 TABLET | Refills: 0 | Status: SHIPPED | OUTPATIENT
Start: 2024-02-14 | End: 2024-02-20

## 2024-02-14 NOTE — PROGRESS NOTES
SUBJECTIVE  HPI: Hilaria Jansen is a 33 year old female who presents for evaluation of back pain  Symptoms began years ago, have been worsening.  Pain is located in the low back bilateral region, with radiation to radiates into the left  leg, and are at worst a 6 on a scale of 1-10.  Recent injury:none recalled by the patient  Personal hx of back pain is recurrent self limited episodes of low back pain in the past.  Pain is exacerbated by: holding positions, changing positions.  Pain is relieved by: none[unfilled] sx include: denies.  Red flag symptoms: negative.    Also started Kyleena 1 year ago, Lmp 12/29    Past Medical History:   Diagnosis Date    Anemia     Hidradenitis suppurativa     Infertility, female     Neurological disorder     Sickle cell anemia (H)     Varicella      Current Outpatient Medications   Medication Sig Dispense Refill    cyclobenzaprine (FLEXERIL) 10 MG tablet Take 1 tablet (10 mg) by mouth at bedtime for 14 days 14 tablet 0    levonorgestrel (KYLEENA) 19.5 MG IUD 1 each by Intrauterine route once      mefloquine (LARIAM) 250 MG tablet Take one tablet weekly - start 2 weeks prior to travel to malaria area, continue weekly through 4 weeks after leaving malaria area 12 tablet 0    methylPREDNISolone (MEDROL DOSEPAK) 4 MG tablet therapy pack Follow Package Directions 21 tablet 0    azithromycin (ZITHROMAX) 500 MG tablet Take one tablet daily for up to 3 days as needed for traveler's diarrhea. May repeat if needed. (Patient not taking: Reported on 2/14/2024) 6 tablet 0     Social History     Tobacco Use    Smoking status: Never    Smokeless tobacco: Never   Substance Use Topics    Alcohol use: No       ROS:  Review of systems negative except as stated above.    OBJECTIVE:  BP (!) 138/92   Pulse 78   Temp 98.2  F (36.8  C) (Tympanic)   Resp 16   Wt 83 kg (183 lb)   SpO2 100%   BMI 31.41 kg/m    Back examination: Back symmetric, no curvature. ROM normal. No CVA  tenderness.  [unfilled] leg raise test: negative  GENERAL APPEARANCE: healthy, alert and no distress  HENT: ear canals and TM's normal.  Nose and mouth without ulcers, erythema or lesions  NECK: supple, non-tender to palpation, no adenopathy noted  RESP: lungs clear to auscultation - no rales, rhonchi or wheezes  CV: regular rates and rhythm, normal S1 S2, no murmur noted  ABDOMEN:  soft, nontender, no HSM or masses and bowel sounds normal  NEURO: Normal strength and tone with no weakness or sensory deficit noted, reflexes normal   SKIN: no suspicious lesions or rashes      Results for orders placed or performed in visit on 02/14/24   UA Macroscopic with reflex to Microscopic and Culture - Clinic Collect     Status: Normal    Specimen: Urine, Midstream   Result Value Ref Range    Color Urine Yellow Colorless, Straw, Light Yellow, Yellow    Appearance Urine Clear Clear    Glucose Urine Negative Negative mg/dL    Bilirubin Urine Negative Negative    Ketones Urine Negative Negative mg/dL    Specific Gravity Urine 1.015 1.003 - 1.035    Blood Urine Negative Negative    pH Urine 6.5 5.0 - 7.0    Protein Albumin Urine Negative Negative mg/dL    Urobilinogen Urine 0.2 0.2, 1.0 E.U./dL    Nitrite Urine Negative Negative    Leukocyte Esterase Urine Negative Negative    Narrative    Microscopic not indicated   HCG qualitative urine     Status: Normal   Result Value Ref Range    hCG Urine Qualitative Negative Negative   WBC count     Status: Normal   Result Value Ref Range    WBC Count 7.4 4.0 - 11.0 10e3/uL   Wet prep - Clinic Collect     Status: Abnormal    Specimen: Vagina; Swab   Result Value Ref Range    Trichomonas Absent Absent    Yeast Absent Absent    Clue Cells Absent Absent    WBCs/high power field 2+ (A) None     X-ray image initially interpreted in clinic by me in order to rule out fracture/dislocation.  No evidence appreciated.      ASSESSMENT/IMPRESSION:  (M54.42) Acute bilateral low back pain with left-sided  sciatica  (primary encounter diagnosis)  Comment: no red flags  Plan: UA Macroscopic with reflex to Microscopic and         Culture - Clinic Collect, Wet prep - Clinic         Collect, HCG qualitative urine, XR Lumbar Spine        2/3 Views, cyclobenzaprine (FLEXERIL) 10 MG         tablet, methylPREDNISolone (MEDROL DOSEPAK) 4         MG tablet therapy pack, WBC count       (N92.6) Missed period  Comment: likely side effect of Kyleena  Plan: follow with OBGYN    Red flags and emergent follow up discussed, and understood by patient  Follow up with PCP if symptoms worsen or fail to improve      45 minute visit with Patient. Spent by me on the date of the encounter doing review of test results, interpretation of tests, patient visit, documentation, communication with other provider.  services utilized for all communication during visit.

## 2024-03-06 ENCOUNTER — OFFICE VISIT (OUTPATIENT)
Dept: INTERNAL MEDICINE | Facility: CLINIC | Age: 34
End: 2024-03-06
Payer: COMMERCIAL

## 2024-03-06 VITALS
HEIGHT: 61 IN | TEMPERATURE: 98.1 F | WEIGHT: 185.3 LBS | BODY MASS INDEX: 34.98 KG/M2 | SYSTOLIC BLOOD PRESSURE: 124 MMHG | HEART RATE: 84 BPM | RESPIRATION RATE: 16 BRPM | OXYGEN SATURATION: 98 % | DIASTOLIC BLOOD PRESSURE: 64 MMHG

## 2024-03-06 DIAGNOSIS — K21.9 GASTROESOPHAGEAL REFLUX DISEASE, UNSPECIFIED WHETHER ESOPHAGITIS PRESENT: ICD-10-CM

## 2024-03-06 DIAGNOSIS — M54.50 CHRONIC BILATERAL LOW BACK PAIN WITHOUT SCIATICA: Primary | ICD-10-CM

## 2024-03-06 DIAGNOSIS — R10.9 FLANK PAIN: ICD-10-CM

## 2024-03-06 DIAGNOSIS — R30.0 DYSURIA: ICD-10-CM

## 2024-03-06 DIAGNOSIS — G89.29 CHRONIC BILATERAL LOW BACK PAIN WITHOUT SCIATICA: Primary | ICD-10-CM

## 2024-03-06 LAB
ALBUMIN UR-MCNC: NEGATIVE MG/DL
APPEARANCE UR: CLEAR
BILIRUB UR QL STRIP: NEGATIVE
COLOR UR AUTO: YELLOW
GLUCOSE UR STRIP-MCNC: NEGATIVE MG/DL
HGB UR QL STRIP: ABNORMAL
KETONES UR STRIP-MCNC: NEGATIVE MG/DL
LEUKOCYTE ESTERASE UR QL STRIP: NEGATIVE
NITRATE UR QL: NEGATIVE
PH UR STRIP: 6 [PH] (ref 5–7)
RBC #/AREA URNS AUTO: ABNORMAL /HPF
SP GR UR STRIP: 1.02 (ref 1–1.03)
SQUAMOUS #/AREA URNS AUTO: ABNORMAL /LPF
UROBILINOGEN UR STRIP-ACNC: 0.2 E.U./DL
WBC #/AREA URNS AUTO: ABNORMAL /HPF

## 2024-03-06 PROCEDURE — 81001 URINALYSIS AUTO W/SCOPE: CPT | Performed by: INTERNAL MEDICINE

## 2024-03-06 PROCEDURE — 99213 OFFICE O/P EST LOW 20 MIN: CPT | Performed by: INTERNAL MEDICINE

## 2024-03-06 RX ORDER — ACETAMINOPHEN 500 MG
500-1000 TABLET ORAL EVERY 6 HOURS PRN
Qty: 100 TABLET | Refills: 5 | Status: SHIPPED | OUTPATIENT
Start: 2024-03-06

## 2024-03-06 RX ORDER — ACETAMINOPHEN 500 MG
500-1000 TABLET ORAL EVERY 6 HOURS PRN
Qty: 100 TABLET | Refills: 5 | Status: SHIPPED | OUTPATIENT
Start: 2024-03-06 | End: 2024-03-06

## 2024-03-06 ASSESSMENT — PAIN SCALES - GENERAL: PAINLEVEL: SEVERE PAIN (7)

## 2024-03-06 NOTE — PATIENT INSTRUCTIONS
Resume the omeprazole 20 mg once per day to treat acid reflux.      Avoid foods that you know cause your stomach problems.    Most common examples are dairy and foods made from wheat (gluten)          CONSTIPATION:       The normal amount of bowel movements is 2 BMs per day up to every 2 days.  We only get concerned if BMs occur more than 3-4 times per day or more than  every 3 days.    To maintain regular BMs: be sure to drink at least 6-8 glasses of water/liquids per day (stay well hydrated) and take fiber products of some kind.  Either fiber powder (Fibercon, Metamucil, Citracel, etc.), fiber capsules or biscuits, or bran/prunes/vegetables.    If still having constipation and having small hard stools, can try OTC stool softners (Correctol) or prescription stool softener (Colace) or Senekot.    If you go less than every 3-4 days, then call me to get something stronger to stimulate bowel movements.                     LUMBAR STRAIN/SPASM:     *  based on your history, No evidence for herniated disc, spinal stenosis, or vertebral fracture.    *  If your back discomfort flares up, consider take over the counter Motrin/Ibuprofen/Advil or Aleve to help relieve pain and inflammation.  follow the directions on the bottle.  Be sure to take with a small meal to prevent stomach upset.      --Motrin 600 mg ( 3 x 200 mg tablets) three times per day every day for 5-7 days, then 2-3 timers per day as needed (take with food to avoid stomach side effects)     OR     --Aleve 2 tablets twice per day for 5-7 days every day, then twice per day as needed     *  do not go out of your way for activity, however, do not avoid basic activates and gentle activity.  Do not lay on the sofa, do not go on bed rest.        *  moist heat as needed like a micorwavable bean bag.  Apply the heat for 10-15 minutes a few times per day as needed.  I would avoid any sort of electrical heating pad out of fear of causing skin burns.  If you do use an  "electric heating pad, do not use a heating pad for longer than 15 minutes to lower the risk of burns.    *  Remember to always use proper lifting technique, always bending at the knees rather than the waist.  Your thigh muscles are MUCH stronger than the smaller muscle of your lower back.    *  Physical therapy through [facility] (third floor of the [facility] in [place] and many other locations throughout the [facility]) if you do not get better.    *  expect your back to be more easily re-aggravated over the next few months.  the soft tissue and muscles are going to be more easily re-irritated over the next several weeks so be careful to return to physical action slowly.      Look for back execises on [employer].  (search \"low back pain rehabilitation exercises\"):        "

## 2024-03-06 NOTE — PROGRESS NOTES
"  Assessment & Plan     (M54.50,  G89.29) Chronic bilateral low back pain without sciatica  (primary encounter diagnosis)  Comment: Low back pain with no red flag symptoms.   Discussed typical mechanical back pain, typical causes, and atypical back pain, including red flag symptoms.    Discussed conservative tratments inclduing physical therpy, stretching and strengthening, use of heat and/or ice, NSAIDs with food, antispasmodics where indicated.     Plan: acetaminophen (TYLENOL) 500 MG tablet,         DISCONTINUED: acetaminophen (TYLENOL) 500 MG         tablet            (R30.0) Dysuria  Comment: No evidence for infection today.  Plan: UA Macroscopic with reflex to Microscopic and         Culture - Clinic Collect, UA Microscopic with         Reflex to Culture            (R10.9) Flank pain  Comment: No evidence for urinary tract infection today.  Plan: UA Macroscopic with reflex to Microscopic and         Culture - Clinic Collect, UA Microscopic with         Reflex to Culture            (K21.9) Gastroesophageal reflux disease, unspecified whether esophagitis present  Comment:'s ongoing reflux/gastritis.  Resume omeprazole once daily.  Avoiding causative foods such as possibly dairy or gluten.  Plan: omeprazole (PRILOSEC) 20 MG DR capsule,         DISCONTINUED: omeprazole (PRILOSEC) 20 MG DR         capsule                       BMI  Estimated body mass index is 35.01 kg/m  as calculated from the following:    Height as of this encounter: 1.549 m (5' 1\").    Weight as of this encounter: 84.1 kg (185 lb 4.8 oz).             Subjective   Hilaria is a 33 year old, presenting for the following health issues:  RECHECK (Follow up urgent care), Urinary Problem (Painful urination, abdominal and back/flank pain,), and Gastrointestinal Problem (Bloating and constipation)        3/6/2024     9:27 AM   Additional Questions   Roomed by Francy RICHARDSON CMA     [facility]       ED/UC Followup:    Facility:  [facility] urgent care  Date of visit: " "2-14-24  Reason for visit: back pain  Current Status:   Chief Complaint   Patient presents with    RECHECK     Follow up urgent care    Urinary Problem     Painful urination, abdominal and back/flank pain,    Gastrointestinal Problem     Bloating and constipation       Ongoing bilateral mid to lower back pain acutely for the last few weeks, intermittent chronic episodes in the past including last year.  No specific trauma or injuries.  No bowel or bladder incontinence.  No weakness or radicular symptoms in the lower extremities.  Has gone through physical therapy in the past with exercises and stretches which she is not doing currently.    She is worried that the discomfort in back may be due to or caused by urinary tract infection.      2.)  Reports intermittent episodes of ongoing abdominal bloating and cramping.  Has periods of constipation.  Has a history of reflux, was taking omeprazole on a regular basis with resolution of her symptoms but stopped taking this several months ago.  No dysphagia or difficulty swallowing.    **I reviewed the information recorded in the patient's EPIC chart (including but not limited to medical history, surgical history, family history, problem list, medication list, and allergy list) and updated the information as indicated based on the patients reported information.         Review of Systems  Constitutional, HEENT, cardiovascular, pulmonary, gi and gu systems are negative, except as otherwise noted.      Objective    /64   Pulse 84   Temp 98.1  F (36.7  C) (Tympanic)   Resp 16   Ht 1.549 m (5' 1\")   Wt 84.1 kg (185 lb 4.8 oz)   LMP  (LMP Unknown)   SpO2 98%   Breastfeeding No   BMI 35.01 kg/m    Body mass index is 35.01 kg/m .  Physical Exam   GENERAL alert and no distress  EYES:  Normal sclera,conjunctiva, EOMI  HENT: oral and posterior pharynx without lesions or erythema, facies symmetric  NECK: Neck supple. No LAD, without thyroidmegaly.  RESP: Clear to " ausculation bilaterally without wheezes or crackles. Normal BS in all fields.  CV: RRR normal S1S2 without murmurs, rubs or gallops.  LYMPH: no cervical lymph adenopathy appreciated  MS: extremities- no gross deformities of the visible extremities noted,   EXT:  no lower extremity edema  PSYCH: Alert and oriented times 3; speech- coherent  SKIN:  No obvious significant skin lesions on visible portions of face   ABD: Obese, nontender, normal bowel sounds nontender.  BACK:  Pt appears uncomfortable, but not in severe distress.  Pain to palpation of paraspinal lumbar muscles, muscles in spasm, palpation reproduces pain exactly. straight leg-raises negative for radicular symptoms bilaterally, but hamstrings are tight on both sides.  Able to move on and off the exam table, no obsevred weakness in the feet or legs with walking, standing, or ambulation. Normal reflexes in the achilles and patellar tendons.             Results for orders placed or performed in visit on 03/06/24   UA Macroscopic with reflex to Microscopic and Culture - Clinic Collect     Status: Abnormal    Specimen: Urine, Clean Catch   Result Value Ref Range    Color Urine Yellow Colorless, Straw, Light Yellow, Yellow    Appearance Urine Clear Clear    Glucose Urine Negative Negative mg/dL    Bilirubin Urine Negative Negative    Ketones Urine Negative Negative mg/dL    Specific Gravity Urine 1.020 1.003 - 1.035    Blood Urine Trace (A) Negative    pH Urine 6.0 5.0 - 7.0    Protein Albumin Urine Negative Negative mg/dL    Urobilinogen Urine 0.2 0.2, 1.0 E.U./dL    Nitrite Urine Negative Negative    Leukocyte Esterase Urine Negative Negative   UA Microscopic with Reflex to Culture     Status: Abnormal   Result Value Ref Range    RBC Urine 0-2 0-2 /HPF /HPF    WBC Urine 0-5 0-5 /HPF /HPF    Squamous Epithelials Urine Few (A) None Seen /LPF    Narrative    Urine Culture not indicated          Signed Electronically by: Galileo Yap MD

## 2024-05-21 ENCOUNTER — OFFICE VISIT (OUTPATIENT)
Dept: URGENT CARE | Facility: URGENT CARE | Age: 34
End: 2024-05-21
Payer: COMMERCIAL

## 2024-05-21 VITALS
WEIGHT: 185.8 LBS | TEMPERATURE: 97.9 F | OXYGEN SATURATION: 98 % | SYSTOLIC BLOOD PRESSURE: 130 MMHG | BODY MASS INDEX: 35.11 KG/M2 | HEART RATE: 75 BPM | DIASTOLIC BLOOD PRESSURE: 78 MMHG

## 2024-05-21 DIAGNOSIS — M79.672 LEFT FOOT PAIN: Primary | ICD-10-CM

## 2024-05-21 DIAGNOSIS — G44.209 TENSION HEADACHE: ICD-10-CM

## 2024-05-21 PROCEDURE — 99214 OFFICE O/P EST MOD 30 MIN: CPT | Performed by: FAMILY MEDICINE

## 2024-05-21 RX ORDER — NAPROXEN 500 MG/1
500 TABLET ORAL 2 TIMES DAILY WITH MEALS
Qty: 14 TABLET | Refills: 0 | Status: SHIPPED | OUTPATIENT
Start: 2024-05-21 | End: 2024-05-28

## 2024-05-21 NOTE — PROGRESS NOTES
SUBJECTIVE: Hilaria Jansen is a 34 year old female presenting with a chief complaint of left foot pain and swelling, also headache.  Onset of symptoms was 3 day(s) ago.  No trauma    Past Medical History:   Diagnosis Date    Anemia     Hidradenitis suppurativa     Infertility, female     Neurological disorder     Sickle cell anemia (H)     Varicella      No Known Allergies  Social History     Tobacco Use    Smoking status: Never    Smokeless tobacco: Never   Substance Use Topics    Alcohol use: No       ROS:  SKIN: no rash  GI: no vomiting    OBJECTIVE:  /78   Pulse 75   Temp 97.9  F (36.6  C) (Tympanic)   Wt 84.3 kg (185 lb 12.8 oz)   SpO2 98%   BMI 35.11 kg/m  GENERAL APPEARANCE: healthy, alert and no distress  EYES: EOMI,  PERRL, conjunctiva clear  HENT: ear canals and TM's normal.  Nose and mouth without ulcers, erythema or lesions  NEURO: Normal strength and tone, sensory exam grossly normal,  normal speech and mentation  SKIN: no suspicious lesions or rashes  Left dorsum medial foot pain, slight swelling      ICD-10-CM    1. Left foot pain  M79.672 naproxen (NAPROSYN) 500 MG tablet     Orthopedic  Referral      2. Tension headache  G44.209 naproxen (NAPROSYN) 500 MG tablet        Start icing  Fluids/Rest, f/u if worse/not any better

## 2024-07-07 ENCOUNTER — HEALTH MAINTENANCE LETTER (OUTPATIENT)
Age: 34
End: 2024-07-07

## 2024-08-01 NOTE — PROGRESS NOTES
[facility] PROGRESS NOTE:   2019 7:56 AM          SUBJECTIVE:   Sleeping comfortably with epidural in place.  Did not stir when I loudly knocked on the door, so did not wake her           OBJECTIVE:     Vitals:    19 0500 19 0515 19 0545 19 0615   BP: 111/55 108/67 102/52 100/54   Resp:   20 20   Temp:       TempSrc:       SpO2: 98% 98%           NST:  FHT:  140/min-mod variability, no accels or decels in last 30min  Cat:   1-2  [place]:  q 2-5  SVE:  Deferred, but was 1.5/40/-3 at time of kaur placement         LABS:     No results found for this or any previous visit (from the past 12 hour(s)).           ASSESSMENT / PLAN:   29 year old  at 37w5d admitted for IOL for non-reassuring fetal status.     Labor: s/p vaginal miso x 2.  Kaur placed at 0245  Fetal well being: Category 1-2 tracing.  GBS: neg  Pain: epidural in place     Lucia Rascon MD             Patient's (Body mass index is 34.51 kg/m².) indicates that they are obese (BMI >30) with health conditions that include hypertension and dyslipidemias . Weight is improving with lifestyle modifications. BMI  is above average; BMI management plan is completed. We discussed low calorie, low carb based diet program, portion control, increasing exercise, and an abimael-based approach such as [employer] or Lose It.

## 2025-01-01 ENCOUNTER — OFFICE VISIT (OUTPATIENT)
Dept: URGENT CARE | Facility: URGENT CARE | Age: 35
End: 2025-01-01
Payer: COMMERCIAL

## 2025-01-01 ENCOUNTER — ANCILLARY PROCEDURE (OUTPATIENT)
Dept: GENERAL RADIOLOGY | Facility: CLINIC | Age: 35
End: 2025-01-01
Attending: FAMILY MEDICINE
Payer: COMMERCIAL

## 2025-01-01 VITALS
DIASTOLIC BLOOD PRESSURE: 100 MMHG | OXYGEN SATURATION: 97 % | BODY MASS INDEX: 34.96 KG/M2 | WEIGHT: 185 LBS | TEMPERATURE: 98.5 F | HEART RATE: 83 BPM | SYSTOLIC BLOOD PRESSURE: 155 MMHG | RESPIRATION RATE: 16 BRPM

## 2025-01-01 DIAGNOSIS — S69.91XA INJURY OF FINGER OF RIGHT HAND, INITIAL ENCOUNTER: ICD-10-CM

## 2025-01-01 DIAGNOSIS — S62.620A CLOSED DISPLACED FRACTURE OF MIDDLE PHALANX OF RIGHT INDEX FINGER, INITIAL ENCOUNTER: Primary | ICD-10-CM

## 2025-01-01 PROCEDURE — 99214 OFFICE O/P EST MOD 30 MIN: CPT | Performed by: FAMILY MEDICINE

## 2025-01-01 PROCEDURE — 73140 X-RAY EXAM OF FINGER(S): CPT | Mod: TC | Performed by: RADIOLOGY

## 2025-01-01 NOTE — PROGRESS NOTES
SUBJECTIVE:  Chief Complaint   Patient presents with    Urgent Care     Pt present with swollen and painful R ring finger, pt fell 2 weeks ago.    .ident presents with a chief complaint of right finger  fourth..  The injury occurred 2 weeks ago.   The injury happened while at home.   How: fall  immediate pain    Past Medical History:   Diagnosis Date    Anemia     Hidradenitis suppurativa     Infertility, female     Neurological disorder     Sickle cell anemia (H)     Varicella      No Known Allergies  Social History     Tobacco Use    Smoking status: Never    Smokeless tobacco: Never   Substance Use Topics    Alcohol use: No       ROSINTEGUMENTARY/SKIN: NEGATIVE for open wound/bleeding and NEGATIVE for bruising    EXAM: BP (!) 155/100   Pulse 83   Temp 98.5  F (36.9  C) (Tympanic)   Resp 16   Wt 83.9 kg (185 lb)   SpO2 97%   BMI 34.96 kg/m  Gen: healthy,alert,no distress  Extremity: finger has pain with palpation and rom.   There is not compromise to the distal circulation.  Pulses are +2 and CRT is brisk.  EXTREMITIES: peripheral pulses normal  SKIN: no suspicious lesions or rashes  NEURO: Normal strength and tone, sensory exam grossly normal, mentation intact and speech normal    X-RAY with finger fx, read by Dr. Alexandre Yeh      ICD-10-CM    1. Closed displaced fracture of middle phalanx of right index finger, initial encounter  S62.620A Orthopedic  Referral      2. Injury of finger of right hand, initial encounter  S69.91XA XR Finger Right G/E 2 Views     Orthopedic  Referral        splint  RICE     Detail Level: Detailed Quality 226: Preventive Care And Screening: Tobacco Use: Screening And Cessation Intervention: Patient screened for tobacco use and is an ex/non-smoker Quality 431: Preventive Care And Screening: Unhealthy Alcohol Use - Screening: Patient not identified as an unhealthy alcohol user when screened for unhealthy alcohol use using a systematic screening method Quality 130: Documentation Of Current Medications In The Medical Record: Current Medications Documented

## 2025-01-02 ENCOUNTER — TELEPHONE (OUTPATIENT)
Dept: ORTHOPEDICS | Facility: CLINIC | Age: 35
End: 2025-01-02
Payer: COMMERCIAL

## 2025-01-02 NOTE — TELEPHONE ENCOUNTER
Upon chart review, patient has a displaced phalanx fracture, and is inappropriate for Dr. Mckeon to see, and is better suited for Dr. Bernal. Outbound call made to patient with a qualified . No answer, no consent to communicate on file. A short message was left providing scheduling number and Dr. Bernal's name for call back to schedule.    Monique Vásquez, NADEEM, LAT, ATC

## 2025-01-07 NOTE — PROGRESS NOTES
Orthopaedic Surgery Hand and Upper Extremity New Clinic Note:  Date: 2025     Visit Provider: Antoni Bernal MD  Patient ID:[id number]    Chief Complaint: right ring finger fracture, middle phalanx        Dominant Hand: right    Occupation: post     This visit was conducted with the assistance of a Latvian  by iPad    HPI:    Hilaria Jansen is a 34 year old female who presents today complaining of right hand injury. The patient states 2.5 weeks prior they sustained a fall on the stairs with complaints of immediate pain and deformity in the right ring finger.  She states that as she was coming on the stairs she missed the last step, and fell on the hand.  After 2 weeks went by without significant improvement in her pain or swelling she presented to an urgent care where they obtained x-rays that demonstrated a fracture of the middle phalanx of the ring finger.  There has never been prior injury or pain of the elbow or arm. There is no numbness or tingling in the extremity.    Symptom Onset: 12/15/2024  Trauma/Inciting Event: fall on stairs  Location of pain/symptoms: right ring finger  Duration (constant/Intermittent, etc): constant  Characteristics of pain (sharp, dull, etc): sharp  Aggravating factors: moving the finger  Prior Treatment: icing  Injections (date): n/a  EMG (for carpal tunnel, etc): n/a    PMH  Diabetes: no  Thyroid Problems: no  Smoking Y/N: N    Histories:  Past Medical History:   Diagnosis Date    Anemia     Hidradenitis suppurativa     Infertility, female     Neurological disorder     Sickle cell anemia (H)     Varicella      Past Surgical History:   Procedure Laterality Date     SECTION N/A 2019    Procedure:  SECTION;  Surgeon: Wendi Coppola MD;  [facility]: [facility]     SECTION N/A 12/15/2021    Procedure:  SECTION;  Surgeon: Brooke Capps MD;  Location: [facility]     SECTION N/A 10/14/2022     "Procedure:  SECTION;  Surgeon: Lucia Gannon MD;  Location: [facility]    HEAD & NECK SURGERY      IR LYMPH NODE BIOPSY  2015    SURGICAL PATHOLOGY EXAM Left 09/10/2015    left paratracheal neck mass removed, benign     Family History   Problem Relation Age of Onset    Breast Cancer Paternal Aunt      Social History     Tobacco Use    Smoking status: Never    Smokeless tobacco: Never   Vaping Use    Vaping status: Never Used   Substance Use Topics    Alcohol use: No    Drug use: No         Allergies:  No Known Allergies    Review of Systems:  Patient denies fever, chills, sweats, anorexia, fatigue, blurred vision, nasal congestion, sore throat, chest pain, weight gain, cough, shortness of breath, nausea, vomiting, change in bowel or bladder habits, or muscle cramps.    Vital Signs:    /86   Ht 1.549 m (5' 1\")   Wt 83.9 kg (185 lb)   BMI 34.96 kg/m      General Physical Exam:    General appearance: No apparent distress, conversant, alert and oriented   Cardiovascular: Brisk capillary refill distally  Lungs: Normal respiratory effort; no cough or wheezing; no cyanosis  Lymphatic: No peripheral edema or extremity lymphadenopathy  Musculoskeletal: No gross deformity of lower extremities  Skin: Normal temperature, turgor and texture; no rash, ulcers or excoriations  Neurologic: No tremor; no rigidity or flaccidity  Psych: Appropriate affect, alert and oriented to person, place and time    Orthopedic examination:    Right ring finger  Significant swelling and ecchymosis about the palm, proximal phalanx, middle phalanx of the right ring finger  Skin otherwise clean dry and intact, no erythema, no lacerations  Range of motion of the MP joint is 0 to 70 degrees, range of motion of the PIP joint is 0 to 20 degrees, range of motion the DIP joint is 0 to 20 degrees  She is diffusely tender to palpation about the middle phalanx  When she attempts composite flexion she is several centimeter short of " reaching the palm  She does appear to have normal digital cascade  She is able to achieve full extension of the ring finger  Her sensation is intact and normal to the radial and ulnar aspect of each digit  Her capillary beds are warm and well-perfused    Imaging and Diagnostics:    3 view x-ray of the right hand obtained on January 1 were reviewed by me today.  They demonstrate a three-part fracture of the middle phalanx with significant shortening and a large butterfly fragment radially.          Assessment and Plan:    Fracture of the right ring finger middle phalanx    Hilaria Jansen is a 34-year-old female who presents today for evaluation of a right ring finger injury.  I reviewed my clinical and radiographic findings today.  I reviewed the natural history and course of this condition with the patient.  They were offered the opportunity to ask questions which were answered to their satisfaction.  We discussed the risks, benefits, alternatives to both nonoperative and operative management.  I discussed with her that given her young age and high functional demands she would likely do better with an open reduction internal fixation of the fracture given the displacement of the fracture and her inability to achieve composite flexion at this time.  She would likely have union of this fracture if treated nonoperatively and her pain would improve but I do believe that she would have a large degree of stiffness in the digit as well as a lag that may be functionally limiting.  I am particularly concerned about the degree of shortening as well as the possibility of fracture fragments within the collateral recess limiting flexion.  I discussed with her that this would need to be performed on a urgent basis given the duration of time that has passed since the injury already.  I discussed with her that postoperatively we would pursue hand surgery to help her improve the stiffness in her adjacent joints.  She was  amenable to the above plan.    Problem List Items Addressed This Visit    None  Visit Diagnoses       Closed displaced fracture of middle phalanx of right index finger, initial encounter    -  Primary    Relevant Orders    Case Request: OPEN REDUCTION INTERNAL FIXATION, FRACTURE,RIGHT RING FINGER (Completed)    Injury of finger of right hand, initial encounter                   Antoni Bernal MD    Hand, Upper Extremity & Microvascular Surgery  Department of Orthopaedic Surgery  [facility]       Orders Placed During This Visit:    No orders of the defined types were placed in this encounter.

## 2025-01-09 ENCOUNTER — OFFICE VISIT (OUTPATIENT)
Dept: ORTHOPEDICS | Facility: CLINIC | Age: 35
End: 2025-01-09
Attending: FAMILY MEDICINE
Payer: COMMERCIAL

## 2025-01-09 ENCOUNTER — TELEPHONE (OUTPATIENT)
Dept: ORTHOPEDICS | Facility: CLINIC | Age: 35
End: 2025-01-09

## 2025-01-09 VITALS
HEIGHT: 61 IN | SYSTOLIC BLOOD PRESSURE: 129 MMHG | WEIGHT: 185 LBS | BODY MASS INDEX: 34.93 KG/M2 | DIASTOLIC BLOOD PRESSURE: 86 MMHG

## 2025-01-09 DIAGNOSIS — S69.91XA INJURY OF FINGER OF RIGHT HAND, INITIAL ENCOUNTER: ICD-10-CM

## 2025-01-09 DIAGNOSIS — S62.624A CLOSED DISPLACED FRACTURE OF MIDDLE PHALANX OF RIGHT RING FINGER, INITIAL ENCOUNTER: Primary | ICD-10-CM

## 2025-01-09 NOTE — LETTER
January 9, 2025      Hilaria Jansen  [facility] [address] [address]        To Whom It May Concern:    Hilaria Jansen  was seen on 1/9/2024.  Patient will need surgery to repair a fracture in the right hand.  Please excuse her from work until her next office visit, which will be post-operatively, date to be determined.        Sincerely,            Antoni Bernal MD    Electronically signed

## 2025-01-09 NOTE — TELEPHONE ENCOUNTER
Patient has been scheduled for surgery. Details are below.    Date of Surgery: 01/13/25    Approximate Arrival Time: SURGERY CENTER WILL CALL 3/4 DAYS PRIOR TO CONFIRM A TIME   Surgeon:MAIRA BARRETT PROVIDERS: Dr. Antoni Bernal    Procedure: OPEN REDUCTION INTERNAL FIXATION, FRACTURE,RIGHT RING FINGER Right    Location: [facility] surgery locations: [facility] [place], [address] [address] [address]  Surgery Consult: NA  PreOp Physical: 01/10/25  PostOp: 01/22/25  Packet Mailed/MyChart Sent: YES  Added to Success: YES    Spoke to: KAROLINA, WITH INTERP

## 2025-01-09 NOTE — LETTER
2025      Hilaria Jansen  [address] [address]      Dear Colleague,    Thank you for referring your patient, Hilaria Jansen, to the [facility] [place]. Please see a copy of my visit note below.    Orthopaedic Surgery Hand and Upper Extremity New Clinic Note:  Date: 2025     Visit Provider: Antoni Bernal MD  Patient ID:[id number]    Chief Complaint: right ring finger fracture, middle phalanx        Dominant Hand: right    Occupation: post     This visit was conducted with the assistance of a Kinyarwanda  by iPad    HPI:    Hilaria Jansen is a 34 year old female who presents today complaining of right hand injury. The patient states 2.5 weeks prior they sustained a fall on the stairs with complaints of immediate pain and deformity in the right ring finger.  She states that as she was coming on the stairs she missed the last step, and fell on the hand.  After 2 weeks went by without significant improvement in her pain or swelling she presented to an urgent care where they obtained x-rays that demonstrated a fracture of the middle phalanx of the ring finger.  There has never been prior injury or pain of the elbow or arm. There is no numbness or tingling in the extremity.    Symptom Onset: 12/15/2024  Trauma/Inciting Event: fall on stairs  Location of pain/symptoms: right ring finger  Duration (constant/Intermittent, etc): constant  Characteristics of pain (sharp, dull, etc): sharp  Aggravating factors: moving the finger  Prior Treatment: icing  Injections (date): n/a  EMG (for carpal tunnel, etc): n/a    PMH  Diabetes: no  Thyroid Problems: no  Smoking Y/N: N    Histories:  Past Medical History:   Diagnosis Date     Anemia      Hidradenitis suppurativa      Infertility, female      Neurological disorder      Sickle cell anemia (H)      Varicella      Past Surgical History:   Procedure Laterality Date      SECTION N/A  "2019    Procedure:  SECTION;  Surgeon: Wendi Coppola MD;  [facility]      SECTION N/A 12/15/2021    Procedure:  SECTION;  Surgeon: Brooke Capps MD;  [facility]      SECTION N/A 10/14/2022    Procedure:  SECTION;  Surgeon: Lucia Gannon MD;  [facility]     HEAD & NECK SURGERY       IR LYMPH NODE BIOPSY  2015     SURGICAL PATHOLOGY EXAM Left 09/10/2015    left paratracheal neck mass removed, benign     Family History   Problem Relation Age of Onset     Breast Cancer Paternal Aunt      Social History     Tobacco Use     Smoking status: Never     Smokeless tobacco: Never   Vaping Use     Vaping status: Never Used   Substance Use Topics     Alcohol use: No     Drug use: No         Allergies:  No Known Allergies    Review of Systems:  Patient denies fever, chills, sweats, anorexia, fatigue, blurred vision, nasal congestion, sore throat, chest pain, weight gain, cough, shortness of breath, nausea, vomiting, change in bowel or bladder habits, or muscle cramps.    Vital Signs:    /86   Ht 1.549 m (5' 1\")   Wt 83.9 kg (185 lb)   BMI 34.96 kg/m      General Physical Exam:    General appearance: No apparent distress, conversant, alert and oriented   Cardiovascular: Brisk capillary refill distally  Lungs: Normal respiratory effort; no cough or wheezing; no cyanosis  Lymphatic: No peripheral edema or extremity lymphadenopathy  Musculoskeletal: No gross deformity of lower extremities  Skin: Normal temperature, turgor and texture; no rash, ulcers or excoriations  Neurologic: No tremor; no rigidity or flaccidity  Psych: Appropriate affect, alert and oriented to person, place and time    Orthopedic examination:    Right ring finger  Significant swelling and ecchymosis about the palm, proximal phalanx, middle phalanx of the right ring finger  Skin otherwise clean dry and intact, no erythema, no lacerations  Range of motion of " the MP joint is 0 to 70 degrees, range of motion of the PIP joint is 0 to 20 degrees, range of motion the DIP joint is 0 to 20 degrees  She is diffusely tender to palpation about the middle phalanx  When she attempts composite flexion she is several centimeter short of reaching the palm  She does appear to have normal digital cascade  She is able to achieve full extension of the ring finger  Her sensation is intact and normal to the radial and ulnar aspect of each digit  Her capillary beds are warm and well-perfused    Imaging and Diagnostics:    3 view x-ray of the right hand obtained on January 1 were reviewed by me today.  They demonstrate a three-part fracture of the middle phalanx with significant shortening and a large butterfly fragment radially.          Assessment and Plan:    Fracture of the right ring finger middle phalanx    Hilaria Jansen is a 34-year-old female who presents today for evaluation of a right ring finger injury.  I reviewed my clinical and radiographic findings today.  I reviewed the natural history and course of this condition with the patient.  They were offered the opportunity to ask questions which were answered to their satisfaction.  We discussed the risks, benefits, alternatives to both nonoperative and operative management.  I discussed with her that given her young age and high functional demands she would likely do better with an open reduction internal fixation of the fracture given the displacement of the fracture and her inability to achieve composite flexion at this time.  She would likely have union of this fracture if treated nonoperatively and her pain would improve but I do believe that she would have a large degree of stiffness in the digit as well as a lag that may be functionally limiting.  I am particularly concerned about the degree of shortening as well as the possibility of fracture fragments within the collateral recess limiting flexion.  I discussed with her  that this would need to be performed on a urgent basis given the duration of time that has passed since the injury already.  I discussed with her that postoperatively we would pursue hand surgery to help her improve the stiffness in her adjacent joints.  She was amenable to the above plan.    Problem List Items Addressed This Visit    None  Visit Diagnoses       Closed displaced fracture of middle phalanx of right index finger, initial encounter    -  Primary    Relevant Orders    Case Request: OPEN REDUCTION INTERNAL FIXATION, FRACTURE,RIGHT RING FINGER (Completed)    Injury of finger of right hand, initial encounter                   Antoni Bernal MD    Hand, Upper Extremity & Microvascular Surgery  Department of Orthopaedic Surgery  [facility]       Orders Placed During This Visit:    No orders of the defined types were placed in this encounter.          Again, thank you for allowing me to participate in the care of your patient.        Sincerely,        Antoni Bernal MD    Electronically signed

## 2025-01-12 ENCOUNTER — DOCUMENTATION ONLY (OUTPATIENT)
Dept: OTHER | Facility: CLINIC | Age: 35
End: 2025-01-12
Payer: COMMERCIAL

## 2025-01-12 DIAGNOSIS — S62.624A CLOSED DISPLACED FRACTURE OF MIDDLE PHALANX OF RIGHT RING FINGER, INITIAL ENCOUNTER: ICD-10-CM

## 2025-01-12 DIAGNOSIS — S62.614A CLOSED DISPLACED FRACTURE OF PROXIMAL PHALANX OF RIGHT RING FINGER, INITIAL ENCOUNTER: Primary | ICD-10-CM

## 2025-01-12 NOTE — PROGRESS NOTES
Operative Report     Date of Procedure: 2025     Location: [facility]     Name:Hilaria Jansen  : 1990  MRN: [id number]       Preoperative Diagnosis: Right ring finger proximal phalanx fracture    Postoperative Diagnosis: Same       Procedure(s) Performed: Open reduction internal fixation of right ring finger proximal phalanx fracture (24142)    Implants: Medartis 2x 1.2 and 2x 1.5mm cortical screws    Surgeon(s): Antoni Bernal MD-Primary; ALFONSO AnandC     Procedure Summary  Anesthesia: MAC and local  Estimated Blood Loss: 5 cc  Total IV Fluids: see anesthesia record mL  Drains:     Procedural Indications: This is a 34 year old female who is having surgery for a displaced and shortened fracture of the proximal phalanx of the ring finger of the right hand..  The alignment and shortening of the fracture are such that it is at high risk for malunion or nonunion leading to significant functional disability in that hand in this young and active female.  It was explained to the patient that the risks of surgery include but are not limited to infection, bleeding, damage to surrounding neurovascular structures, stiffness, need for further surgery, malunion, nonunion, and symptomatic hardware.  The patient was offered the opportunity to ask questions and wished to proceed.       Procedure Details: The patient was seen in the preoperative area.  Again the risks, benefits, complications, treatment options, non-operative alternatives, expected recovery and outcomes were discussed with the patient. The possibilities of reaction to medication, pulmonary aspiration, injury to surrounding structures, bleeding, recurrent infection, the need for additional procedures, failure to diagnose a condition, and creating a complication requiring transfusion or operation were again discussed with the patient. The patient concurred with the proposed plan, giving informed consent.  The site of surgery was marked with my  initials. The patient was actively warmed in preoperative area. Preoperative antibiotics were given within 1 hours of incision. Mechanical venous thrombosis prophylaxis including bilateral sequential compression devices were placed.       The patient was brought into the operating room and placed in the supine position with the operative extremity on a hand table. A forearm tourniquet was applied. 10 cc of a 50/50 mix of 1% lidocaine with 0.025% Marcaine plain were injected into the adjacent webspaces in a ring block fashion. Bony prominences were padded.  Following adequate anesthesia, the right  upper extremity was prepped and draped in sterile fashion.      I began with an incision of the dorsal radial aspect of the proximal phalanx of the ring finger.  Skin and subcutaneous tissues were incised with a 15 blade.  Longitudinal structures including dorsal sensory branches of the radial nerve and veins were mobilized and protected.  I entered the interval between the extensor tendon and the lateral band which brought us down to the periosteum overlying the butterfly fragment.  The extensor tendon was mobilized along the length of the proximal phalanx and retracted dorsally.  The periosteum was elevated volarly up to the origin of the A2 pulley which was protected.  From this dorsal view I was able to get a [place] elevator in between the key fracture fragments.  There was significant fracture callus limiting the mobility of the fragments.  This callus was meticulously excised with a combination of curettes and rongeur's.  When the fracture had mobility a pointed reduction tenaculum was placed on the butterfly fragment and this was reduced to the proximal piece.  The position and alignment were checked on fluoroscopy and found to be acceptable.  A 1.5 millimeter screw was placed in lag fashion across the fracture.  The position and alignment of the fracture and the position of the hardware was checked on fluoroscopy  and found to be acceptable.  Another 1.5 mm lag screw was placed across this fracture line.    We turned to the distal fragment and a pointed reduction tenaculum was placed percutaneously onto the articular block which was subsequently clamped and reduced back to the butterfly fragment.  The position and alignment of this reduction were checked on fluoroscopy and found to be acceptable.  The overall rotation of the finger was noted to be acceptable by looking at nail cascade and tenodesis.  At the distal fracture fragment a 1.2 millimeter screw was placed across the fracture in lag mode achieving excellent compression.  The screw was checked on fluoroscopy and found to be acceptable.  Another 1.2 mm lag screw was placed across this fracture fragment.    With all clamps removed the final position and alignment of the fracture reduction and construct were checked and were noted to be acceptable.  Screw lengths were checked and were adequate.    Periosteal tissue was closed over the phalanx with 4-0 Monocryl suture.  The interval between the lateral band and the extensor tendon was closed with 4-0 Monocryl suture.  The tourniquet was deflated at 1 hour and 48 minutes and meticulous hemostasis was achieved with bipolar electrocautery.  The skin and subcutaneous tissues were closed with 4-0 nylon sutures.      All sponge and sharp counts were correct at the end of the case    A sterile dressing and a forearm-based resting hand splint were applied.       Complications:  None; patient tolerated the procedure well.     Disposition: PACU - hemodynamically stable.  Condition: stable    A physicians assistant was required in this case to assist with retraction, protection of vital structures, cauterization of crossing vessels, and/or suctioning of fluids from the surgical field. A fully qualified resident/fellow at this teaching institution was not available to assist with this case.

## 2025-01-12 NOTE — PROGRESS NOTES
Postoperative Instructions - BONE FIXATION PROTOCOL  Dr. Antoni Bernal    Care of incision:    Keep bandages and splint clean and dry.  When you shower, wrap the hand and elbow in a plastic bag.  Use tape to seal off water from leaking in.  Shower with your arm above shoulder level.      Care of swelling:    Keep your operative hand elevated: hand above elbow, elbow above the heart. If you had regional anesthesia (a nerve block), wear the sling until the block wears off. You can use the sling when you are walking around, but keep you non-immobilized joints (elbow and shoulder) moving every 2-3 hours.  Sleep on your back with your hand and elbow on top of your body on a pillow.  Apply ice in a ziplock ba min on and 20 min off to those parts that are least covered with bandages.  Move the fingers that are not immobilized as much as you can to prevent scarring of nerves and tendons and to decrease swelling.  Keeping swelling to a minimum will help you control pain and will allow you to move your fingers easier.      Care of pain:      Do not play a  catch up  game with pain. If you had regional anesthesia (a nerve block), begin the following protocol before the block has completely worn off.  Take Tylenol and Ibuprofen (Motrin/Advil) or an equivalent around the clock for the first 1-3 days.  It is much easier to anticipate pain and treat it, rather than treat it after it has appeared. Take Tylenol 650mg every 6 hours (never more than 3000mg in one day). If you do not have kidney or stomach disease, take Ibuprofen (Motrin/Advil) 400mg every 6 hours. Alternate and stagger these medications so you are taking something every 3 hours. For example, take tylenol then three hours later take ibuprofen. Three hours after that, take tylenol again and repeat around the clock except while sleeping.      If prescribed, take the opioid medication every 4-6 hours as needed, if needed. Do not drink alcohol, drink, or operate heavy  machinery while taking any opioid pain medication.     Things to watch out for:    Complications from these procedures are rare, but they do happen.  If you have any unusual pain, fever, redness, drainage from the wound, bleeding, bluishness of the fingers, other than normal mild bruising of skin, do not wait until your first post-operative visit - make a phone call to our office and speak to a professional as soon as you suspect that something is wrong.  WE ARE ALWAYS AVAILABLE TO ANSWER YOUR QUESTIONS. It is common to experience bruising or discoloration in other parts of the hand, wrist, forearm, and elbow after surgery.     First postoperative visit:    Your first postoperative visit should be on within 14 days following surgery, unless otherwise specified.  Please call the office, where you were initially seen, to make an appointment.  Your postoperative instructions, need for therapy, care of the operated extremity will be discussed in detail with you.      First year:    Expect your condition to improve for about 1 year after the surgery.  There is usually significant improvement in the first 2 months, and then a slow, steady improvement for the rest of the year.  Protect the scar from sunlight with SPF 50 for 1 year, so that it does not develop a permanent  sunburn  appearance. After the skin has healed, you may begin to put Vitamin E ointment on the scar to help minimize the appearance.

## 2025-01-13 RX ORDER — OXYCODONE HYDROCHLORIDE 5 MG/1
5 TABLET ORAL EVERY 6 HOURS PRN
Qty: 12 TABLET | Refills: 0 | Status: SHIPPED | OUTPATIENT
Start: 2025-01-13

## 2025-01-14 NOTE — PROGRESS NOTES
Orthopaedic Surgery Hand and Upper Extremity Follow Up Clinic Note:  Date: Jan 22, 2025     Visit Provider: Antoni Bernal MD  Patient ID:[id number]    Procedure Performed: Open reduction internal fixation of right ring finger proximal phalanx fracture (30091)     Date of Surgery: 1/13/2025    Subjective:  Hilaria Jansen is a 34 year old female who returns 9 days after the above procedure. She states the hand is doing well.  She is taking tylenol and ibuprofen.  She denies any numbness or tingling.  She stayed in the splint which was clean dry and intact.  She has no complaints today.  She has a hand therapy appointment scheduled for immediately after this visit.    Objective:    /88   Wt 83 kg (183 lb)   BMI 34.58 kg/m      General: alert, appropriate, NAD  HEENT: NC/AT  CV: RRR by pulse  Pulm: Unlabored on RA  MSK:  Right ring finger    Dorsal lateral incision clean dry and intact  Stitches in good position  No erythema or ecchymosis  There is diffuse swelling to the digit  She is independently firing the DIP and PIP, minimally  Active range of motion of the PIP less than 10 degrees, active range of motion of the DIP 10 degrees  Sensation intact to the radial and ulnar aspect of the digit  Capillary beds warm and well-perfused  There does not appear to be any rotational deformity of the digit, appropriate digital cascade    Imaging:    3 view x-ray of the right hand obtained in the office today and independently reviewed and interpreted by me demonstrates interval reduction and fixation of the comminuted proximal phalanx fracture with placement of 4 screws.  The hardware is intact in good position.  The coronal alignment and length are improved, there is residual apex volar angulation on the lateral view.      Assessment/Plan:  Status post open reduction term fixation of right ring finger P1 fracture    Hilaria Jansen is a 34-year-old female who presents today for her first postoperative visit  after the above.  I discussed my clinical and radiographic findings with the patient today.  I discussed with her that at this point the hardware is intact in good position and the overall alignment is improved prior to preop.  I discussed with her that since the fracture was treated in a delayed fashion and subsequently involved a significant amount of dissection in order to free the fracture fragments I expect that she has a high degree of stiffness and will subsequently have stiffness that will require hand therapy.  I discussed with her that she should make her hand therapy visits today after our appointment where they will begin range of motion especially of the DIP.  They will fabricate her an ulnar gutter splint.  I discussed with the therapist today that we should be diligent in working on her range of motion but we must be cautious especially with passive range of motion exercises due to the tenuous nature of her fixation.  I would like to see her again in 4 weeks for repeat clinical and radiographic evaluation      Antoni Bernal MD    Hand, Upper Extremity & Microvascular Surgery  Department of Orthopaedic Surgery  [facility]

## 2025-01-22 ENCOUNTER — OFFICE VISIT (OUTPATIENT)
Dept: ORTHOPEDICS | Facility: CLINIC | Age: 35
End: 2025-01-22
Payer: COMMERCIAL

## 2025-01-22 ENCOUNTER — TELEPHONE (OUTPATIENT)
Dept: ORTHOPEDICS | Facility: CLINIC | Age: 35
End: 2025-01-22

## 2025-01-22 ENCOUNTER — THERAPY VISIT (OUTPATIENT)
Dept: OCCUPATIONAL THERAPY | Facility: CLINIC | Age: 35
End: 2025-01-22
Attending: STUDENT IN AN ORGANIZED HEALTH CARE EDUCATION/TRAINING PROGRAM
Payer: COMMERCIAL

## 2025-01-22 ENCOUNTER — ANCILLARY PROCEDURE (OUTPATIENT)
Dept: GENERAL RADIOLOGY | Facility: CLINIC | Age: 35
End: 2025-01-22
Attending: STUDENT IN AN ORGANIZED HEALTH CARE EDUCATION/TRAINING PROGRAM
Payer: COMMERCIAL

## 2025-01-22 VITALS — BODY MASS INDEX: 34.58 KG/M2 | DIASTOLIC BLOOD PRESSURE: 88 MMHG | SYSTOLIC BLOOD PRESSURE: 122 MMHG | WEIGHT: 183 LBS

## 2025-01-22 DIAGNOSIS — M25.641 STIFFNESS OF FINGER JOINT, RIGHT: ICD-10-CM

## 2025-01-22 DIAGNOSIS — Z47.89 ORTHOPEDIC AFTERCARE: Primary | ICD-10-CM

## 2025-01-22 DIAGNOSIS — Z98.890 S/P ORIF (OPEN REDUCTION INTERNAL FIXATION) FRACTURE: ICD-10-CM

## 2025-01-22 DIAGNOSIS — S62.624A CLOSED DISPLACED FRACTURE OF MIDDLE PHALANX OF RIGHT RING FINGER, INITIAL ENCOUNTER: ICD-10-CM

## 2025-01-22 DIAGNOSIS — Z47.89 ORTHOPEDIC AFTERCARE: ICD-10-CM

## 2025-01-22 DIAGNOSIS — M79.644 FINGER PAIN, RIGHT: Primary | ICD-10-CM

## 2025-01-22 DIAGNOSIS — Z87.81 S/P ORIF (OPEN REDUCTION INTERNAL FIXATION) FRACTURE: ICD-10-CM

## 2025-01-22 PROCEDURE — 97110 THERAPEUTIC EXERCISES: CPT | Mod: GO | Performed by: OCCUPATIONAL THERAPIST

## 2025-01-22 PROCEDURE — 97760 ORTHOTIC MGMT&TRAING 1ST ENC: CPT | Mod: GO | Performed by: OCCUPATIONAL THERAPIST

## 2025-01-22 PROCEDURE — 73140 X-RAY EXAM OF FINGER(S): CPT | Mod: TC | Performed by: RADIOLOGY

## 2025-01-22 PROCEDURE — 97165 OT EVAL LOW COMPLEX 30 MIN: CPT | Mod: GO | Performed by: OCCUPATIONAL THERAPIST

## 2025-01-22 NOTE — TELEPHONE ENCOUNTER
Reason for Call:  Form, our goal is to have forms completed with 7 days, however, some forms may require a visit or additional information.    Type of letter, form or note:  FMLA     Who is the form from?: Patient    Where did the form come from: Patient or family brought in       Phone number of person requesting form: [phone]  Can we leave a detailed message on this number:  Not Applicable    Desired completion date of form: ASAP      How will form be returned?:  Returned to patient     Has the patient signed a consent form for release of information (may be included with form)? Not Applicable    Additional comments: form completed on 1/22/25.    Form was started and place in Provider Basket for provider review/ completion at [place].

## 2025-01-22 NOTE — PROGRESS NOTES
OCCUPATIONAL THERAPY EVALUATION  Type of Visit: {Visit Type:851334}    {Disappearing TIP  Adult Abuse Screen not completed in this Encounter. Please complete screening!:698962}      {TIP  The Fall Risk Screen has not been completed. Complete the screen!:884578}    Subjective      {Disappearing TIP  Health History pop-up / flowsheet :998531}  Presenting condition or subjective complaint: surgery on my finger  Date of onset:    {Disapparing TIP  Date of Onset/Injury :975930}  Relevant medical history:     Dates & types of surgery: neck surgery and 3 C_sections    Prior diagnostic imaging/testing results: X-ray     Prior therapy history for the same diagnosis, illness or injury: No      Prior Level of Function  Transfers: Independent  Ambulation: Independent  ADL: Independent  IADL: {kristen prior level of function (Optional):683556}    Living Environment  Social support: With family members   Type of home: [facility]   Stairs to enter the home:         Ramp: Yes   Stairs inside the home: Yes 2 Is there a railing: No     Help at home: Home management tasks (cooking, cleaning)  Equipment owned:       Employment: Yes postage due Technician  Hobbies/Interests:      Patient goals for therapy: I cannot bend or move the finger    Pain assessment: {Pain assessment:892400}     Objective   ADDITIONAL HISTORY:  Right hand dominant  Patient reports symptoms of pain, stiffness/loss of motion, weakness/loss of strength, and edema  Transportation: unable to use normal mode of transportation, due to current injury  Currently not working due to present treatment problem    Functional Outcome Measure:   ***    PAIN:  Pain Level at Rest: 8/10  Pain Level with Use: 8/10  Pain Location: Right UE that radiates from the finger up to the shoulder  Pain Quality: Aching, Burning, Sharp, and Stabbing  Pain Frequency: intermittent  Pain is Worst: daytime or nighttime  Pain is Exacerbated By: opening bottle,   Pain is Relieved By: cold, otc  medications, and rest  Pain Progression: Improved    POSTURE: {:080662}     EDEMA:   Finger/Thumb   (Circumference measured in cm) 1/22/2025  Right    Index P1     PIP     P2     Long P1 7.0 6.0   PIP 6.5 5.7   P2 5.8 5.5   Ring P1 9.4 5.9   PIP 9.0 5.4   P2 6.5 5.0   Small P1 6.5 5.3   PIP 5.8 5.0   P2 5.0 4.7   Thumb P1     IP       Wrist/Elbow  (Circumference measured in cm) 1/22/2025   Distal Wrist Crease    Elbow Crease    1st Dorsal Compartment    Radial Styloid         SCAR/WOUND: {TATIANA CHT SCAR (Optional):795211}    SENSATION: {:525395}     SCREENS: {TATIANA CHT NEURAL TENSION TEST (Optional):864811}     ROM:   Hand ROM  Right AROM    Index MP -20/70   PIP 0/70   DIP 0/23   Total Active Motion    Long MP -15/70   PIP 0/78   DIP 0/26   Total Active Motion    RING MP 0/60   PIP -35/50   DIP 0/5   Total Active Motion    Small MP 0/80   PIP 0/63   DIP 0/15   Total Active Motion        OBSERVATIONS/APPEARANCE: {TATIANA CHT OBS:671583}     SPECIAL TESTS: {TATIANA CHT SPECIAL TESTS:423405}    NEURAL TENSION TESTING: {TATIANA CHT NEURAL TENSION TEST (Optional):444935}    RESISTED TESTING: {TATIANA CHT RESISTED TEST:887884}     STRENGTH: {TATIANA CHT Strength (Optional):484627}    PALPATION: {TATIANA CHT Palpation (Optional):013393}    {JOINT MOBILITY:538402}       Assessment & Plan   CLINICAL IMPRESSIONS  Medical Diagnosis:    {Disappaering TIP  Medical Dx :925071}  Treatment Diagnosis:    {Disappearing TIP  Treatment Dx :023208}  Impression/Assessment: {tatiana ot assessment:803609}    Clinical Decision Making (Complexity):  Assessment of Occupational Performance: {Number of Occupations Affected:829355}  Occupational Performance Limitations: {Perf Deficits:389550}  Clinical Decision Making (Complexity): {Complexity:279898}    PLAN OF CARE  Treatment Interventions:  {:127504}    Long Term Goals {Disappearing TIP  Goals :352163}       {Disappearing TIP  Frequency/Duration :187226}  Frequency of Treatment:    Duration of Treatment:        Recommended Referrals to Other Professionals: {kristen referrals suggested:822601}  Education Assessment:   {Disappearing TIP  Patient Education :204714}    Risks and benefits of evaluation/treatment have been explained.   Patient/Family/caregiver agrees with Plan of Care.     Evaluation Time:  {Disappearing TIP  Eval Minutes :810327}     {OPTIONAL EVAL ONLY:153936}     Signing Clinician: Ashley Horner, OT

## 2025-01-22 NOTE — LETTER
1/22/2025      Hilaria Jansen  [address] [address]      Dear Colleague,    Thank you for referring your patient, Hilaria Jansen, to the [facility] [place]. Please see a copy of my visit note below.    Orthopaedic Surgery Hand and Upper Extremity Follow Up Clinic Note:  Date: Jan 22, 2025     Visit Provider: Antoni Bernal MD  Patient ID:[id number]    Procedure Performed: Open reduction internal fixation of right ring finger proximal phalanx fracture (28770)     Date of Surgery: 1/13/2025    Subjective:  Hilaria Jansen is a 34 year old female who returns 9 days after the above procedure. She states the hand is doing well.  She is taking tylenol and ibuprofen.  She denies any numbness or tingling.  She stayed in the splint which was clean dry and intact.  She has no complaints today.  She has a hand therapy appointment scheduled for immediately after this visit.    Objective:    /88   Wt 83 kg (183 lb)   BMI 34.58 kg/m      General: alert, appropriate, NAD  HEENT: NC/AT  CV: RRR by pulse  Pulm: Unlabored on RA  MSK:  Right ring finger    Dorsal lateral incision clean dry and intact  Stitches in good position  No erythema or ecchymosis  There is diffuse swelling to the digit  She is independently firing the DIP and PIP, minimally  Active range of motion of the PIP less than 10 degrees, active range of motion of the DIP 10 degrees  Sensation intact to the radial and ulnar aspect of the digit  Capillary beds warm and well-perfused  There does not appear to be any rotational deformity of the digit, appropriate digital cascade    Imaging:    3 view x-ray of the right hand obtained in the office today and independently reviewed and interpreted by me demonstrates interval reduction and fixation of the comminuted proximal phalanx fracture with placement of 4 screws.  The hardware is intact in good position.  The coronal alignment and length are improved,  there is residual apex volar angulation on the lateral view.      Assessment/Plan:  Status post open reduction term fixation of right ring finger P1 fracture    Hilaria Jansen is a 34-year-old female who presents today for her first postoperative visit after the above.  I discussed my clinical and radiographic findings with the patient today.  I discussed with her that at this point the hardware is intact in good position and the overall alignment is improved prior to preop.  I discussed with her that since the fracture was treated in a delayed fashion and subsequently involved a significant amount of dissection in order to free the fracture fragments I expect that she has a high degree of stiffness and will subsequently have stiffness that will require hand therapy.  I discussed with her that she should make her hand therapy visits today after our appointment where they will begin range of motion especially of the DIP.  They will fabricate her an ulnar gutter splint.  I discussed with the therapist today that we should be diligent in working on her range of motion but we must be cautious especially with passive range of motion exercises due to the tenuous nature of her fixation.  I would like to see her again in 4 weeks for repeat clinical and radiographic evaluation      Antoni Bernal MD    Hand, Upper Extremity & Microvascular Surgery  Department of Orthopaedic Surgery  [facility]      Again, thank you for allowing me to participate in the care of your patient.        Sincerely,        Antoni Bernal MD    Electronically signed

## 2025-01-26 PROBLEM — M79.644 FINGER PAIN, RIGHT: Status: ACTIVE | Noted: 2025-01-26

## 2025-01-26 PROBLEM — Z87.81 S/P ORIF (OPEN REDUCTION INTERNAL FIXATION) FRACTURE: Status: ACTIVE | Noted: 2025-01-26

## 2025-01-26 PROBLEM — M25.641 STIFFNESS OF FINGER JOINT, RIGHT: Status: ACTIVE | Noted: 2025-01-26

## 2025-01-26 PROBLEM — Z98.890 S/P ORIF (OPEN REDUCTION INTERNAL FIXATION) FRACTURE: Status: ACTIVE | Noted: 2025-01-26

## 2025-02-11 NOTE — PROGRESS NOTES
Orthopaedic Surgery Hand and Upper Extremity Follow Up Clinic Note:  Date: Feb 19, 2025     Visit Provider: Antoni Bernal MD  Patient ID:[id number]    Procedure Performed: Open reduction internal fixation of right ring finger proximal phalanx fracture (75012)      Date of Surgery: 1/13/2025    Subjective:  Hilaria Jansen is a 34 year old female who returns 4 weeks after the above procedure.  She has persistent pain in the proximal phalanx of the PIP joint.  She has been keeping up with her hand/finger home exercises as well as her therapy appointments.  She feels like her progress is limited by some pain.  She takes Tylenol for pain.  She does feel like her motion has improved since last time.    I offered her the use of a [place]ic  today but but she felt like she did not need it.    Objective:    There were no vitals taken for this visit.    General: alert, appropriate, NAD  HEENT: NC/AT  CV: RRR by pulse  Pulm: Unlabored on RA  MSK:    Right ring finger  Skin clean, dry, intact without erythema or ecchymosis  There is tenderness to palpation over the dorsum of the proximal phalanx, there is mild tenderness palpation diffusely about the ring phalanx, there is no tenderness palpation about the DIP or the distal phalanx  There is pain during attempted range of motion exercises of the PIP    The tuft is 1 cm from the palm during composite flexion  Actively flexing and extending the PIP and DIP though minimally, range of motion is described below    MP AROM 0-75, PROM 0-90   PIP AROM 40-50, PROM 35-60 with MP flexion PROM 35-70,   DIP AROM 0-40, PROM 0-45    Imaging:    3 view x-ray of the right ring finger obtained in the office today and independently reviewed and interpreted by me demonstrates hardware intact in good position.  The alignment of the phalanx is unchanged with some residual extension.  There is evidence of progressive osseous union    Assessment/Plan:  Status post right ring finger  proximal phalanx open reduction internal fixation of delayed fracture now 5.5 weeks postop    Hilaria Jansen is a 34-year-old female who presents today for the above.  I discussed my clinical and radiographic findings with her today.  We discussed that her x-rays show evidence of progressive union which is reassuring.  I also discussed that her objective range of motion measurements are significantly improved from last time.  We discussed that she will likely continue to have some pain in the PIP as her motion is improving.  I reassured her that I anticipate that this will continue to improve over the coming months.  She should continue with hand therapy but I will discuss with a hand therapist to withhold passive range of motion until about 8 weeks as she still has some persistent tenderness over the fracture during palpation.  Finally, I discussed with Hilaria that these fractures and surgeries frequently results in persistent contractures.  I explained to her that our goal should be a functional range of motion with the understanding that the finger will likely not be exactly like the other fingers.  She was offered the opportunity to ask questions which were answered to her satisfaction.  I will discuss with hand therapy our next steps and she will otherwise return to see me in 6 weeks for repeat clinical and radiographic examination.  I hope to see her range of motion continue to improve.      Antoni Bernal MD    Hand, Upper Extremity & Microvascular Surgery  Department of Orthopaedic Surgery  [facility]

## 2025-02-19 ENCOUNTER — OFFICE VISIT (OUTPATIENT)
Dept: ORTHOPEDICS | Facility: CLINIC | Age: 35
End: 2025-02-19
Payer: COMMERCIAL

## 2025-02-19 ENCOUNTER — ANCILLARY PROCEDURE (OUTPATIENT)
Dept: GENERAL RADIOLOGY | Facility: CLINIC | Age: 35
End: 2025-02-19
Attending: STUDENT IN AN ORGANIZED HEALTH CARE EDUCATION/TRAINING PROGRAM
Payer: COMMERCIAL

## 2025-02-19 DIAGNOSIS — Z47.89 ORTHOPEDIC AFTERCARE: Primary | ICD-10-CM

## 2025-02-19 DIAGNOSIS — Z47.89 ORTHOPEDIC AFTERCARE: ICD-10-CM

## 2025-02-19 PROCEDURE — 99024 POSTOP FOLLOW-UP VISIT: CPT | Performed by: STUDENT IN AN ORGANIZED HEALTH CARE EDUCATION/TRAINING PROGRAM

## 2025-02-19 PROCEDURE — 73140 X-RAY EXAM OF FINGER(S): CPT | Mod: TC | Performed by: RADIOLOGY

## 2025-02-19 NOTE — LETTER
2/19/2025      Hilaria Jansen  [address] [address]      Dear Colleague,    Thank you for referring your patient, Hilaria Jansen, to the [facility] [place]. Please see a copy of my visit note below.    Orthopaedic Surgery Hand and Upper Extremity Follow Up Clinic Note:  Date: Feb 19, 2025     Visit Provider: Antoni Bernal MD  Patient ID:[id number]    Procedure Performed: Open reduction internal fixation of right ring finger proximal phalanx fracture (19608)      Date of Surgery: 1/13/2025    Subjective:  Hilaria Jansen is a 34 year old female who returns 4 weeks after the above procedure.  She has persistent pain in the proximal phalanx of the PIP joint.  She has been keeping up with her hand/finger home exercises as well as her therapy appointments.  She feels like her progress is limited by some pain.  She takes Tylenol for pain.  She does feel like her motion has improved since last time.    I offered her the use of a [place]ese  today but but she felt like she did not need it.    Objective:    There were no vitals taken for this visit.    General: alert, appropriate, NAD  HEENT: NC/AT  CV: RRR by pulse  Pulm: Unlabored on RA  MSK:    Right middle finger  Skin clean, dry, intact without erythema or ecchymosis  There is tenderness to palpation over the dorsum of the proximal phalanx, there is mild tenderness palpation diffusely about the middle phalanx, there is no tenderness palpation about the DIP or the distal phalanx  There is pain during attempted range of motion exercises of the PIP    The tuft is 1 cm from the palm during composite flexion  Actively flexing and extending the PIP and DIP though minimally, range of motion is described below    MP AROM 0-75, PROM 0-90   PIP AROM 40-50, PROM 35-60 with MP flexion PROM 35-70,   DIP AROM 0-40, PROM 0-45    Imaging:    3 view x-ray of the right middle finger obtained in the office today and  independently reviewed and interpreted by me demonstrates hardware intact in good position.  The alignment of the phalanx is unchanged with some residual extension.  There is evidence of progressive osseous union    Assessment/Plan:  Status post right middle finger proximal phalanx open reduction internal fixation of delayed fracture now 5.5 weeks postop    Hilaria Jansen is a 34-year-old female who presents today for the above.  I discussed my clinical and radiographic findings with her today.  We discussed that her x-rays show evidence of progressive union which is reassuring.  I also discussed that her objective range of motion measurements are significantly improved from last time.  We discussed that she will likely continue to have some pain in the PIP as her motion is improving.  I reassured her that I anticipate that this will continue to improve over the coming months.  She should continue with hand therapy but I will discuss with a hand therapist to withhold passive range of motion until about 8 weeks as she still has some persistent tenderness over the fracture during palpation.  Finally, I discussed with Hilaria that these fractures and surgeries frequently results in persistent contractures.  I explained to her that our goal should be a functional range of motion with the understanding that the finger will likely not be exactly like the other fingers.  She was offered the opportunity to ask questions which were answered to her satisfaction.  I will discuss with hand therapy our next steps and she will otherwise return to see me in 6 weeks for repeat clinical and radiographic examination.  I hope to see her range of motion continue to improve.      Antoni Bernal MD    Hand, Upper Extremity & Microvascular Surgery  Department of Orthopaedic Surgery  [facility]      Again, thank you for allowing me to participate in the care of your patient.        Sincerely,        Antoni FIGUEROA  MD Gabe    Electronically signed

## 2025-03-10 ENCOUNTER — OFFICE VISIT (OUTPATIENT)
Dept: INTERNAL MEDICINE | Facility: CLINIC | Age: 35
End: 2025-03-10
Payer: COMMERCIAL

## 2025-03-10 ENCOUNTER — TELEPHONE (OUTPATIENT)
Dept: INTERNAL MEDICINE | Facility: CLINIC | Age: 35
End: 2025-03-10

## 2025-03-10 VITALS
WEIGHT: 185.9 LBS | DIASTOLIC BLOOD PRESSURE: 83 MMHG | BODY MASS INDEX: 35.1 KG/M2 | TEMPERATURE: 98 F | OXYGEN SATURATION: 100 % | HEIGHT: 61 IN | RESPIRATION RATE: 16 BRPM | SYSTOLIC BLOOD PRESSURE: 125 MMHG | HEART RATE: 100 BPM

## 2025-03-10 DIAGNOSIS — K21.00 GASTROESOPHAGEAL REFLUX DISEASE WITH ESOPHAGITIS WITHOUT HEMORRHAGE: ICD-10-CM

## 2025-03-10 DIAGNOSIS — E66.812 CLASS 2 OBESITY WITH BODY MASS INDEX (BMI) OF 35.0 TO 35.9 IN ADULT, UNSPECIFIED OBESITY TYPE, UNSPECIFIED WHETHER SERIOUS COMORBIDITY PRESENT: ICD-10-CM

## 2025-03-10 DIAGNOSIS — S62.624S: ICD-10-CM

## 2025-03-10 DIAGNOSIS — Z00.00 PREVENTATIVE HEALTH CARE: Primary | ICD-10-CM

## 2025-03-10 LAB
ALBUMIN SERPL BCG-MCNC: 4.4 G/DL (ref 3.5–5.2)
ALP SERPL-CCNC: 55 U/L (ref 40–150)
ALT SERPL W P-5'-P-CCNC: 14 U/L (ref 0–50)
ANION GAP SERPL CALCULATED.3IONS-SCNC: 13 MMOL/L (ref 7–15)
AST SERPL W P-5'-P-CCNC: 21 U/L (ref 0–45)
BILIRUB SERPL-MCNC: 0.5 MG/DL
BUN SERPL-MCNC: 21.3 MG/DL (ref 6–20)
CALCIUM SERPL-MCNC: 10.2 MG/DL (ref 8.8–10.4)
CHLORIDE SERPL-SCNC: 103 MMOL/L (ref 98–107)
CHOLEST SERPL-MCNC: 195 MG/DL
CREAT SERPL-MCNC: 0.9 MG/DL (ref 0.51–0.95)
EGFRCR SERPLBLD CKD-EPI 2021: 86 ML/MIN/1.73M2
ERYTHROCYTE [DISTWIDTH] IN BLOOD BY AUTOMATED COUNT: 13 % (ref 10–15)
FASTING STATUS PATIENT QL REPORTED: YES
FASTING STATUS PATIENT QL REPORTED: YES
GLUCOSE SERPL-MCNC: 92 MG/DL (ref 70–99)
HCO3 SERPL-SCNC: 24 MMOL/L (ref 22–29)
HCT VFR BLD AUTO: 36.1 % (ref 35–47)
HDLC SERPL-MCNC: 43 MG/DL
HGB BLD-MCNC: 12.2 G/DL (ref 11.7–15.7)
LDLC SERPL CALC-MCNC: 126 MG/DL
MCH RBC QN AUTO: 28.5 PG (ref 26.5–33)
MCHC RBC AUTO-ENTMCNC: 33.8 G/DL (ref 31.5–36.5)
MCV RBC AUTO: 84 FL (ref 78–100)
NONHDLC SERPL-MCNC: 152 MG/DL
PLATELET # BLD AUTO: 232 10E3/UL (ref 150–450)
POTASSIUM SERPL-SCNC: 4.2 MMOL/L (ref 3.4–5.3)
PROT SERPL-MCNC: 7.9 G/DL (ref 6.4–8.3)
RBC # BLD AUTO: 4.28 10E6/UL (ref 3.8–5.2)
SODIUM SERPL-SCNC: 140 MMOL/L (ref 135–145)
TRIGL SERPL-MCNC: 130 MG/DL
TSH SERPL DL<=0.005 MIU/L-ACNC: 2.61 UIU/ML (ref 0.3–4.2)
WBC # BLD AUTO: 8.5 10E3/UL (ref 4–11)

## 2025-03-10 PROCEDURE — 3074F SYST BP LT 130 MM HG: CPT | Performed by: STUDENT IN AN ORGANIZED HEALTH CARE EDUCATION/TRAINING PROGRAM

## 2025-03-10 PROCEDURE — 84443 ASSAY THYROID STIM HORMONE: CPT | Performed by: STUDENT IN AN ORGANIZED HEALTH CARE EDUCATION/TRAINING PROGRAM

## 2025-03-10 PROCEDURE — 80053 COMPREHEN METABOLIC PANEL: CPT | Performed by: STUDENT IN AN ORGANIZED HEALTH CARE EDUCATION/TRAINING PROGRAM

## 2025-03-10 PROCEDURE — 80061 LIPID PANEL: CPT | Performed by: STUDENT IN AN ORGANIZED HEALTH CARE EDUCATION/TRAINING PROGRAM

## 2025-03-10 PROCEDURE — 36415 COLL VENOUS BLD VENIPUNCTURE: CPT | Performed by: STUDENT IN AN ORGANIZED HEALTH CARE EDUCATION/TRAINING PROGRAM

## 2025-03-10 PROCEDURE — 85027 COMPLETE CBC AUTOMATED: CPT | Performed by: STUDENT IN AN ORGANIZED HEALTH CARE EDUCATION/TRAINING PROGRAM

## 2025-03-10 PROCEDURE — 99214 OFFICE O/P EST MOD 30 MIN: CPT | Mod: 25 | Performed by: STUDENT IN AN ORGANIZED HEALTH CARE EDUCATION/TRAINING PROGRAM

## 2025-03-10 PROCEDURE — 99395 PREV VISIT EST AGE 18-39: CPT | Performed by: STUDENT IN AN ORGANIZED HEALTH CARE EDUCATION/TRAINING PROGRAM

## 2025-03-10 PROCEDURE — 3079F DIAST BP 80-89 MM HG: CPT | Performed by: STUDENT IN AN ORGANIZED HEALTH CARE EDUCATION/TRAINING PROGRAM

## 2025-03-10 RX ORDER — OMEPRAZOLE 40 MG/1
40 CAPSULE, DELAYED RELEASE ORAL DAILY
Qty: 30 CAPSULE | Refills: 0 | Status: SHIPPED | OUTPATIENT
Start: 2025-03-10 | End: 2025-04-09

## 2025-03-10 RX ORDER — SENNOSIDES 8.6 MG
650 CAPSULE ORAL EVERY 8 HOURS PRN
Qty: 45 TABLET | Refills: 0 | Status: SHIPPED | OUTPATIENT
Start: 2025-03-10 | End: 2025-03-25

## 2025-03-10 RX ORDER — OMEPRAZOLE 40 MG/1
40 CAPSULE, DELAYED RELEASE ORAL DAILY
Qty: 30 CAPSULE | Refills: 0 | Status: CANCELLED | OUTPATIENT
Start: 2025-03-10

## 2025-03-10 SDOH — HEALTH STABILITY: PHYSICAL HEALTH: ON AVERAGE, HOW MANY DAYS PER WEEK DO YOU ENGAGE IN MODERATE TO STRENUOUS EXERCISE (LIKE A BRISK WALK)?: 2 DAYS

## 2025-03-10 ASSESSMENT — SOCIAL DETERMINANTS OF HEALTH (SDOH): HOW OFTEN DO YOU GET TOGETHER WITH FRIENDS OR RELATIVES?: ONCE A WEEK

## 2025-03-10 NOTE — TELEPHONE ENCOUNTER
Not covered for the age of 13 and over. Prior authorization required  Please contact the insurance at [phone] [employer]  ID# [id number]  Gr: [id number]    Qty: 30      Thank you,  Trisha Schultz [facility]  [phone]

## 2025-03-10 NOTE — PROGRESS NOTES
"Preventive Care Visit  [facility]  Niraj Whiting MD, Internal Medicine  Mar 10, 2025      Assessment & Plan     (Z00.00) Preventative health care  (primary encounter diagnosis)  - Working on diet and exercise for weight loss  - Denies alcohol or tobacco use  - Works for [employer]  Plan: Lipid panel reflex to direct LDL Fasting, CBC         with platelets, TSH with free T4 reflex    (E66.812,  Z68.35) Class 2 obesity with body mass index (BMI) of 35.0 to 35.9 in adult, unspecified obesity type, unspecified whether serious comorbidity present  - Having issues with weight loss  - Discussed dietary and exercise modifications  Plan: Lipid panel reflex to direct LDL Fasting,         Comprehensive metabolic panel (BMP + Alb, Alk         Phos, ALT, AST, Total. Bili, TP), TSH with free        T4 reflex    (K21.00) Gastroesophageal reflux disease with esophagitis without hemorrhage  - Belching and abdominal burning sensation, has not used any medications  - No nausea with vomiting, no stool changes  Plan: omeprazole (PRILOSEC) 40 MG DR capsule,         Comprehensive metabolic panel (BMP + Alb, Alk         Phos, ALT, AST, Total. Bili, TP)            (S62.624S) Closed displaced fracture of middle phalanx of right ring finger, sequela  - Currently working with therapy and hand surgery  - Requesting tylenol refill  Plan: acetaminophen (TYLENOL) 650 MG CR tablet        BMI  Estimated body mass index is 35.71 kg/m  as calculated from the following:    Height as of this encounter: 1.537 m (5' 0.5\").    Weight as of this encounter: 84.3 kg (185 lb 14.4 oz).     Counseling  Appropriate preventive services were addressed with this patient via screening, questionnaire, or discussion as appropriate for fall prevention, nutrition, physical activity, Tobacco-use cessation, social engagement, weight loss and cognition.  Checklist reviewing preventive services available has been given to the patient.  Reviewed " patient's diet, addressing concerns and/or questions.   She is at risk for lack of exercise and has been provided with information to increase physical activity for the benefit of her well-being.       Subjective   Hilaria is a 34 year old, presenting for the following:  Physical (Fasting.)        3/10/2025     7:24 AM   Additional Questions   Roomed by Fadumo Shields MA   Accompanied by Self          HPI  34F pmh sickle cell trait, sinusitis, and recent ORIF right ring finger proximal phalanx fracture presents for general physical exam  and acute concerns.  Patient's main complaint is regarding epigastric burning sensation and excessive belching.  She reports she has tried antacids in the past and consistently which will alleviate the symptoms, has noted increasing symptoms as of late.  She denies any nausea with vomiting, no changes in her stool.  No abdominal discomfort today in clinic.  She also is concerned about difficulty with weight loss.  Her diet and exercise have not been optimized and she feels this is due to her IUD.  We discussed diet and exercise modifications prior to starting any medications for weight loss.  She denies any alcohol or tobacco use.  She works for the post office.        Advance Care Planning  Patient does not have a Health Care Directive: Advance Directive received and scanned. Click on Code in the patient header to view.      3/10/2025   General Health   How would you rate your overall physical health? Good   Feel stress (tense, anxious, or unable to sleep) Only a little         3/10/2025   Nutrition   Three or more servings of calcium each day? Yes   Diet: Regular (no restrictions)   How many servings of fruit and vegetables per day? (!) 2-3   How many sweetened beverages each day? 0-1         3/10/2025   Exercise   Days per week of moderate/strenous exercise 2 days         3/10/2025   Social Factors   Frequency of gathering with friends or relatives Once a week         3/10/2025    Dental   Dentist two times every year? Yes              Today's PHQ-2 Score:       1/10/2025     8:52 AM   PHQ-2 (  [employer])   Q1: Little interest or pleasure in doing things 0   Q2: Feeling down, depressed or hopeless 0   PHQ-2 Score 0    Q1: Little interest or pleasure in doing things Not at all   Q2: Feeling down, depressed or hopeless Not at all   PHQ-2 Score 0       Patient-reported         3/10/2025   Substance Use   Alcohol more than 3/day or more than 7/wk No   Do you use any other substances recreationally? No     Social History     Tobacco Use    Smoking status: Never    Smokeless tobacco: Never   Vaping Use    Vaping status: Never Used   Substance Use Topics    Alcohol use: No    Drug use: No          Mammogram Screening - Patient under 40 years of age: Routine Mammogram Screening not recommended.       History of abnormal Pap smear: No - age 30- 64 PAP with HPV every 5 years recommended        Latest Ref Rng & Units 2022     1:52 PM   PAP / HPV   PAP  Negative for Intraepithelial Lesion or Malignancy (NILM)    HPV 16 DNA Negative Negative    HPV 18 DNA Negative Negative    Other HR HPV Negative Negative            3/10/2025   Contraception/Family Planning   Questions about contraception or family planning (!) YES         Reviewed and updated as needed this visit by Provider                    Past Medical History:   Diagnosis Date    Anemia     Hidradenitis suppurativa     Infertility, female     Neurological disorder     Sickle cell anemia (H)     Varicella      Past Surgical History:   Procedure Laterality Date     SECTION N/A 2019    Procedure:  SECTION;  Surgeon: Wendi Coppola MD;  Location: [facility]     SECTION N/A 12/15/2021    Procedure:  SECTION;  Surgeon: Brooke Capps MD;  Location: [facility]     SECTION N/A 10/14/2022    Procedure:  SECTION;  Surgeon: Lucia Gannon MD;  Location: [facility]    HEAD & NECK  "SURGERY      IR LYMPH NODE BIOPSY  7/1/2015    SURGICAL PATHOLOGY EXAM Left 09/10/2015    left paratracheal neck mass removed, benign         Review of Systems  Constitutional, neuro, ENT, endocrine, pulmonary, cardiac, gastrointestinal, genitourinary, musculoskeletal, integument and psychiatric systems are negative, except as otherwise noted.     Objective    Exam  There were no vitals taken for this visit.   Estimated body mass index is 34.58 kg/m  as calculated from the following:    Height as of 1/10/25: 1.549 m (5' 1\").    Weight as of 1/22/25: 83 kg (183 lb).    Physical Exam  Vitals reviewed.   Constitutional:       Appearance: Normal appearance.   HENT:      Head: Atraumatic.   Eyes:      Extraocular Movements: Extraocular movements intact.   Cardiovascular:      Rate and Rhythm: Normal rate and regular rhythm.   Pulmonary:      Breath sounds: Normal breath sounds.   Abdominal:      Palpations: Abdomen is soft.   Musculoskeletal:         General: Normal range of motion.      Cervical back: Normal range of motion.   Skin:     General: Skin is warm.   Neurological:      General: No focal deficit present.   Psychiatric:         Mood and Affect: Mood normal.               Signed Electronically by: Niraj Whiting MD    "

## 2025-03-12 NOTE — TELEPHONE ENCOUNTER
Please let patient know insurance denied coverage of her omeprazole but she an pick this up over the counter.

## 2025-03-12 NOTE — TELEPHONE ENCOUNTER
PRIOR AUTHORIZATION DENIED    Medication: OMEPRAZOLE 40 MG PO CPDR  Insurance Company: [employer]ty [employer]'s - Phone [phone] Fax [phone]  Denial Date: 3/12/2025  Denial Reason(s):       Drug is excluded for patients age 13+. No PAs or appeals available.    Appeal Information: N/A  Patient Notified: NO

## 2025-03-18 NOTE — PROGRESS NOTES
Orthopaedic Surgery Hand and Upper Extremity Follow Up Clinic Note:  Date: Apr 2, 2025     Visit Provider: Antoni Bernal MD  Patient ID:[id number]    Procedure Performed: Open reduction internal fixation of right ring finger proximal phalanx fracture (10269)      Date of Surgery: 1/13/2025    Subjective:  Hilaria Jansen is a 34 year old female who returns 11 weeks after the above procedure. She states the finger has been feeling better.   She states her range of motions has been improving, however there is still some pain in stiffness.  She is taking Tylenol and ibuprofen as needed for her finger.  She wears a finger splint unless she is doing range of motions exercises. She has been attending hand therapy. For the last several weeks she has noted some increased pain in the anterior aspect of the right shoulder and the upper arm more broadly.     Objective:    LMP  (LMP Unknown)     General: alert, appropriate, NAD  HEENT: NC/AT  CV: RRR by pulse  Pulm: Unlabored on RA  MSK:  R should  Able to abduct the arm above shoulder height, forward flexion above shoulder pain, limited by anterior shoulder pain  Tender to palpation diffusely over the anterior, lateral, posterior shoulder  5/5 strength to internal/external rotation, forward flexion    Right ring finger  Skin clean, dry, intact without erythema or ecchymosis  Scar flat without hyperpigmentation   There is tenderness to palpation over the dorsum of the proximal phalanx, there is mild tenderness palpation diffusely about the middle phalanx, there is no tenderness palpation about the DIP or the distal phalanx    There is no pain during easy range of motion, but pain during extremes of ROM    The tuft is 1 cm from the palm during composite flexion    Actively flexing and extending the PIP and DIP range of motion is described below     MP AROM 0-85, PROM 0-100   PIP AROM 40-65, PROM 30-75 with MP flexion PROM 30-85,   DIP AROM 0-40, PROM 0-45    Sensation intact  to R/U aspect of digit distally  Capillary beds distally warm and well perfused    Imaging:    3 view x-ray of the right ring finger obtained in the office today and independently reviewed and interpreted by me demonstrates ongoing osseous union of the prior demonstrated proximal phalanx fracture.  The hardware is intact in good position.     Assessment/Plan:  11 weeks status post right ring finger proximal phalanx open reduction internal fixation    Hilaria Jansen is a 34-year-old female who presents today for the above.  I reviewed my clinical and radiographic findings with her today.  At this time on x-ray she appears to be going on to union.  However, she has persistent tenderness over the dorsum of the proximal phalanx. This is not unexpected given the extensive dissection that was required for the operation.  She has persistent stiffness in the PIP joint which again I reviewed with her is not unexpected but is improving with hand therapy and time.  She will continue with hand therapy in order to hopefully achieve full composite flexion of that digit but I cautioned her that full and normal range of motion after a delayed presentation for proximal phalanx displaced fracture is not unexpected.  She demonstrated understanding of the above.  I will see her in 8 weeks for repeat clinical and radiographic evaluation.  Regarding her right shoulder pain I believe that she has pain from disuse as a part of her ongoing recovery and therapy.  She would like to see a physical therapist for stretching and strengthening program so this referral was placed today.      Antoni Bernal MD    Hand, Upper Extremity & Microvascular Surgery  Department of Orthopaedic Surgery  [facility]

## 2025-04-01 NOTE — PROGRESS NOTES
IUD Removal:  SUBJECTIVE:    Is a pregnancy test required: No.  Was a consent obtained?  {Yes/No:828032}    Hilaria Jansen is a 34 year old female,, No LMP recorded (lmp unknown). (Menstrual status: IUD). who presents today for IUD removal. Her current IUD was placed 2.5 years ago. She {PLC has had not had:668758} with the IUD. She requests removal of the IUD because {PLC Removal:860701}    Today's PHQ-2 Score:       1/10/2025     8:52 AM   PHQ-2 (  [employer])   Q1: Little interest or pleasure in doing things 0   Q2: Feeling down, depressed or hopeless 0   PHQ-2 Score 0    Q1: Little interest or pleasure in doing things Not at all   Q2: Feeling down, depressed or hopeless Not at all   PHQ-2 Score 0       Patient-reported         PROCEDURE:    A speculum exam was performed and the cervix was visualized. The IUD string {WAS/WAS NOT:9033} visualized. Using ring forceps, the string  was grasped and the IUD removed intact.    POST PROCEDURE:    The patient {PLC Tolerance:944921}. Patient was discharged in stable condition.    {PLC IUD REMOVAL INST:852408}    Bettie Lott CNM

## 2025-04-02 ENCOUNTER — OFFICE VISIT (OUTPATIENT)
Dept: ORTHOPEDICS | Facility: CLINIC | Age: 35
End: 2025-04-02
Payer: COMMERCIAL

## 2025-04-02 ENCOUNTER — ANCILLARY PROCEDURE (OUTPATIENT)
Dept: GENERAL RADIOLOGY | Facility: CLINIC | Age: 35
End: 2025-04-02
Attending: STUDENT IN AN ORGANIZED HEALTH CARE EDUCATION/TRAINING PROGRAM
Payer: COMMERCIAL

## 2025-04-02 DIAGNOSIS — Z47.89 ORTHOPEDIC AFTERCARE: ICD-10-CM

## 2025-04-02 DIAGNOSIS — M25.511 RIGHT ANTERIOR SHOULDER PAIN: ICD-10-CM

## 2025-04-02 DIAGNOSIS — S62.624A CLOSED DISPLACED FRACTURE OF MIDDLE PHALANX OF RIGHT RING FINGER, INITIAL ENCOUNTER: ICD-10-CM

## 2025-04-02 PROCEDURE — 73140 X-RAY EXAM OF FINGER(S): CPT | Mod: TC | Performed by: RADIOLOGY

## 2025-04-02 NOTE — LETTER
4/2/2025      Hilaria Jansen  [address] [facility] [address]      Dear Colleague,    Thank you for referring your patient, Hilaria Jansen, to the [facility] [place]. Please see a copy of my visit note below.    Orthopaedic Surgery Hand and Upper Extremity Follow Up Clinic Note:  Date: Apr 2, 2025     Visit Provider: Antoni Bernal MD  Patient ID:[id number]    Procedure Performed: Open reduction internal fixation of right ring finger proximal phalanx fracture (54980)      Date of Surgery: 1/13/2025    Subjective:  Hilaria Jansen is a 34 year old female who returns 11 weeks after the above procedure. She states the finger has been feeling better.   She states her range of motions has been improving, however there is still some pain in stiffness.  She is taking Tylenol and ibuprofen as needed for her finger.  She wears a finger splint unless she is doing range of motions exercises. She has been attending hand therapy. For the last several weeks she has noted some increased pain in the anterior aspect of the right shoulder and the upper arm more broadly.     Objective:    LMP  (LMP Unknown)     General: alert, appropriate, NAD  HEENT: NC/AT  CV: RRR by pulse  Pulm: Unlabored on RA  MSK:  R should  Able to abduct the arm above shoulder height, forward flexion above shoulder pain, limited by anterior shoulder pain  Tender to palpation diffusely over the anterior, lateral, posterior shoulder  5/5 strength to internal/external rotation, forward flexion    Right ring finger  Skin clean, dry, intact without erythema or ecchymosis  Scar flat without hyperpigmentation   There is tenderness to palpation over the dorsum of the proximal phalanx, there is mild tenderness palpation diffusely about the middle phalanx, there is no tenderness palpation about the DIP or the distal phalanx    There is no pain during easy range of motion, but pain during extremes of ROM    The tuft is 1  cm from the palm during composite flexion    Actively flexing and extending the PIP and DIP range of motion is described below     MP AROM 0-85, PROM 0-100   PIP AROM 40-65, PROM 30-75 with MP flexion PROM 30-85,   DIP AROM 0-40, PROM 0-45    Sensation intact to R/U aspect of digit distally  Capillary beds distally warm and well perfused    Imaging:    3 view x-ray of the right ring finger obtained in the office today and independently reviewed and interpreted by me demonstrates ongoing osseous union of the prior demonstrated proximal phalanx fracture.  The hardware is intact in good position.     Assessment/Plan:  11 weeks status post right ring finger proximal phalanx open reduction internal fixation    Hilaria Jansen is a 34-year-old female who presents today for the above.  I reviewed my clinical and radiographic findings with her today.  At this time on x-ray she appears to be going on to union.  However, she has persistent tenderness over the dorsum of the proximal phalanx. This is not unexpected given the extensive dissection that was required for the operation.  She has persistent stiffness in the PIP joint which again I reviewed with her is not unexpected but is improving with hand therapy and time.  She will continue with hand therapy in order to hopefully achieve full composite flexion of that digit but I cautioned her that full and normal range of motion after a delayed presentation for proximal phalanx displaced fracture is not unexpected.  She demonstrated understanding of the above.  I will see her in 8 weeks for repeat clinical and radiographic evaluation.  Regarding her right shoulder pain I believe that she has pain from disuse as a part of her ongoing recovery and therapy.  She would like to see a physical therapist for stretching and strengthening program so this referral was placed today.      Antoni Bernal MD    Hand, Upper Extremity & Microvascular Surgery  Department  of Orthopaedic Surgery  [facility]      Again, thank you for allowing me to participate in the care of your patient.        Sincerely,        Antoni Bernal MD    Electronically signed

## 2025-04-03 ENCOUNTER — OFFICE VISIT (OUTPATIENT)
Dept: OBGYN | Facility: CLINIC | Age: 35
End: 2025-04-03
Payer: COMMERCIAL

## 2025-04-03 VITALS — WEIGHT: 186 LBS | DIASTOLIC BLOOD PRESSURE: 96 MMHG | SYSTOLIC BLOOD PRESSURE: 144 MMHG | BODY MASS INDEX: 35.73 KG/M2

## 2025-04-03 DIAGNOSIS — E66.812 CLASS 2 OBESITY DUE TO EXCESS CALORIES WITH BODY MASS INDEX (BMI) OF 35.0 TO 35.9 IN ADULT, UNSPECIFIED WHETHER SERIOUS COMORBIDITY PRESENT: ICD-10-CM

## 2025-04-03 DIAGNOSIS — R03.0 ELEVATED BLOOD PRESSURE READING WITHOUT DIAGNOSIS OF HYPERTENSION: ICD-10-CM

## 2025-04-03 DIAGNOSIS — Z30.432 ENCOUNTER FOR REMOVAL OF INTRAUTERINE CONTRACEPTIVE DEVICE: Primary | ICD-10-CM

## 2025-04-03 DIAGNOSIS — E66.09 CLASS 2 OBESITY DUE TO EXCESS CALORIES WITH BODY MASS INDEX (BMI) OF 35.0 TO 35.9 IN ADULT, UNSPECIFIED WHETHER SERIOUS COMORBIDITY PRESENT: ICD-10-CM

## 2025-04-03 PROBLEM — E66.01 CLASS 2 SEVERE OBESITY WITH BODY MASS INDEX (BMI) OF 35 TO 39.9 WITH SERIOUS COMORBIDITY (H): Status: ACTIVE | Noted: 2025-04-03

## 2025-04-03 NOTE — NURSING NOTE
"Chief Complaint   Patient presents with    IUD     Desires removal of Kyleena IUD due to side effects     initial BP (!) 144/96   Wt 84.4 kg (186 lb)   LMP  (LMP Unknown)   BMI 35.73 kg/m   Estimated body mass index is 35.73 kg/m  as calculated from the following:    Height as of 3/10/25: 1.537 m (5' 0.5\").    Weight as of this encounter: 84.4 kg (186 lb).  BP completed using cuff size large    Caitlin Guaman CMA on 4/3/2025 at 1:30 PM    "

## 2025-04-03 NOTE — PROGRESS NOTES
IUD Removal:  SUBJECTIVE:    Is a pregnancy test required: No.  Was a consent obtained?  Yes    Hilaria Jansen is a 34 year old female,, No LMP recorded (lmp unknown). (Menstrual status: IUD). who presents today for IUD removal. Her current IUD was placed about 3 years ago. She has had problems including weight gain and mood changes  with the IUD. She requests removal of the IUD because of problems stated above.  She discussed that these are considered common problems with the IUD.  She is sure that she wants it removed. She is very frustrate with her weight gain.  She denies any history of hypertension.      Today's PHQ-2 Score:       1/10/2025     8:52 AM   PHQ-2 (  [employer])   Q1: Little interest or pleasure in doing things 0   Q2: Feeling down, depressed or hopeless 0   PHQ-2 Score 0    Q1: Little interest or pleasure in doing things Not at all   Q2: Feeling down, depressed or hopeless Not at all   PHQ-2 Score 0       Patient-reported     Assessment/Plan:    (E66.812,  Z68.35,  E66.09) Class 2 obesity due to excess calories with body mass index (BMI) of 35.0 to 35.9 in adult, unspecified whether serious comorbidity present  (primary encounter diagnosis)  Plan: Adult Comprehensive Weight Management         Referral    (R03.0) Elevated blood pressure reading without diagnosis of hypertension  Plan: Please follow up with PCP     (Z30.432) Encounter for removal of intrauterine contraceptive device  Plan: REMOVE INTRAUTERINE DEVICE        See procedure below     PROCEDURE:    A speculum exam was performed and the cervix was visualized. The IUD string was visualized. Using ring forceps, the string  was grasped and the IUD removed intact.    POST PROCEDURE:    The patient tolerated the procedure well. Patient was discharged in stable condition.    Call if bleeding, pain or fever occur., Birth control counseling given., and Use of condoms/foam discussed.    Bettie Lott CNM

## 2025-04-24 ENCOUNTER — ANCILLARY PROCEDURE (OUTPATIENT)
Dept: GENERAL RADIOLOGY | Facility: CLINIC | Age: 35
End: 2025-04-24
Attending: PHYSICIAN ASSISTANT
Payer: COMMERCIAL

## 2025-04-24 ENCOUNTER — OFFICE VISIT (OUTPATIENT)
Dept: URGENT CARE | Facility: URGENT CARE | Age: 35
End: 2025-04-24
Payer: COMMERCIAL

## 2025-04-24 VITALS
TEMPERATURE: 98.4 F | HEART RATE: 81 BPM | WEIGHT: 188.1 LBS | RESPIRATION RATE: 19 BRPM | SYSTOLIC BLOOD PRESSURE: 158 MMHG | OXYGEN SATURATION: 100 % | BODY MASS INDEX: 36.93 KG/M2 | DIASTOLIC BLOOD PRESSURE: 112 MMHG | HEIGHT: 60 IN

## 2025-04-24 DIAGNOSIS — I10 HYPERTENSION, UNSPECIFIED TYPE: ICD-10-CM

## 2025-04-24 DIAGNOSIS — M54.50 LUMBAR PAIN: ICD-10-CM

## 2025-04-24 DIAGNOSIS — M25.571 ACUTE BILATERAL ANKLE PAIN: Primary | ICD-10-CM

## 2025-04-24 DIAGNOSIS — R07.9 CHEST PAIN, UNSPECIFIED TYPE: ICD-10-CM

## 2025-04-24 DIAGNOSIS — M25.572 ACUTE BILATERAL ANKLE PAIN: Primary | ICD-10-CM

## 2025-04-24 LAB
ALBUMIN SERPL-MCNC: 3.8 G/DL (ref 3.4–5)
ALBUMIN UR-MCNC: NEGATIVE MG/DL
ALP SERPL-CCNC: 57 U/L (ref 40–150)
ALT SERPL W P-5'-P-CCNC: 22 U/L (ref 0–50)
ANION GAP SERPL CALCULATED.3IONS-SCNC: 2 MMOL/L (ref 3–14)
APPEARANCE UR: CLEAR
AST SERPL W P-5'-P-CCNC: 30 U/L (ref 0–45)
BASOPHILS # BLD AUTO: 0 10E3/UL (ref 0–0.2)
BASOPHILS NFR BLD AUTO: 0 %
BILIRUB SERPL-MCNC: 0.9 MG/DL (ref 0.2–1.3)
BILIRUB UR QL STRIP: NEGATIVE
BUN SERPL-MCNC: 12 MG/DL (ref 7–30)
CALCIUM SERPL-MCNC: 10 MG/DL (ref 8.5–10.1)
CHLORIDE BLD-SCNC: 108 MMOL/L (ref 94–109)
CO2 SERPL-SCNC: 31 MMOL/L (ref 20–32)
COLOR UR AUTO: YELLOW
CREAT SERPL-MCNC: 1 MG/DL (ref 0.52–1.04)
D DIMER PPP FEU-MCNC: <0.27 UG/ML FEU (ref 0–0.5)
EGFRCR SERPLBLD CKD-EPI 2021: 75 ML/MIN/1.73M2
EOSINOPHIL # BLD AUTO: 0.1 10E3/UL (ref 0–0.7)
EOSINOPHIL NFR BLD AUTO: 1 %
ERYTHROCYTE [DISTWIDTH] IN BLOOD BY AUTOMATED COUNT: 12.5 % (ref 10–15)
GLUCOSE BLD-MCNC: 92 MG/DL (ref 70–99)
GLUCOSE UR STRIP-MCNC: NEGATIVE MG/DL
HCT VFR BLD AUTO: 37.4 % (ref 35–47)
HGB BLD-MCNC: 12.6 G/DL (ref 11.7–15.7)
HGB UR QL STRIP: NEGATIVE
IMM GRANULOCYTES # BLD: 0 10E3/UL
IMM GRANULOCYTES NFR BLD: 0 %
KETONES UR STRIP-MCNC: NEGATIVE MG/DL
LEUKOCYTE ESTERASE UR QL STRIP: NEGATIVE
LYMPHOCYTES # BLD AUTO: 3.4 10E3/UL (ref 0.8–5.3)
LYMPHOCYTES NFR BLD AUTO: 45 %
MCH RBC QN AUTO: 28.1 PG (ref 26.5–33)
MCHC RBC AUTO-ENTMCNC: 33.7 G/DL (ref 31.5–36.5)
MCV RBC AUTO: 84 FL (ref 78–100)
MONOCYTES # BLD AUTO: 0.4 10E3/UL (ref 0–1.3)
MONOCYTES NFR BLD AUTO: 5 %
NEUTROPHILS # BLD AUTO: 3.7 10E3/UL (ref 1.6–8.3)
NEUTROPHILS NFR BLD AUTO: 48 %
NITRATE UR QL: NEGATIVE
PH UR STRIP: 6 [PH] (ref 5–7)
PLATELET # BLD AUTO: 237 10E3/UL (ref 150–450)
POTASSIUM BLD-SCNC: 4.5 MMOL/L (ref 3.4–5.3)
PROT SERPL-MCNC: 8.2 G/DL (ref 6.8–8.8)
RBC # BLD AUTO: 4.48 10E6/UL (ref 3.8–5.2)
SODIUM SERPL-SCNC: 141 MMOL/L (ref 135–145)
SP GR UR STRIP: 1.01 (ref 1–1.03)
TROPONIN T SERPL HS-MCNC: 12 NG/L
UROBILINOGEN UR STRIP-ACNC: 0.2 E.U./DL
WBC # BLD AUTO: 7.6 10E3/UL (ref 4–11)

## 2025-04-24 RX ORDER — AMLODIPINE BESYLATE 2.5 MG/1
2.5 TABLET ORAL DAILY
Qty: 30 TABLET | Refills: 3 | Status: SHIPPED | OUTPATIENT
Start: 2025-04-24

## 2025-04-24 RX ORDER — NAPROXEN 500 MG/1
500 TABLET ORAL
Qty: 30 TABLET | Refills: 0 | Status: SHIPPED | OUTPATIENT
Start: 2025-04-24 | End: 2025-05-04

## 2025-04-24 ASSESSMENT — ENCOUNTER SYMPTOMS
CONSTIPATION: 0
FEVER: 0
CHEST TIGHTNESS: 1
DYSURIA: 0
PALPITATIONS: 0
FLANK PAIN: 0
SPEECH DIFFICULTY: 0
COUGH: 0
TREMORS: 0
LIGHT-HEADEDNESS: 0
FREQUENCY: 0
WHEEZING: 0
DIARRHEA: 0
HEMATURIA: 0
PHOTOPHOBIA: 0
NAUSEA: 0
HEADACHES: 0
FATIGUE: 0
VOMITING: 0
DIZZINESS: 0
SHORTNESS OF BREATH: 0

## 2025-04-24 NOTE — PROGRESS NOTES
Acute bilateral ankle pain  - XR Ankle Bilateral G/E 3 Views  - EKG 12-lead, tracing only  - XR Chest 2 Views  - CBC with platelets and differential  - D dimer, quantitative  - naproxen (NAPROSYN) 500 MG tablet; Take 1 tablet (500 mg) by mouth 3 times daily (with meals) for 10 days.    Chest pain, unspecified type  - EKG 12-lead, tracing only  - XR Chest 2 Views  - CBC with platelets and differential  - D dimer, quantitative  - Troponin T, High Sensitivity  - Comprehensive metabolic panel (BMP + Alb, Alk Phos, ALT, AST, Total. Bili, TP)  - naproxen (NAPROSYN) 500 MG tablet; Take 1 tablet (500 mg) by mouth 3 times daily (with meals) for 10 days.  - amLODIPine (NORVASC) 2.5 MG tablet; Take 1 tablet (2.5 mg) by mouth daily.    Lumbar pain  - UA Macroscopic with reflex to Microscopic and Culture - Clinic Collect    General Tips for All Ages:    R.I.C.E Method:  Why: This helps reduce swelling and promotes healing.  What to Do:  Rest: Allow the injured area to rest.  Ice: Apply an ice pack for 15-20 minutes every 2-3 hours.  Compression: Use a compression bandage to control swelling.  Elevation: Elevate the injured limb to reduce swelling.    Over-the-Counter (OTC) Pain Relievers:  Why: Manages pain and reduces inflammation.  What to Use:  All Ages: Acetaminophen or ibuprofen as directed on the package.    Avoid H.A.R.M:  Why: To prevent further injury.  What to Avoid:  Heat: Avoid heat packs initially.  Alcohol: Can increase swelling.  Running: Until you receive clearance from a healthcare provider.  Massage: Avoid direct pressure on the injured area.    For Adults (18 Years and Older):    Physical Therapy (if prescribed):  Why: Helps restore strength and flexibility.  What to Do: Follow the prescribed exercises and attend therapy sessions.    Bracing/Taping (if prescribed):  Why: Provides support during recovery.  What to Do: Use braces or tape as directed by your healthcare provider.    For Adolescents (12-17  Years):    OTC Pain Relievers:  Why: Manages pain and reduces inflammation.  What to Use: Acetaminophen or ibuprofen as directed on the package.    Physical Therapy (if prescribed):  Why: Aids in regaining strength and flexibility.  What to Do: Follow the exercises given by your physical therapist.    For Children (Under 12 Years):    Pediatrician Consultation:  Why: Children's injuries may need specialized care.  What to Do: Consult your child's pediatrician for guidance.    OTC Pain Relievers (if approved by pediatrician):  Why: Manages pain and reduces inflammation.  What to Use: Follow your pediatrician's advice on using acetaminophen or ibuprofen.    All Ages:    Hydration and Nutrition:  Why: Essential for healing.  What to Do: Stay hydrated and ensure a balanced diet rich in vitamins and minerals.    Restorative Sleep:  Why: Crucial for recovery.  What to Do: Ensure you get adequate and quality sleep.  Follow-Up:    Patient advised to return to clinic for reevaluation (either UC or PCP) if symptoms do not improve in 7 days. Patient educated on red flag symptoms and asked to go directly to the ED if these symptoms present themselves.     Remember, individual cases may vary, and it's crucial to follow your healthcare provider's advice. If you have concerns or if symptoms persist, don't hesitate to reach out for further guidance.      Hypertension, unspecified type  - amLODIPine (NORVASC) 2.5 MG tablet; Take 1 tablet (2.5 mg) by mouth daily.    General Tips for All Ages:    Lifestyle Modifications:  Why: Lifestyle changes can significantly impact blood pressure.  What to Do:  Maintain a healthy weight.  Adopt a balanced diet with low sodium.  Engage in regular physical activity.  Limit alcohol intake.  Quit smoking.    Regular Blood Pressure Monitoring:  Why: Keep track of your blood pressure to gauge progress.  What to Do: Invest in a reliable home blood pressure monitor and follow your healthcare provider's  recommendations.    Stress Management:  Why: Chronic stress can contribute to elevated blood pressure.  What to Do: Incorporate stress-reducing activities like deep breathing, meditation, or hobbies.    For Adults (18 Years and Older):    Prescription Medications (if prescribed):  Why: Some may need medications to control blood pressure effectively.  What to Do: Take medications as directed by your healthcare provider.    Over-the-Counter (OTC) Supplements:  Why: Some supplements may have a modest impact on blood pressure.  What to Use:  Omega-3 Fatty Acids: Found in fish oil.  Coenzyme Q10 (CoQ10): Consult with your healthcare provider before taking any supplements.    For Adults and Seniors (50 Years and Older):    Calcium Channel Blockers (if prescribed):  Why: Can help relax blood vessels.  What to Do: Take medications, like amlodipine, as prescribed.    Thiazide Diuretics (if prescribed):  Why: Promotes fluid loss, reducing blood volume.  What to Do: Follow your healthcare provider's recommendations for hydrochlorothiazide or similar medications.    For Seniors (65 Years and Older):    Regular Health Check-ups:  Why: Regular monitoring is crucial for seniors.  What to Do: Attend routine health check-ups, including blood pressure assessments.    Medication Adjustments (if needed):  Why: Medication needs may change with age.  What to Do: Work closely with your healthcare provider to adjust medications as necessary.    All Ages:    Limit Caffeine Intake:  Why: Excessive caffeine can temporarily raise blood pressure.  What to Do: Moderate your coffee and tea consumption.    Follow-Up:    I recommended that the patient check their blood pressure at home 2-3 times daily and write the numbers down.  If they continue to have elevated blood pressures, I would like them to have a follow-up with their primary care provider in the next 1 to 2 weeks.  If there blood pressure numbers are within normal limits, the patient may  follow-up with their primary care provider in the next 1 to 2 months.  Patient educated on red flag symptoms and asked to go directly to the ED if these symptoms present themselves.     Remember, hypertension management is a lifelong commitment. By combining lifestyle changes, medications (if prescribed), and regular monitoring, you can take control of your blood pressure and maintain a healthier life.    Wishing you good health!     Follow up with PCP in 1-3 months for any medication adjustments.      General Tips for All Ages:    R.I.C.E Method:  Why: This helps reduce swelling and promotes healing.  What to Do:  Rest: Allow the injured area to rest.  Ice: Apply an ice pack for 15-20 minutes every 2-3 hours.  Compression: Use a compression bandage to control swelling.  Elevation: Elevate the injured limb to reduce swelling.    Over-the-Counter (OTC) Pain Relievers:  Why: Manages pain and reduces inflammation.  What to Use:  All Ages: Acetaminophen or ibuprofen as directed on the package.    Avoid H.A.R.M:  Why: To prevent further injury.  What to Avoid:  Heat: Avoid heat packs initially.  Alcohol: Can increase swelling.  Running: Until you receive clearance from a healthcare provider.  Massage: Avoid direct pressure on the injured area.    For Adults (18 Years and Older):    Physical Therapy (if prescribed):  Why: Helps restore strength and flexibility.  What to Do: Follow the prescribed exercises and attend therapy sessions.    Bracing/Taping (if prescribed):  Why: Provides support during recovery.  What to Do: Use braces or tape as directed by your healthcare provider.    For Adolescents (12-17 Years):    OTC Pain Relievers:  Why: Manages pain and reduces inflammation.  What to Use: Acetaminophen or ibuprofen as directed on the package.    Physical Therapy (if prescribed):  Why: Aids in regaining strength and flexibility.  What to Do: Follow the exercises given by your physical therapist.    For Children (Under  12 Years):    Pediatrician Consultation:  Why: Children's injuries may need specialized care.  What to Do: Consult your child's pediatrician for guidance.    OTC Pain Relievers (if approved by pediatrician):  Why: Manages pain and reduces inflammation.  What to Use: Follow your pediatrician's advice on using acetaminophen or ibuprofen.    All Ages:    Hydration and Nutrition:  Why: Essential for healing.  What to Do: Stay hydrated and ensure a balanced diet rich in vitamins and minerals.    Restorative Sleep:  Why: Crucial for recovery.  What to Do: Ensure you get adequate and quality sleep.  Follow-Up:    Patient advised to return to clinic for reevaluation (either UC or PCP) if symptoms do not improve in 10 days. Patient educated on red flag symptoms and asked to go directly to the ED if these symptoms present themselves.     Remember, individual cases may vary, and it's crucial to follow your healthcare provider's advice. If you have concerns or if symptoms persist, don't hesitate to reach out for further guidance.       Douleur dans la poitrine : recommandations de soin  Chest Pain: Care Instructions  Carly recommandations de soin  Beaucoup de choses peuvent entraîner une douleur dans la poitrine. Certaines ne sont pas graves et s'améliorent d'elles-mêmes en quelques jours. Mais d'autres types de douleurs nécessitent plus d'examens et un traitement. Votre médecin peut vous recommander une consultation de suivi dans un délai de 8 à 12 heures. Si vous ne vous sentez pas mieux, vous devrez passer d'autres examens ou aurez besoin d'un autre traitement.  Même si le médecin vous a laissé rentrer chez vous, vous devez continuer à surveiller tout problème. Le médecin vous a soigneusement examiné mais parfois, mary problèmes peuvent survenir plus tard. Si vous avez de nouveaux symptômes ou si carly symptômes ne s'améliorent pas, obtenez immédiatement mary soins médicaux.  Si vous ressentez une douleur ou une pression dans la  poitrine, plus forte ou différente, sandhya dure plus de 5 minutes ou si vous vous évanouissez (perte de conscience), appelez le 911 ou demandez immédiatement une autre aide d'urgence.   La visite médicale n'est qu'une étape de votre traitement. Même si vous vous sentez mieux, vous devez toujours vous conformer aux recommandations de votre médecin, comme de vous rendre à tous les rendez-vous de suivi recommandés et de prendre calry médicaments exactement tels qu'ils vous ont été prescrits. Elton vous aidera à récupérer et prévenir les futurs problèmes.  Comment prendre soin de vous à la edwardo ?  Reposez-vous jusqu'à ce que vous vous sentiez mieux.  Prenez carly médicaments exactement tels qu'ils vous ont été prescrits. Contactez votre médecin si vous pensez avoir un problème avec votre médicament.  Ne prenez pas le volant après avoir dalia un médicament antidouleur en ordonnance.  Quand devez-vous demander de l'aide ?  Appelez le 911 si :   Vous vous évanouissez (perte de conscience).  Vous avez de grandes difficultés à respirer.  Vous avez fredi symptômes de crise cardiaque. Elton peut comprendre :  Fredi douleurs ou fredi pressions dans la poitrine, ou une sensation étrange dans la poitrine.  Fredi suées.  Un essoufflement.  Fredi nausées ou fredi vomissements.  Une douleur, pression ou sensation étrange dans le dos, le cou, la mâchoire, la région supérieure de l'abdomen ou à l'épaule ou dans un bras ou dans les deux.  Fredi étourdissements ou une faiblesse soudaine.  Un rythme cardiaque rapide ou irrégulier.  Après avoir appelé le 911, l'opérateur peut vous conseiller de prendre une aspirine (dosage pour adulte) ou 2 à 4 aspirines à faible dose. Attendez l'ambulance. N'essayez pas de conduire seul.  Appelez dès à présent votre médecin si :   vous avez fredi difficultés à respirer.  Votre douleur dans la poitrine s'accentue.  Vous avez fredi vertiges ou fredi étourdissements, ou vous sentez que vous allez vous évanouir.  Votre état ne  "s'améliore pas.  Vous ressentez une nouvelle douleur dans la poitrine ou une douleur différente.  Où obtenir plus d informations ?  Aller   [email or web address]  Saisir votre recherche A120 dans la zone prévue à cet effet pour en savoir plus \"Douleur dans la poitrine : recommandations de soin.\"  À jour le: 31 juillet 2024  Version du contenu: 14.4    [id number] [employer].   Les instructions ont été adaptées sous licence par [facility] professionnel de santé. Pour toute question en un état de santé ou en yuri instructions, demandez toujours l samara de [facility] professionCommunity Health de santé. [employer] décline toute garantie ou responsabilité quant à votre utilisation de yuri informations.          40 minutes spent by me on the date of the encounter doing chart review, history and exam, documentation and further activities per the note    CATHERINE [facility] URGENT [facility]    Subjective   34 year old who presents to clinic today for the following health issues:    Musculoskeletal Problem       HPI   Where in your body do you have pain? Musculoskeletal problem/pain  Onset/Duration: Bilateral ankle pain for the last couple weeks. No injury. Some swelling. Patient states that the pain came on gradually   Description  Location: Bilateral ankle   Joint Swelling: YES  Redness: No  Pain: YES  Warmth: No  Intensity:  moderate  Progression of Symptoms:  waxing and waning  Accompanying signs and symptoms:   Fevers: No  Numbness/tingling/weakness: YES  History  Trauma to the area: No  Recent illness:  No  Previous similar problem: No  Previous evaluation:  No  Precipitating or alleviating factors:  Aggravating factors include: standing, walking, and climbing stairs  Therapies tried and outcome: rest/inactivity and acetaminophen     Patient states that she has also been having some mild low back pain and mid sternal chest pain. She denies any shortness of breath or heart palpitations. She " denies any radiating pains down the arms. Denies any urinary symptoms or cold/flu symptoms.     Review of Systems   Review of Systems   Constitutional:  Negative for fatigue and fever.   Eyes:  Negative for photophobia.   Respiratory:  Positive for chest tightness. Negative for cough, shortness of breath and wheezing.    Cardiovascular:  Positive for chest pain. Negative for palpitations.   Gastrointestinal:  Negative for constipation, diarrhea, nausea and vomiting.   Genitourinary:  Negative for decreased urine volume, dysuria, flank pain, frequency, hematuria, pelvic pain and urgency.   Neurological:  Negative for dizziness, tremors, syncope, speech difficulty, light-headedness and headaches.      See HPI    Objective    Temp: 98.4  F (36.9  C) Temp src: Oral BP: (!) 158/112 Pulse: 81   Resp: 19 SpO2: 100 %       Physical Exam   Physical Exam  Constitutional:       General: She is not in acute distress.     Appearance: Normal appearance. She is normal weight. She is not ill-appearing, toxic-appearing or diaphoretic.   HENT:      Head: Normocephalic and atraumatic.   Cardiovascular:      Rate and Rhythm: Normal rate and regular rhythm.      Pulses: Normal pulses.      Heart sounds: Normal heart sounds. No murmur heard.     No friction rub. No gallop.   Pulmonary:      Effort: Pulmonary effort is normal. No respiratory distress.      Breath sounds: Normal breath sounds. No stridor. No wheezing, rhonchi or rales.   Chest:      Chest wall: No tenderness.   Musculoskeletal:      Lumbar back: Normal. No swelling, deformity, tenderness or bony tenderness. Normal range of motion. Negative right straight leg raise test and negative left straight leg raise test. No scoliosis.        Feet:    Feet:      Comments: Patient has tenderness in the area shown above with no deformity, swelling, bruising, or   Neurological:      General: No focal deficit present.      Mental Status: She is alert and oriented to person, place, and  time. Mental status is at baseline.      Gait: Gait normal.   Psychiatric:         Mood and Affect: Mood normal.         Behavior: Behavior normal.         Thought Content: Thought content normal.         Judgment: Judgment normal.          Results for orders placed or performed in visit on 04/24/25 (from the past 24 hours)   XR Ankle Bilateral G/E 3 Views    Narrative    EXAM: XR ANKLE BILATERAL G/E 3 VIEWS  LOCATION: [facility]  DATE: 4/24/2025    INDICATION:  Acute bilateral ankle pain, Acute bilateral ankle pain  COMPARISON: None.      Impression    IMPRESSION: Normal joint spaces and alignment. No fracture.   XR Chest 2 Views    Narrative    EXAM: XR CHEST 2 VIEWS  LOCATION: [facility]  DATE: 4/24/2025    INDICATION: Chest pain.  COMPARISON: None.      Impression    IMPRESSION: Negative chest. Lungs clear.   CBC with platelets and differential    Narrative    The following orders were created for panel order CBC with platelets and differential.  Procedure                               Abnormality         Status                     ---------                               -----------         ------                     CBC with platelets and ...[[id number]]                      Final result                 Please view results for these tests on the individual orders.   D dimer, quantitative   Result Value Ref Range    D-Dimer Quantitative <0.27 0.00 - 0.50 ug/mL FEU    Narrative    This D-dimer assay is intended for use in conjunction with a clinical pretest probability assessment model to exclude pulmonary embolism (PE) and deep venous thrombosis (DVT) in outpatients suspected of PE or DVT. The cut-off value is 0.50 ug/mL FEU.   Comprehensive metabolic panel (BMP + Alb, Alk Phos, ALT, AST, Total. Bili, TP)   Result Value Ref Range    Sodium 141 135 - 145 mmol/L    Potassium 4.5 3.4 - 5.3 mmol/L    Chloride 108 94 - 109 mmol/L    Carbon Dioxide (CO2) 31 20 - 32  mmol/L    Anion Gap 2 (L) 3 - 14 mmol/L    Urea Nitrogen 12 7 - 30 mg/dL    Creatinine 1.00 0.52 - 1.04 mg/dL    GFR Estimate 75 >60 mL/min/1.73m2    Calcium 10.0 8.5 - 10.1 mg/dL    Glucose 92 70 - 99 mg/dL    Alkaline Phosphatase 57 40 - 150 U/L    AST 30 0 - 45 U/L    ALT 22 0 - 50 U/L    Protein Total 8.2 6.8 - 8.8 g/dL    Albumin 3.8 3.4 - 5.0 g/dL    Bilirubin Total 0.9 0.2 - 1.3 mg/dL   CBC with platelets and differential   Result Value Ref Range    WBC Count 7.6 4.0 - 11.0 10e3/uL    RBC Count 4.48 3.80 - 5.20 10e6/uL    Hemoglobin 12.6 11.7 - 15.7 g/dL    Hematocrit 37.4 35.0 - 47.0 %    MCV 84 78 - 100 fL    MCH 28.1 26.5 - 33.0 pg    MCHC 33.7 31.5 - 36.5 g/dL    RDW 12.5 10.0 - 15.0 %    Platelet Count 237 150 - 450 10e3/uL    % Neutrophils 48 %    % Lymphocytes 45 %    % Monocytes 5 %    % Eosinophils 1 %    % Basophils 0 %    % Immature Granulocytes 0 %    Absolute Neutrophils 3.7 1.6 - 8.3 10e3/uL    Absolute Lymphocytes 3.4 0.8 - 5.3 10e3/uL    Absolute Monocytes 0.4 0.0 - 1.3 10e3/uL    Absolute Eosinophils 0.1 0.0 - 0.7 10e3/uL    Absolute Basophils 0.0 0.0 - 0.2 10e3/uL    Absolute Immature Granulocytes 0.0 <=0.4 10e3/uL   UA Macroscopic with reflex to Microscopic and Culture - Clinic Collect    Specimen: Urine, Midstream   Result Value Ref Range    Color Urine Yellow Colorless, Straw, Light Yellow, Yellow    Appearance Urine Clear Clear    Glucose Urine Negative Negative mg/dL    Bilirubin Urine Negative Negative    Ketones Urine Negative Negative mg/dL    Specific Gravity Urine 1.015 1.003 - 1.035    Blood Urine Negative Negative    pH Urine 6.0 5.0 - 7.0    Protein Albumin Urine Negative Negative mg/dL    Urobilinogen Urine 0.2 0.2, 1.0 E.U./dL    Nitrite Urine Negative Negative    Leukocyte Esterase Urine Negative Negative    Narrative    Microscopic not indicated

## 2025-04-24 NOTE — PROGRESS NOTES
Urgent Care Clinic Visit    Chief Complaint   Patient presents with    Musculoskeletal Problem     Right ankle/calf pain for the last few weeks. some swelling. Also complain of mild chest discomfort for the last few days. No hx of DVT/PE.               4/24/2025    10:18 AM   Additional Questions   Roomed by Blair Lam MA   Accompanied by Self

## 2025-05-05 ENCOUNTER — THERAPY VISIT (OUTPATIENT)
Dept: OCCUPATIONAL THERAPY | Facility: CLINIC | Age: 35
End: 2025-05-05
Payer: COMMERCIAL

## 2025-05-05 DIAGNOSIS — Z87.81 S/P ORIF (OPEN REDUCTION INTERNAL FIXATION) FRACTURE: ICD-10-CM

## 2025-05-05 DIAGNOSIS — S62.624A CLOSED DISPLACED FRACTURE OF MIDDLE PHALANX OF RIGHT RING FINGER, INITIAL ENCOUNTER: Primary | ICD-10-CM

## 2025-05-05 DIAGNOSIS — Z98.890 S/P ORIF (OPEN REDUCTION INTERNAL FIXATION) FRACTURE: ICD-10-CM

## 2025-05-05 DIAGNOSIS — M25.641 STIFFNESS OF FINGER JOINT, RIGHT: ICD-10-CM

## 2025-05-05 DIAGNOSIS — M79.644 FINGER PAIN, RIGHT: ICD-10-CM

## 2025-05-05 PROCEDURE — 97140 MANUAL THERAPY 1/> REGIONS: CPT | Mod: GO | Performed by: OCCUPATIONAL THERAPIST

## 2025-05-05 PROCEDURE — 97110 THERAPEUTIC EXERCISES: CPT | Mod: GO | Performed by: OCCUPATIONAL THERAPIST

## 2025-05-09 NOTE — PROGRESS NOTES
Orthopaedic Surgery Hand and Upper Extremity Follow Up Clinic Note:  Date: May 28, 2025     Visit Provider: Antoni Bernal MD  Patient ID:[id number]    Procedure Performed: Open reduction internal fixation of right ring finger proximal phalanx fracture (39579)      Date of Surgery: 1/13/2025    Subjective:  Hilaria Jansen is a 35 year old female who returns 4 months after the above procedure. Patient states her finger is feeling better, but there is still room for improvement.  Her main complaint today is her right shoulder pain, which was discussed at the last visit.  Physical therapy was ordered, but patient has not yet scheduled for that.  She has been attending hand therapy and making gains. She only occasionally has pain in the finger during the extremes of motion.     Objective:    There were no vitals taken for this visit.    MSK:  LMP  (LMP Unknown)      General: alert, appropriate, NAD  HEENT: NC/AT  CV: RRR by pulse  Pulm: Unlabored on RA  MSK:    Right ring finger  Skin clean, dry, intact without erythema or ecchymosis  Scar flat without hyperpigmentation   No signficiant tenderness to palpation about the proximal, middle, or distal phalanx   There is no pain during mid ranges of motion, but pain during extremes of ROM  The tuft is  <1 cm from the palm during composite flexion  No apparent rotational or angular deformities  Actively flexing and extending the PIP and DIP range of motion is described below  MP AROM 0-85, PROM 0-100   PIP AROM 40-70, PROM 30-80 with MP flexion PROM 30-85,   DIP AROM 0-40, PROM 0-45  Sensation intact to R/U aspect of digit distally  Capillary beds distally warm and well perfused    Imaging:    Three-view x-ray of the right ring finger obtained in the office today and independently reviewed and interpreted by me demonstrates interval healing of the prior demonstrated proximal phalanx fracture.  The hardware is intact in good position there is evidence of osseous union.   There is an osteophyte at the radial aspect of the proximal phalangeal head..     Assessment/Plan:  4-month status post right ring finger proximal phalanx subacute fracture ORIF    Hilaria Jansen is a 35-year-old female who presents today for the above.  I reviewed my clinical and radiographic findings with her today.  I discussed with her that she is making small gains with hand therapy as time goes on, despite her persistent stiffness. Dutton admits that the finger is less bothersome for her with daily activities that it was previously.  I discussed with her that if she continues to find that the finger is bothersome then in the future we could consider ostectomy of the osteophyte and contracture release, but this would be an involved operation with significant postoperative hand therapy needs.Today her biggest concern and complaint is with her right shoulder. She has an appointment with physical therapy next week for strengthening, stretching, ROM, and stabilization exercises. I would like to see her again in 3 months for repeat clinical and radiographic examination.     Antoni Bernal MD    Hand, Upper Extremity & Microvascular Surgery  Department of Orthopaedic Surgery  [facility]

## 2025-05-15 ENCOUNTER — OFFICE VISIT (OUTPATIENT)
Dept: INTERNAL MEDICINE | Facility: CLINIC | Age: 35
End: 2025-05-15
Payer: COMMERCIAL

## 2025-05-15 VITALS
DIASTOLIC BLOOD PRESSURE: 92 MMHG | SYSTOLIC BLOOD PRESSURE: 135 MMHG | HEART RATE: 78 BPM | WEIGHT: 189 LBS | HEIGHT: 60 IN | BODY MASS INDEX: 37.11 KG/M2

## 2025-05-15 DIAGNOSIS — R07.9 CHEST PAIN, UNSPECIFIED TYPE: ICD-10-CM

## 2025-05-15 DIAGNOSIS — G89.29 CHRONIC PAIN OF BOTH ANKLES: ICD-10-CM

## 2025-05-15 DIAGNOSIS — M25.571 CHRONIC PAIN OF BOTH ANKLES: ICD-10-CM

## 2025-05-15 DIAGNOSIS — I10 HYPERTENSION, UNSPECIFIED TYPE: Primary | ICD-10-CM

## 2025-05-15 DIAGNOSIS — M25.572 CHRONIC PAIN OF BOTH ANKLES: ICD-10-CM

## 2025-05-15 RX ORDER — AMLODIPINE BESYLATE 2.5 MG/1
5 TABLET ORAL DAILY
Qty: 30 TABLET | Refills: 3 | Status: SHIPPED | OUTPATIENT
Start: 2025-05-15

## 2025-05-15 ASSESSMENT — PAIN SCALES - GENERAL: PAINLEVEL_OUTOF10: NO PAIN (0)

## 2025-05-15 NOTE — PATIENT INSTRUCTIONS
Discussed with the patient that I am an acute same-day provider and that I am not a primary care provider.  We discussed that she cannot establish care with myself rather she needs to make an appointment to get established with a primary care in any of the [facility] clinics at her earliest convenience. I also stressed the importance of having one provider that she sees regularly. Patient verbalized understanding.       Make sure you are getting sleep. That might mean childcare and/or getting a new job.     Increase amlodipine to 5 mg once daily

## 2025-05-15 NOTE — PROGRESS NOTES
Assessment & Plan     Hypertension, unspecified type  Patient presents to the clinic for chief complaint of urgent care follow-up regarding hypertension.  Blood pressure is still slightly elevated for patient's age.  Will increase her amlodipine to 5 mg once daily.  Patient should follow-up with primary care provider in 1 month.  Patient agrees with plan.  - amLODIPine (NORVASC) 2.5 MG tablet; Take 2 tablets (5 mg) by mouth daily.    Chronic pain of both ankles  Will refer to PT  - Physical Therapy  Referral; Future    Chest pain, unspecified type  Will increase medication to 5 mg.   - amLODIPine (NORVASC) 2.5 MG tablet; Take 2 tablets (5 mg) by mouth daily.    The longitudinal plan of care for the diagnosis(es)/condition(s) as documented were addressed during this visit. Due to the added complexity in care, I will continue to support Hilaria in the subsequent management and with ongoing continuity of care.      30 minutes spent by me on the date of the encounter doing chart review, history and exam, documentation and further activities per the note      BMI  Estimated body mass index is 36.91 kg/m  as calculated from the following:    Height as of this encounter: 1.524 m (5').    Weight as of this encounter: 85.7 kg (189 lb).   Weight management plan: Patient was referred to their PCP to discuss a diet and exercise plan.          Subjective   Hilaria is a 35 year old, presenting for the following health issues:  Patient is here today for an  follow-up for high blood pressure.   She states she works over nights and she doesn't get to sleep much.   She states sometimes she has some chest pain and some pain in her back.   She is worried she could be having an ulcer.   She has had some pain on her ankle, mostly on the right. She was given naproxen but she is still having pain     She does report having stress.   She also discussed being overweight with her PCP at last visit.   She did make an appointment with  weight management.       No chief complaint on file.    HPI                Review of Systems  Constitutional, HEENT, cardiovascular, pulmonary, gi and gu systems are negative, except as otherwise noted.      Objective    BP (!) 135/92   Pulse 78   Ht 1.524 m (5')   Wt 85.7 kg (189 lb)   Breastfeeding No   BMI 36.91 kg/m    Body mass index is 36.91 kg/m .  Physical Exam   GENERAL: alert and no distress  NECK: no adenopathy, no asymmetry, masses, or scars  RESP: lungs clear to auscultation - no rales, rhonchi or wheezes  CV: regular rate and rhythm, normal S1 S2, no S3 or S4, no murmur, click or rub, no peripheral edema  ABDOMEN: soft, nontender, no hepatosplenomegaly, no masses and bowel sounds normal  MS: no gross musculoskeletal defects noted, no edema            Signed Electronically by: LEONA Alonzo CNP

## 2025-05-28 ENCOUNTER — OFFICE VISIT (OUTPATIENT)
Dept: ORTHOPEDICS | Facility: CLINIC | Age: 35
End: 2025-05-28
Payer: COMMERCIAL

## 2025-05-28 ENCOUNTER — ANCILLARY PROCEDURE (OUTPATIENT)
Dept: GENERAL RADIOLOGY | Facility: CLINIC | Age: 35
End: 2025-05-28
Attending: STUDENT IN AN ORGANIZED HEALTH CARE EDUCATION/TRAINING PROGRAM
Payer: COMMERCIAL

## 2025-05-28 DIAGNOSIS — Z47.89 ORTHOPEDIC AFTERCARE: ICD-10-CM

## 2025-05-28 DIAGNOSIS — Z47.89 ORTHOPEDIC AFTERCARE: Primary | ICD-10-CM

## 2025-05-28 PROCEDURE — 73140 X-RAY EXAM OF FINGER(S): CPT | Mod: TC | Performed by: RADIOLOGY

## 2025-05-28 PROCEDURE — 99214 OFFICE O/P EST MOD 30 MIN: CPT | Performed by: STUDENT IN AN ORGANIZED HEALTH CARE EDUCATION/TRAINING PROGRAM

## 2025-05-28 NOTE — LETTER
5/28/2025      Hilaria Jansen  [address] [address]      Dear Colleague,    Thank you for referring your patient, Hilaria Jansen, to the [facility] [place]. Please see a copy of my visit note below.    Orthopaedic Surgery Hand and Upper Extremity Follow Up Clinic Note:  Date: May 28, 2025     Visit Provider: Antoni Bernal MD  Patient ID:[id number]    Procedure Performed: Open reduction internal fixation of right ring finger proximal phalanx fracture (37113)      Date of Surgery: 1/13/2025    Subjective:  Hilaria Jansen is a 35 year old female who returns 4 months after the above procedure. Patient states her finger is feeling better, but there is still room for improvement.  Her main complaint today is her right shoulder pain, which was discussed at the last visit.  Physical therapy was ordered, but patient has not yet scheduled for that.  She has been attending hand therapy and making gains. She only occasionally has pain in the finger during the extremes of motion.     Objective:    There were no vitals taken for this visit.    MSK:  LMP  (LMP Unknown)      General: alert, appropriate, NAD  HEENT: NC/AT  CV: RRR by pulse  Pulm: Unlabored on RA  MSK:    Right ring finger  Skin clean, dry, intact without erythema or ecchymosis  Scar flat without hyperpigmentation   No signficiant tenderness to palpation about the proximal, middle, or distal phalanx   There is no pain during mid ranges of motion, but pain during extremes of ROM  The tuft is  <1 cm from the palm during composite flexion  No apparent rotational or angular deformities  Actively flexing and extending the PIP and DIP range of motion is described below  MP AROM 0-85, PROM 0-100   PIP AROM 40-70, PROM 30-80 with MP flexion PROM 30-85,   DIP AROM 0-40, PROM 0-45  Sensation intact to R/U aspect of digit distally  Capillary beds distally warm and well perfused    Imaging:    Three-view x-ray of the  right ring finger obtained in the office today and independently reviewed and interpreted by me demonstrates interval healing of the prior demonstrated proximal phalanx fracture.  The hardware is intact in good position there is evidence of osseous union.  There is an osteophyte at the radial aspect of the proximal phalangeal head..     Assessment/Plan:  4-month status post right ring finger proximal phalanx subacute fracture ORIF    Hilaria Jansen is a 35-year-old female who presents today for the above.  I reviewed my clinical and radiographic findings with her today.  I discussed with her that she is making small gains with hand therapy as time goes on, despite her persistent stiffness. Dutton admits that the finger is less bothersome for her with daily activities that it was previously.  I discussed with her that if she continues to find that the finger is bothersome then in the future we could consider ostectomy of the osteophyte and contracture release, but this would be an involved operation with significant postoperative hand therapy needs.Today her biggest concern and complaint is with her right shoulder. She has an appointment with physical therapy next week for strengthening, stretching, ROM, and stabilization exercises. I would like to see her again in 3 months for repeat clinical and radiographic examination.     Antoni Bernal MD    Hand, Upper Extremity & Microvascular Surgery  Department of Orthopaedic Surgery  [facility]    Again, thank you for allowing me to participate in the care of your patient.        Sincerely,        Antoni Bernal MD    Electronically signed

## 2025-06-05 ENCOUNTER — THERAPY VISIT (OUTPATIENT)
Dept: PHYSICAL THERAPY | Facility: CLINIC | Age: 35
End: 2025-06-05
Attending: STUDENT IN AN ORGANIZED HEALTH CARE EDUCATION/TRAINING PROGRAM
Payer: COMMERCIAL

## 2025-06-05 DIAGNOSIS — M25.572 CHRONIC PAIN OF BOTH ANKLES: ICD-10-CM

## 2025-06-05 DIAGNOSIS — M25.571 CHRONIC PAIN OF BOTH ANKLES: ICD-10-CM

## 2025-06-05 DIAGNOSIS — M25.511 RIGHT ANTERIOR SHOULDER PAIN: ICD-10-CM

## 2025-06-05 DIAGNOSIS — G89.29 CHRONIC PAIN OF BOTH ANKLES: ICD-10-CM

## 2025-06-05 ASSESSMENT — ACTIVITIES OF DAILY LIVING (ADL)
REMOVING_SOMETHING_FROM_YOUR_BACK_POCKET: 8
PUTTING_ON_YOUR_PANTS: 7
PLEASE_INDICATE_YOR_PRIMARY_REASON_FOR_REFERRAL_TO_THERAPY:: SHOULDER
CARRYING_A_SMALL_SUITCASE_WITH_YOUR_AFFECTED_LIMB: QUITE A BIT OF DIFFICULTY
PLEASE_INDICATE_YOR_PRIMARY_REASON_FOR_REFERRAL_TO_THERAPY:: ELBOW
PUSHING_WITH_THE_INVOLVED_ARM: 8
WASHING_YOUR_HAIR?: 7
ANY_OF_YOUR_USUAL_WORK,_HOUSEWORK_OR_SCHOOL_ACTIVITIES: EXTREME DIFFICULTY
UEFI_TOTAL_SCORE/80: .25
OPENING_A_JAR: EXTREME DIFFICULTY
WASHING_YOUR_BACK: 8
LAUNDERING_CLOTHES: QUITE A BIT OF DIFFICULTY
PLACING_AN_OBJECT_ONTO,_OR_REMOVING_IT_FROM_AN_OVERHEAD_SHELF: QUITE A BIT OF DIFFICULTY
THROWING_A_BALL: QUITE A BIT OF DIFFICULTY
WASHING_YOUR_HAIR_OR_SCALP: QUITE A BIT OF DIFFICULTY
SLEEPING: QUITE A BIT OF DIFFICULTY
WHEN_LYING_ON_THE_INVOLVED_SIDE: 9
REACHING_FOR_SOMETHING_ON_A_HIGH_SHELF: 9
DRESS: MODERATE DIFFICULTY
VACUUMING,_SWEEPING,_OR_RAKING: QUITE A BIT OF DIFFICULTY
CLEANING: QUITE A BIT OF DIFFICULTY
AT_ITS_WORST?: 5
UEFI_TOTAL_SCORE: 20
PREPARING_FOOD: EXTREME DIFFICULTY
YOUR_USUAL_HOBBIES,_RECREATIONAL_OR_SPORTING_ACTIVITIES: EXTREME DIFFICULTY
TYING_OR_LACING_SHOES: A LITTLE BIT OF DIFFICULTY
OPENING_DOORS: MODERATE DIFFICULTY
TOUCHING_THE_BACK_OF_YOUR_NECK: 8
USING_TOOLS_OR_APPLIANCES: QUITE A BIT OF DIFFICULTY
PUTTING_ON_A_SHIRT_THAT_BUTTONS_DOWN_THE_FRONT: 7
DOING_UP_BUTTONS: QUITE A BIT OF DIFFICULTY
LIFTING_A_BAG_OF_GROCERIES_TO_WAIST_LEVEL: QUITE A BIT OF DIFFICULTY
PLACING_AN_OBJECT_ON_A_HIGH_SHELF: 9
CARRYING_A_HEAVY_OBJECT_OF_10_POUNDS: 10
PUTTING_ON_AN_UNDERSHIRT_OR_A_PULLOVER_SWEATER: 7
PUSHING_UP_ON_YOUR_HANDS: QUITE A BIT OF DIFFICULTY
DRIVING: QUITE A BIT OF DIFFICULTY

## 2025-06-05 NOTE — PROGRESS NOTES
PHYSICAL THERAPY EVALUATION  Type of Visit: Evaluation       Fall Risk Screen:  Have you fallen 2 or more times in the past year?: No  Have you fallen and had an injury in the past year?: No  Is patient receiving Physical Therapy Services?: No    Subjective  Pt reports that she's been having pain in the right shoulder since having surgery on the right hand in . Pain down the right arm to the right hand at times with numbness and tingling. Pain is worst with certain tasks and positions. Denies vague symptoms. Would like to get rid of this pain and return to PLOF pain free. Tylenol/pain cream only helps mildly.             Presenting condition or subjective complaint: shoulder pain since I have finger surgery in january  Date of onset: 25    Relevant medical history:     Dates & types of surgery:      Prior diagnostic imaging/testing results: Other     Prior therapy history for the same diagnosis, illness or injury:        Living Environment  Social support:     Type of home:     Stairs to enter the home:         Ramp:     Stairs inside the home:         Help at home:    Equipment owned:       Employment:      Hobbies/Interests:      Patient goals for therapy: driving    Pain assessment: Pain present  Location: right shoulder down to the right hand/Ratin/10     Objective   Posture: kyphotic    Screening: Cervical AROM Flexion min loss, extension min loss no effect, rotation min loss L and R min loss pain into right arm, SB L nil and R min loss pain down the right arm, RET min loss increases/worse    *=painful    Shoulder AROM (* = pain) Right Left   *        170   * 170   ER/HBH 70* 70   IR/HBB T10* T7     Shoulder PROM (* = pain) Right Left   * 170   * 170   ER In 90 abduction 70* In 90 abduction 90   IR In 90 abduction 45* In 90 abduction 70     Shoulder Strength (* = pain) Right Left   FL 5/5 5/5   ABD 4/5* 5/5   ER (0 abduction) 4/5* 5/5   ER (90 abduction) NT/5 NT/5   IR 5/5  5/5   Biceps 5/5 5/5                 Palpation tenderness: superier right shoulder tender    Other Tests: none    Assessment & Plan   CLINICAL IMPRESSIONS  Medical Diagnosis: Right Anterior Shoulder pain    Treatment Diagnosis: Right Anterior shoulder pain   Impression/Assessment: Patient is a 35 year old female with right shoulder complaints.  The following significant findings have been identified: Pain, Decreased ROM/flexibility, Decreased joint mobility, Decreased strength, Impaired balance, Impaired muscle performance, Decreased activity tolerance, and Impaired posture. These impairments interfere with their ability to perform self care tasks, work tasks, recreational activities, household chores, driving , household mobility, and community mobility as compared to previous level of function.     Clinical Decision Making (Complexity):  Clinical Presentation: Stable/Uncomplicated  Clinical Presentation Rationale: based on medical and personal factors listed in PT evaluation  Clinical Decision Making (Complexity): Low complexity    PLAN OF CARE  Treatment Interventions:  Interventions: Manual Therapy, Neuromuscular Re-education, Therapeutic Activity, Therapeutic Exercise, Self-Care/Home Management    Long Term Goals     PT Goal 1  Goal Identifier: Reaching  Goal Description: Pt will be able to reach overhead, out to the side, behind the back and cross body pain free in order to reach cabinets, for dressing, and ADls  Rationale: to maximize safety and independence with performance of ADLs and functional tasks;to maximize safety and independence within the home;to maximize safety and independence within the community;to maximize safety and independence with transportation;to maximize safety and independence with self cares  Goal Progress: new goal  Target Date: 08/14/25  PT Goal 2  Goal Identifier: Lifting  Goal Description: Pt will be able to lift > 5 pounds overhead pain free in order to place things into high  spaces with ADLs at home, and perform daily tasks at home  Rationale: to maximize safety and independence with performance of ADLs and functional tasks;to maximize safety and independence within the home;to maximize safety and independence within the community;to maximize safety and independence with transportation;to maximize safety and independence with self cares  Goal Progress: new goal  Target Date: 08/14/25      Frequency of Treatment: 1 x week  Duration of Treatment: 10 weeks    Recommended Referrals to Other Professionals: none  Education Assessment:   Learner/Method: Patient;No Barriers to Learning  Education Comments: no concerns    Risks and benefits of evaluation/treatment have been explained.   Patient/Family/caregiver agrees with Plan of Care.     Evaluation Time:     PT Eval, Low Complexity Minutes (33877): 10     Signing Clinician: Jovani Mcdonnell PT

## 2025-06-09 ENCOUNTER — THERAPY VISIT (OUTPATIENT)
Dept: OCCUPATIONAL THERAPY | Facility: CLINIC | Age: 35
End: 2025-06-09
Payer: COMMERCIAL

## 2025-06-09 DIAGNOSIS — Z98.890 S/P ORIF (OPEN REDUCTION INTERNAL FIXATION) FRACTURE: ICD-10-CM

## 2025-06-09 DIAGNOSIS — M25.641 STIFFNESS OF FINGER JOINT, RIGHT: ICD-10-CM

## 2025-06-09 DIAGNOSIS — M79.644 FINGER PAIN, RIGHT: ICD-10-CM

## 2025-06-09 DIAGNOSIS — Z87.81 S/P ORIF (OPEN REDUCTION INTERNAL FIXATION) FRACTURE: ICD-10-CM

## 2025-06-09 DIAGNOSIS — S62.624A CLOSED DISPLACED FRACTURE OF MIDDLE PHALANX OF RIGHT RING FINGER, INITIAL ENCOUNTER: Primary | ICD-10-CM

## 2025-06-09 PROCEDURE — 97110 THERAPEUTIC EXERCISES: CPT | Mod: GO | Performed by: OCCUPATIONAL THERAPIST

## 2025-06-09 NOTE — PROGRESS NOTES
"   06/09/25 0500   Appointment Info   Treating Provider Sylvia Gardner, OTR/L, CHT   Total/Authorized Visits 6 +4 (POC)   Visits Used 8   Medical Diagnosis R ring finger fracture   OT Tx Diagnosis R ring finger stiffness and pain   Progress Note/Certification   Onset of Illness/Injury or Date of Surgery 01/12/25  (DOS)   Progress Note Completed Date 06/09/25       Present No  (pt declined)   OT Goal 1   Goal Identifier Household Chores   Goal Description Pt will be able to open a new jar without pain in order to maximize independence with ADLs   Goal Progress Moderate difficulty (2 UEFI score), improvement from extreme difficulty (0 UEFI score). Max rehab potential reached. d/c d/t able to manage independently, pain is mainly in R shoulder   Target Date 03/09/25   Subjective Report   Subjective Report \"Better than before.\" Notes stiffness has improved   Objective Measures   Objective Measures Hand Obj Measures;Objective Measure 1;Objective Measure 2;Objective Measure 3;Objective Measure 4;Objective Measure 5;Objective Measure 6   Objective Measure 2   Objective Measure RF  MP flexion AROM   Details 93 pre   Objective Measure 3   Objective Measure RF PIP flexion AROM   Details 66 pre, 71 post   Objective Measure 4   Objective Measure RF DIP flexion AROM   Details 63 pre, 70 post   Objective Measure 5   Objective Measure Hand pain   Details Up to a 6-7/10   Objective Measure 6   Objective Measure RF PIP ext AROM   Details -37 pre/post   Treatment Interventions (OT)   Interventions Therapeutic Procedure/Exercise;Manual Therapy   Therapeutic Procedure/Exercise   Therapeutic Procedure: strength, endurance, ROM, flexibillity minutes (34216) 26   Therapeutic Procedures Ther Proc 2;Ther Proc 3   Ther Proc 1 PROM - composite flexion   Ther Proc 1 - Details 3 reps passively in clinic   Ther Proc 2 PROM - hook fist flexion   Ther Proc 2 - Details 5 reps passively in clinic   Ther Proc 3 PROM - extension "   Ther Proc 3 - Details 5 reps passively in clinic   PTRx Ther Proc 1 Finger Passive Range of Motion Composite Flexion   PTRx Ther Proc 1 - Details Gently push into a bend with the other hand and hold for 10-20 seconds. Complete 5 repetitions 2-3x/day.Reviewed in clinic instructed to make a composite fist.   PTRx Ther Proc 2 Finger Passive Range of Motion Hook/IP Joint Flexion   PTRx Ther Proc 2 - Details 5 stretches - 10-20 second hold per stretch 2-3x/day   PTRx Ther Proc 3 Finger Active Range of Motion Tendon Glides Fist Series   PTRx Ther Proc 3 - Details 1 set 10 reps 2-3x/dayOnce you make a full fist then use your left hand to push your finger a little farther in for 10 seconds.Reduced fq increased length of hold   PTRx Ther Proc 4 Finger Active Range of Motion Table Top Fist Series   PTRx Ther Proc 4 - Details 1 set 10 reps 2-3x/dayKeep wrist straightOnce you make a full fist then use your left hand to push your finger a little farther in for 10 seconds.Advanced HEP to AA/PROM for ring finger for return to functional use of UE   PTRx Ther Proc 5 Hand Strengthening Gripping   PTRx Ther Proc 5 - Details 3 sets 10 repetitions 1x/dayPink sponge for resistance. Added to HEP increased resistance for functional use of UE   PTRx Ther Proc 6 Finger Passive Range of Motion Tracking for Finger Extension on Table With Assist (#2)   PTRx Ther Proc 6 - Details 15 repetitions 2-3x/day. Modified repetitions reduced # times per day   PTRx Ther Proc 7 Finger Passive Range of Motion PIP Joint Extension   PTRx Ther Proc 7 - Details Hold middle joint straight for 10-20 seconds. Complete 5 repetitions 2-3x/day.   Skilled Intervention Modification/advancement of HEP to reduce stiffness, increase functional use of UE for ADL/IADL. Provided instruction for accuracy   Patient Response/Progress Good. Pain during, but improvement after tx with carryover between sessions.   Orthotics   HAND Splinting Hand   Hand Splint Description    (Ulnar gutter)   Comments R ring and small fingers   Education   Learner/Method Patient;No Barriers to Learning   Education Comments PTRX on phone   Plan   Updates to plan of care HEP revision   Total Session Time   Timed Code Treatment Minutes 26   Total Treatment Time (sum of timed and untimed services) 26     Upper Extremity Functional Index Score:  SCORE:   Column Totals: /80: 43   (A lower score indicates greater disability.)    Beginning/End Dates of Progress Note Reporting Period:  3/14/2025 to 06/09/2025    DISCHARGE  Reason for Discharge: Maximum rehab potential has been reached. Continue HEP, see PT for R shoulder pain. Pt can manage independently with HEP.    Discharge Plan: Patient to continue home program.    Referring Provider:  Antoni Bernal

## 2025-06-12 ENCOUNTER — THERAPY VISIT (OUTPATIENT)
Dept: PHYSICAL THERAPY | Facility: CLINIC | Age: 35
End: 2025-06-12
Attending: STUDENT IN AN ORGANIZED HEALTH CARE EDUCATION/TRAINING PROGRAM
Payer: COMMERCIAL

## 2025-06-12 DIAGNOSIS — M25.511 RIGHT ANTERIOR SHOULDER PAIN: Primary | ICD-10-CM

## 2025-06-16 NOTE — PROGRESS NOTES
"Hilaria is a 35 year old who is being evaluated via a billable video visit.      The patient has been notified of following:     \"This video visit will be conducted via a call between you and your physician/provider. We have found that certain health care needs can be provided without the need for an in-person physical exam.  This service lets us provide the care you need with a video conversation.  If a prescription is necessary we can send it directly to your pharmacy.  If lab work is needed we can place an order for that and you can then stop by our lab to have the test done at a later time.    Video visits are billed at different rates depending on your insurance coverage.  Please reach out to your insurance provider with any questions.    If during the course of the call the physician/provider feels a video visit is not appropriate, you will not be charged for this service.\"    Patient has given verbal consent for Video visit? Yes    How would you like to obtain your AVS? MyChart    If the video visit is dropped, the invitation should be resent by: Send to e-mail at: [email or web address]    Will anyone else be joining your video visit? No    I    Video-Visit Details    Type of service:  Video Visit    Originating Location (pt. Location): Home    Distant Location (provider location):   [facility]    Platform used for Video Visit: [employer]    Video Start Time: 9:56 AM    Video End Time:10:39 AM    New Medical Weight Management Consult    PATIENT:  Hilaria Jansen   MRN:         [id number]   :         1990  KEITH:         2025      Dear Physician No Ref-Primary,    I had the pleasure of seeing your patient, Hilaria Jansen. Full intake/assessment was done to determine barriers to weight loss success and develop a treatment plan. Hilaria Jansen is a 35 year old female interested in treatment of medical problems associated with excess weight. She has a height " "of 5' .5\", a weight of 184 lbs 0 oz, and the calculated Body mass index is 35.34 kg/m .    Assessment & Plan   Problem List Items Addressed This Visit       Gastroesophageal reflux disease without esophagitis    Class 2 obesity due to excess calories with body mass index (BMI) of 35.0 to 35.9 in adult, unspecified whether serious comorbidity present - Primary    6/24/2025 initial evaluation. Pt will watch bariatric videos and decide if she wants to transition to bariatric surgery. In the meantime, pt will start Naltrexone. Pt has upcoming appointment with PCP on 6/30/25 and will get blood pressure checked at that time then will send [employer] message with BP reading. If normal, will start Wellbutrin to form Contrave. Discussed Naltrexone and Wellbutrin off-label use for weight loss, mechanism of action, titration guidelines, and common side effects. Medication handouts given in AVS.    Dietician appointment tomorrow.         Relevant Medications    naltrexone (DEPADE/REVIA) 50 MG tablet    Other Relevant Orders    Adult Sleep Eval & Management Referral    Hepatic function panel    Hemoglobin A1c    HTN (hypertension)     Other Visit Diagnoses         Snores        Relevant Orders    Adult Sleep Eval & Management Referral             PROGRAM OVERVIEW  Reviewed options at [facility].   All questions were answered. Education provided on chronic disease management of obesity.    SURGICAL WEIGHT LOSS   Option presented given pt BMI and current comorbid conditions. No current interest.  Website for bariatric online information session provided.  Pt will review at home and we will discuss at our follow up visit. [email or web address]    MEDICATIONS:  We discussed healthy habits to assist with weight loss. We reviewed medications associated with weight gain. We discussed the role of pharmacological agents in the treatment of obesity and the \"off-label\" use of medications in this practice. We reviewed " medication that may assist with weight loss. Indications, contraindications, risks/benefits, and potential side effects were discussed. Explained medications must always be used together with lifestyle changes. Reviewed rationale for long term use of pharmacotherapy in chronic disease management for obesity.      AOM Considerations:  Phentermine:  Avoid w/ uncontrolled HTN.  Topiramate: Not on birth control.   GLP-1:      Naltrexone: Started today   Wellbutrin: Caution HTN.  Metformin:    Contrave:  Qsymia:             PATIENT INSTRUCTIONS:  Please view bariatric online info session:  [email or web address]  A Sleep Referral was ordered today. Please call [phone], if you don't hear from someone within 2 business days.    Start Naltrexone.   Directions: Take 1/2 tab in the am for a week.  If tolerating, increase to 1/2 tablet twice daily.   4. Send a [employer] message after you get your blood pressure checked on 6/30/25 and if it's normal I will send a prescription for Wellbutrin.  Take 1 tablet in the morning for the first week, if tolerating then increase to one tablet in the morning and one tablet in the evening.  (If you have trouble with sleep you can take your second dose in the afternoon instead)   5. Labs ordered.  Please call [phone] to set up a lab appt 2-3 months after starting Naltrexone.    Goals:  Eat within 1 hour of waking. Try to eat every 4-6 hours. Prioritize protein 1st and fruit/veggies/fiber 2nd.  Strive to drink 64oz water throughout the day.  Continue to walk twice/week. Aim for working up to 20 minutes at a time. Strength train twice/week to preserve muscle.     Follow up:    Call [phone] to schedule next visit in September with Mary Mendoza NP   Dietician appt 6/25/25     44 minutes spent on the date of the encounter doing chart review, history and exam, review test results, counseling, developing plan of care, documentation, and further activities as noted  above.       Weight Related Co-morbidities:          I have the following health issues associated with obesity: HTN   I have the following symptoms associated with obesity: Joint pain   GERD  Referred to PT on 5/15/25 for chronic bilateral ankle pain and was advised at that visit to set up care with a primary care provider      Patient Active Problem List   Diagnosis    Abnormal CT scan, neck    Gastroesophageal reflux disease without esophagitis    Sinusitis    Sickle cell trait    Dry eyes, bilateral    Allergic conjunctivitis of both eyes    Muscle tension dysphonia    Bilateral low back pain with right-sided sciatica    Bilateral low back pain with left-sided sciatica    Need for Tdap vaccination    Monochorionic diamniotic twin pregnancy    Anemia during pregnancy    Monochorionic diamniotic twin gestation in third trimester    Closed displaced fracture of middle phalanx of right ring finger, initial encounter    Finger pain, right    Stiffness of finger joint, right    S/P ORIF (open reduction internal fixation) fracture    Class 2 obesity due to excess calories with body mass index (BMI) of 35.0 to 35.9 in adult, unspecified whether serious comorbidity present    Right anterior shoulder pain    Chronic pain of both ankles    HTN (hypertension)       Reported weight gain with IUD at OBGYN appointment 4/3/25. Had IUD removed.  Weight History    Previously had weight loss surgery: No   Age pt became overweight:      The following factors contributed to weight gain:    Pregnancies   I have tried the following methods to lose weight:   Increased exercise, diet.   My lowest weight since age 18 was: 121   My highest weight since age 18 was: 187   The most weight I have ever lost was: (lbs) 2-3 pounds   Family hx of obesity: No   Are you interested in learning about weight loss surgery if appropriate?  Yes   How has your weight changed over the last year?  Gained 30lbs after most recent pregnancy     Diet Recall      Wake Up: Works 8:30p-5A. Wakes up 9A, then sleeps again 4-7P.   Breakfast 12-1P Oatmeal   Lunch 8:30P Salad w/ fish   Supper 1-2A fruit like laurita, apple, banana   Bedtime:   Snack between meals:   Snack in evening:       Plantain chips   No      Typical snacks:    Caloric beverages per day. What type: Energy drinks cause high BP. Smoothies.       Low adan/diet drinks:   Alcohol:   Foods you crave:    Water and juice throughout the day.   No   Sometimes yogurts.        Sleep History    How many hours do you sleep at night? 3-7   Do you think you have sleep apnea?  No   Do you snore so loudly some one can hear you through a closed door? Yes   Has anyone seen or heard you stop breathing during your sleep?  No   Do you often feel tired, fatigued, or sleepy during the day? Yes   Do you have a TV/Computer in your bedroom? No         Eating Habits (Yes/No) (Never, Daily, Several Days, Weekly,Monthly)   Healthy Eater Yes      Eat fast food/take out? Never      Eat most meals in front of screens No      Skip meals Yes   Grazes all day No      Snacks between meals. No      Eat most food at end of day. Yes      Eat in middle of night  No      Eat  to prevent or correct low blood sugar. No      Eat to prevent GERD No      Constant Dieter Yes      I emotionally eat. (stressed, depressed, anxious, bored) No      Feel hungry all the time-even if I just have eaten. No      Feeling full is important to me. No      Finish all the food on my plate-even if already full. No      Eat/snack without noticing I'm eating. No      Trouble not eating junk if around  No      Think about food all day. No      Who does the grocery shopping? Patient   Who does the cooking? Patient       Eating Disorder Screen    I binge eat No      I make myself vomit what I have eaten or use laxatives to get rid of food. No   I eat a large amount of food, like a loaf of bread, a box of cookies, a pint/quart of ice cream, all at once. No   I eat a large amount  of food even when I am not hungry. No   I eat alone because I feel embarrassed and do not want others to see how much I have eaten. No   I eat until I am uncomfortably full. No   I feel bad, disgusted, or guilty after I overeat. No       Activity/Exercise History    How many hours of screen time do you have daily?    How many times a week are you active for the purpose of exercise? For how long? Walks for a few minutes at a time 1-2 times/week.   What do you do for exercise?  Does PT every week for Shoulder    What keeps you from being more active?        Work/Social History Reviewed With Patient    Employment status is: Full time   Job is: [employer]   Is job active or sedentary? Sedentary, works at [employer]   Marital status?    Who do you live with?   and 4 kids   Do you have children? are they overweight?      Social History     Tobacco Use    Smoking status: Never    Smokeless tobacco: Never   Vaping Use    Vaping status: Never Used   Substance Use Topics    Alcohol use: No    Drug use: No    Rare alcohol use.    Mental Health History Reviewed With Patient    How does your weight effect your mental health    How do you handle stress?  What coping techniques do you use?    Do you see a therapist?   No   Would you like to be connected with a therapist? No     MEDICATIONS:   Current Outpatient Medications   Medication Sig Dispense Refill    ibuprofen (ADVIL/MOTRIN) 800 MG tablet take 1 tablet every 8 hours as needed for pain      naltrexone (DEPADE/REVIA) 50 MG tablet Take 1/2 tab by mouth daily for a week.  If tolerating, increase to 1/2 tablet twice daily. 90 tablet 1    acetaminophen (TYLENOL) 500 MG tablet Take 1-2 tablets (500-1,000 mg) by mouth every 6 hours as needed for mild pain 100 tablet 5    amLODIPine (NORVASC) 2.5 MG tablet Take 2 tablets (5 mg) by mouth daily. 30 tablet 3     No current facility-administered medications for this visit.        ROS:    HEENT  H/O glaucoma:   no  Cardiovascular  CAD:   no  Palpitations:   no  HTN:    yes  Gastrointestinal  GERD:   Yes, managed with sitting upright and sometimes takes omeprazole.  Constipation:   Yes, sometimes. Manages it with water and fruit.  Liver Dz:   no  H/O Pancreatitis:  no  H/O Gastroparesis no  H/O Gallbladder Dz: no  Psychiatric  Anxiety:   no  Depression:  no  Bipolar:  no  H/O ETOH/Drug abuse no  H/O eating disorder: no  Endocrine  PMH/FMH of MTC or MEN2:  no  Neurologic:  H/O seizures:   no  Headaches:  no  Memory Impairment:  no    H/O kidney stones:  no  Kidney disease:  no  Current birth control:  Condoms, is thinking about getting IUD again.    Labs/Records Reviewed:  Hemoglobin A1C   Date Value Ref Range Status   05/12/2021 5.0 0 - 5.6 % Final     Comment:     Normal <5.7% Prediabetes 5.7-6.4%  Diabetes 6.5% or higher - adopted from ADA   consensus guidelines.       TSH   Date Value Ref Range Status   03/10/2025 2.61 0.30 - 4.20 uIU/mL Final   08/13/2019 1.39 0.40 - 4.00 mU/L Final     Sodium   Date Value Ref Range Status   04/24/2025 141 135 - 145 mmol/L Final   04/13/2021 136 133 - 144 mmol/L Final     Potassium   Date Value Ref Range Status   04/24/2025 4.5 3.4 - 5.3 mmol/L Final   04/13/2021 3.8 3.4 - 5.3 mmol/L Final     Chloride   Date Value Ref Range Status   04/24/2025 108 94 - 109 mmol/L Final   04/13/2021 107 94 - 109 mmol/L Final     Carbon Dioxide   Date Value Ref Range Status   04/13/2021 28 20 - 32 mmol/L Final     Carbon Dioxide (CO2)   Date Value Ref Range Status   04/24/2025 31 20 - 32 mmol/L Final     Anion Gap   Date Value Ref Range Status   04/24/2025 2 (L) 3 - 14 mmol/L Final   04/13/2021 1 (L) 3 - 14 mmol/L Final     Glucose   Date Value Ref Range Status   04/24/2025 92 70 - 99 mg/dL Final   04/13/2021 93 70 - 99 mg/dL Final     Urea Nitrogen   Date Value Ref Range Status   04/24/2025 12 7 - 30 mg/dL Final   04/13/2021 11 7 - 30 mg/dL Final     Creatinine   Date Value Ref Range Status  "  04/24/2025 1.00 0.52 - 1.04 mg/dL Final   04/13/2021 0.82 0.52 - 1.04 mg/dL Final     GFR Estimate   Date Value Ref Range Status   04/24/2025 75 >60 mL/min/1.73m2 Final   04/13/2021 >90 >60 mL/min/[1.73_m2] Final     Comment:     Non  GFR Calc  Starting 12/18/2018, serum creatinine based estimated GFR (eGFR) will be   calculated using the Chronic Kidney Disease Epidemiology Collaboration   (CKD-EPI) equation.       Calcium   Date Value Ref Range Status   04/24/2025 10.0 8.5 - 10.1 mg/dL Final   04/13/2021 9.5 8.5 - 10.1 mg/dL Final     ALT   Date Value Ref Range Status   04/24/2025 22 0 - 50 U/L Final   04/13/2021 26 0 - 50 U/L Final     AST   Date Value Ref Range Status   04/24/2025 30 0 - 45 U/L Final   04/13/2021 17 0 - 45 U/L Final     Cholesterol   Date Value Ref Range Status   03/10/2025 195 <200 mg/dL Final     Direct Measure HDL   Date Value Ref Range Status   03/10/2025 43 (L) >=50 mg/dL Final     LDL Cholesterol Calculated   Date Value Ref Range Status   03/10/2025 126 (H) <100 mg/dL Final     Triglycerides   Date Value Ref Range Status   03/10/2025 130 <150 mg/dL Final     Hemoglobin   Date Value Ref Range Status   04/24/2025 12.6 11.7 - 15.7 g/dL Final   05/12/2021 11.4 (L) 11.7 - 15.7 g/dL Final         BP Readings from Last 6 Encounters:   05/15/25 (!) 135/92   04/24/25 (!) 158/112   04/03/25 (!) 144/96   03/10/25 125/83   01/22/25 122/88   01/10/25 138/86       Pulse Readings from Last 6 Encounters:   05/15/25 78   04/24/25 81   03/10/25 100   01/10/25 78   01/01/25 83   05/21/24 75        PHYSICAL EXAM:  Ht 5' 0.5\" (1.537 m)   Wt 184 lb (83.5 kg)   BMI 35.34 kg/m    GENERAL: Healthy, alert and no distress  EYES: Eyes grossly normal to inspection.  No discharge or erythema, or obvious scleral/conjunctival abnormalities.  RESP: No audible wheeze, cough, or visible cyanosis.  No visible retractions or increased work of breathing.    SKIN: Visible skin clear. No significant rash, " abnormal pigmentation or lesions.  NEURO: Mentation and speech appropriate for age.  PSYCH: Mentation appears normal, affect normal/bright, judgement and insight intact, normal speech and appearance well-groomed.    COUNSELING:   Reviewed obesity as a chronic disease and comprehensive management stratagies.      We discussed Bariatric Basics including:  -eating 3 meals daily  -eating protein first  -eating slowly, chewing food well  -avoiding/limiting calorie containing beverages  -limiting carbohydrates and changing to whole grains  -limiting restaurant or cafeteria eating to twice a week or less    We discussed the importance of restorative sleep and stress management in maintaining a healthy weight.  We discussed insulin resistance and glycemic index as it relates to appetite and weight control.   We discussed the importance of physical activity including cardiovascular and strength training in maintaining a healthier weight and explored viable options.  Patient education of above written in AVS.      Sincerely,      Mary Mendoza NP

## 2025-06-19 RX ORDER — IBUPROFEN 800 MG/1
TABLET, FILM COATED ORAL
COMMUNITY
Start: 2025-06-14

## 2025-06-19 RX ORDER — PROMETHAZINE HYDROCHLORIDE 25 MG/1
TABLET ORAL
COMMUNITY
Start: 2025-06-14 | End: 2025-06-24

## 2025-06-24 ENCOUNTER — VIRTUAL VISIT (OUTPATIENT)
Dept: SURGERY | Facility: CLINIC | Age: 35
End: 2025-06-24
Payer: COMMERCIAL

## 2025-06-24 VITALS — WEIGHT: 184 LBS | HEIGHT: 61 IN | BODY MASS INDEX: 34.74 KG/M2

## 2025-06-24 DIAGNOSIS — R06.83 SNORES: ICD-10-CM

## 2025-06-24 DIAGNOSIS — I10 HYPERTENSION, UNSPECIFIED TYPE: ICD-10-CM

## 2025-06-24 DIAGNOSIS — E66.09 CLASS 2 OBESITY DUE TO EXCESS CALORIES WITH BODY MASS INDEX (BMI) OF 35.0 TO 35.9 IN ADULT, UNSPECIFIED WHETHER SERIOUS COMORBIDITY PRESENT: Primary | ICD-10-CM

## 2025-06-24 DIAGNOSIS — E66.812 CLASS 2 OBESITY DUE TO EXCESS CALORIES WITH BODY MASS INDEX (BMI) OF 35.0 TO 35.9 IN ADULT, UNSPECIFIED WHETHER SERIOUS COMORBIDITY PRESENT: Primary | ICD-10-CM

## 2025-06-24 DIAGNOSIS — K21.9 GASTROESOPHAGEAL REFLUX DISEASE WITHOUT ESOPHAGITIS: ICD-10-CM

## 2025-06-24 PROCEDURE — 98002 SYNCH AUDIO-VIDEO NEW MOD 45: CPT | Performed by: NURSE PRACTITIONER

## 2025-06-24 RX ORDER — NALTREXONE HYDROCHLORIDE 50 MG/1
TABLET, FILM COATED ORAL
Qty: 90 TABLET | Refills: 1 | Status: SHIPPED | OUTPATIENT
Start: 2025-06-24

## 2025-06-24 NOTE — PATIENT INSTRUCTIONS
It was nice to talk with you today! Below is the plan discussed.-  IGOR Hernandez      Pt Instructions:  Please view bariatric online info session:  [email or web address]  A Sleep Referral was ordered today. Please call [phone], if you don't hear from someone within 2 business days.    Start Naltrexone.   Directions: Take 1/2 tab in the am for a week.  If tolerating, increase to 1/2 tablet twice daily.   There are coupons on GoodRx if insurance does not cover this medication.  4. Send a [employer] message after you get your blood pressure checked on 6/30/25 and if it's normal I will send a prescription for Wellbutrin.  Take 1 tablet in the morning for the first week, if tolerating then increase to one tablet in the morning and one tablet in the evening.  (If you have trouble with sleep you can take your second dose in the afternoon instead)   5. Labs ordered.  Please call [phone] to set up a lab appt 2-3 months after starting Naltrexone.    Goals:  Eat within 1 hour of waking. Try to eat every 4-6 hours. Prioritize protein 1st and fruit/veggies/fiber 2nd.  Strive to drink 64oz water throughout the day.  Continue to walk twice/week. Aim for working up to 20 minutes at a time. Strength train twice/week to preserve muscle.     Follow up:    Call [phone] to schedule next visit in September with Mary Mendoza NP   Dietician appt 6/25/25                              Obesity is a complex chronic disease    Obesity is a brain disease that causes dysregulation of our energy system.  It is not a character flaw it is a chemistry flaw.      It has many causes.  80% is genetic.  These can be influenced by factors like an altered microbiome (microbes in your gut), endocrine disrupting chemicals, sedentary lifestyle, low nutrient/ high calorie foods, and weight promoting medications.     Energy homeostasis is tightly regulated by the central nervous system. The hypothalamus is the primary center for the  "regulation of energy balance.  The thermostat is influenced by signals in response to fullness, hunger, and others. With obesity are several impairments in those signals to your hypothalamus.  It causes your thermostat \"set point\"  is be too high.      When you lose weight, you body's response it to defend its set point. Signals will be sent to your hypothalamus to decrease your metabolism, increase hunger, and decrease feeling of fullness.  This ultimately drives your weight back to you set range.     Medications can help regulate those signals.  If you respond well to obesity medications, these should be used lifelong.  Otherwise, if removed, your body will go back to that dysregulated state and defend its set point.  (You will regain your weight.)    Obesity is a chronic disease. It is hard to treat but we are learning more every day.  Treatment is improving by leaps and bounds.  The best thing you can do is continue close follow up with your obesity medicine provider.  Together we can tackle this disease.        METFORMIN    Metformin is a medication that is used to assist with lowering blood sugars in patients that have Pre-Diabetes or Diabetes. It has also been found to help with modest weight loss.    Metformin helps to lower blood sugars by lowering the amount of sugar (glucose) your liver produces that would otherwise cause your blood sugar to increase. It also makes your body respond better to insulin (the product that lowers your blood sugar).     Emerging research reported in journals suggests metformin may also lead to weight loss as a result of changes in the appetite centers of the brain, shifts in the gut microbiome, and reversal of metabolic changes that usually happen with age.    Starting instructions:  Take 1 tablet by mouth daily with a meal for 1 week, then increase to 2 tablets daily with a meal, if tolerating can increase to 3 tablets daily with a meal or at bedtime.     Side-effects. " "Metformin is generally well tolerated. The main side-effects we see are: Diarrhea, nausea and stomach upset - this tends to subside over time as your body gets used to the medication. Taking this medications with food will lower these side effects.    For any questions or concerns please send a [employer] message to our team or call our weight management call center at [phone].     In order to get refills of this or any medication we prescribe you must be seen in the medical weight mgmt clinic every 6 months.      Naltrexone    Naltrexone is a medication that is used most often to help people who are troubled by dependence on prescription pain killers or alcohol. It has also been found to help with weight loss. Although it's not currently FDA approved for weight loss, it has been used safely for a number of years to help people who are carrying extra weight.     Just how Naltrexone helps with weight loss has not been exactly determined.  It seems to work by quieting down brain signals related to strong food cravings. Many of our patients use the word \"addiction\" to describe their feelings and constant thoughts about food. It makes sense then to treat the feeling of dependence on food, outside of real hunger, with a medication designed to help with other sorts of dependence.     Our patients on Naltrexone find that they:    >feel less interest in food   >think less about food and eating and have more time to think of other things   >find it easier to push the plate away   >have an easier time eating less    For some of our patients, these feelings are very immediate. Other patients, don't feel much of a change but find they've lost weight. Like all weight loss medications, Naltrexone works best when you help it work. This means:  1. Having less tempting high calorie (fattening) food around the house or office. (For people with strong cravings this is very important.)   2. Staying away from situations or people that " may trigger your cravings .   3. Eating out only one time or less each week.  4. Eating your meals at a table with the TV or computer off.    Side-effects. Naltrexone is generally well tolerated. The main side-effect we see is  nausea or a woozy feeling. A small number of people feel quite ill. Most people have a mild reaction and some people have no reaction at all.  The good news is that this feeling does go away.     In order to avoid nausea, please start the medication with half a pill for the first few days. Go on to a full pill if you are feeling well.      If you  are nauseated on 1/2 a pill it is okay to cut back to 1/4 pill ( a very small amount). Take this for a couple of days and work your way back up to a 1/2 pill and then a whole pill. Taking the medication at night or with food  to start also may help prevent the feeling of nausea.       WARNING: This medication blocks the action of opioid type pain medications.    If you routinely take narcotic pain medication like Codeine, Oxycontin,Percocet,Morphine,Dilaudid or Methodone, do not take this until you have talked with weight management staff.   2.  If you are planning surgery you should stop Naltrexone 4 days prior to the surgery.   3.  If you have an injury that requires pain medication, make sure the health care staff knows you take Naltrexone.       For any questions/concerns contact [facility] [phone]     Bupropion (Wellbutrin)    We are starting Bupropion (Wellbutrin). Bupropion helps lessen appetite and may mildly increase and energy.      Instructions:  Take 1 tablet in the morning for the first week, if tolerating then increase to one tablet in the morning and one tablet in the evening.  (If you have trouble with sleep you can take your second dose in the afternoon instead)    For some of our patients the medication works right away. Other patients don't feel much of a change but find they've lost weight. Like all weight  loss medications, bupropion works best when you help it work. This means:  1. Having less tempting high calorie (fattening) food around the house or office. (For people with strong cravings this is very important.)   2. Staying away from situations or people that may trigger your cravings .   3. Eating out only one time or less each week.  4. Eating your meals at a table with the TV or computer off.    Side-effects: The most common side effect include: nausea; constipation; headache;  trouble sleeping; and dry mouth.     Call the nurse at [phone] if you have any questions or concerns. (Do not stop taking it if you don't think it's working. For some people it works without them knowing it.)       In order to get refills of this or any medication we prescribe you must be seen in the medical weight mgmt clinic every 2-4 months.       CONTRAVE (bupropion and naltrexone)    We are considering starting Contrave. Contrave is specifically prescribed for obesity. It is a combination of two medications, Naltrexone and Bupropione (Wellbutrin). The Bupropion helps lessen appetite and the Naltrexone works by blocking certain receptors in the brain and curbing cravings.      For some of our patients the medication works right away. Other patients don't feel much of a change but find they've lost weight. Like all weight loss medications, Contrave works best when you help it work. This means:  1. Having less tempting high calorie (fattening) food around the house or office. (For people with strong cravings this is very important.)   2. Staying away from situations or people that may trigger your cravings .   3. Eating out only one time or less each week.  4. Eating your meals at a table with the TV or computer off.    WARNING: This medication blocks the action of opioid type pain medications. If you routinely take any medication like Codeine, Oxycontin, Percocet, Morphine, Dilaudid or Methodone, do not take this until you have  talked with weight management staff.     FYI- If you are planning on having an elective surgery, you should start titrating yourself off Contrave 4 weeks prior to surgery. This would entail decreasing the same way you started the medication, by taking one tablet less per week for 4 weeks, until you are able to stop the medication. If you need to stop it quicker, you can take 1 tablet in the morning and 1 tablet in the evening for 2 weeks, and then stop the medication. Please don't hesitate to call if you have any questions regarding this.    Dosing as follows:  Week 1- 1 tablet in the morning  Week 2- 1 tablet in the morning and 1 tablet at bedtime  Week 3- 2 tablets in the morning and 1 tablet at bedtime  Week 4 and thereafter- 2 tablets in the morning and 2 tablets at bedtime    Side-effects: The most common side effect include: nausea; constipation; headache; vomiting; dizziness; diarrhea; trouble sleeping; and dry mouth.     This medication typically isn t covered by insurance and will require a prior authorization, which can take 1-2 weeks to review. Patients can visit [email or web address] and sign up for the Pharmacy savings program and receive the medication for $60-70 per month for 12 months if eligible.    For any questions or concerns please send a [employer] message to our team or call our weight management call center at [phone] during regular business hours. For questions during evenings or weekends your messages will be addressed during the next business day.  For emergencies please call 911 or seek immediate medical care.     (Do not stop taking it if you don't think it's working. For some people it works without them knowing it.)        WEGOVY (semaglutide)      Wegovy (semaglutide) injection 2.4 mg is an injectable prescription medicine used for adults with obesity (BMI >=30) or overweight (excess weight) (BMI >=27) who also have weight-related medical problems to help them lose weight and keep the  weight off.  It is a GLP-1 agonist medication. GLP1 agonists stimulate the hormone GLP-1 in your body to allow you to feel full quickly and stay full longer.    Directions:  Start Wegovy (semaglutide) 0.25 mg once weekly for 4 weeks, then if tolerating increase to 0.5 mg weekly for 4 weeks, then if tolerating increase to 1 mg weekly for 4 weeks, then if tolerating increase to 1.7 mg weekly for 4 weeks, then if tolerating increase to 2.4 mg weekly thereafter.      -Each Wegovy pen is a once weekly single-dose prefilled pen with a pen injector already built within the pen. Discard the Wegovy pen after use in sharps container.     Common Side Effects:    nausea, headache, diarrhea, stomach upset.  If these become unmanageable call or mychart.    Serious Side effects:   Pancreatitis (inflammation of the pancreas) has been associated with this type of medication, but is very rare.Symptoms of pancreatitis include: Pain in your upper stomach area which may travel to your back and be worse after eating.     Tips to help with side effects:  For Nausea/Heartburn:  Eat small, protein focused meals throughout the day  Stay hydrated.-The medication can decrease your drive for thirst  Eat slowly. STOP at the first sign of satisfaction  Eat at regular intervals, letting hours pass without eating- can cause nausea.  AVOID foods that are high in fat and sugar .      For constipation:  Stay hydrated and make sure you are moving.    Miralax 1 scoop/packet up daily    Ducosate Sodium 1 pill twice daily (stool softener)    Storage:   Store the prescription in the refrigerator. Once it is time to use the Wegovy pen, you can keep the pen at room temperature and it is good for up to 28 days at room temperature.     How to inject:  For a video on how to use the pen please go to:  https://www.[employer]/about-wegovy/how-to-use-the-wegovy-pen.html#itemTwo

## 2025-06-24 NOTE — ASSESSMENT & PLAN NOTE
6/24/2025 initial evaluation. Pt will watch bariatric videos and decide if she wants to transition to bariatric surgery. In the meantime, pt will start Naltrexone. Pt has upcoming appointment with PCP on 6/30/25 and will get blood pressure checked at that time then will send [employer] message with BP reading. If normal, will start Wellbutrin to form Contrave. Discussed Naltrexone and Wellbutrin off-label use for weight loss, mechanism of action, titration guidelines, and common side effects. Medication handouts given in AVS.    Dietician appointment tomorrow.

## 2025-06-26 ENCOUNTER — THERAPY VISIT (OUTPATIENT)
Dept: PHYSICAL THERAPY | Facility: CLINIC | Age: 35
End: 2025-06-26
Payer: COMMERCIAL

## 2025-06-26 DIAGNOSIS — M25.511 RIGHT ANTERIOR SHOULDER PAIN: Primary | ICD-10-CM

## 2025-06-30 ENCOUNTER — OFFICE VISIT (OUTPATIENT)
Dept: INTERNAL MEDICINE | Facility: CLINIC | Age: 35
End: 2025-06-30
Payer: COMMERCIAL

## 2025-06-30 ENCOUNTER — ANCILLARY PROCEDURE (OUTPATIENT)
Dept: GENERAL RADIOLOGY | Facility: CLINIC | Age: 35
End: 2025-06-30
Attending: STUDENT IN AN ORGANIZED HEALTH CARE EDUCATION/TRAINING PROGRAM
Payer: COMMERCIAL

## 2025-06-30 VITALS
HEART RATE: 97 BPM | RESPIRATION RATE: 20 BRPM | DIASTOLIC BLOOD PRESSURE: 85 MMHG | HEIGHT: 61 IN | WEIGHT: 194.1 LBS | SYSTOLIC BLOOD PRESSURE: 135 MMHG | TEMPERATURE: 98.6 F | OXYGEN SATURATION: 97 % | BODY MASS INDEX: 36.65 KG/M2

## 2025-06-30 DIAGNOSIS — M25.571 PAIN IN JOINT INVOLVING ANKLE AND FOOT, RIGHT: ICD-10-CM

## 2025-06-30 DIAGNOSIS — I10 HYPERTENSION, UNSPECIFIED TYPE: Primary | ICD-10-CM

## 2025-06-30 PROCEDURE — 3075F SYST BP GE 130 - 139MM HG: CPT | Performed by: STUDENT IN AN ORGANIZED HEALTH CARE EDUCATION/TRAINING PROGRAM

## 2025-06-30 PROCEDURE — 73610 X-RAY EXAM OF ANKLE: CPT | Mod: TC | Performed by: RADIOLOGY

## 2025-06-30 PROCEDURE — 99214 OFFICE O/P EST MOD 30 MIN: CPT | Performed by: STUDENT IN AN ORGANIZED HEALTH CARE EDUCATION/TRAINING PROGRAM

## 2025-06-30 PROCEDURE — 3079F DIAST BP 80-89 MM HG: CPT | Performed by: STUDENT IN AN ORGANIZED HEALTH CARE EDUCATION/TRAINING PROGRAM

## 2025-06-30 RX ORDER — AMLODIPINE AND BENAZEPRIL HYDROCHLORIDE 5; 10 MG/1; MG/1
1 CAPSULE ORAL DAILY
Qty: 90 CAPSULE | Refills: 0 | Status: SHIPPED | OUTPATIENT
Start: 2025-06-30 | End: 2025-09-28

## 2025-06-30 NOTE — PROGRESS NOTES
Assessment & Plan     (I10) Hypertension, unspecified type  (primary encounter diagnosis)  - Home BP readings are routinely over 130s/80s per her report  - No side effects to amlodipine 5mg daily  Plan: amLODIPine-benazepril (LOTREL) 5-10 MG capsule        Return in 3 months for recheck    (M25.571) Pain in joint involving ankle and foot, right  - Possibly related to standing and weight, works at [employer]  - Exam is benign, had XR several months ago normal  - Naproxen did not help with the pain  Plan: XR Ankle Right G/E 3 Views, Physical Therapy          Referral        OTC analgesics        Subjective   Hilaria is a 35 year old, presenting for the following health issues:  Hypertension and Pain (Right leg pain.)      6/30/2025     1:49 PM   Additional Questions   Roomed by ROSANA Thorne   Accompanied by Self     Musculoskeletal Problem    History of Present Illness       Hypertension: She presents for follow up of hypertension.  She does check blood pressure  regularly outside of the clinic. Outpatient blood pressures have not been over 140/90. She follows a low salt diet.     Reason for visit:  Leg pain    She eats 2-3 servings of fruits and vegetables daily.She consumes 1 sweetened beverage(s) daily.She exercises with enough effort to increase her heart rate 10 to 19 minutes per day.  She exercises with enough effort to increase her heart rate 3 or less days per week. She is missing 2 dose(s) of medications per week.          Hypertension Follow-up    Do you check your blood pressure regularly outside of the clinic? Yes   Are you following a low salt diet? Yes  Are your blood pressures ever more than 140 on the top number (systolic) OR more   than 90 on the bottom number (diastolic), for example 140/90? No    BP Readings from Last 2 Encounters:   06/30/25 (!) 138/90   05/15/25 (!) 135/92     How many servings of fruits and vegetables do you eat daily?  2-3  On average, how many sweetened beverages do you  "drink each day (Examples: soda, juice, sweet tea, etc.  Do NOT count diet or artificially sweetened beverages)?   1  How many days per week do you exercise enough to make your heart beat faster? 3 or less  How many minutes a day do you exercise enough to make your heart beat faster? 10 - 19  How many days per week do you miss taking your medication? 2  What makes it hard for you to take your medications?  remembering to take        Review of Systems  Constitutional, neuro, ENT, endocrine, pulmonary, cardiac, gastrointestinal, genitourinary, musculoskeletal, integument and psychiatric systems are negative, except as otherwise noted.      Objective    BP (!) 138/90 (BP Location: Left arm, Patient Position: Sitting, Cuff Size: Adult Large)   Pulse 97   Temp 98.6  F (37  C) (Oral)   Resp 20   Ht 1.537 m (5' 0.5\")   Wt 88 kg (194 lb 1.6 oz)   LMP 06/15/2025 (Exact Date)   SpO2 97%   BMI 37.28 kg/m    Body mass index is 37.28 kg/m .  Physical Exam  Vitals reviewed.   Constitutional:       Appearance: Normal appearance.   HENT:      Head: Atraumatic.   Eyes:      Extraocular Movements: Extraocular movements intact.   Cardiovascular:      Rate and Rhythm: Normal rate and regular rhythm.   Pulmonary:      Breath sounds: Normal breath sounds.   Abdominal:      Palpations: Abdomen is soft.   Musculoskeletal:         General: Normal range of motion.      Cervical back: Normal range of motion.   Skin:     General: Skin is warm.   Neurological:      General: No focal deficit present.   Psychiatric:         Mood and Affect: Mood normal.                Signed Electronically by: Niraj Whiting MD  "

## 2025-07-10 ENCOUNTER — THERAPY VISIT (OUTPATIENT)
Dept: PHYSICAL THERAPY | Facility: CLINIC | Age: 35
End: 2025-07-10
Payer: COMMERCIAL

## 2025-07-10 DIAGNOSIS — M25.511 RIGHT ANTERIOR SHOULDER PAIN: Primary | ICD-10-CM

## 2025-07-18 ENCOUNTER — THERAPY VISIT (OUTPATIENT)
Dept: PHYSICAL THERAPY | Facility: CLINIC | Age: 35
End: 2025-07-18
Payer: COMMERCIAL

## 2025-07-18 DIAGNOSIS — M25.571 PAIN IN JOINT INVOLVING ANKLE AND FOOT, RIGHT: Primary | ICD-10-CM

## 2025-07-18 PROCEDURE — 97010 HOT OR COLD PACKS THERAPY: CPT | Mod: GP | Performed by: PHYSICAL THERAPIST

## 2025-07-18 PROCEDURE — 97161 PT EVAL LOW COMPLEX 20 MIN: CPT | Mod: GP | Performed by: PHYSICAL THERAPIST

## 2025-07-18 PROCEDURE — 97110 THERAPEUTIC EXERCISES: CPT | Mod: GP | Performed by: PHYSICAL THERAPIST

## 2025-07-18 ASSESSMENT — ACTIVITIES OF DAILY LIVING (ADL)
LEFS_RAW_SCORE: INCOMPLETE
REMOVING_SOMETHING_FROM_YOUR_BACK_POCKET: 4
PLEASE_INDICATE_YOR_PRIMARY_REASON_FOR_REFERRAL_TO_THERAPY:: FOOT AND/OR ANKLE
PUTTING_ON_AN_UNDERSHIRT_OR_A_PULLOVER_SWEATER: 5
PUTTING_ON_A_SHIRT_THAT_BUTTONS_DOWN_THE_FRONT: 4
LEFS_SCORE(%): INCOMPLETE
AT_ITS_WORST?: 7
PUSHING_WITH_THE_INVOLVED_ARM: 5
WASHING_YOUR_HAIR?: 3
WHEN_LYING_ON_THE_INVOLVED_SIDE: 6
TOUCHING_THE_BACK_OF_YOUR_NECK: 6
REACHING_FOR_SOMETHING_ON_A_HIGH_SHELF: 5
PLACING_AN_OBJECT_ON_A_HIGH_SHELF: 6
WASHING_YOUR_BACK: 4
PUTTING_ON_YOUR_PANTS: 5
ANY_OF_YOUR_USUAL_WORK,_HOUSEWORK_OR_SCHOOL_ACTIVITIES: MODERATE DIFFICULTY
PLEASE_INDICATE_YOR_PRIMARY_REASON_FOR_REFERRAL_TO_THERAPY:: SHOULDER
CARRYING_A_HEAVY_OBJECT_OF_10_POUNDS: 9

## 2025-07-18 NOTE — PROGRESS NOTES
PHYSICAL THERAPY EVALUATION  Type of Visit: Evaluation       Fall Risk Screen:  Have you fallen 2 or more times in the past year?: No  Have you fallen and had an injury in the past year?: Yes      Subjective         Presenting condition or subjective complaint: shoulder and foot pain. Onset of R lateral ankle/foot pain no apparent reason, possibly after having finger surgery in Jan. and feels like has to walk differently. Walking, stairs, being on feet all hurt. Taking ~ 2 ibuprofen/day, icing. X-rays neg on 06/30/25.  Date of onset: 06/30/25 (order date)    Relevant medical history: Anemia; High blood pressure   Dates & types of surgery: neck surgery 2016,c section 2019,csection 2021,twins c section 2022    Prior diagnostic imaging/testing results: X-ray     Prior therapy history for the same diagnosis, illness or injury: No      Living Environment  Social support: With a significant other or spouse   Type of home: [facility]   Stairs to enter the home: Yes 3 Is there a railing: Yes     Ramp:     Stairs inside the home: Yes 3 Is there a railing: Yes     Help at home: None  Equipment owned:       Employment: Yes postage due technician  Hobbies/Interests: shopping ,cooking,dancing    Patient goals for therapy: I cannnot carry more than 5 pounds    Pain assessment: 7-8/10 pain R lateral ankle/foot.     Objective   FOOT/ANKLE EVALUATION  INTEGUMENTARY (edema, incisions): Figure 8: .25cm greater on R  GAIT:   Weightbearing Status: WBAT  Assistive Device(s): None  Gait Deviations: Antalgic  Decreased stance time R, decreased push off R  BALANCE/PROPRIOCEPTION: Single Leg Stance Eyes Open (seconds): L 5 seconds, R 2 seconds with some pain  WEIGHT BEARING ALIGNMENT: Foot/Ankle WB Alignment:Pes planus L, Pes planus R  ROM: AROM R DF/PF/Inv min loss with all (guarded), Ev WNL  STRENGTH: general weakness noted with all 4 diagonals R, L 5/5  FLEXIBILITY: gastroc/soleus tightness R>L  SPECIAL TESTS: -valgus/varus testing  B  FUNCTIONAL TESTS: Step Test: able to do 4 inch step up, sharp pain with attempt at step down  PALPATION: general TTP R lateral ankle/ATFL area  JOINT MOBILITY: hypomobile TCJ R    Assessment & Plan   CLINICAL IMPRESSIONS  Medical Diagnosis: R ankle/foot pain    Treatment Diagnosis: R lateral ankle pain   Impression/Assessment: Patient is a 35 year old female with R ankle pain complaints.  The following significant findings have been identified: Pain, Decreased ROM/flexibility, Decreased joint mobility, Decreased strength, Impaired balance, Decreased proprioception, Inflammation, Edema, Impaired gait, Impaired muscle performance, Decreased activity tolerance, and Instability. These impairments interfere with their ability to perform self care tasks, work tasks, recreational activities, household chores, driving , household mobility, and community mobility as compared to previous level of function.     Clinical Decision Making (Complexity):  Clinical Presentation: Stable/Uncomplicated  Clinical Presentation Rationale: based on medical and personal factors listed in PT evaluation  Clinical Decision Making (Complexity): Low complexity    PLAN OF CARE  Treatment Interventions:  Modalities: Cryotherapy  Interventions: Gait Training, Manual Therapy, Neuromuscular Re-education, Therapeutic Activity, Therapeutic Exercise, Self-Care/Home Management    Long Term Goals     PT Goal 1  Goal Identifier: Able to ambulate stairs reciprocally  Goal Description: 2/10 pain or less  Rationale: to maximize safety and independence with performance of ADLs and functional tasks;to maximize safety and independence within the home;to maximize safety and independence within the community;to maximize safety and independence with transportation;to maximize safety and independence with self cares  Goal Progress: currently 7-8/10 pain  Target Date: 09/13/25  PT Goal 2  Goal Identifier: Able to walk/stand for full day 2/10 pain or  less  Rationale: to maximize safety and independence with performance of ADLs and functional tasks;to maximize safety and independence within the home;to maximize safety and independence within the community;to maximize safety and independence with transportation;to maximize safety and independence with self cares  Goal Progress: currently 7-8/10 pain  Target Date: 09/13/25      Frequency of Treatment: 2x month  Duration of Treatment: 2 months      Education Assessment:   Learner/Method: Patient    Risks and benefits of evaluation/treatment have been explained.   Patient/Family/caregiver agrees with Plan of Care.     Evaluation Time:     PT Eval, Low Complexity Minutes (98752): 15     Signing Clinician: Reba Pineda, PT,OCS

## 2025-07-19 PROBLEM — M25.571 PAIN IN JOINT INVOLVING ANKLE AND FOOT, RIGHT: Status: ACTIVE | Noted: 2025-07-19

## 2025-07-24 DIAGNOSIS — E66.09 CLASS 2 OBESITY DUE TO EXCESS CALORIES WITH BODY MASS INDEX (BMI) OF 35.0 TO 35.9 IN ADULT, UNSPECIFIED WHETHER SERIOUS COMORBIDITY PRESENT: Primary | ICD-10-CM

## 2025-07-24 DIAGNOSIS — E66.812 CLASS 2 OBESITY DUE TO EXCESS CALORIES WITH BODY MASS INDEX (BMI) OF 35.0 TO 35.9 IN ADULT, UNSPECIFIED WHETHER SERIOUS COMORBIDITY PRESENT: Primary | ICD-10-CM

## 2025-07-24 RX ORDER — BUPROPION HYDROCHLORIDE 150 MG/1
TABLET, EXTENDED RELEASE ORAL
Qty: 60 TABLET | Refills: 0 | Status: SHIPPED | OUTPATIENT
Start: 2025-07-24

## 2025-08-26 ENCOUNTER — THERAPY VISIT (OUTPATIENT)
Dept: PHYSICAL THERAPY | Facility: CLINIC | Age: 35
End: 2025-08-26
Payer: COMMERCIAL

## 2025-08-26 DIAGNOSIS — M25.511 RIGHT ANTERIOR SHOULDER PAIN: Primary | ICD-10-CM

## 2025-08-26 PROBLEM — M25.571 PAIN IN JOINT INVOLVING ANKLE AND FOOT, RIGHT: Status: RESOLVED | Noted: 2025-07-19 | Resolved: 2025-08-26

## 2025-08-26 PROCEDURE — 97110 THERAPEUTIC EXERCISES: CPT | Mod: GP | Performed by: PHYSICAL THERAPIST

## 2025-09-03 ENCOUNTER — OFFICE VISIT (OUTPATIENT)
Dept: ORTHOPEDICS | Facility: CLINIC | Age: 35
End: 2025-09-03
Payer: COMMERCIAL

## 2025-09-03 DIAGNOSIS — Z47.89 ORTHOPEDIC AFTERCARE: Primary | ICD-10-CM

## 2025-09-03 PROCEDURE — 99213 OFFICE O/P EST LOW 20 MIN: CPT | Performed by: STUDENT IN AN ORGANIZED HEALTH CARE EDUCATION/TRAINING PROGRAM

## (undated) DEVICE — CATH TRAY FOLEY 16FR BARDEX W/DRAIN BAG STATLOCK 300316A

## (undated) DEVICE — SU VICRYL 0 CT-1 36" J346H

## (undated) DEVICE — BARRIER SEPRAFILM 5X6" SINGLE SHEET 4301-02

## (undated) DEVICE — SOL WATER IRRIG 1000ML BOTTLE 07139-09

## (undated) DEVICE — PREP CHLORAPREP 26ML TINTED ORANGE  260815

## (undated) DEVICE — BNDG ABDOMINAL BINDER 10X26-50" 08140145

## (undated) DEVICE — DRSG STERI STRIP 1/4X3" R1541

## (undated) DEVICE — SU MONOCRYL 0 CT-1 36" Y346H

## (undated) DEVICE — SU VICRYL 4-0 KS 27" UND J662H

## (undated) DEVICE — BASIN SET MAJOR

## (undated) DEVICE — STOCKING SLEEVE COMPRESSION CALF MED

## (undated) DEVICE — PACK C-SECTION LF PL15OTA83B

## (undated) DEVICE — GLOVE PROTEXIS BLUE W/NEU-THERA 7.0  2D73EB70

## (undated) DEVICE — STRAP KNEE/BODY 31143004

## (undated) DEVICE — SOL NACL 0.9% IRRIG 1000ML BOTTLE 07138-09

## (undated) DEVICE — SOL ADH LIQUID BENZOIN SWAB 0.6ML C1544

## (undated) DEVICE — SU VICRYL 3-0 CTX 36" UND J980H

## (undated) DEVICE — STOCKING SLEEVE COMPRESSION CALF LG

## (undated) DEVICE — ESU PENCIL W/HOLSTER

## (undated) DEVICE — SUCTION CANISTER MEDIVAC LINER 1500ML W/LID 65651-515

## (undated) DEVICE — ESU GROUND PAD UNIVERSAL W/O CORD

## (undated) DEVICE — GLOVE ESTEEM POWDER FREE SMT 6.5  2D72PT65

## (undated) DEVICE — SU MONOCRYL 4-0 PS-2 18" UND Y496G

## (undated) DEVICE — GLOVE PROTEXIS BLUE W/NEU-THERA 6.5  2D73EB65

## (undated) DEVICE — ADH SKIN CLOSURE PREMIERPRO EXOFIN 1.0ML 3470

## (undated) DEVICE — GLOVE ESTEEM POWDER FREE SMT 6.0  2D72PT60

## (undated) DEVICE — DRAPE SETUP C-SECTION INVISISHIELD 54X90" DYNJE5600

## (undated) DEVICE — DRSG TELFA ISLAND 4X10"

## (undated) RX ORDER — FENTANYL CITRATE 50 UG/ML
INJECTION, SOLUTION INTRAMUSCULAR; INTRAVENOUS
Status: DISPENSED
Start: 2019-06-17

## (undated) RX ORDER — MORPHINE SULFATE 1 MG/ML
INJECTION, SOLUTION EPIDURAL; INTRATHECAL; INTRAVENOUS
Status: DISPENSED
Start: 2021-12-15

## (undated) RX ORDER — MORPHINE SULFATE 1 MG/ML
INJECTION, SOLUTION EPIDURAL; INTRATHECAL; INTRAVENOUS
Status: DISPENSED
Start: 2022-10-14

## (undated) RX ORDER — MORPHINE SULFATE 1 MG/ML
INJECTION, SOLUTION EPIDURAL; INTRATHECAL; INTRAVENOUS
Status: DISPENSED
Start: 2019-06-17

## (undated) RX ORDER — FENTANYL CITRATE 50 UG/ML
INJECTION, SOLUTION INTRAMUSCULAR; INTRAVENOUS
Status: DISPENSED
Start: 2021-12-15

## (undated) RX ORDER — KETOROLAC TROMETHAMINE 30 MG/ML
INJECTION, SOLUTION INTRAMUSCULAR; INTRAVENOUS
Status: DISPENSED
Start: 2019-06-17

## (undated) RX ORDER — EPINEPHRINE 1 MG/ML
INJECTION, SOLUTION, CONCENTRATE INTRAVENOUS
Status: DISPENSED
Start: 2021-12-15

## (undated) RX ORDER — PHENYLEPHRINE HCL IN 0.9% NACL 1 MG/10 ML
SYRINGE (ML) INTRAVENOUS
Status: DISPENSED
Start: 2019-06-17

## (undated) RX ORDER — KETOROLAC TROMETHAMINE 30 MG/ML
INJECTION, SOLUTION INTRAMUSCULAR; INTRAVENOUS
Status: DISPENSED
Start: 2022-10-14

## (undated) RX ORDER — FENTANYL CITRATE 50 UG/ML
INJECTION, SOLUTION INTRAMUSCULAR; INTRAVENOUS
Status: DISPENSED
Start: 2022-10-14